# Patient Record
Sex: MALE | Race: WHITE | NOT HISPANIC OR LATINO | Employment: FULL TIME | URBAN - METROPOLITAN AREA
[De-identification: names, ages, dates, MRNs, and addresses within clinical notes are randomized per-mention and may not be internally consistent; named-entity substitution may affect disease eponyms.]

---

## 2021-01-15 ENCOUNTER — OFFICE VISIT (OUTPATIENT)
Dept: FAMILY MEDICINE CLINIC | Facility: CLINIC | Age: 61
End: 2021-01-15
Payer: COMMERCIAL

## 2021-01-15 VITALS
HEIGHT: 73 IN | SYSTOLIC BLOOD PRESSURE: 144 MMHG | WEIGHT: 235 LBS | BODY MASS INDEX: 31.14 KG/M2 | OXYGEN SATURATION: 98 % | DIASTOLIC BLOOD PRESSURE: 96 MMHG | TEMPERATURE: 98.7 F | HEART RATE: 88 BPM

## 2021-01-15 DIAGNOSIS — Z12.5 PROSTATE CANCER SCREENING: ICD-10-CM

## 2021-01-15 DIAGNOSIS — R03.0 ELEVATED BLOOD PRESSURE READING IN OFFICE WITHOUT DIAGNOSIS OF HYPERTENSION: ICD-10-CM

## 2021-01-15 DIAGNOSIS — Z13.29 THYROID DISORDER SCREEN: ICD-10-CM

## 2021-01-15 DIAGNOSIS — K40.21 BILATERAL RECURRENT INGUINAL HERNIA WITHOUT OBSTRUCTION OR GANGRENE: ICD-10-CM

## 2021-01-15 DIAGNOSIS — Z13.1 DIABETES MELLITUS SCREENING: ICD-10-CM

## 2021-01-15 DIAGNOSIS — Z12.12 SCREENING FOR COLORECTAL CANCER: ICD-10-CM

## 2021-01-15 DIAGNOSIS — Z12.11 SCREENING FOR COLORECTAL CANCER: ICD-10-CM

## 2021-01-15 DIAGNOSIS — Z11.59 NEED FOR HEPATITIS C SCREENING TEST: ICD-10-CM

## 2021-01-15 DIAGNOSIS — Z13.220 LIPID SCREENING: ICD-10-CM

## 2021-01-15 DIAGNOSIS — Z13.31 NEGATIVE DEPRESSION SCREENING: ICD-10-CM

## 2021-01-15 DIAGNOSIS — Z76.89 ENCOUNTER TO ESTABLISH CARE: Primary | ICD-10-CM

## 2021-01-15 DIAGNOSIS — N52.9 ERECTILE DYSFUNCTION, UNSPECIFIED ERECTILE DYSFUNCTION TYPE: ICD-10-CM

## 2021-01-15 PROCEDURE — 3725F SCREEN DEPRESSION PERFORMED: CPT | Performed by: PHYSICIAN ASSISTANT

## 2021-01-15 PROCEDURE — 99204 OFFICE O/P NEW MOD 45 MIN: CPT | Performed by: PHYSICIAN ASSISTANT

## 2021-01-15 RX ORDER — TADALAFIL 5 MG/1
5 TABLET ORAL DAILY PRN
Qty: 25 TABLET | Refills: 1 | Status: SHIPPED | OUTPATIENT
Start: 2021-01-15 | End: 2022-07-05

## 2021-01-15 NOTE — PATIENT INSTRUCTIONS
Hypertension   AMBULATORY CARE:   Hypertension  is high blood pressure  Your blood pressure is the force of your blood moving against the walls of your arteries  Hypertension causes your blood pressure to get so high that your heart has to work much harder than normal  This can damage your heart  The cause of hypertension may not be known  This is called essential or primary hypertension  Hypertension caused by another medical condition, such as kidney disease, is called secondary hypertension  Common symptoms include the following:   · Headache     · Blurred vision     · Chest pain     · Dizziness or weakness     · Trouble breathing    · Nosebleeds    Call 911 for any of the following:   · You have chest pain  · You have any of the following signs of a heart attack:      ? Squeezing, pressure, or pain in your chest    ? You may  also have any of the following:     § Discomfort or pain in your back, neck, jaw, stomach, or arm    § Shortness of breath    § Nausea or vomiting    § Lightheadedness or a sudden cold sweat    · You become confused or have difficulty speaking  · You suddenly feel lightheaded or have trouble breathing  Seek care immediately if:   · You have a severe headache or vision loss  · You have weakness in an arm or leg  Contact your healthcare provider if:   · You feel faint, dizzy, confused, or drowsy  · You have been taking your blood pressure medicine but your pressure is higher than your provider says it should be  · You have questions or concerns about your condition or care  What you need to know about the stages of hypertension:       · Normal blood pressure is 119/79 or lower   Your healthcare provider may only check your blood pressure each year if it stays at a normal level  · Elevated blood pressure is 120/79 to 129/79   This is sometimes called prehypertension   Your healthcare provider may suggest lifestyle changes to help lower your blood pressure to a normal level  He or she may then check it again in 3 to 6 months  · Stage 1 hypertension is 130/80  to 139/89   Your provider may recommend lifestyle changes, medication, and checks every 3 to 6 months until your blood pressure is controlled  · Stage 2 hypertension is 140/90 or higher   Your provider will recommend lifestyle changes and have you take 2 kinds of hypertension medicines  You will also need to have your blood pressure checked monthly until it is controlled  Treatment for hypertension  may include medicine to lower your blood pressure and lower your cholesterol level  A low cholesterol level helps prevent heart disease and makes it easier to control your blood pressure  Take your medicine exactly as directed  You may also need to make lifestyle changes  Manage hypertension:   · Check your blood pressure at home  Avoid smoking, caffeine, and exercise at least 30 minutes before checking your blood pressure  Sit and rest for 5 minutes before you take your blood pressure  Extend your arm and support it on a flat surface  Your arm should be at the same level as your heart  Follow the directions that came with your blood pressure monitor  Check your blood pressure 2 times, 1 minute apart, before you take your medicine in the morning  Also check your blood pressure before your evening meal  Keep a record of your readings and bring it to your follow-up visits  Ask your healthcare provider what your blood pressure should be  · Manage any other health conditions you have  Health conditions such as diabetes can increase your risk for hypertension  Follow your healthcare provider's instructions and take all your medicines as directed  · Ask about all medicines  Certain medicines can increase your blood pressure  Examples include oral birth control pills, decongestants, herbal supplements, and NSAIDs, such as ibuprofen  Your healthcare provider can tell you which medicines are safe for you to take  This includes prescription and over-the-counter medicines  Lifestyle changes you can make to manage hypertension:   · Limit sodium (salt) as directed  Too much sodium can affect your fluid balance  Check labels to find low-sodium or no-salt-added foods  Some low-sodium foods use potassium salts for flavor  Too much potassium can also cause health problems  Your healthcare provider will tell you how much sodium and potassium are safe for you to have in a day  He or she may recommend that you limit sodium to 2,300 mg a day  · Follow the meal plan recommended by your healthcare provider  A dietitian or your provider can give you more information on low-sodium plans or the DASH (Dietary Approaches to Stop Hypertension) eating plan  The DASH plan is low in sodium, unhealthy fats, and total fat  It is high in potassium, calcium, and fiber  · Exercise to maintain a healthy weight  Exercise at least 30 minutes per day, on most days of the week  This will help decrease your blood pressure  Ask your healthcare provider about the best exercise plan for you  · Decrease stress  This may help lower your blood pressure  Learn ways to relax, such as deep breathing or listening to music  · Limit alcohol as directed  Alcohol can increase your blood pressure  A drink of alcohol is 12 ounces of beer, 5 ounces of wine, or 1½ ounces of liquor  · Do not smoke  Nicotine and other chemicals in cigarettes and cigars can increase your blood pressure and also cause lung damage  Ask your healthcare provider for information if you currently smoke and need help to quit  E-cigarettes or smokeless tobacco still contain nicotine  Talk to your healthcare provider before you use these products  Follow up with your healthcare provider as directed: You will need to return to have your blood pressure checked and to have other lab tests done   Write down your questions so you remember to ask them during your visits  © Copyright [address] [place] [address] Information is for End User's use only and may not be sold, redistributed or otherwise used for commercial purposes  All illustrations and images included in CareNotes® are the copyrighted property of [employer]  or Betito Browne  The above information is an  only  It is not intended as medical advice for individual conditions or treatments  Talk to your doctor, nurse or pharmacist before following any medical regimen to see if it is safe and effective for you

## 2021-01-15 NOTE — PROGRESS NOTES
Routine Follow-up    Richard Mahajan 61 y o  male   Date:  1/15/2021      Assessment and Plan:    Hugo Camacho was seen today for establish care and hernia  Diagnoses and all orders for this visit:    Encounter to establish care    Screening for colorectal cancer  -     Cologuard; Future    Lipid screening  -     Lipid panel; Future    Diabetes mellitus screening  -     Comprehensive metabolic panel; Future  -     Hemoglobin A1C; Future    Thyroid disorder screen  -     TSH, 3rd generation with Free T4 reflex; Future    Need for hepatitis C screening test  -     Hepatitis C antibody; Future    Prostate cancer screening  -     PSA, Total Screen; Future    Negative depression screening    Bilateral recurrent inguinal hernia without obstruction or gangrene  -     Ambulatory referral to General Surgery; Future    Elevated blood pressure reading in office without diagnosis of hypertension  - follow up in 1 month to continue to monitor  - smoking cessation, limit caffeine, low salt     Erectile dysfunction, unspecified erectile dysfunction type  -     tadalafil (CIALIS) 5 MG tablet; Take 1 tablet (5 mg total) by mouth daily as needed for erectile dysfunction            BMI Counseling: Body mass index is 31 43 kg/m²  The BMI is above normal  Nutrition recommendations include decreasing portion sizes and encouraging healthy choices of fruits and vegetables  Tobacco Cessation Counseling: Tobacco cessation counseling was provided  The patient is sincerely urged to quit consumption of tobacco  He is not ready to quit tobacco        HPI:  Chief Complaint   Patient presents with   [address] patient   Hernia     Possible hernia, patients states "he has 3 testicles" since May 2020  Declined flu shot  HPI   Patient is a 60 yo male who presents to establish care  No recent PCP  His main concern is possible inguinal hernias  He states he has "3 testicles"   There is a lump floating around in there L scrotal area  He does appreciate not appreciate pain but sometimes fullness - "it feels crowded"  He notices bulges of b/l groin x 2 years  No difficulty with urination or bowel habits  He smokes 2 packs per day, smokes x 47 years  He states he should wear glasses  He does hearing 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 work  He does a physical every year with work  He has not done any routine labs, crc screening  He declines lung cancer screening  He does have concern for ED  ROS: Review of Systems   Constitutional: Negative  HENT: Negative  Eyes: Positive for visual disturbance  Respiratory: Negative  Cardiovascular: Negative  Gastrointestinal: Negative  Genitourinary: Positive for scrotal swelling (L side)  Negative for difficulty urinating, dysuria, hematuria, penile pain and testicular pain  Skin: Negative  Neurological: Negative  Psychiatric/Behavioral: Negative  History reviewed  No pertinent past medical history  There is no problem list on file for this patient        Past Surgical History:   Procedure Laterality Date    TONSILLECTOMY         Social History     Socioeconomic History    Marital status: /Civil Union     Spouse name: None    Number of children: None    Years of education: None    Highest education level: None   Occupational History    None   Social Needs    Financial resource strain: None    Food insecurity     Worry: None     Inability: None    Transportation needs     Medical: No     Non-medical: No   Tobacco Use    Smoking status: Current Every Day Smoker    Smokeless tobacco: Never Used   Substance and Sexual Activity    Alcohol use: Not Currently    Drug use: Not Currently    Sexual activity: None   Lifestyle    Physical activity     Days per week: None     Minutes per session: None    Stress: None   Relationships    Social connections     Talks on phone: None     Gets together: None     Attends Che[employer] service: None     Active member of club or organization: None     Attends meetings of clubs or organizations: None     Relationship status: None    Intimate partner violence     Fear of current or ex partner: None     Emotionally abused: None     Physically abused: None     Forced sexual activity: None   Other Topics Concern    None   Social History Narrative    None       Family History   Problem Relation Age of Onset    Diabetes Maternal Grandfather        No Known Allergies      Current Outpatient Medications:     tadalafil (CIALIS) 5 MG tablet, Take 1 tablet (5 mg total) by mouth daily as needed for erectile dysfunction, Disp: 25 tablet, Rfl: 1      Physical Exam:  /96 (BP Location: Left arm, Patient Position: Sitting, Cuff Size: Large)   Pulse 88   Temp 98 7 °F (37 1 °C) (Tympanic)   Ht 6' 0 5" (1 842 m)   Wt 107 kg (235 lb)   SpO2 98%   BMI 31 43 kg/m²     Physical Exam  Exam conducted with a chaperone present  Constitutional:       General: He is not in acute distress  Appearance: Normal appearance  HENT:      Head: Normocephalic and atraumatic  Right Ear: Tympanic membrane, ear canal and external ear normal       Left Ear: Tympanic membrane, ear canal and external ear normal    Eyes:      Conjunctiva/sclera: Conjunctivae normal       Pupils: Pupils are equal, round, and reactive to light  Cardiovascular:      Rate and Rhythm: Normal rate and regular rhythm  Pulses: Normal pulses  Heart sounds: No murmur  Pulmonary:      Effort: Pulmonary effort is normal  No respiratory distress  Breath sounds: Normal breath sounds  Abdominal:      Hernia: A hernia is present  Hernia is present in the left inguinal area (descended into L scrotal sac) and right inguinal area  Genitourinary:     Penis: Normal        Scrotum/Testes: Normal    Musculoskeletal:         General: No deformity or signs of injury  Skin:     General: Skin is warm and dry  Findings: No rash     Neurological:      General: No focal deficit present  Mental Status: He is alert and oriented to person, place, and time  Cranial Nerves: No cranial nerve deficit  Psychiatric:         Mood and Affect: Mood normal          Behavior: Behavior normal          Thought Content:  Thought content normal            Labs:  No results found for: WBC, HGB, HCT, MCV, PLT  No results found for: NA, K, CL, CO2, ANIONGAP, BUN, CREATININE, GLUCOSE, GLUF, CALCIUM, CORRECTEDCA, AST, ALT, ALKPHOS, PROT, BILITOT, EGFR

## 2021-01-22 ENCOUNTER — CONSULT (OUTPATIENT)
Dept: SURGERY | Facility: CLINIC | Age: 61
End: 2021-01-22
Payer: COMMERCIAL

## 2021-01-22 VITALS
BODY MASS INDEX: 31.01 KG/M2 | RESPIRATION RATE: 18 BRPM | WEIGHT: 234 LBS | HEART RATE: 104 BPM | TEMPERATURE: 97.2 F | SYSTOLIC BLOOD PRESSURE: 140 MMHG | DIASTOLIC BLOOD PRESSURE: 88 MMHG | HEIGHT: 73 IN

## 2021-01-22 DIAGNOSIS — K40.21 BILATERAL RECURRENT INGUINAL HERNIA WITHOUT OBSTRUCTION OR GANGRENE: Primary | ICD-10-CM

## 2021-01-22 DIAGNOSIS — K42.9 UMBILICAL HERNIA WITHOUT OBSTRUCTION AND WITHOUT GANGRENE: ICD-10-CM

## 2021-01-22 PROBLEM — K40.20 BILATERAL INGUINAL HERNIA WITHOUT OBSTRUCTION OR GANGRENE: Status: ACTIVE | Noted: 2021-01-22

## 2021-01-22 PROCEDURE — 4004F PT TOBACCO SCREEN RCVD TLK: CPT | Performed by: SURGERY

## 2021-01-22 PROCEDURE — 99244 OFF/OP CNSLTJ NEW/EST MOD 40: CPT | Performed by: SURGERY

## 2021-01-22 PROCEDURE — 3008F BODY MASS INDEX DOCD: CPT | Performed by: SURGERY

## 2021-01-22 RX ORDER — SODIUM CHLORIDE, SODIUM LACTATE, POTASSIUM CHLORIDE, CALCIUM CHLORIDE 600; 310; 30; 20 MG/100ML; MG/100ML; MG/100ML; MG/100ML
125 INJECTION, SOLUTION INTRAVENOUS CONTINUOUS
Status: CANCELLED | OUTPATIENT
Start: 2021-01-22

## 2021-01-22 RX ORDER — CEFAZOLIN SODIUM 2 G/50ML
2000 SOLUTION INTRAVENOUS ONCE
Status: CANCELLED | OUTPATIENT
Start: 2021-02-09 | End: 2021-01-22

## 2021-01-22 NOTE — H&P (VIEW-ONLY)
Assessment/Plan:    Bilateral inguinal hernia without obstruction or gangrene  Patient is a pleasant 78-year-old male with a past medical history significant for heavy tobacco use presenting for the definitive treatment of bilateral inguinal hernias by open mesh repair  According to the patient he has been aware of the hernias for several years  His work obligations have prevented his seeking out definitive treatment previously  The patient reports minimal symptoms referable to the hernias except when at home while doing strenuous work around the house  Patient denies a prior history of inguinal hernias  His only past surgical history significant for tonsillectomy at age 11  Today on physical examination the patient is a pleasant well-nourished well-developed male  He is in no acute distress  Awake alert oriented  Competent reliable as a historian  His abdomen rotund  He has a focal fascial defect at his umbilicus  He has palpable bilateral reducible inguinal hernias with larger on the left than the right  The testes are descended bilaterally  The technical details of open bilateral inguinal hernia repair with mesh were reviewed with the patient as were the risks related to anesthesia, bleeding, infection, the use of mesh, 1-3% lifetime risk of recurrence and 1-5% risk of chronic postoperative pain  Alternatives to open repair including laparoscopic and robotic options were discussed with the patient and offered to them  He declined  All questions answered to the satisfaction of the patient and informed consent taken to proceed  Umbilical hernia  The patient has a small asymptomatic hernia centered at his umbilicus  No additional treatment is indicated at the present time  Subjective:      Patient ID: Misael Antonio is a 61 y o  male  Patient is here today for pre op evaluation for bilateral inguinal hernias that he noticed over a year ago     States that he can feel a lump on the left groin,but not on the right  Pain comes and goes with straining, espically if he picks something up  No fever or chills  Sera Pierre MA        Review of Systems   Constitutional: Negative for chills and fever  HENT: Negative for ear pain and sore throat  Eyes: Negative for pain and visual disturbance  Respiratory: Negative for cough and shortness of breath  Patient smokes 2 packs of cigarettes per day   Cardiovascular: Negative for chest pain and palpitations  Gastrointestinal: Negative for abdominal pain and vomiting  Genitourinary: Negative for dysuria and hematuria  Musculoskeletal: Negative for arthralgias and back pain  Skin: Negative for color change and rash  Neurological: Negative for seizures and syncope  All other systems reviewed and are negative  Objective:      /88 (BP Location: Left arm, Patient Position: Sitting, Cuff Size: Standard)   Pulse 104   Temp (!) 97 2 °F (36 2 °C) (Tympanic)   Resp 18   Ht 6' 0 5" (1 842 m)   Wt 106 kg (234 lb)   BMI 31 30 kg/m²          Physical Exam  Vitals signs and nursing note reviewed  Constitutional:       Appearance: He is well-developed  HENT:      Head: Normocephalic and atraumatic  Eyes:      Conjunctiva/sclera: Conjunctivae normal       Pupils: Pupils are equal, round, and reactive to light  Neck:      Musculoskeletal: Normal range of motion and neck supple  Cardiovascular:      Rate and Rhythm: Normal rate and regular rhythm  Pulmonary:      Effort: Pulmonary effort is normal       Breath sounds: Normal breath sounds  Abdominal:      General: Bowel sounds are normal       Palpations: Abdomen is soft  Comments: Bilateral inguinal hernias present left greater than right   Genitourinary:     Comments: Testes descended bilaterally  Musculoskeletal: Normal range of motion  Skin:     General: Skin is warm and dry     Neurological:      Mental Status: He is alert and oriented to person, place, and time  Psychiatric:         Behavior: Behavior normal          Thought Content:  Thought content normal          Judgment: Judgment normal

## 2021-01-22 NOTE — ASSESSMENT & PLAN NOTE
Patient is a pleasant 27-year-old male with a past medical history significant for heavy tobacco use presenting for the definitive treatment of bilateral inguinal hernias by open mesh repair  According to the patient he has been aware of the hernias for several years  His work obligations have prevented his seeking out definitive treatment previously  The patient reports minimal symptoms referable to the hernias except when at home while doing strenuous work around the house  Patient denies a prior history of inguinal hernias  His only past surgical history significant for tonsillectomy at age 11  Today on physical examination the patient is a pleasant well-nourished well-developed male  He is in no acute distress  Awake alert oriented  Competent reliable as a historian  His abdomen rotund  He has a focal fascial defect at his umbilicus  He has palpable bilateral reducible inguinal hernias with larger on the left than the right  The testes are descended bilaterally  The technical details of open bilateral inguinal hernia repair with mesh were reviewed with the patient as were the risks related to anesthesia, bleeding, infection, the use of mesh, 1-3% lifetime risk of recurrence and 1-5% risk of chronic postoperative pain  Alternatives to open repair including laparoscopic and robotic options were discussed with the patient and offered to them  He declined  All questions answered to the satisfaction of the patient and informed consent taken to proceed

## 2021-01-22 NOTE — H&P
Assessment/Plan:    Bilateral inguinal hernia without obstruction or gangrene  Patient is a pleasant 17-year-old male with a past medical history significant for heavy tobacco use presenting for the definitive treatment of bilateral inguinal hernias by open mesh repair  According to the patient he has been aware of the hernias for several years  His work obligations have prevented his seeking out definitive treatment previously  The patient reports minimal symptoms referable to the hernias except when at home while doing strenuous work around the house  Patient denies a prior history of inguinal hernias  His only past surgical history significant for tonsillectomy at age 11  Today on physical examination the patient is a pleasant well-nourished well-developed male  He is in no acute distress  Awake alert oriented  Competent reliable as a historian  His abdomen rotund  He has a focal fascial defect at his umbilicus  He has palpable bilateral reducible inguinal hernias with larger on the left than the right  The testes are descended bilaterally  The technical details of open bilateral inguinal hernia repair with mesh were reviewed with the patient as were the risks related to anesthesia, bleeding, infection, the use of mesh, 1-3% lifetime risk of recurrence and 1-5% risk of chronic postoperative pain  Alternatives to open repair including laparoscopic and robotic options were discussed with the patient and offered to them  He declined  All questions answered to the satisfaction of the patient and informed consent taken to proceed  Umbilical hernia  The patient has a small asymptomatic hernia centered at his umbilicus  No additional treatment is indicated at the present time  Subjective:      Patient ID: Tyra Sharma is a 61 y o  male  Patient is here today for pre op evaluation for bilateral inguinal hernias that he noticed over a year ago     States that he can feel a lump on the left groin,but not on the right  Pain comes and goes with straining, espically if he picks something up  No fever or chills  Sera Pierre MA        Review of Systems   Constitutional: Negative for chills and fever  HENT: Negative for ear pain and sore throat  Eyes: Negative for pain and visual disturbance  Respiratory: Negative for cough and shortness of breath  Patient smokes 2 packs of cigarettes per day   Cardiovascular: Negative for chest pain and palpitations  Gastrointestinal: Negative for abdominal pain and vomiting  Genitourinary: Negative for dysuria and hematuria  Musculoskeletal: Negative for arthralgias and back pain  Skin: Negative for color change and rash  Neurological: Negative for seizures and syncope  All other systems reviewed and are negative  Objective:      /88 (BP Location: Left arm, Patient Position: Sitting, Cuff Size: Standard)   Pulse 104   Temp (!) 97 2 °F (36 2 °C) (Tympanic)   Resp 18   Ht 6' 0 5" (1 842 m)   Wt 106 kg (234 lb)   BMI 31 30 kg/m²          Physical Exam  Vitals signs and nursing note reviewed  Constitutional:       Appearance: He is well-developed  HENT:      Head: Normocephalic and atraumatic  Eyes:      Conjunctiva/sclera: Conjunctivae normal       Pupils: Pupils are equal, round, and reactive to light  Neck:      Musculoskeletal: Normal range of motion and neck supple  Cardiovascular:      Rate and Rhythm: Normal rate and regular rhythm  Pulmonary:      Effort: Pulmonary effort is normal       Breath sounds: Normal breath sounds  Abdominal:      General: Bowel sounds are normal       Palpations: Abdomen is soft  Comments: Bilateral inguinal hernias present left greater than right   Genitourinary:     Comments: Testes descended bilaterally  Musculoskeletal: Normal range of motion  Skin:     General: Skin is warm and dry     Neurological:      Mental Status: He is alert and oriented to person, place, and time  Psychiatric:         Behavior: Behavior normal          Thought Content:  Thought content normal          Judgment: Judgment normal

## 2021-01-22 NOTE — PROGRESS NOTES
Assessment/Plan:    Bilateral inguinal hernia without obstruction or gangrene  Patient is a pleasant 20-year-old male with a past medical history significant for heavy tobacco use presenting for the definitive treatment of bilateral inguinal hernias by open mesh repair  According to the patient he has been aware of the hernias for several years  His work obligations have prevented his seeking out definitive treatment previously  The patient reports minimal symptoms referable to the hernias except when at home while doing strenuous work around the house  Patient denies a prior history of inguinal hernias  His only past surgical history significant for tonsillectomy at age 11  Today on physical examination the patient is a pleasant well-nourished well-developed male  He is in no acute distress  Awake alert oriented  Competent reliable as a historian  His abdomen rotund  He has a focal fascial defect at his umbilicus  He has palpable bilateral reducible inguinal hernias with larger on the left than the right  The testes are descended bilaterally  The technical details of open bilateral inguinal hernia repair with mesh were reviewed with the patient as were the risks related to anesthesia, bleeding, infection, the use of mesh, 1-3% lifetime risk of recurrence and 1-5% risk of chronic postoperative pain  Alternatives to open repair including laparoscopic and robotic options were discussed with the patient and offered to them  He declined  All questions answered to the satisfaction of the patient and informed consent taken to proceed  Umbilical hernia  The patient has a small asymptomatic hernia centered at his umbilicus  No additional treatment is indicated at the present time  Subjective:      Patient ID: Mamadou Otoole is a 61 y o  male  Patient is here today for pre op evaluation for bilateral inguinal hernias that he noticed over a year ago     States that he can feel a lump on the left groin,but not on the right  Pain comes and goes with straining, espically if he picks something up  No fever or chills  Sera Pierre MA        Review of Systems   Constitutional: Negative for chills and fever  HENT: Negative for ear pain and sore throat  Eyes: Negative for pain and visual disturbance  Respiratory: Negative for cough and shortness of breath  Patient smokes 2 packs of cigarettes per day   Cardiovascular: Negative for chest pain and palpitations  Gastrointestinal: Negative for abdominal pain and vomiting  Genitourinary: Negative for dysuria and hematuria  Musculoskeletal: Negative for arthralgias and back pain  Skin: Negative for color change and rash  Neurological: Negative for seizures and syncope  All other systems reviewed and are negative  Objective:      /88 (BP Location: Left arm, Patient Position: Sitting, Cuff Size: Standard)   Pulse 104   Temp (!) 97 2 °F (36 2 °C) (Tympanic)   Resp 18   Ht 6' 0 5" (1 842 m)   Wt 106 kg (234 lb)   BMI 31 30 kg/m²          Physical Exam  Vitals signs and nursing note reviewed  Constitutional:       Appearance: He is well-developed  HENT:      Head: Normocephalic and atraumatic  Eyes:      Conjunctiva/sclera: Conjunctivae normal       Pupils: Pupils are equal, round, and reactive to light  Neck:      Musculoskeletal: Normal range of motion and neck supple  Cardiovascular:      Rate and Rhythm: Normal rate and regular rhythm  Pulmonary:      Effort: Pulmonary effort is normal       Breath sounds: Normal breath sounds  Abdominal:      General: Bowel sounds are normal       Palpations: Abdomen is soft  Comments: Bilateral inguinal hernias present left greater than right   Genitourinary:     Comments: Testes descended bilaterally  Musculoskeletal: Normal range of motion  Skin:     General: Skin is warm and dry     Neurological:      Mental Status: He is alert and oriented to person, place, and time  Psychiatric:         Behavior: Behavior normal          Thought Content:  Thought content normal          Judgment: Judgment normal

## 2021-01-22 NOTE — LETTER
January 22, 2021     Telam Huitron PA-C  [address] [facility] [address]    Patient: Ciera Mancera   YOB: 1960   Date of Visit: 1/22/2021       Dear Dr Angi Portillo: Thank you for referring John Fitch to me for evaluation  Below are my notes for this consultation  If you have questions, please do not hesitate to call me  I look forward to following your patient along with you  Sincerely,        Susan Richards MD        CC: No Recipients  Susan Richards MD  1/22/2021 12:29 PM  Sign when Signing Visit  Assessment/Plan:    Bilateral inguinal hernia without obstruction or gangrene  Patient is a pleasant 63-year-old male with a past medical history significant for heavy tobacco use presenting for the definitive treatment of bilateral inguinal hernias by open mesh repair  According to the patient he has been aware of the hernias for several years  His work obligations have prevented his seeking out definitive treatment previously  The patient reports minimal symptoms referable to the hernias except when at home while doing strenuous work around the house  Patient denies a prior history of inguinal hernias  His only past surgical history significant for tonsillectomy at age 11  Today on physical examination the patient is a pleasant well-nourished well-developed male  He is in no acute distress  Awake alert oriented  Competent reliable as a historian  His abdomen rotund  He has a focal fascial defect at his umbilicus  He has palpable bilateral reducible inguinal hernias with larger on the left than the right  The testes are descended bilaterally  The technical details of open bilateral inguinal hernia repair with mesh were reviewed with the patient as were the risks related to anesthesia, bleeding, infection, the use of mesh, 1-3% lifetime risk of recurrence and 1-5% risk of chronic postoperative pain      Alternatives to open repair including laparoscopic and robotic options were discussed with the patient and offered to them  He declined  All questions answered to the satisfaction of the patient and informed consent taken to proceed  Umbilical hernia  The patient has a small asymptomatic hernia centered at his umbilicus  No additional treatment is indicated at the present time  Subjective:      Patient ID: Misael Antonio is a 61 y o  male  Patient is here today for pre op evaluation for bilateral inguinal hernias that he noticed over a year ago  States that he can feel a lump on the left groin,but not on the right  Pain comes and goes with straining, espically if he picks something up  No fever or chills  Sera Pierre MA        Review of Systems   Constitutional: Negative for chills and fever  HENT: Negative for ear pain and sore throat  Eyes: Negative for pain and visual disturbance  Respiratory: Negative for cough and shortness of breath  Patient smokes 2 packs of cigarettes per day   Cardiovascular: Negative for chest pain and palpitations  Gastrointestinal: Negative for abdominal pain and vomiting  Genitourinary: Negative for dysuria and hematuria  Musculoskeletal: Negative for arthralgias and back pain  Skin: Negative for color change and rash  Neurological: Negative for seizures and syncope  All other systems reviewed and are negative  Objective:      /88 (BP Location: Left arm, Patient Position: Sitting, Cuff Size: Standard)   Pulse 104   Temp (!) 97 2 °F (36 2 °C) (Tympanic)   Resp 18   Ht 6' 0 5" (1 842 m)   Wt 106 kg (234 lb)   BMI 31 30 kg/m²          Physical Exam  Vitals signs and nursing note reviewed  Constitutional:       Appearance: He is well-developed  HENT:      Head: Normocephalic and atraumatic  Eyes:      Conjunctiva/sclera: Conjunctivae normal       Pupils: Pupils are equal, round, and reactive to light     Neck:      Musculoskeletal: Normal range of motion and neck supple  Cardiovascular:      Rate and Rhythm: Normal rate and regular rhythm  Pulmonary:      Effort: Pulmonary effort is normal       Breath sounds: Normal breath sounds  Abdominal:      General: Bowel sounds are normal       Palpations: Abdomen is soft  Comments: Bilateral inguinal hernias present left greater than right   Genitourinary:     Comments: Testes descended bilaterally  Musculoskeletal: Normal range of motion  Skin:     General: Skin is warm and dry  Neurological:      Mental Status: He is alert and oriented to person, place, and time  Psychiatric:         Behavior: Behavior normal          Thought Content:  Thought content normal          Judgment: Judgment normal

## 2021-01-22 NOTE — ASSESSMENT & PLAN NOTE
The patient has a small asymptomatic hernia centered at his umbilicus  No additional treatment is indicated at the present time

## 2021-01-29 ENCOUNTER — APPOINTMENT (OUTPATIENT)
Dept: RADIOLOGY | Facility: MEDICAL CENTER | Age: 61
End: 2021-01-29
Payer: COMMERCIAL

## 2021-01-29 ENCOUNTER — CLINICAL SUPPORT (OUTPATIENT)
Dept: URGENT CARE | Facility: MEDICAL CENTER | Age: 61
End: 2021-01-29
Payer: COMMERCIAL

## 2021-01-29 DIAGNOSIS — K40.21 BILATERAL RECURRENT INGUINAL HERNIA WITHOUT OBSTRUCTION OR GANGRENE: ICD-10-CM

## 2021-01-29 LAB
ATRIAL RATE: 85 BPM
P AXIS: 41 DEGREES
PR INTERVAL: 172 MS
QRS AXIS: 5 DEGREES
QRSD INTERVAL: 100 MS
QT INTERVAL: 368 MS
QTC INTERVAL: 437 MS
T WAVE AXIS: 62 DEGREES
VENTRICULAR RATE: 85 BPM

## 2021-01-29 PROCEDURE — 93005 ELECTROCARDIOGRAM TRACING: CPT

## 2021-01-29 PROCEDURE — 93010 ELECTROCARDIOGRAM REPORT: CPT

## 2021-01-29 PROCEDURE — 93010 ELECTROCARDIOGRAM REPORT: CPT | Performed by: INTERNAL MEDICINE

## 2021-01-29 PROCEDURE — 71046 X-RAY EXAM CHEST 2 VIEWS: CPT

## 2021-02-02 ENCOUNTER — HOSPITAL ENCOUNTER (EMERGENCY)
Facility: HOSPITAL | Age: 61
Discharge: HOME | End: 2021-02-02
Attending: INTERNAL MEDICINE | Admitting: INTERNAL MEDICINE
Payer: COMMERCIAL

## 2021-02-02 ENCOUNTER — APPOINTMENT (EMERGENCY)
Dept: CT IMAGING | Facility: HOSPITAL | Age: 61
End: 2021-02-02
Payer: COMMERCIAL

## 2021-02-02 VITALS
RESPIRATION RATE: 20 BRPM | SYSTOLIC BLOOD PRESSURE: 147 MMHG | TEMPERATURE: 97 F | WEIGHT: 235 LBS | BODY MASS INDEX: 31.43 KG/M2 | OXYGEN SATURATION: 96 % | HEART RATE: 77 BPM | DIASTOLIC BLOOD PRESSURE: 92 MMHG

## 2021-02-02 DIAGNOSIS — R42 LIGHTHEADEDNESS: ICD-10-CM

## 2021-02-02 DIAGNOSIS — R42 DIZZINESS: Primary | ICD-10-CM

## 2021-02-02 LAB
ALBUMIN SERPL BCP-MCNC: 4.3 G/DL (ref 3.5–5.7)
ALP SERPL-CCNC: 69 U/L (ref 55–165)
ALT SERPL W P-5'-P-CCNC: 22 U/L (ref 7–52)
ANION GAP SERPL CALCULATED.3IONS-SCNC: 9 MMOL/L (ref 4–13)
APTT PPP: 26 SECONDS (ref 23–37)
AST SERPL W P-5'-P-CCNC: 16 U/L (ref 13–39)
ATRIAL RATE: 83 BPM
BASOPHILS # BLD AUTO: 0.1 THOUSANDS/ΜL (ref 0–0.1)
BASOPHILS NFR BLD AUTO: 1 % (ref 0–2)
BILIRUB SERPL-MCNC: 0.4 MG/DL (ref 0.2–1)
BUN SERPL-MCNC: 23 MG/DL (ref 7–25)
CALCIUM SERPL-MCNC: 9.2 MG/DL (ref 8.6–10.5)
CHLORIDE SERPL-SCNC: 102 MMOL/L (ref 98–107)
CO2 SERPL-SCNC: 25 MMOL/L (ref 21–31)
CREAT SERPL-MCNC: 1.03 MG/DL (ref 0.7–1.3)
EOSINOPHIL # BLD AUTO: 0.1 THOUSAND/ΜL (ref 0–0.61)
EOSINOPHIL NFR BLD AUTO: 1 % (ref 0–5)
ERYTHROCYTE [DISTWIDTH] IN BLOOD BY AUTOMATED COUNT: 12.8 % (ref 11.5–14.5)
GFR SERPL CREATININE-BSD FRML MDRD: 79 ML/MIN/1.73SQ M
GLUCOSE SERPL-MCNC: 146 MG/DL (ref 65–99)
HCT VFR BLD AUTO: 43.1 % (ref 42–47)
HGB BLD-MCNC: 14.2 G/DL (ref 14–18)
INR PPP: 0.99 (ref 0.84–1.19)
LYMPHOCYTES # BLD AUTO: 1.3 THOUSANDS/ΜL (ref 0.6–4.47)
LYMPHOCYTES NFR BLD AUTO: 11 % (ref 21–51)
MAGNESIUM SERPL-MCNC: 1.9 MG/DL (ref 1.9–2.7)
MCH RBC QN AUTO: 29.7 PG (ref 26–34)
MCHC RBC AUTO-ENTMCNC: 33 G/DL (ref 31–37)
MCV RBC AUTO: 90 FL (ref 81–99)
MONOCYTES # BLD AUTO: 0.4 THOUSAND/ΜL (ref 0.17–1.22)
MONOCYTES NFR BLD AUTO: 4 % (ref 2–12)
NEUTROPHILS # BLD AUTO: 9.4 THOUSANDS/ΜL (ref 1.4–6.5)
NEUTS SEG NFR BLD AUTO: 84 % (ref 42–75)
P AXIS: 54 DEGREES
PLATELET # BLD AUTO: 213 THOUSANDS/UL (ref 149–390)
PMV BLD AUTO: 6.9 FL (ref 8.6–11.7)
POTASSIUM SERPL-SCNC: 4 MMOL/L (ref 3.5–5.5)
PR INTERVAL: 172 MS
PROT SERPL-MCNC: 6.8 G/DL (ref 6.4–8.9)
PROTHROMBIN TIME: 13 SECONDS (ref 11.6–14.5)
QRS AXIS: -41 DEGREES
QRSD INTERVAL: 102 MS
QT INTERVAL: 402 MS
QTC INTERVAL: 472 MS
RBC # BLD AUTO: 4.8 MILLION/UL (ref 4.3–5.9)
SODIUM SERPL-SCNC: 136 MMOL/L (ref 134–143)
T WAVE AXIS: 62 DEGREES
TROPONIN I SERPL-MCNC: <0.03 NG/ML
VENTRICULAR RATE: 83 BPM
WBC # BLD AUTO: 11.3 THOUSAND/UL (ref 4.8–10.8)

## 2021-02-02 PROCEDURE — 70498 CT ANGIOGRAPHY NECK: CPT

## 2021-02-02 PROCEDURE — 85025 COMPLETE CBC W/AUTO DIFF WBC: CPT | Performed by: INTERNAL MEDICINE

## 2021-02-02 PROCEDURE — 83735 ASSAY OF MAGNESIUM: CPT | Performed by: INTERNAL MEDICINE

## 2021-02-02 PROCEDURE — 93010 ELECTROCARDIOGRAM REPORT: CPT | Performed by: INTERNAL MEDICINE

## 2021-02-02 PROCEDURE — 99285 EMERGENCY DEPT VISIT HI MDM: CPT

## 2021-02-02 PROCEDURE — 84484 ASSAY OF TROPONIN QUANT: CPT | Performed by: INTERNAL MEDICINE

## 2021-02-02 PROCEDURE — 87635 SARS-COV-2 COVID-19 AMP PRB: CPT | Performed by: INTERNAL MEDICINE

## 2021-02-02 PROCEDURE — 85730 THROMBOPLASTIN TIME PARTIAL: CPT | Performed by: INTERNAL MEDICINE

## 2021-02-02 PROCEDURE — 85610 PROTHROMBIN TIME: CPT | Performed by: INTERNAL MEDICINE

## 2021-02-02 PROCEDURE — G1004 CDSM NDSC: HCPCS

## 2021-02-02 PROCEDURE — 36415 COLL VENOUS BLD VENIPUNCTURE: CPT | Performed by: INTERNAL MEDICINE

## 2021-02-02 PROCEDURE — 99285 EMERGENCY DEPT VISIT HI MDM: CPT | Performed by: INTERNAL MEDICINE

## 2021-02-02 PROCEDURE — 93005 ELECTROCARDIOGRAM TRACING: CPT

## 2021-02-02 PROCEDURE — 80053 COMPREHEN METABOLIC PANEL: CPT | Performed by: INTERNAL MEDICINE

## 2021-02-02 PROCEDURE — 70496 CT ANGIOGRAPHY HEAD: CPT

## 2021-02-02 RX ORDER — SODIUM CHLORIDE 9 MG/ML
125 INJECTION, SOLUTION INTRAVENOUS CONTINUOUS
Status: DISCONTINUED | OUTPATIENT
Start: 2021-02-02 | End: 2021-02-02 | Stop reason: HOSPADM

## 2021-02-02 RX ORDER — MECLIZINE HYDROCHLORIDE 25 MG/1
25 TABLET ORAL 3 TIMES DAILY PRN
Qty: 30 TABLET | Refills: 0 | Status: SHIPPED | OUTPATIENT
Start: 2021-02-02 | End: 2021-03-03

## 2021-02-02 RX ORDER — MECLIZINE HCL 12.5 MG/1
25 TABLET ORAL ONCE
Status: COMPLETED | OUTPATIENT
Start: 2021-02-02 | End: 2021-02-02

## 2021-02-02 RX ADMIN — IOHEXOL 85 ML: 350 INJECTION, SOLUTION INTRAVENOUS at 09:20

## 2021-02-02 RX ADMIN — MECLIZINE 25 MG: 12.5 TABLET ORAL at 10:42

## 2021-02-02 NOTE — DISCHARGE INSTRUCTIONS
Meclizine as needed for dizziness  Follow-up with family doctor and/or Cardiology if symptoms persist

## 2021-02-02 NOTE — ED PROVIDER NOTES
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 when he had the sensation  His extreme dizziness, he felt somewhat diaphoretic and nauseous  Lasted a short period of time  Today woke around 530, and the symptoms return  Associated with significant vertigo, he had to sit down, put his head between his legs to try to stop things  He became nauseous, very diaphoretic with this  He has no real past medical history other than a recent diagnosis of a hernia  He has had no pain associated with this or prior to his diaphoretic episode  He denies chest pain or palpitations  Denies headache  Currently his symptoms are completely resolved, he actually had a walk down the road to get in the ambulance to come here due to the weather conditions  He truly does describe a vertigo where everything is spinning  He smokes 2 packs a day  The than the recent hernia diagnosis, has not been to a doctor since the 1980s  Currently symptoms have resolved completely  He only thing that made his symptoms better or putting his head between his legs  There were not initiated nor worsened by rapid changes in position or rise from a chair  Prior to Admission Medications   Prescriptions Last Dose Informant Patient Reported? Taking? tadalafil (CIALIS) 5 MG tablet  Self No No   Sig: Take 1 tablet (5 mg total) by mouth daily as needed for erectile dysfunction      Facility-Administered Medications: None       History reviewed  No pertinent past medical history  Past Surgical History:   Procedure Laterality Date    TONSILLECTOMY         Family History   Problem Relation Age of Onset    Diabetes Maternal Grandfather      I have reviewed and agree with the history as documented      E-Cigarette/Vaping    E-Cigarette Use Never User      E-Cigarette/Vaping Substances    Nicotine No     THC No     CBD No     Flavoring No     Other No     Unknown No      Social History     Tobacco Use    Smoking status: Current Every Day Smoker     Packs/day: 2 00     Types: Cigarettes    Smokeless tobacco: Never Used   Substance Use Topics    Alcohol use: Not Currently    Drug use: Not Currently       Review of Systems   Constitutional: Negative for chills and fever  HENT: Negative for rhinorrhea and sore throat  Eyes: Negative for visual disturbance  Respiratory: Negative for cough and shortness of breath  Cardiovascular: Negative for chest pain and leg swelling  Gastrointestinal: Positive for nausea  Negative for abdominal pain, diarrhea and vomiting  Genitourinary: Negative for dysuria  Musculoskeletal: Negative for back pain and myalgias  Skin: Negative for rash  Diaphoretic   Neurological: Positive for dizziness  Negative for headaches  Psychiatric/Behavioral: Negative for confusion  All other systems reviewed and are negative  Physical Exam  Physical Exam  Vitals signs and nursing note reviewed  Constitutional:       Appearance: He is well-developed  HENT:      Nose: Nose normal       Mouth/Throat:      Pharynx: No oropharyngeal exudate  Eyes:      General: No scleral icterus  Conjunctiva/sclera: Conjunctivae normal       Pupils: Pupils are equal, round, and reactive to light  Neck:      Musculoskeletal: Normal range of motion and neck supple  Vascular: No JVD  Trachea: No tracheal deviation  Cardiovascular:      Rate and Rhythm: Normal rate and regular rhythm  Heart sounds: Normal heart sounds  No murmur  Pulmonary:      Effort: Pulmonary effort is normal  No respiratory distress  Breath sounds: Wheezing present  No rales  Comments: Few mild wheezes  Abdominal:      General: Bowel sounds are normal       Palpations: Abdomen is soft  Tenderness: There is no abdominal tenderness  There is no guarding        Comments: Right inguinal hernia   Musculoskeletal: Normal range of motion  General: No tenderness  Skin:     General: Skin is warm and dry  Neurological:      Mental Status: He is alert and oriented to person, place, and time  Cranial Nerves: No cranial nerve deficit  Sensory: No sensory deficit  Motor: No abnormal muscle tone  Comments: 5/5 motor, nl sens   Psychiatric:         Behavior: Behavior normal          Vital Signs  ED Triage Vitals [02/02/21 0742]   Temperature Pulse Respirations Blood Pressure SpO2   (!) 97 °F (36 1 °C) 78 18 147/92 97 %      Temp Source Heart Rate Source Patient Position - Orthostatic VS BP Location FiO2 (%)   Temporal Monitor Sitting Left arm --      Pain Score       --           Vitals:    02/02/21 0742 02/02/21 0745 02/02/21 0815 02/02/21 0845   BP: 147/92 147/92     Pulse: 78 79 79 77   Patient Position - Orthostatic VS: Sitting            Visual Acuity      ED Medications  Medications   iohexol (OMNIPAQUE) 350 MG/ML injection (SINGLE-DOSE) 85 mL (85 mL Intravenous Given 2/2/21 0920)   meclizine (ANTIVERT) tablet 25 mg (25 mg Oral Given 2/2/21 1042)       Diagnostic Studies  Results Reviewed     Procedure Component Value Units Date/Time    Novel Coronavirus [facility] [[id number]] Collected: 02/02/21 1046    Lab Status:  In process Specimen: Nares from Nasopharyngeal Swab Updated: 02/02/21 1051    Comprehensive metabolic panel [[id number]]  (Abnormal) Collected: 02/02/21 0802    Lab Status: Final result Specimen: Blood from Arm, Left Updated: 02/02/21 0830     Sodium 136 mmol/L      Potassium 4 0 mmol/L      Chloride 102 mmol/L      CO2 25 mmol/L      ANION GAP 9 mmol/L      BUN 23 mg/dL      Creatinine 1 03 mg/dL      Glucose 146 mg/dL      Calcium 9 2 mg/dL      AST 16 U/L      ALT 22 U/L      Alkaline Phosphatase 69 U/L      Total Protein 6 8 g/dL      Albumin 4 3 g/dL      Total Bilirubin 0 40 mg/dL      eGFR 79 ml/min/1 73sq m     Narrative: National Kidney Disease Foundation guidelines for Chronic Kidney Disease (CKD):     Stage 1 with normal or high GFR (GFR > 90 mL/min/1 73 square meters)    Stage 2 Mild CKD (GFR = 60-89 mL/min/1 73 square meters)    Stage 3A Moderate CKD (GFR = 45-59 mL/min/1 73 square meters)    Stage 3B Moderate CKD (GFR = 30-44 mL/min/1 73 square meters)    Stage 4 Severe CKD (GFR = 15-29 mL/min/1 73 square meters)    Stage 5 End Stage CKD (GFR <15 mL/min/1 73 square meters)  Note: GFR calculation is accurate only with a steady state creatinine    Troponin I [[id number]]  (Normal) Collected: 02/02/21 0802    Lab Status: Final result Specimen: Blood from Arm, Left Updated: 02/02/21 0826     Troponin I <0 03 ng/mL     Magnesium [[id number]]  (Normal) Collected: 02/02/21 0802    Lab Status: Final result Specimen: Blood from Arm, Left Updated: 02/02/21 0826     Magnesium 1 9 mg/dL     Protime-INR [[id number]]  (Normal) Collected: 02/02/21 0802    Lab Status: Final result Specimen: Blood from Arm, Left Updated: 02/02/21 0819     Protime 13 0 seconds      INR 0 99    APTT [[id number]]  (Normal) Collected: 02/02/21 0802    Lab Status: Final result Specimen: Blood from Arm, Left Updated: 02/02/21 0819     PTT 26 seconds     CBC and differential [[id number]]  (Abnormal) Collected: 02/02/21 0802    Lab Status: Final result Specimen: Blood from Arm, Left Updated: 02/02/21 0812     WBC 11 30 Thousand/uL      RBC 4 80 Million/uL      Hemoglobin 14 2 g/dL      Hematocrit 43 1 %      MCV 90 fL      MCH 29 7 pg      MCHC 33 0 g/dL      RDW 12 8 %      MPV 6 9 fL      Platelets 624 Thousands/uL      Neutrophils Relative 84 %      Lymphocytes Relative 11 %      Monocytes Relative 4 %      Eosinophils Relative 1 %      Basophils Relative 1 %      Neutrophils Absolute 9 40 Thousands/µL      Lymphocytes Absolute 1 30 Thousands/µL      Monocytes Absolute 0 40 Thousand/µL      Eosinophils Absolute 0 10 Thousand/µL      Basophils Absolute 0 10 Thousands/µL                  CTA head and neck with and without contrast   ED Interpretation by Mik Baird DO (02/02 1011)   No vascular abnormalities, no intra cranial process  Final Result by Jabier Lefort, MD (02/02 3010)      No mass effect, acute intracranial hemorrhage or evidence of recent infarction  No evidence for high-grade stenosis, major branch vessel occlusion or vascular aneurysm of the intracranial circulation  Mild atherosclerotic plaque at the carotid bulbs  No evidence for high-grade stenosis or focal occlusion of the cervical vasculature  Workstation performed: [id number]                    Procedures  ECG 12 Lead Documentation Only    Date/Time: 2/2/2021 8:03 AM  Performed by: Mik Baird DO  Authorized by: Mik Baird DO     Indications / Diagnosis:  Dizziness  ECG reviewed by me, the ED Provider: yes    Patient location:  ED  Previous ECG:     Previous ECG:  Compared to current    Similarity:  No change    Comparison to cardiac monitor: Yes    Interpretation:     Interpretation: non-specific    Rate:     ECG rate:  83    ECG rate assessment: normal    Rhythm:     Rhythm: sinus rhythm    Ectopy:     Ectopy: none    QRS:     QRS axis:  Left    QRS intervals:  Normal  Conduction:     Conduction: normal    ST segments:     ST segments:  Normal  T waves:     T waves: normal               ED Course  ED Course as of Feb 02 1552   Tue Feb 02, 2021   0944 Patient back on CT scan  He feels excellent  No further episodes of dizziness  Again had no chest pain Lukas episode of diaphoresis  1011 Patient feels well and is anxious for discharge  Vital signs have been stable                      Stroke Assessment     Row Name 02/02/21 0759             NIH Stroke Scale    Interval  Baseline      Level of Consciousness (1a )  0      LOC Questions (1b )  0      LOC Commands (1c )  0      Best Gaze (2 )  0      Visual (3 )  0      Facial Palsy (4 )  0 Motor Arm, Left (5a )  0      Motor Arm, Right (5b )  0      Motor Leg, Left (6a )  0      Motor Leg, Right (6b )  0      Limb Ataxia (7 )  0      Sensory (8 )  0      Best Language (9 )  0      Dysarthria (10 )  0      Extinction and Inattention (11 ) (Formerly Neglect)  0      Total  0          First Filed Value   TPA Decision  Patient not a TPA candidate  SBIRT 22yo+      Most Recent Value   SBIRT (24 yo +)   In order to provide better care to our patients, we are screening all of our patients for alcohol and drug use  Would it be okay to ask you these screening questions? Yes Filed at: 02/02/2021 1048   Initial Alcohol Screen: US AUDIT-C    1  How often do you have a drink containing alcohol?  0 Filed at: 02/02/2021 1048   2  How many drinks containing alcohol do you have on a typical day you are drinking? 0 Filed at: 02/02/2021 1048   3a  Male UNDER 65: How often do you have five or more drinks on one occasion? 0 Filed at: 02/02/2021 1048   3b  FEMALE Any Age, or MALE 65+: How often do you have 4 or more drinks on one occassion? 0 Filed at: 02/02/2021 1048   Audit-C Score  0 Filed at: 02/02/2021 1048   BAILEE: How many times in the past year have you    Used an illegal drug or used a prescription medication for non-medical reasons?   Never Filed at: 02/02/2021 1048                    MDM    Disposition  Final diagnoses:   Dizziness   Lightheadedness     Time reflects when diagnosis was documented in both MDM as applicable and the Disposition within this note     Time User Action Codes Description Comment    2/2/2021 10:11 AM Pepe Peterson Add [R42] Dizziness     2/2/2021 10:11 AM Pepe Peterson Add [R42] Lightheadedness     2/2/2021 10:35 AM Pepe Peterson Add [R42] Vertigo     2/2/2021 10:35 AM Pepe Peterson Remove [R42] Vertigo       ED Disposition     ED Disposition Condition Date/Time Comment    Discharge Stable Tue Feb 2, 2021 10:11 AM Misael Antonio discharge to home/self care             Follow-up Information     Follow up With Specialties Details Why [address] Darielakenneth Sabillon PA-C Family Medicine, Physician Assistant Call  Follow-up with her regarding these episodes Margy Bassett [place]  [phone]            Discharge Medication List as of 2/2/2021 10:36 AM      START taking these medications    Details   meclizine (ANTIVERT) 25 mg tablet Take 1 tablet (25 mg total) by mouth 3 (three) times a day as needed for dizziness, Starting Tue 2/2/2021, Normal         CONTINUE these medications which have NOT CHANGED    Details   tadalafil (CIALIS) 5 MG tablet Take 1 tablet (5 mg total) by mouth daily as needed for erectile dysfunction, Starting Fri 1/15/2021, Normal           No discharge procedures on file      PDMP Review     None          ED Provider  Electronically Signed by           Chelsey Robertson DO  02/02/21 2813

## 2021-02-03 ENCOUNTER — OFFICE VISIT (OUTPATIENT)
Dept: FAMILY MEDICINE CLINIC | Facility: CLINIC | Age: 61
End: 2021-02-03
Payer: COMMERCIAL

## 2021-02-03 VITALS
DIASTOLIC BLOOD PRESSURE: 92 MMHG | BODY MASS INDEX: 31.28 KG/M2 | RESPIRATION RATE: 20 BRPM | TEMPERATURE: 98.3 F | HEIGHT: 73 IN | SYSTOLIC BLOOD PRESSURE: 148 MMHG | HEART RATE: 92 BPM | OXYGEN SATURATION: 97 % | WEIGHT: 236 LBS

## 2021-02-03 DIAGNOSIS — R73.9 HYPERGLYCEMIA: ICD-10-CM

## 2021-02-03 DIAGNOSIS — R59.0 LAD (LYMPHADENOPATHY) OF RIGHT CERVICAL REGION: ICD-10-CM

## 2021-02-03 DIAGNOSIS — R42 VERTIGO: Primary | ICD-10-CM

## 2021-02-03 DIAGNOSIS — I10 ESSENTIAL HYPERTENSION: ICD-10-CM

## 2021-02-03 LAB — SARS-COV-2 RNA RESP QL NAA+PROBE: NEGATIVE

## 2021-02-03 PROCEDURE — 99215 OFFICE O/P EST HI 40 MIN: CPT | Performed by: PHYSICIAN ASSISTANT

## 2021-02-03 RX ORDER — LISINOPRIL 5 MG/1
5 TABLET ORAL DAILY
Qty: 30 TABLET | Refills: 1 | Status: SHIPPED | OUTPATIENT
Start: 2021-02-03 | End: 2021-03-03 | Stop reason: SDUPTHER

## 2021-02-03 NOTE — PATIENT INSTRUCTIONS
Vertigo   WHAT YOU NEED TO KNOW:   Vertigo is a condition that causes you to feel dizzy  You may feel that you or everything around you is moving or spinning  You may also feel like you are being pulled down or toward your side  DISCHARGE INSTRUCTIONS:   Return to the emergency department if:   · You have a headache and a stiff neck  · You have shaking chills and a fever  · You vomit over and over with no relief  · You have blood, pus, or fluid coming out of your ears  · You are confused  Contact your healthcare provider if:   · Your symptoms do not get better with treatment  · You have questions about your condition or care  Medicines:   · Medicine  may be given to help relieve your symptoms  · Take your medicine as directed  Contact your healthcare provider if you think your medicine is not helping or if you have side effects  Tell him or her if you are allergic to any medicine  Keep a list of the medicines, vitamins, and herbs you take  Include the amounts, and when and why you take them  Bring the list or the pill bottles to follow-up visits  Carry your medicine list with you in case of an emergency  Manage your symptoms:   · Do not drive , walk without help, or operate heavy machinery when you are dizzy  · Move slowly  when you move from one position to another position  Get up slowly from sitting or lying down  Sit or lie down right away if you feel dizzy  · Drink plenty of liquids  Liquids help prevent dehydration  Ask how much liquid to drink each day and which liquids are best for you  · Vestibular and balance rehabilitation therapy (VBRT)  is used to teach you exercises to improve your balance and strength  These exercises may help decrease your vertigo and improve your balance  Ask for more information about this therapy  Follow up with your healthcare provider as directed:  Write down your questions so you remember to ask them during your visits     © Copyright [address] Information is for [employer] use only and may not be sold, redistributed or otherwise used for commercial purposes  All illustrations and images included in CareNotes® are the copyrighted property of A D A M , Inc  or Betito Browne  The above information is an  only  It is not intended as medical advice for individual conditions or treatments  Talk to your doctor, nurse or pharmacist before following any medical regimen to see if it is safe and effective for you  Hypertension   AMBULATORY CARE:   Hypertension  is high blood pressure  Your blood pressure is the force of your blood moving against the walls of your arteries  Hypertension causes your blood pressure to get so high that your heart has to work much harder than normal  This can damage your heart  The cause of hypertension may not be known  This is called essential or primary hypertension  Hypertension caused by another medical condition, such as kidney disease, is called secondary hypertension  Common symptoms include the following:   · Headache     · Blurred vision     · Chest pain     · Dizziness or weakness     · Trouble breathing    · Nosebleeds    Call 911 for any of the following:   · You have chest pain  · You have any of the following signs of a heart attack:      ? Squeezing, pressure, or pain in your chest    ? You may  also have any of the following:     § Discomfort or pain in your back, neck, jaw, stomach, or arm    § Shortness of breath    § Nausea or vomiting    § Lightheadedness or a sudden cold sweat    · You become confused or have difficulty speaking  · You suddenly feel lightheaded or have trouble breathing  Seek care immediately if:   · You have a severe headache or vision loss  · You have weakness in an arm or leg  Contact your healthcare provider if:   · You feel faint, dizzy, confused, or drowsy      · You have been taking your blood pressure medicine but your pressure is higher than your provider says it should be  · You have questions or concerns about your condition or care  What you need to know about the stages of hypertension:       · Normal blood pressure is 119/79 or lower   Your healthcare provider may only check your blood pressure each year if it stays at a normal level  · Elevated blood pressure is 120/79 to 129/79   This is sometimes called prehypertension  Your healthcare provider may suggest lifestyle changes to help lower your blood pressure to a normal level  He or she may then check it again in 3 to 6 months  · Stage 1 hypertension is 130/80  to 139/89   Your provider may recommend lifestyle changes, medication, and checks every 3 to 6 months until your blood pressure is controlled  · Stage 2 hypertension is 140/90 or higher   Your provider will recommend lifestyle changes and have you take 2 kinds of hypertension medicines  You will also need to have your blood pressure checked monthly until it is controlled  Treatment for hypertension  may include medicine to lower your blood pressure and lower your cholesterol level  A low cholesterol level helps prevent heart disease and makes it easier to control your blood pressure  Take your medicine exactly as directed  You may also need to make lifestyle changes  Manage hypertension:   · Check your blood pressure at home  Avoid smoking, caffeine, and exercise at least 30 minutes before checking your blood pressure  Sit and rest for 5 minutes before you take your blood pressure  Extend your arm and support it on a flat surface  Your arm should be at the same level as your heart  Follow the directions that came with your blood pressure monitor  Check your blood pressure 2 times, 1 minute apart, before you take your medicine in the morning  Also check your blood pressure before your evening meal  Keep a record of your readings and bring it to your follow-up visits   Ask your healthcare provider what your blood pressure should be  · Manage any other health conditions you have  Health conditions such as diabetes can increase your risk for hypertension  Follow your healthcare provider's instructions and take all your medicines as directed  · Ask about all medicines  Certain medicines can increase your blood pressure  Examples include oral birth control pills, decongestants, herbal supplements, and NSAIDs, such as ibuprofen  Your healthcare provider can tell you which medicines are safe for you to take  This includes prescription and over-the-counter medicines  Lifestyle changes you can make to manage hypertension:   · Limit sodium (salt) as directed  Too much sodium can affect your fluid balance  Check labels to find low-sodium or no-salt-added foods  Some low-sodium foods use potassium salts for flavor  Too much potassium can also cause health problems  Your healthcare provider will tell you how much sodium and potassium are safe for you to have in a day  He or she may recommend that you limit sodium to 2,300 mg a day  · Follow the meal plan recommended by your healthcare provider  A dietitian or your provider can give you more information on low-sodium plans or the DASH (Dietary Approaches to Stop Hypertension) eating plan  The DASH plan is low in sodium, unhealthy fats, and total fat  It is high in potassium, calcium, and fiber  · Exercise to maintain a healthy weight  Exercise at least 30 minutes per day, on most days of the week  This will help decrease your blood pressure  Ask your healthcare provider about the best exercise plan for you  · Decrease stress  This may help lower your blood pressure  Learn ways to relax, such as deep breathing or listening to music  · Limit alcohol as directed  Alcohol can increase your blood pressure  A drink of alcohol is 12 ounces of beer, 5 ounces of wine, or 1½ ounces of liquor  · Do not smoke    Nicotine and other chemicals in cigarettes and cigars can increase your blood pressure and also cause lung damage  Ask your healthcare provider for information if you currently smoke and need help to quit  E-cigarettes or smokeless tobacco still contain nicotine  Talk to your healthcare provider before you use these products  Follow up with your healthcare provider as directed: You will need to return to have your blood pressure checked and to have other lab tests done  Write down your questions so you remember to ask them during your visits  © Copyright [address] Information is for End User's use only and may not be sold, redistributed or otherwise used for commercial purposes  All illustrations and images included in CareNotes® are the copyrighted property of A D A M , Inc  or [address] [place] [address]  The above information is an  only  It is not intended as medical advice for individual conditions or treatments  Talk to your doctor, nurse or pharmacist before following any medical regimen to see if it is safe and effective for you

## 2021-02-03 NOTE — PROGRESS NOTES
ER Follow-up    Tyra Sharma 61 y o  male   Date:  2/3/2021      Assessment and Plan:    Deanna Ramirez was seen today for follow-up  Diagnoses and all orders for this visit:    Vertigo  -     Ambulatory referral to Physical Therapy; Future  - no further episodes  - benign ER work up   - induced with repetitive positional changes  - continue prn meclizine  - if persistent, pursue vestibular therapy   - no ear infection, questionable congestion of inner ear - recommend daily otc antihistamine    LAD (lymphadenopathy) of right cervical region  - x 3 days, no URI symptoms  - patient reports on off since prior dental procedure 3 years ago  - follow up with clinical exam at 1 month visit  - pursue ultrasound if persistent     Hyperglycemia  - obtain a1c   - labs pending in chart    Essential hypertension  -     lisinopril (ZESTRIL) 5 mg tablet; Take 1 tablet (5 mg total) by mouth daily  - low salt diet, stop smoking  - follow  Up in 1 month   - hold lisinopril morning of upcoming hernia surgery          HPI:  Chief Complaint   Patient presents with    Follow-up     Seen at ER on 2/2/21 for dizziness  States the ER provider told him he has an inner ear infection  No antibiotics given  Was given Meclizine for dizziness  HPI   Patient is a 60 yo male who presents for ER follow up from day prior due to dizziness  His history was consistent with vertigo  It first started while out shoveling and turning head back and forth  He had unremarkable work up with EKG, labs, CTA head and neck  He was discharged with meclizine  He has had no further episodes  He did take a meclizine prior to coming to appt today to prevent any episodes  He has had no recent URI symptoms  He does appreciate a swollen lymph node on R side that is a little tender x 3 days  He has appreciated this on and off since having teeth pulled 3 years ago  He also now has had 3 visit of elevated BP  He has never been on BP meds prior  He does smoke     While in ER, BG was in 100s but was not fasting  He has pending labs in chart for a1c  No prior history of diabetes but also no recent medical care  ROS: Review of Systems   Constitutional: Negative for appetite change, chills, diaphoresis, fever and unexpected weight change  HENT: Negative for congestion, ear discharge, ear pain, rhinorrhea, sore throat and tinnitus  Eyes:        Overdue for eye exam, looking for new eye doctor    Respiratory: Negative  Cardiovascular: Negative  Gastrointestinal: Negative  Musculoskeletal: Positive for arthralgias  Skin: Negative  Neurological: Positive for dizziness (no further episodes since ER )  Negative for seizures, syncope, facial asymmetry, weakness, light-headedness, numbness and headaches  Hematological: Positive for adenopathy  History reviewed  No pertinent past medical history    Patient Active Problem List   Diagnosis    Bilateral inguinal hernia without obstruction or gangrene    Umbilical hernia       Past Surgical History:   Procedure Laterality Date    TONSILLECTOMY         Social History     Socioeconomic History    Marital status: /Civil Union     Spouse name: None    Number of children: None    Years of education: None    Highest education level: None   Occupational History    None   Social Needs    Financial resource strain: None    Food insecurity     Worry: None     Inability: None    Transportation needs     Medical: No     Non-medical: No   Tobacco Use    Smoking status: Current Every Day Smoker     Packs/day: 2 00     Types: Cigarettes    Smokeless tobacco: Never Used   Substance and Sexual Activity    Alcohol use: Not Currently    Drug use: Not Currently    Sexual activity: None   Lifestyle    Physical activity     Days per week: None     Minutes per session: None    Stress: None   Relationships    Social connections     Talks on phone: None     Gets together: None     Attends [employer] service: None Active member of club or organization: None     Attends meetings of clubs or organizations: None     Relationship status: None    Intimate partner violence     Fear of current or ex partner: None     Emotionally abused: None     Physically abused: None     Forced sexual activity: None   Other Topics Concern    None   Social History Narrative    None       Family History   Problem Relation Age of Onset    Diabetes Maternal Grandfather        No Known Allergies      Current Outpatient Medications:     meclizine (ANTIVERT) 25 mg tablet, Take 1 tablet (25 mg total) by mouth 3 (three) times a day as needed for dizziness, Disp: 30 tablet, Rfl: 0    tadalafil (CIALIS) 5 MG tablet, Take 1 tablet (5 mg total) by mouth daily as needed for erectile dysfunction, Disp: 25 tablet, Rfl: 1    lisinopril (ZESTRIL) 5 mg tablet, Take 1 tablet (5 mg total) by mouth daily, Disp: 30 tablet, Rfl: 1  No current facility-administered medications for this visit  Physical Exam:  /92 (BP Location: Left arm, Patient Position: Sitting)   Pulse 92   Temp 98 3 °F (36 8 °C)   Resp 20   Ht 6' 0 5" (1 842 m)   Wt 107 kg (236 lb)   SpO2 97%   BMI 31 57 kg/m²     Physical Exam  Constitutional:       General: He is not in acute distress  Appearance: Normal appearance  He is not diaphoretic  HENT:      Head: Normocephalic and atraumatic  Right Ear: Tympanic membrane, ear canal and external ear normal  Tympanic membrane is not bulging  Left Ear: Tympanic membrane, ear canal and external ear normal  Tympanic membrane is not bulging  Nose: Nose normal       Mouth/Throat:      Mouth: Mucous membranes are moist       Dentition: Has dentures  Pharynx: Oropharynx is clear  Eyes:      General:         Right eye: No discharge  Left eye: No discharge  Extraocular Movements: Extraocular movements intact        Conjunctiva/sclera: Conjunctivae normal       Pupils: Pupils are equal, round, and reactive to light  Neck:      Musculoskeletal: Normal range of motion and neck supple  Cardiovascular:      Rate and Rhythm: Normal rate and regular rhythm  Heart sounds: No murmur  Pulmonary:      Effort: Pulmonary effort is normal  No respiratory distress  Breath sounds: Normal breath sounds  Musculoskeletal:         General: No deformity  Lymphadenopathy:      Cervical: Cervical adenopathy (small R anterior cervical, slightly tender) present  Skin:     General: Skin is warm and dry  Coloration: Skin is not pale  Findings: No rash  Neurological:      General: No focal deficit present  Mental Status: He is alert and oriented to person, place, and time  Cranial Nerves: No cranial nerve deficit  Gait: Gait normal    Psychiatric:         Mood and Affect: Mood normal          Behavior: Behavior normal          Thought Content:  Thought content normal            Labs:  Lab Results   Component Value Date    WBC 11 30 (H) 02/02/2021    HGB 14 2 02/02/2021    HCT 43 1 02/02/2021    MCV 90 02/02/2021     02/02/2021     Lab Results   Component Value Date    K 4 0 02/02/2021     02/02/2021    CO2 25 02/02/2021    BUN 23 02/02/2021    CREATININE 1 03 02/02/2021    CALCIUM 9 2 02/02/2021    AST 16 02/02/2021    ALT 22 02/02/2021    ALKPHOS 69 02/02/2021    EGFR 79 02/02/2021

## 2021-02-04 ENCOUNTER — TELEPHONE (OUTPATIENT)
Dept: FAMILY MEDICINE CLINIC | Facility: CLINIC | Age: 61
End: 2021-02-04

## 2021-02-04 DIAGNOSIS — R42 VERTIGO: Primary | ICD-10-CM

## 2021-02-04 DIAGNOSIS — H92.01 RIGHT EAR PAIN: ICD-10-CM

## 2021-02-04 RX ORDER — PREDNISONE 20 MG/1
20 TABLET ORAL 2 TIMES DAILY WITH MEALS
Qty: 6 TABLET | Refills: 0 | Status: SHIPPED | OUTPATIENT
Start: 2021-02-04 | End: 2021-02-07

## 2021-02-04 RX ORDER — ACETAMINOPHEN 500 MG
500 TABLET ORAL EVERY 6 HOURS PRN
COMMUNITY
End: 2021-10-01

## 2021-02-04 NOTE — PRE-PROCEDURE INSTRUCTIONS
Pre-Surgery Instructions:   Medication Instructions    acetaminophen (TYLENOL) 500 mg tablet Instructed patient per Anesthesia Guidelines   lisinopril (ZESTRIL) 5 mg tablet Instructed patient per Anesthesia Guidelines   tadalafil (CIALIS) 5 MG tablet Instructed patient per Anesthesia Guidelines  Education Index    Med Instructions Troubleshoot   ACE/ARB Med Class    Continue this medication up to the evening before surgery/procedure, but do not take the morning of the day of surgery  Acetaminophen Med Class    Continue to take this medication on your normal schedule  If this is an oral medication and you take it in the morning, then you may take this medicine with a sip of water

## 2021-02-04 NOTE — TELEPHONE ENCOUNTER
Logan Calabrese called and stated he was seen by you yesterday for ear infection, as was told by ER, He stated the pain has now spread from the ear to the neck and jawbone  He would like your opinion on this as he states you did not emphasize on this at his appointment  States he is going for surgery on 2/9/2021 with Dr Tim Fletcher for hernia repair  He stated he would like to talk to you      Please advise  Thank you

## 2021-02-04 NOTE — TELEPHONE ENCOUNTER
Patient did not have an ear infection  He has vertigo which is an inner problem  As stated at visit, it did not look infected but had minimal congestion behind ear and was recommend an antihistamine such as claritin, allegra, or zyrtec  Please have him start daily claritin and we will do a short course of steroid x 3 days to take with food  Will send to pharmacy  He cannot take NSAIDs (aleve, ibuprofen, motrin) leading up to surgery, so will use steroid

## 2021-02-08 ENCOUNTER — ANESTHESIA EVENT (OUTPATIENT)
Dept: PERIOP | Facility: HOSPITAL | Age: 61
End: 2021-02-08
Payer: COMMERCIAL

## 2021-02-08 NOTE — ANESTHESIA PREPROCEDURE EVALUATION
Procedure:  INGUINAL HERNIA REPAIR (Bilateral Groin)    Relevant Problems   No relevant active problems   Current Every Day Smoker   HTN  Physical Exam    Airway    Mallampati score: II  TM Distance: >3 FB  Neck ROM: full     Dental       Cardiovascular      Pulmonary      Other Findings        Anesthesia Plan  ASA Score- 2     Anesthesia Type- general with ASA Monitors  Additional Monitors:   Airway Plan: LMA  Plan Factors-    Chart reviewed  Induction- intravenous  Postoperative Plan-     Informed Consent- Anesthetic plan and risks discussed with patient  I personally reviewed this patient with the CRNA  Discussed and agreed on the Anesthesia Plan with the CRNA  Abraham Jones

## 2021-02-09 ENCOUNTER — ANESTHESIA (OUTPATIENT)
Dept: PERIOP | Facility: HOSPITAL | Age: 61
End: 2021-02-09
Payer: COMMERCIAL

## 2021-02-09 ENCOUNTER — HOSPITAL ENCOUNTER (OUTPATIENT)
Facility: HOSPITAL | Age: 61
Setting detail: OUTPATIENT SURGERY
Discharge: HOME | End: 2021-02-09
Attending: SURGERY | Admitting: SURGERY
Payer: COMMERCIAL

## 2021-02-09 VITALS
RESPIRATION RATE: 18 BRPM | HEART RATE: 91 BPM | SYSTOLIC BLOOD PRESSURE: 142 MMHG | HEIGHT: 73 IN | TEMPERATURE: 97.8 F | WEIGHT: 236 LBS | DIASTOLIC BLOOD PRESSURE: 84 MMHG | OXYGEN SATURATION: 95 % | BODY MASS INDEX: 31.28 KG/M2

## 2021-02-09 VITALS — HEART RATE: 93 BPM

## 2021-02-09 DIAGNOSIS — K40.20 NON-RECURRENT BILATERAL INGUINAL HERNIA WITHOUT OBSTRUCTION OR GANGRENE: Primary | ICD-10-CM

## 2021-02-09 PROCEDURE — 49520 REREPAIR ING HERNIA REDUCE: CPT | Performed by: SURGERY

## 2021-02-09 PROCEDURE — C1781 MESH (IMPLANTABLE): HCPCS | Performed by: SURGERY

## 2021-02-09 PROCEDURE — 49520 REREPAIR ING HERNIA REDUCE: CPT | Performed by: PHYSICIAN ASSISTANT

## 2021-02-09 DEVICE — BARD MESH PERFIX PLUG, EXTRA LARGE
Type: IMPLANTABLE DEVICE | Site: GROIN | Status: FUNCTIONAL
Brand: BARD MESH PERFIX PLUG

## 2021-02-09 RX ORDER — LIDOCAINE HYDROCHLORIDE 10 MG/ML
INJECTION, SOLUTION EPIDURAL; INFILTRATION; INTRACAUDAL; PERINEURAL AS NEEDED
Status: DISCONTINUED | OUTPATIENT
Start: 2021-02-09 | End: 2021-02-09

## 2021-02-09 RX ORDER — BUPIVACAINE HYDROCHLORIDE 5 MG/ML
INJECTION, SOLUTION PERINEURAL AS NEEDED
Status: DISCONTINUED | OUTPATIENT
Start: 2021-02-09 | End: 2021-02-09 | Stop reason: HOSPADM

## 2021-02-09 RX ORDER — SODIUM CHLORIDE, SODIUM LACTATE, POTASSIUM CHLORIDE, CALCIUM CHLORIDE 600; 310; 30; 20 MG/100ML; MG/100ML; MG/100ML; MG/100ML
50 INJECTION, SOLUTION INTRAVENOUS CONTINUOUS
Status: DISCONTINUED | OUTPATIENT
Start: 2021-02-09 | End: 2021-02-09

## 2021-02-09 RX ORDER — ACETAMINOPHEN 325 MG/1
650 TABLET ORAL EVERY 6 HOURS PRN
Status: DISCONTINUED | OUTPATIENT
Start: 2021-02-09 | End: 2021-02-09 | Stop reason: HOSPADM

## 2021-02-09 RX ORDER — CEFAZOLIN SODIUM 2 G/50ML
2000 SOLUTION INTRAVENOUS ONCE
Status: COMPLETED | OUTPATIENT
Start: 2021-02-09 | End: 2021-02-09

## 2021-02-09 RX ORDER — ONDANSETRON 2 MG/ML
4 INJECTION INTRAMUSCULAR; INTRAVENOUS EVERY 6 HOURS PRN
Status: DISCONTINUED | OUTPATIENT
Start: 2021-02-09 | End: 2021-02-09 | Stop reason: HOSPADM

## 2021-02-09 RX ORDER — SODIUM CHLORIDE, SODIUM LACTATE, POTASSIUM CHLORIDE, CALCIUM CHLORIDE 600; 310; 30; 20 MG/100ML; MG/100ML; MG/100ML; MG/100ML
75 INJECTION, SOLUTION INTRAVENOUS CONTINUOUS
Status: DISCONTINUED | OUTPATIENT
Start: 2021-02-09 | End: 2021-02-09 | Stop reason: HOSPADM

## 2021-02-09 RX ORDER — OXYCODONE HYDROCHLORIDE AND ACETAMINOPHEN 5; 325 MG/1; MG/1
1 TABLET ORAL EVERY 6 HOURS PRN
Status: DISCONTINUED | OUTPATIENT
Start: 2021-02-09 | End: 2021-02-09 | Stop reason: HOSPADM

## 2021-02-09 RX ORDER — FENTANYL CITRATE 50 UG/ML
INJECTION, SOLUTION INTRAMUSCULAR; INTRAVENOUS AS NEEDED
Status: DISCONTINUED | OUTPATIENT
Start: 2021-02-09 | End: 2021-02-09

## 2021-02-09 RX ORDER — SODIUM CHLORIDE, SODIUM LACTATE, POTASSIUM CHLORIDE, CALCIUM CHLORIDE 600; 310; 30; 20 MG/100ML; MG/100ML; MG/100ML; MG/100ML
125 INJECTION, SOLUTION INTRAVENOUS CONTINUOUS
Status: DISCONTINUED | OUTPATIENT
Start: 2021-02-09 | End: 2021-02-09 | Stop reason: HOSPADM

## 2021-02-09 RX ORDER — LIDOCAINE HYDROCHLORIDE AND EPINEPHRINE 10; 10 MG/ML; UG/ML
INJECTION, SOLUTION INFILTRATION; PERINEURAL AS NEEDED
Status: DISCONTINUED | OUTPATIENT
Start: 2021-02-09 | End: 2021-02-09 | Stop reason: HOSPADM

## 2021-02-09 RX ORDER — FENTANYL CITRATE/PF 50 MCG/ML
50 SYRINGE (ML) INJECTION
Status: DISCONTINUED | OUTPATIENT
Start: 2021-02-09 | End: 2021-02-09 | Stop reason: HOSPADM

## 2021-02-09 RX ORDER — DEXAMETHASONE SODIUM PHOSPHATE 4 MG/ML
INJECTION, SOLUTION INTRA-ARTICULAR; INTRALESIONAL; INTRAMUSCULAR; INTRAVENOUS; SOFT TISSUE AS NEEDED
Status: DISCONTINUED | OUTPATIENT
Start: 2021-02-09 | End: 2021-02-09

## 2021-02-09 RX ORDER — MAGNESIUM HYDROXIDE 1200 MG/15ML
LIQUID ORAL AS NEEDED
Status: DISCONTINUED | OUTPATIENT
Start: 2021-02-09 | End: 2021-02-09 | Stop reason: HOSPADM

## 2021-02-09 RX ORDER — ONDANSETRON 2 MG/ML
INJECTION INTRAMUSCULAR; INTRAVENOUS AS NEEDED
Status: DISCONTINUED | OUTPATIENT
Start: 2021-02-09 | End: 2021-02-09

## 2021-02-09 RX ORDER — PROPOFOL 10 MG/ML
INJECTION, EMULSION INTRAVENOUS AS NEEDED
Status: DISCONTINUED | OUTPATIENT
Start: 2021-02-09 | End: 2021-02-09

## 2021-02-09 RX ORDER — HYDROMORPHONE HCL/PF 1 MG/ML
0.5 SYRINGE (ML) INJECTION
Status: DISCONTINUED | OUTPATIENT
Start: 2021-02-09 | End: 2021-02-09 | Stop reason: HOSPADM

## 2021-02-09 RX ORDER — SODIUM CHLORIDE, SODIUM LACTATE, POTASSIUM CHLORIDE, CALCIUM CHLORIDE 600; 310; 30; 20 MG/100ML; MG/100ML; MG/100ML; MG/100ML
125 INJECTION, SOLUTION INTRAVENOUS CONTINUOUS
Status: ACTIVE | OUTPATIENT
Start: 2021-02-09 | End: 2021-02-09

## 2021-02-09 RX ORDER — ONDANSETRON 2 MG/ML
4 INJECTION INTRAMUSCULAR; INTRAVENOUS ONCE AS NEEDED
Status: DISCONTINUED | OUTPATIENT
Start: 2021-02-09 | End: 2021-02-09 | Stop reason: HOSPADM

## 2021-02-09 RX ORDER — METOCLOPRAMIDE HYDROCHLORIDE 5 MG/ML
10 INJECTION INTRAMUSCULAR; INTRAVENOUS ONCE AS NEEDED
Status: DISCONTINUED | OUTPATIENT
Start: 2021-02-09 | End: 2021-02-09 | Stop reason: HOSPADM

## 2021-02-09 RX ORDER — KETOROLAC TROMETHAMINE 30 MG/ML
INJECTION, SOLUTION INTRAMUSCULAR; INTRAVENOUS AS NEEDED
Status: DISCONTINUED | OUTPATIENT
Start: 2021-02-09 | End: 2021-02-09

## 2021-02-09 RX ORDER — MIDAZOLAM HYDROCHLORIDE 2 MG/2ML
INJECTION, SOLUTION INTRAMUSCULAR; INTRAVENOUS AS NEEDED
Status: DISCONTINUED | OUTPATIENT
Start: 2021-02-09 | End: 2021-02-09

## 2021-02-09 RX ORDER — HYDROCODONE BITARTRATE AND ACETAMINOPHEN 5; 325 MG/1; MG/1
1 TABLET ORAL EVERY 6 HOURS PRN
Qty: 15 TABLET | Refills: 0 | Status: SHIPPED | OUTPATIENT
Start: 2021-02-09 | End: 2021-02-19

## 2021-02-09 RX ORDER — OXYCODONE HYDROCHLORIDE AND ACETAMINOPHEN 5; 325 MG/1; MG/1
2 TABLET ORAL EVERY 6 HOURS PRN
Status: DISCONTINUED | OUTPATIENT
Start: 2021-02-09 | End: 2021-02-09 | Stop reason: HOSPADM

## 2021-02-09 RX ADMIN — DEXAMETHASONE SODIUM PHOSPHATE 8 MG: 4 INJECTION, SOLUTION INTRA-ARTICULAR; INTRALESIONAL; INTRAMUSCULAR; INTRAVENOUS; SOFT TISSUE at 09:42

## 2021-02-09 RX ADMIN — PROPOFOL 230 MG: 10 INJECTION, EMULSION INTRAVENOUS at 09:34

## 2021-02-09 RX ADMIN — KETOROLAC TROMETHAMINE 30 MG: 30 INJECTION, SOLUTION INTRAMUSCULAR; INTRAVENOUS at 10:51

## 2021-02-09 RX ADMIN — PHENYLEPHRINE HYDROCHLORIDE 100 MCG: 10 INJECTION INTRAVENOUS at 10:08

## 2021-02-09 RX ADMIN — SODIUM CHLORIDE, SODIUM LACTATE, POTASSIUM CHLORIDE, AND CALCIUM CHLORIDE 125 ML/HR: .6; .31; .03; .02 INJECTION, SOLUTION INTRAVENOUS at 08:56

## 2021-02-09 RX ADMIN — SODIUM CHLORIDE, SODIUM LACTATE, POTASSIUM CHLORIDE, AND CALCIUM CHLORIDE: .6; .31; .03; .02 INJECTION, SOLUTION INTRAVENOUS at 10:57

## 2021-02-09 RX ADMIN — CEFAZOLIN SODIUM 2000 MG: 2 SOLUTION INTRAVENOUS at 09:26

## 2021-02-09 RX ADMIN — FENTANYL CITRATE 50 MCG: 50 INJECTION INTRAMUSCULAR; INTRAVENOUS at 09:34

## 2021-02-09 RX ADMIN — PHENYLEPHRINE HYDROCHLORIDE 100 MCG: 10 INJECTION INTRAVENOUS at 10:05

## 2021-02-09 RX ADMIN — FENTANYL CITRATE 50 MCG: 50 INJECTION INTRAMUSCULAR; INTRAVENOUS at 10:34

## 2021-02-09 RX ADMIN — FENTANYL CITRATE 50 MCG: 50 INJECTION INTRAMUSCULAR; INTRAVENOUS at 10:50

## 2021-02-09 RX ADMIN — SODIUM CHLORIDE, SODIUM LACTATE, POTASSIUM CHLORIDE, AND CALCIUM CHLORIDE: .6; .31; .03; .02 INJECTION, SOLUTION INTRAVENOUS at 09:00

## 2021-02-09 RX ADMIN — FENTANYL CITRATE 25 MCG: 50 INJECTION INTRAMUSCULAR; INTRAVENOUS at 10:21

## 2021-02-09 RX ADMIN — FENTANYL CITRATE 50 MCG: 50 INJECTION INTRAMUSCULAR; INTRAVENOUS at 11:49

## 2021-02-09 RX ADMIN — MIDAZOLAM HYDROCHLORIDE 2 MG: 1 INJECTION, SOLUTION INTRAMUSCULAR; INTRAVENOUS at 09:29

## 2021-02-09 RX ADMIN — LIDOCAINE HYDROCHLORIDE 100 MG: 10 INJECTION, SOLUTION EPIDURAL; INFILTRATION; INTRACAUDAL; PERINEURAL at 09:34

## 2021-02-09 RX ADMIN — ONDANSETRON 4 MG: 2 INJECTION INTRAMUSCULAR; INTRAVENOUS at 10:51

## 2021-02-09 RX ADMIN — FENTANYL CITRATE 25 MCG: 50 INJECTION INTRAMUSCULAR; INTRAVENOUS at 10:18

## 2021-02-09 NOTE — DISCHARGE INSTR - AVS FIRST PAGE
[facility]    Discharge Instructions  Light activity for 2 weeks  No heavy lifting for 2 weeks  Max 10 lbs for 2 weeks  No driving for 3-7 days or until pain is well controlled  Remove dressing in 3-5 days  Surgical glue will fall off with time  Take discharge medications as prescribed  Notify our office for nausea, vomiting, fever, diarrhea, chest pain, trouble breathing  Follow up in our office in 2 weeks or sooner if needed  Call with additional questions or concerns [phone]

## 2021-02-09 NOTE — OP NOTE
OPERATIVE REPORT  PATIENT NAME: Megan Almanzar    :  1960  MRN: [id number]  Pt Location: [address] [place] [address] OR ROOM 03    SURGERY DATE: 2021    Surgeon(s) and Role:     Agnes Prieto MD - Primary     * Bobby Guardado PA-C - Assisting  The PA was necessary to provide expert assistance; i e  in the form of providing optimal exposure with retraction, suturing, and assistance with dissection in order to perform the most efficient operation and in order to optimize patient safety in the abscence of a qualified surgical resident  Preop Diagnosis:  Bilateral recurrent inguinal hernia without obstruction or gangrene [K40 21]    Post-Op Diagnosis Codes:     * Bilateral recurrent inguinal hernia without obstruction or gangrene [K40 21]    Procedure(s) (LRB):  INGUINAL HERNIA REPAIR (Bilateral)    Specimen(s):  * No specimens in log *    Estimated Blood Loss:   10 mL    Drains:  * No LDAs found *    Anesthesia Type:   General/LMA    Operative Indications:  Bilateral recurrent inguinal hernia without obstruction or gangrene [K40 21]  PATIENT PRESENTS WITH BILATERAL SYMPTOMATIC INGUINAL HERNIAS FOR WHICH DEFINITIVE TREATMENT BY OPEN MESH REPAIR IS NOW INDICATED  Operative Findings:  Patient is found to have bilateral direct inguinal hernias  They were repaired with standard plug and patch technique  Extra-large mesh is used for each side  Complications:   None    Procedure and Technique:  The patient was taken to the operating room where they are properly identified, monitored and anesthetized  The received antibiotics perioperatively  Venodynes for DVT prophylaxis  Time-out performed  Skin infiltrated with 1% lidocaine local anesthesia with epinephrine  Skin incised  Dissection carried down through Annabella's to the aponeurosis of the external oblique which was opened to the external ring  Leaves developed superiorly and inferiorly  Self-retaining retractor placed    The spermatic cord dissected out at the pubic tubercle and controlled with a Penrose drain  The direct sac readily identified  The transversalis fascia incised and the hernia reduced  A mesh plug was placed through the floor of Hesselbach triangle and secured circumferentially with interrupted 2 0 Vicryl suture  The onlay mesh placed over the floor the canal and around the internal ring  The aponeurosis of the external oblique closed over top of the repair with a running 3 0 Vicryl suture  The wound irrigated and closed in layers with interrupted 3 0 Vicryl on Annabella's and running subcuticular 4 Monocryl suture on skin  The wounds infiltrated with 0 5% Marcaine  Dressed  Patient extubated and taken to recovery in stable condition  The procedure was performed in an identical fashion on each side     I was present for the entire procedure    Patient Disposition:  PACU     SIGNATURE: Eliseo Zhao MD  DATE: February 9, 2021  TIME: 11:16 AM

## 2021-02-09 NOTE — ANESTHESIA POSTPROCEDURE EVALUATION
Post-Op Assessment Note    CV Status:  Stable  Pain Score: 0       Mental Status:  Arousable   Hydration Status:  Stable   PONV Controlled:  None   Airway Patency:  Patent      Post Op Vitals Reviewed: Yes      Staff: CRNA         No complications documented      BP   147/90   Temp  97 8   Pulse  86   Resp   18   SpO2   99%

## 2021-02-09 NOTE — DISCHARGE INSTRUCTIONS
After An Inguinal Hernia Repair   AMBULATORY CARE:   Call 911 for any of the following:   · You feel lightheaded, short of breath, and have chest pain  · You cough up blood  · You have trouble breathing  Seek care immediately if:   · Your arm or leg feels warm, tender, and painful  It may look swollen and red  · Blood soaks through your bandage  · Your abdomen or groin feels hard and looks bigger than usual      · Your bruise suddenly gets bigger  · Your bowel movements are black, bloody, or tarry-looking  Contact your healthcare provider if:   · You have a fever above 101°F      · You develop a skin rash, hives, or itching  · Your incision is swollen, red, or draining pus or fluid  · You have nausea, or you are vomiting  · You cannot have a bowel movement  · You have trouble urinating  · Your pain does not get better after you take pain medicine  · You have questions or concerns about your condition or care  Medicines: You may need any of the following:  · Prescription pain medicine  may be given  Ask your healthcare provider how to take this medicine safely  Some prescription pain medicines contain acetaminophen  Do not take other medicines that contain acetaminophen without talking to your healthcare provider  Too much acetaminophen may cause liver damage  Prescription pain medicine may cause constipation  Ask your healthcare provider how to prevent or treat constipation  · NSAIDs , such as ibuprofen, help decrease swelling, pain, and fever  This medicine is available with or without a doctor's order  NSAIDs can cause stomach bleeding or kidney problems in certain people  If you take blood thinner medicine, always ask your healthcare provider if NSAIDs are safe for you  Always read the medicine label and follow directions  · Acetaminophen  decreases pain and fever  It is available without a doctor's order  Ask how much to take and how often to take it  Follow directions  Read the labels of all other medicines you are using to see if they also contain acetaminophen, or ask your doctor or pharmacist  Acetaminophen can cause liver damage if not taken correctly  Do not use more than 4 grams (4,000 milligrams) total of acetaminophen in one day  · Take your medicine as directed  Contact your healthcare provider if you think your medicine is not helping or if you have side effects  Tell him or her if you are allergic to any medicine  Keep a list of the medicines, vitamins, and herbs you take  Include the amounts, and when and why you take them  Bring the list or the pill bottles to follow-up visits  Carry your medicine list with you in case of an emergency  Bathing: You can shower in 48 hours  Remove your bandage before you shower  It is normal to see a small amount of blood under the bandage  Carefully wash around your wound  It is okay to let soap and water run over your wound  Do not  scrub your wound  Gently pay your wound dry  Care for your wound as directed: If you have strips of medical tape over your incision, allow them to fall off on their own  It may take 7 to 10 days for them to fall off  Do not put powders, lotions, or creams on your wound  They may cause your wound to get infected  Do not get in a bathtub, swimming pool, or hot tub until your healthcare provider says it is okay  Check your wound every day for signs of infection, such as redness, swelling, or pus  Bruising is normal and expected  Men may have bruising and swelling in the scrotum  Take deep breaths and cough:  Do this 10 times each hour  This will decrease your risk for a lung infection  Take a deep breath and hold it for as long as you can  Let the air out and then cough strongly  Deep breaths help open your airway  You may be given an incentive spirometer to help you take deep breaths  Put the plastic piece in your mouth and take a slow, deep breath   Then let the air out and cough  Repeat these steps 10 times every hour  Use a pillow as a splint:  Press a pillow lightly against your incision when you cough, move, or get out of bed  This may decrease pain or discomfort  You may need another person to help you get in and out of bed, a chair, or off the toilet  Activity:  Get out of bed and walk the day after your surgery  This will help prevent blood clots, move your bowels after surgery, and increase healing  Start with short walks around the house  Gradually walk further each day  Do not play sports for 2 to 3 weeks  Do not lift anything heavier than 5 pounds until your healthcare provider says it is okay  This may put too much pressure on your incision and cause it to come apart  It may also increase your risk for another hernia  Drink liquids as directed:  Liquids may prevent constipation and straining during a bowel movement  This will help prevent pressure on your incision, and prevent another hernia  Ask how much liquid to drink each day and which liquids are best for you  Decrease swelling:   · Apply ice  on your incision for 15 to 20 minutes every hour or as directed  Use an ice pack, or put crushed ice in a plastic bag  Cover it with a towel  Ice helps prevent tissue damage and decreases swelling and pain  · Elevate your scrotum  on a towel  Lie in bed  Roll a small towel and place it under your scrotum  This will help decrease swelling and bruising  Driving:  Do not drive for at least 1 week after surgery  Do not drive if you are taking prescription pain medication  Do not drive until it is comfortable to wear a seatbelt across your abdomen  Ask your healthcare provider when it is safe for you to drive  Return to work or school: You may be able to return to work or school in 50 to 67 hours  You may need to stay out of work if longer you have to lift heavy items at work     Do not smoke:  Nicotine and other chemicals in cigarettes and cigars can prevent your wound from healing  It can also increase your risk for another inguinal hernia  Ask your healthcare provider for information if you currently smoke and need help to quit  E-cigarettes or smokeless tobacco still contain nicotine  Talk to your healthcare provider before you use these products  Follow up with your healthcare provider as directed:  Write down your questions so you remember to ask them during your visits  © Copyright [address] Information is for End User's use only and may not be sold, redistributed or otherwise used for commercial purposes  All illustrations and images included in CareNotes® are the copyrighted property of [employer]  or [address] [place], [place] [address]william   The above information is an  only  It is not intended as medical advice for individual conditions or treatments  Talk to your doctor, nurse or pharmacist before following any medical regimen to see if it is safe and effective for you

## 2021-02-09 NOTE — INTERVAL H&P NOTE
H&P reviewed  After examining the patient I find no changes in the patients condition since the H&P had been written      Vitals:    02/09/21 0843   BP: 159/94   Pulse: 84   Resp: 18   Temp: (!) 96 8 °F (36 °C)   SpO2: 98%

## 2021-02-10 ENCOUNTER — TELEPHONE (OUTPATIENT)
Dept: SURGERY | Facility: CLINIC | Age: 61
End: 2021-02-10

## 2021-02-10 NOTE — TELEPHONE ENCOUNTER
Patient contacted by phone  The technical details of the surgery were reviewed with the patient and all questions answered to their satisfaction

## 2021-02-10 NOTE — TELEPHONE ENCOUNTER
Called and left a voicemail with contact information to review the findings at the time of surgery and to discuss any questions or concerns that they might have in the early postoperative period

## 2021-02-26 ENCOUNTER — OFFICE VISIT (OUTPATIENT)
Dept: SURGERY | Facility: CLINIC | Age: 61
End: 2021-02-26

## 2021-02-26 VITALS — TEMPERATURE: 98 F

## 2021-02-26 DIAGNOSIS — K40.20 NON-RECURRENT BILATERAL INGUINAL HERNIA WITHOUT OBSTRUCTION OR GANGRENE: Primary | ICD-10-CM

## 2021-02-26 PROCEDURE — 99024 POSTOP FOLLOW-UP VISIT: CPT | Performed by: PHYSICIAN ASSISTANT

## 2021-02-26 NOTE — PROGRESS NOTES
Post-Op Note - General Surgery   Pawel Paris 61 y o  male MRN: [id number]  Encounter: [id number]    Assessment/Plan    Bilateral inguinal hernia without obstruction or gangrene  Patient well after open bilateral inguinal hernia repair with mesh  Reporting minimal pain at this point  On exam incisions are well healed without evidence of complication  Copy of op note provided and reviewed  Advised slow return to normal activity over next 2-4 weeks  All questions answered, may follow-up in our office as needed  Diagnoses and all orders for this visit:    Non-recurrent bilateral inguinal hernia without obstruction or gangrene        Subjective      Chief Complaint   Patient presents with    Post-op     s/p Bi-lat ing hernia rpr 02/09/2021     Patient is here today in post op f/u s/p bilateral inguinal hernia repair 02-9-2021  States that he is doing well and denied any new concerns  Dio [address]    Pleasant 60 yo M here for follow-up 2 weeks s/p bilateral inguinal hernia repair with mesh  Reports feeling well  Pain improving and now mild and well controlled  Slowly increasing his activity level  No fever, redness or drainage from incision  Review of Systems   Constitutional: Negative for chills and fever  Skin: Negative for rash  The following portions of the patient's history were reviewed and updated as appropriate: allergies, current medications, past family history, past medical history, past social history, past surgical history and problem list     Objective      Temperature 98 °F (36 7 °C), temperature source Tympanic  Physical Exam  Vitals signs and nursing note reviewed  Constitutional:       General: He is not in acute distress  Appearance: He is well-developed  He is not diaphoretic  HENT:      Head: Normocephalic and atraumatic  Eyes:      Conjunctiva/sclera: Conjunctivae normal       Pupils: Pupils are equal, round, and reactive to light     Neck:      Musculoskeletal: Normal range of motion  Pulmonary:      Effort: No respiratory distress  Genitourinary:      Musculoskeletal: Normal range of motion  Skin:     General: Skin is warm and dry  Capillary Refill: Capillary refill takes less than 2 seconds  Neurological:      Mental Status: He is alert and oriented to person, place, and time     Psychiatric:         Behavior: Behavior normal          Signature:  Bobby Guardado PA-C  Date: 3/3/2021 Time: 12:40 PM

## 2021-02-26 NOTE — ASSESSMENT & PLAN NOTE
Patient well after open bilateral inguinal hernia repair with mesh  Reporting minimal pain at this point  On exam incisions are well healed without evidence of complication  Copy of op note provided and reviewed  Advised slow return to normal activity over next 2-4 weeks  All questions answered, may follow-up in our office as needed

## 2021-03-03 ENCOUNTER — OFFICE VISIT (OUTPATIENT)
Dept: FAMILY MEDICINE CLINIC | Facility: CLINIC | Age: 61
End: 2021-03-03
Payer: COMMERCIAL

## 2021-03-03 VITALS
DIASTOLIC BLOOD PRESSURE: 88 MMHG | HEART RATE: 68 BPM | WEIGHT: 236 LBS | HEIGHT: 72 IN | TEMPERATURE: 97.7 F | OXYGEN SATURATION: 94 % | BODY MASS INDEX: 31.97 KG/M2 | SYSTOLIC BLOOD PRESSURE: 144 MMHG

## 2021-03-03 DIAGNOSIS — R73.9 HYPERGLYCEMIA: ICD-10-CM

## 2021-03-03 DIAGNOSIS — I10 ESSENTIAL HYPERTENSION: Primary | ICD-10-CM

## 2021-03-03 DIAGNOSIS — Z53.20 COLON CANCER SCREENING DECLINED: ICD-10-CM

## 2021-03-03 PROCEDURE — 3077F SYST BP >= 140 MM HG: CPT | Performed by: PHYSICIAN ASSISTANT

## 2021-03-03 PROCEDURE — 3008F BODY MASS INDEX DOCD: CPT | Performed by: PHYSICIAN ASSISTANT

## 2021-03-03 PROCEDURE — 4004F PT TOBACCO SCREEN RCVD TLK: CPT | Performed by: PHYSICIAN ASSISTANT

## 2021-03-03 PROCEDURE — 99213 OFFICE O/P EST LOW 20 MIN: CPT | Performed by: PHYSICIAN ASSISTANT

## 2021-03-03 PROCEDURE — 3079F DIAST BP 80-89 MM HG: CPT | Performed by: PHYSICIAN ASSISTANT

## 2021-03-03 RX ORDER — LISINOPRIL 10 MG/1
10 TABLET ORAL DAILY
Qty: 90 TABLET | Refills: 1 | Status: SHIPPED | OUTPATIENT
Start: 2021-03-03 | End: 2021-08-30

## 2021-03-03 NOTE — PROGRESS NOTES
Routine Follow-up    Jennifer Lala 61 y o  male   Date:  3/3/2021      Assessment and Plan:    Orman Blizzard was seen today for follow-up  Diagnoses and all orders for this visit:    Essential hypertension  -     lisinopril (ZESTRIL) 10 mg tablet; Take 1 tablet (10 mg total) by mouth daily  - increase to 10 mg and call with updated home BP readings in 2 weeks; continue to increase if needed; tolerating lisinopril well    Hyperglycemia  - declines further routine labs, a1c not completed  - encourage low carb diet     Colon cancer screening declined             HPI:  Chief Complaint   Patient presents with    Follow-up     1 month check  No concerns today  No refills needed  HPI   Patient is a 62 yo male who presents for 1 month follow up  His diastolic BP improved but systolic still in 606V on lisinopril 5 mg  He is tolerating this without concerns  He does check BP at home and readings are consistent with our manual office visit checks  He is no longer having any dizziness, vertigo, no ear congestion  No longer taking meclizine  The recurrent LAD following dental procedures had resolved and not returned  He did complete some blood work while in ER and BG was in 140s although was not fasting  He still has pending routine labs but is not willing to proceed at this time due to cost  He does eat fast food often  He continues to smoke  ROS: Review of Systems   Constitutional: Negative for appetite change, diaphoresis and fever  Respiratory: Negative  Cardiovascular: Negative  Genitourinary: Negative  Healing very well from inguinal hernia repair, no concerns     Neurological: Negative  Psychiatric/Behavioral: Negative          Past Medical History:   Diagnosis Date    Bilateral inguinal hernia     Dizziness 02/02/2021    isolated incident of dizziness, sweating & disorientation    Hypertension      Patient Active Problem List   Diagnosis    Bilateral inguinal hernia without obstruction or gangrene    Umbilical hernia       Past Surgical History:   Procedure Laterality Date    [place] REPAIR ING HERNIA,5+Y/O,REDUCIBL Bilateral 2/9/2021    Procedure: INGUINAL HERNIA REPAIR with mesh;  Surgeon: Alpha Gaucher, MD;  Location: [address] [place] [address] MAIN OR;  Service: General    TONSILLECTOMY         Social History     Socioeconomic History    Marital status: /Civil Union     Spouse name: None    Number of children: None    Years of education: None    Highest education level: None   Occupational History    None   Social Needs    Financial resource strain: None    Food insecurity     Worry: None     Inability: None    Transportation needs     Medical: No     Non-medical: No   Tobacco Use    Smoking status: Current Every Day Smoker     Packs/day: 2 00     Types: Cigarettes    Smokeless tobacco: Never Used   Substance and Sexual Activity    Alcohol use: Not Currently    Drug use: Not Currently    Sexual activity: None   Lifestyle    Physical activity     Days per week: None     Minutes per session: None    Stress: None   Relationships    Social connections     Talks on phone: None     Gets together: None     Attends Buddhist service: None     Active member of club or organization: None     Attends meetings of clubs or organizations: None     Relationship status: None    Intimate partner violence     Fear of current or ex partner: None     Emotionally abused: None     Physically abused: None     Forced sexual activity: None   Other Topics Concern    None   Social History Narrative    None       Family History   Problem Relation Age of Onset    Diabetes Maternal Grandfather        No Known Allergies      Current Outpatient Medications:     lisinopril (ZESTRIL) 10 mg tablet, Take 1 tablet (10 mg total) by mouth daily, Disp: 90 tablet, Rfl: 1    tadalafil (CIALIS) 5 MG tablet, Take 1 tablet (5 mg total) by mouth daily as needed for erectile dysfunction, Disp: 25 tablet, Rfl: 1    acetaminophen (TYLENOL) 500 mg tablet, Take 500 mg by mouth every 6 (six) hours as needed for mild pain, Disp: , Rfl:       Physical Exam:  /88 (BP Location: Left arm, Patient Position: Sitting, Cuff Size: Large)   Pulse 68   Temp 97 7 °F (36 5 °C) (Tympanic)   Ht 6' (1 829 m)   Wt 107 kg (236 lb)   SpO2 94%   BMI 32 01 kg/m²     Physical Exam  Constitutional:       General: He is not in acute distress  Appearance: Normal appearance  He is obese  HENT:      Head: Normocephalic and atraumatic  Right Ear: External ear normal       Left Ear: External ear normal    Eyes:      Conjunctiva/sclera: Conjunctivae normal       Pupils: Pupils are equal, round, and reactive to light  Cardiovascular:      Rate and Rhythm: Normal rate and regular rhythm  Heart sounds: No murmur  Pulmonary:      Effort: Pulmonary effort is normal  No respiratory distress  Breath sounds: Normal breath sounds  No wheezing  Musculoskeletal:         General: No deformity  Skin:     General: Skin is warm and dry  Neurological:      General: No focal deficit present  Mental Status: He is alert and oriented to person, place, and time  Cranial Nerves: No cranial nerve deficit     Psychiatric:         Mood and Affect: Mood normal          Behavior: Behavior normal            Labs:  Lab Results   Component Value Date    WBC 11 30 (H) 02/02/2021    HGB 14 2 02/02/2021    HCT 43 1 02/02/2021    MCV 90 02/02/2021     02/02/2021     Lab Results   Component Value Date    K 4 0 02/02/2021     02/02/2021    CO2 25 02/02/2021    BUN 23 02/02/2021    CREATININE 1 03 02/02/2021    CALCIUM 9 2 02/02/2021    AST 16 02/02/2021    ALT 22 02/02/2021    ALKPHOS 69 02/02/2021    EGFR 79 02/02/2021

## 2021-06-01 ENCOUNTER — TELEPHONE (OUTPATIENT)
Dept: FAMILY MEDICINE CLINIC | Facility: CLINIC | Age: 61
End: 2021-06-01

## 2021-06-01 NOTE — TELEPHONE ENCOUNTER
Patients wife called and her  needs refill on his blood pressure med  Pharm is [facility] in [place]  Please advise thank you

## 2021-06-01 NOTE — TELEPHONE ENCOUNTER
Last refill date: 3/3/21 #90 with one refill  Refill not needed at this time  Attempted to contact patients spouse, received busy signal  Refill should be at pharmacy  Patient needs to contact pharmacy for refill  Will try again

## 2021-06-01 NOTE — TELEPHONE ENCOUNTER
Attempted to contact patients spouse again  Received busy signal   Called pharmacy to have them refill the medication

## 2021-08-11 ENCOUNTER — APPOINTMENT (EMERGENCY)
Dept: CT IMAGING | Facility: HOSPITAL | Age: 61
End: 2021-08-11
Payer: COMMERCIAL

## 2021-08-11 ENCOUNTER — HOSPITAL ENCOUNTER (EMERGENCY)
Facility: HOSPITAL | Age: 61
Discharge: HOME | End: 2021-08-11
Attending: FAMILY MEDICINE | Admitting: FAMILY MEDICINE
Payer: COMMERCIAL

## 2021-08-11 VITALS
RESPIRATION RATE: 20 BRPM | OXYGEN SATURATION: 96 % | BODY MASS INDEX: 32.01 KG/M2 | SYSTOLIC BLOOD PRESSURE: 168 MMHG | HEART RATE: 82 BPM | TEMPERATURE: 97.1 F | DIASTOLIC BLOOD PRESSURE: 84 MMHG | WEIGHT: 236 LBS

## 2021-08-11 DIAGNOSIS — N23 RENAL COLIC: ICD-10-CM

## 2021-08-11 DIAGNOSIS — N20.0 KIDNEY STONES, CALCIUM OXALATE: ICD-10-CM

## 2021-08-11 DIAGNOSIS — R10.9 ABDOMINAL PAIN: Primary | ICD-10-CM

## 2021-08-11 LAB
ALBUMIN SERPL BCP-MCNC: 4.7 G/DL (ref 3.5–5.7)
ALP SERPL-CCNC: 81 U/L (ref 55–165)
ALT SERPL W P-5'-P-CCNC: 25 U/L (ref 7–52)
ANION GAP SERPL CALCULATED.3IONS-SCNC: 9 MMOL/L (ref 4–13)
AST SERPL W P-5'-P-CCNC: 15 U/L (ref 13–39)
BASOPHILS # BLD AUTO: 0.1 THOUSANDS/ΜL (ref 0–0.1)
BASOPHILS NFR BLD AUTO: 1 % (ref 0–2)
BILIRUB SERPL-MCNC: 0.4 MG/DL (ref 0.2–1)
BILIRUB UR QL STRIP: NEGATIVE
BUN SERPL-MCNC: 21 MG/DL (ref 7–25)
CALCIUM SERPL-MCNC: 9.9 MG/DL (ref 8.6–10.5)
CHLORIDE SERPL-SCNC: 99 MMOL/L (ref 98–107)
CLARITY UR: CLEAR
CO2 SERPL-SCNC: 24 MMOL/L (ref 21–31)
COLOR UR: YELLOW
CREAT SERPL-MCNC: 1.32 MG/DL (ref 0.7–1.3)
EOSINOPHIL # BLD AUTO: 0.1 THOUSAND/ΜL (ref 0–0.61)
EOSINOPHIL NFR BLD AUTO: 1 % (ref 0–5)
ERYTHROCYTE [DISTWIDTH] IN BLOOD BY AUTOMATED COUNT: 12.8 % (ref 11.5–14.5)
GFR SERPL CREATININE-BSD FRML MDRD: 58 ML/MIN/1.73SQ M
GLUCOSE SERPL-MCNC: 123 MG/DL (ref 65–99)
GLUCOSE UR STRIP-MCNC: NEGATIVE MG/DL
HCT VFR BLD AUTO: 42.8 % (ref 42–47)
HGB BLD-MCNC: 14.3 G/DL (ref 14–18)
HGB UR QL STRIP.AUTO: NEGATIVE
KETONES UR STRIP-MCNC: NEGATIVE MG/DL
LEUKOCYTE ESTERASE UR QL STRIP: NEGATIVE
LIPASE SERPL-CCNC: 11 U/L (ref 11–82)
LYMPHOCYTES # BLD AUTO: 1.5 THOUSANDS/ΜL (ref 0.6–4.47)
LYMPHOCYTES NFR BLD AUTO: 11 % (ref 21–51)
MCH RBC QN AUTO: 30.1 PG (ref 26–34)
MCHC RBC AUTO-ENTMCNC: 33.4 G/DL (ref 31–37)
MCV RBC AUTO: 90 FL (ref 81–99)
MONOCYTES # BLD AUTO: 0.7 THOUSAND/ΜL (ref 0.17–1.22)
MONOCYTES NFR BLD AUTO: 5 % (ref 2–12)
NEUTROPHILS # BLD AUTO: 11.4 THOUSANDS/ΜL (ref 1.4–6.5)
NEUTS SEG NFR BLD AUTO: 82 % (ref 42–75)
NITRITE UR QL STRIP: NEGATIVE
PH UR STRIP.AUTO: 6 [PH]
PLATELET # BLD AUTO: 247 THOUSANDS/UL (ref 149–390)
PMV BLD AUTO: 6.8 FL (ref 8.6–11.7)
POTASSIUM SERPL-SCNC: 4.4 MMOL/L (ref 3.5–5.5)
PROT SERPL-MCNC: 7.2 G/DL (ref 6.4–8.9)
PROT UR STRIP-MCNC: NEGATIVE MG/DL
RBC # BLD AUTO: 4.75 MILLION/UL (ref 4.3–5.9)
SODIUM SERPL-SCNC: 132 MMOL/L (ref 134–143)
SP GR UR STRIP.AUTO: >=1.03 (ref 1–1.03)
UROBILINOGEN UR QL STRIP.AUTO: 0.2 E.U./DL
WBC # BLD AUTO: 13.8 THOUSAND/UL (ref 4.8–10.8)

## 2021-08-11 PROCEDURE — 81003 URINALYSIS AUTO W/O SCOPE: CPT | Performed by: FAMILY MEDICINE

## 2021-08-11 PROCEDURE — 83690 ASSAY OF LIPASE: CPT | Performed by: FAMILY MEDICINE

## 2021-08-11 PROCEDURE — 99283 EMERGENCY DEPT VISIT LOW MDM: CPT | Performed by: FAMILY MEDICINE

## 2021-08-11 PROCEDURE — 99284 EMERGENCY DEPT VISIT MOD MDM: CPT

## 2021-08-11 PROCEDURE — 74176 CT ABD & PELVIS W/O CONTRAST: CPT

## 2021-08-11 PROCEDURE — 36415 COLL VENOUS BLD VENIPUNCTURE: CPT | Performed by: FAMILY MEDICINE

## 2021-08-11 PROCEDURE — G1004 CDSM NDSC: HCPCS

## 2021-08-11 PROCEDURE — 85025 COMPLETE CBC W/AUTO DIFF WBC: CPT | Performed by: FAMILY MEDICINE

## 2021-08-11 PROCEDURE — 96374 THER/PROPH/DIAG INJ IV PUSH: CPT

## 2021-08-11 PROCEDURE — 80053 COMPREHEN METABOLIC PANEL: CPT | Performed by: FAMILY MEDICINE

## 2021-08-11 RX ORDER — TAMSULOSIN HYDROCHLORIDE 0.4 MG/1
0.4 CAPSULE ORAL 2 TIMES DAILY
Qty: 14 CAPSULE | Refills: 0 | Status: SHIPPED | OUTPATIENT
Start: 2021-08-11 | End: 2021-10-01 | Stop reason: ALTCHOICE

## 2021-08-11 RX ORDER — KETOROLAC TROMETHAMINE 30 MG/ML
30 INJECTION, SOLUTION INTRAMUSCULAR; INTRAVENOUS ONCE
Status: DISCONTINUED | OUTPATIENT
Start: 2021-08-11 | End: 2021-08-11

## 2021-08-11 RX ORDER — HYDROCODONE BITARTRATE AND ACETAMINOPHEN 5; 325 MG/1; MG/1
1 TABLET ORAL EVERY 4 HOURS PRN
Qty: 10 TABLET | Refills: 0 | Status: SHIPPED | OUTPATIENT
Start: 2021-08-11 | End: 2021-10-01

## 2021-08-11 RX ORDER — FENTANYL CITRATE 50 UG/ML
50 INJECTION, SOLUTION INTRAMUSCULAR; INTRAVENOUS ONCE
Status: COMPLETED | OUTPATIENT
Start: 2021-08-11 | End: 2021-08-11

## 2021-08-11 RX ADMIN — FENTANYL CITRATE 50 MCG: 50 INJECTION, SOLUTION INTRAMUSCULAR; INTRAVENOUS at 16:38

## 2021-08-11 NOTE — ED PROVIDER NOTES
History  Chief Complaint   Patient presents with    Abdominal Pain     pt presents with LLQ pain that radiates to his back since 1200hrs today  pt reports he has the feeling of needing to pass stool but is unable to do so  HPI  This is a 40-year-old morbidly obese male presented to ED with complain of left-sided flank pain going down to his left lower quadrant and groin area  Patient states that he has pain started around the 12:00 p m  Today  He states he feels like at that he has to go the bathroom but unable to have a bowel movement  He states he did had a bowel movement this morning  Rating his pain 8/10  States unable to find a comfortable position  Patient states nothing makes the pain better or worse and he did take some ibuprofen prior to arrival   Patient is awake alert oriented x3 GCS 15  Prior to Admission Medications   Prescriptions Last Dose Informant Patient Reported? Taking?   acetaminophen (TYLENOL) 500 mg tablet  Self Yes No   Sig: Take 500 mg by mouth every 6 (six) hours as needed for mild pain   lisinopril (ZESTRIL) 10 mg tablet   No No   Sig: Take 1 tablet (10 mg total) by mouth daily   tadalafil (CIALIS) 5 MG tablet  Self No No   Sig: Take 1 tablet (5 mg total) by mouth daily as needed for erectile dysfunction      Facility-Administered Medications: None       Past Medical History:   Diagnosis Date    Bilateral inguinal hernia     Dizziness 02/02/2021    isolated incident of dizziness, sweating & disorientation    Hypertension        Past Surgical History:   Procedure Laterality Date    [place] REPAIR ING HERNIA,5+Y/O,REDUCIBL Bilateral 2/9/2021    Procedure: INGUINAL HERNIA REPAIR with mesh;  Surgeon: Sarahi Veronica MD;  [facility]: [facility] MAIN OR;  Service: General    TONSILLECTOMY         Family History   Problem Relation Age of Onset    Diabetes Maternal Grandfather      I have reviewed and agree with the history as documented      E-Cigarette/Vaping    E-Cigarette Use Never User E-Cigarette/Vaping Substances    Nicotine No     THC No     CBD No     Flavoring No     Other No     Unknown No      Social History     Tobacco Use    Smoking status: Current Every Day Smoker     Packs/day: 2 00     Types: Cigarettes    Smokeless tobacco: Never Used   Vaping Use    Vaping Use: Never used   Substance Use Topics    Alcohol use: Not Currently    Drug use: Not Currently       Review of Systems   Constitutional: Negative  HENT: Negative  Eyes: Negative  Respiratory: Negative  Cardiovascular: Negative  Gastrointestinal: Positive for abdominal pain (LLQ pain)  Endocrine: Negative  Genitourinary: Positive for flank pain  Skin: Negative  Allergic/Immunologic: Negative  Neurological: Negative  Psychiatric/Behavioral: Negative  Physical Exam  Physical Exam  Vitals and nursing note reviewed  Constitutional:       Appearance: He is well-developed  He is obese  HENT:      Head: Normocephalic and atraumatic  Cardiovascular:      Rate and Rhythm: Normal rate and regular rhythm  Pulmonary:      Effort: Pulmonary effort is normal       Breath sounds: Normal breath sounds  Abdominal:      General: Bowel sounds are normal       Palpations: Abdomen is soft  Skin:     General: Skin is warm  Neurological:      General: No focal deficit present  Mental Status: He is alert and oriented to person, place, and time     Psychiatric:         Mood and Affect: Mood normal          Behavior: Behavior normal          Vital Signs  ED Triage Vitals [08/11/21 1532]   Temperature Pulse Respirations Blood Pressure SpO2   (!) 97 1 °F (36 2 °C) 84 18 170/96 96 %      Temp Source Heart Rate Source Patient Position - Orthostatic VS BP Location FiO2 (%)   Tympanic Monitor -- Left arm --      Pain Score       6           Vitals:    08/11/21 1532   BP: 170/96   Pulse: 84         Visual Acuity      ED Medications  Medications   fentanyl citrate (PF) 100 MCG/2ML 50 mcg (50 mcg Intravenous Given 8/11/21 1638)       Diagnostic Studies  Results Reviewed     Procedure Component Value Units Date/Time    Comprehensive metabolic panel [[id number]]  (Abnormal) Collected: 08/11/21 1623    Lab Status: Final result Specimen: Blood from Arm, Left Updated: 08/11/21 1652     Sodium 132 mmol/L      Potassium 4 4 mmol/L      Chloride 99 mmol/L      CO2 24 mmol/L      ANION GAP 9 mmol/L      BUN 21 mg/dL      Creatinine 1 32 mg/dL      Glucose 123 mg/dL      Calcium 9 9 mg/dL      AST 15 U/L      ALT 25 U/L      Alkaline Phosphatase 81 U/L      Total Protein 7 2 g/dL      Albumin 4 7 g/dL      Total Bilirubin 0 40 mg/dL      eGFR 58 ml/min/1 73sq m     Narrative:      [place] guidelines for Chronic Kidney Disease (CKD):     Stage 1 with normal or high GFR (GFR > 90 mL/min/1 73 square meters)    Stage 2 Mild CKD (GFR = 60-89 mL/min/1 73 square meters)    Stage 3A Moderate CKD (GFR = 45-59 mL/min/1 73 square meters)    Stage 3B Moderate CKD (GFR = 30-44 mL/min/1 73 square meters)    Stage 4 Severe CKD (GFR = 15-29 mL/min/1 73 square meters)    Stage 5 End Stage CKD (GFR <15 mL/min/1 73 square meters)  Note: GFR calculation is accurate only with a steady state creatinine    Lipase [[id number]]  (Normal) Collected: 08/11/21 1623    Lab Status: Final result Specimen: Blood from Arm, Left Updated: 08/11/21 1652     Lipase 11 u/L     UA w Reflex to Microscopic w Reflex to Culture [[id number]]  (Abnormal) Collected: 08/11/21 1632    Lab Status: Final result Specimen: Urine, Clean Catch Updated: 08/11/21 1643     Color, UA Yellow     Clarity, UA Clear     Specific Gravity, UA >=1 030     pH, UA 6 0     Leukocytes, UA Negative     Nitrite, UA Negative     Protein, UA Negative mg/dl      Glucose, UA Negative mg/dl      Ketones, UA Negative mg/dl      Urobilinogen, UA 0 2 E U /dl      Bilirubin, UA Negative     Blood, UA Negative    CBC and differential [[id number]]  (Abnormal) Collected: 08/11/21 1623    Lab Status: Final result Specimen: Blood from Arm, Left Updated: 08/11/21 1634     WBC 13 80 Thousand/uL      RBC 4 75 Million/uL      Hemoglobin 14 3 g/dL      Hematocrit 42 8 %      MCV 90 fL      MCH 30 1 pg      MCHC 33 4 g/dL      RDW 12 8 %      MPV 6 8 fL      Platelets 751 Thousands/uL      Neutrophils Relative 82 %      Lymphocytes Relative 11 %      Monocytes Relative 5 %      Eosinophils Relative 1 %      Basophils Relative 1 %      Neutrophils Absolute 11 40 Thousands/µL      Lymphocytes Absolute 1 50 Thousands/µL      Monocytes Absolute 0 70 Thousand/µL      Eosinophils Absolute 0 10 Thousand/µL      Basophils Absolute 0 10 Thousands/µL                  CT abdomen pelvis with contrast    (Results Pending)   CT renal stone study abdomen pelvis wo contrast    (Results Pending)              Procedures  Procedures         ED Course  ED Course as of Aug 11 1657   Wed Aug 11, 2021   1642 Pt care transfer to Dr Diane Santana   Pending CT and labs                                               MDM    Disposition  Final diagnoses:   Abdominal pain     Time reflects when diagnosis was documented in both MDM as applicable and the Disposition within this note     Time User Action Codes Description Comment    8/11/2021  4:42 PM Norma Gabriel [R10 9] Abdominal pain       ED Disposition     None      Follow-up Information    None         Patient's Medications   Discharge Prescriptions    No medications on file     No discharge procedures on file      PDMP Review       Value Time User    PDMP Reviewed  Yes 2/9/2021 11:26 AM Prabhakar Guardado PA-C          ED Provider  Electronically Signed by           Josh Jacobs MD  08/11/21 0754

## 2021-08-11 NOTE — DISCHARGE INSTRUCTIONS
Follow up with primary care or Urology if needed    Take Flomax 2 times a day for 5 days  Hydrocodone only as needed for severe pain  Motrin 600 mg 3 times a day as needed for moderate pain with food  Return if he developed fever or pain worsens

## 2021-10-01 ENCOUNTER — OFFICE VISIT (OUTPATIENT)
Dept: FAMILY MEDICINE CLINIC | Facility: CLINIC | Age: 61
End: 2021-10-01
Payer: COMMERCIAL

## 2021-10-01 VITALS
OXYGEN SATURATION: 96 % | SYSTOLIC BLOOD PRESSURE: 152 MMHG | HEIGHT: 74 IN | HEART RATE: 78 BPM | WEIGHT: 230 LBS | DIASTOLIC BLOOD PRESSURE: 90 MMHG | BODY MASS INDEX: 29.52 KG/M2 | TEMPERATURE: 98.4 F

## 2021-10-01 DIAGNOSIS — Z13.29 THYROID DISORDER SCREEN: ICD-10-CM

## 2021-10-01 DIAGNOSIS — Z53.20 LUNG CANCER SCREENING DECLINED BY PATIENT: ICD-10-CM

## 2021-10-01 DIAGNOSIS — Z12.11 COLON CANCER SCREENING: ICD-10-CM

## 2021-10-01 DIAGNOSIS — I10 ESSENTIAL HYPERTENSION: Primary | ICD-10-CM

## 2021-10-01 DIAGNOSIS — Z13.220 SCREENING CHOLESTEROL LEVEL: ICD-10-CM

## 2021-10-01 DIAGNOSIS — Z12.5 PROSTATE CANCER SCREENING: ICD-10-CM

## 2021-10-01 PROBLEM — F17.210 CIGARETTE NICOTINE DEPENDENCE WITHOUT COMPLICATION: Status: ACTIVE | Noted: 2021-10-01

## 2021-10-01 PROCEDURE — 99213 OFFICE O/P EST LOW 20 MIN: CPT | Performed by: PHYSICIAN ASSISTANT

## 2021-10-01 PROCEDURE — 3008F BODY MASS INDEX DOCD: CPT | Performed by: PHYSICIAN ASSISTANT

## 2021-10-01 PROCEDURE — 4004F PT TOBACCO SCREEN RCVD TLK: CPT | Performed by: PHYSICIAN ASSISTANT

## 2021-10-01 RX ORDER — IBUPROFEN 200 MG
200 TABLET ORAL EVERY 6 HOURS PRN
COMMUNITY
End: 2022-07-05

## 2021-10-01 RX ORDER — LISINOPRIL 20 MG/1
20 TABLET ORAL DAILY
Qty: 90 TABLET | Refills: 1 | Status: SHIPPED | OUTPATIENT
Start: 2021-10-01 | End: 2022-05-02

## 2022-02-22 ENCOUNTER — OFFICE VISIT (OUTPATIENT)
Dept: GASTROENTEROLOGY | Facility: CLINIC | Age: 62
End: 2022-02-22
Payer: COMMERCIAL

## 2022-02-22 VITALS
HEIGHT: 74 IN | SYSTOLIC BLOOD PRESSURE: 120 MMHG | TEMPERATURE: 98.6 F | RESPIRATION RATE: 18 BRPM | HEART RATE: 68 BPM | BODY MASS INDEX: 30.18 KG/M2 | OXYGEN SATURATION: 99 % | DIASTOLIC BLOOD PRESSURE: 88 MMHG | WEIGHT: 235.2 LBS

## 2022-02-22 DIAGNOSIS — Z72.0 TOBACCO ABUSE: Primary | ICD-10-CM

## 2022-02-22 DIAGNOSIS — Z12.11 COLON CANCER SCREENING: ICD-10-CM

## 2022-02-22 PROCEDURE — 3008F BODY MASS INDEX DOCD: CPT | Performed by: INTERNAL MEDICINE

## 2022-02-22 PROCEDURE — 4004F PT TOBACCO SCREEN RCVD TLK: CPT | Performed by: INTERNAL MEDICINE

## 2022-02-22 PROCEDURE — 99204 OFFICE O/P NEW MOD 45 MIN: CPT | Performed by: INTERNAL MEDICINE

## 2022-02-22 NOTE — PROGRESS NOTES
[facility] - Outpatient Consultation  Cynthia Callahan 64 y o  male MRN: [id number]  Encounter: [id number]          ASSESSMENT AND PLAN:      1  Colon cancer screening  Patient is overdue for age-appropriate colorectal cancer screening  This will be index colonoscopy  Is requested Learta Sussex tab for bowel prep, for which samples were provided today  He was encouraged to make sure that he is drinking the appropriate amount of water as the instructions provided to ensure adequate prep  Benefits and risks of colonoscopy were discussed in detail including bleeding, infection, perforation   - Ambulatory referral to Gastroenterology  - Colonoscopy; Future    2  Tobacco abuse  Patient smokes 2-3 packs per day and has for roughly 40 years  He understands the increased risk for health problems including malignancy as we discussed today     ______________________________________________________________________    HPI:  Mr Sharda Cuellar is a pleasant 28-year-old male with past medical history of extensive tobacco abuse presenting for discussion of colorectal cancer screening  He was referred by his PCP Sabine Reid  He denies any GI symptoms including rectal bleeding, change in bowel habits, weight loss  On rare occasions he gets heartburn but does not require medications for this  There is no family history of colorectal cancer  He has never had a colonoscopy or other form of colorectal cancer screening for      REVIEW OF SYSTEMS:    CONSTITUTIONAL: Denies any fever, chills, rigors, and weight loss  HEENT: Denies odynophagia, tinnitus  CARDIOVASCULAR: No chest pain or palpitations  RESPIRATORY: Denies any cough, hemoptysis, shortness of breath or dyspnea on exertion  GASTROINTESTINAL: As noted in the History of Present Illness  GENITOURINARY: No problems with urination  Denies any hematuria or dysuria  NEUROLOGIC: No dizziness or vertigo, denies headaches     MUSCULOSKELETAL: Denies any muscle or joint pain  SKIN: Denies skin rashes or itching  ENDOCRINE:  Denies intolerance to heat or cold  PSYCHOSOCIAL: Denies depression or anxiety  Denies any recent memory loss  Historical Information   Past Medical History:   Diagnosis Date    Bilateral inguinal hernia     Dizziness 02/02/2021    isolated incident of dizziness, sweating & disorientation    Hypertension      Past Surgical History:   Procedure Laterality Date    AR REPAIR ING HERNIA,5+Y/O,REDUCIBL Bilateral 2/9/2021    Procedure: INGUINAL HERNIA REPAIR with mesh;  Surgeon: Yves Fisher MD;  Location: [facility] MAIN OR;  Service: General    TONSILLECTOMY       Social History   Social History     Substance and Sexual Activity   Alcohol Use Not Currently     Social History     Substance and Sexual Activity   Drug Use Not Currently     Social History     Tobacco Use   Smoking Status Current Every Day Smoker    Packs/day: 2 00    Types: Cigarettes   Smokeless Tobacco Never Used     Family History   Problem Relation Age of Onset    Diabetes Maternal Grandfather        Meds/Allergies       Current Outpatient Medications:     ibuprofen (MOTRIN) 200 mg tablet    lisinopril (ZESTRIL) 20 mg tablet    tadalafil (CIALIS) 5 MG tablet    No Known Allergies        Objective     Blood pressure 120/88, pulse 68, temperature 98 6 °F (37 °C), resp  rate 18, height 6' 2" (1 88 m), weight 107 kg (235 lb 3 2 oz), SpO2 99 %  Body mass index is 30 2 kg/m²  PHYSICAL EXAM:      General Appearance:   Alert, cooperative, no distress   HEENT:   Normocephalic, atraumatic, anicteric  Neck:  Supple, symmetrical, trachea midline   Lungs:   Faint expiratory wheezes, normal respiratory effort  No rales  Heart[de-identified]   Regular rate and rhythm; no murmur, rub, or gallop     Abdomen:   Soft, non-tender, non-distended; normal bowel sounds; no masses, no organomegaly    Genitalia:   Deferred    Rectal:   Deferred    Extremities:  No cyanosis, clubbing or edema Pulses:  2+ and symmetric    Skin:  No jaundice, rashes, or lesions          Lab Results:   No visits with results within 1 Day(s) from this visit  Latest known visit with results is:   Admission on 08/11/2021, Discharged on 08/11/2021   Component Date Value    WBC 08/11/2021 13 80*    RBC 08/11/2021 4 75     Hemoglobin 08/11/2021 14 3     Hematocrit 08/11/2021 42 8     MCV 08/11/2021 90     MCH 08/11/2021 30 1     MCHC 08/11/2021 33 4     RDW 08/11/2021 12 8     MPV 08/11/2021 6 8*    Platelets 67/79/8373 247     Neutrophils Relative 08/11/2021 82*    Lymphocytes Relative 08/11/2021 11*    Monocytes Relative 08/11/2021 5     Eosinophils Relative 08/11/2021 1     Basophils Relative 08/11/2021 1     Neutrophils Absolute 08/11/2021 11 40*    Lymphocytes Absolute 08/11/2021 1 50     Monocytes Absolute 08/11/2021 0 70     Eosinophils Absolute 08/11/2021 0 10     Basophils Absolute 08/11/2021 0 10     Sodium 08/11/2021 132*    Potassium 08/11/2021 4 4     Chloride 08/11/2021 99     CO2 08/11/2021 24     ANION GAP 08/11/2021 9     BUN 08/11/2021 21     Creatinine 08/11/2021 1 32*    Glucose 08/11/2021 123*    Calcium 08/11/2021 9 9     AST 08/11/2021 15     ALT 08/11/2021 25     Alkaline Phosphatase 08/11/2021 81     Total Protein 08/11/2021 7 2     Albumin 08/11/2021 4 7     Total Bilirubin 08/11/2021 0 40     eGFR 08/11/2021 58     Lipase 08/11/2021 11     Color, UA 08/11/2021 Yellow     Clarity, UA 08/11/2021 Clear     Specific Gravity, UA 08/11/2021 >=1 030*    pH, UA 08/11/2021 6 0     Leukocytes, UA 08/11/2021 Negative     Nitrite, UA 08/11/2021 Negative     Protein, UA 08/11/2021 Negative     Glucose, UA 08/11/2021 Negative     Ketones, UA 08/11/2021 Negative     Urobilinogen, UA 08/11/2021 0 2     Bilirubin, UA 08/11/2021 Negative     Blood, UA 08/11/2021 Negative          Radiology Results:   No results found

## 2022-02-22 NOTE — PATIENT INSTRUCTIONS
Scheduled date of colonoscopy (as of today):3/9/22  Physician performing colonoscopy:Vijay  Location of colonoscopy:[place]  Bowel prep reviewed with patient:Sutab  Instructions reviewed with patient by:Tabby  Clearances: none

## 2022-02-22 NOTE — H&P (VIEW-ONLY)
[facility] - Outpatient Consultation  Vince Tiwari 64 y o  male MRN: [id number]  Encounter: [id number]          ASSESSMENT AND PLAN:      1  Colon cancer screening  Patient is overdue for age-appropriate colorectal cancer screening  This will be index colonoscopy  Is requested Theoplis Carrier tab for bowel prep, for which samples were provided today  He was encouraged to make sure that he is drinking the appropriate amount of water as the instructions provided to ensure adequate prep  Benefits and risks of colonoscopy were discussed in detail including bleeding, infection, perforation   - Ambulatory referral to Gastroenterology  - Colonoscopy; Future    2  Tobacco abuse  Patient smokes 2-3 packs per day and has for roughly 40 years  He understands the increased risk for health problems including malignancy as we discussed today     ______________________________________________________________________    HPI:  Mr rAambula is a pleasant 57-year-old male with past medical history of extensive tobacco abuse presenting for discussion of colorectal cancer screening  He was referred by his PCP Guilherme Amos  He denies any GI symptoms including rectal bleeding, change in bowel habits, weight loss  On rare occasions he gets heartburn but does not require medications for this  There is no family history of colorectal cancer  He has never had a colonoscopy or other form of colorectal cancer screening for      REVIEW OF SYSTEMS:    CONSTITUTIONAL: Denies any fever, chills, rigors, and weight loss  HEENT: Denies odynophagia, tinnitus  CARDIOVASCULAR: No chest pain or palpitations  RESPIRATORY: Denies any cough, hemoptysis, shortness of breath or dyspnea on exertion  GASTROINTESTINAL: As noted in the History of Present Illness  GENITOURINARY: No problems with urination  Denies any hematuria or dysuria  NEUROLOGIC: No dizziness or vertigo, denies headaches     MUSCULOSKELETAL: Denies any muscle or joint pain  SKIN: Denies skin rashes or itching  ENDOCRINE:  Denies intolerance to heat or cold  PSYCHOSOCIAL: Denies depression or anxiety  Denies any recent memory loss  Historical Information   Past Medical History:   Diagnosis Date    Bilateral inguinal hernia     Dizziness 02/02/2021    isolated incident of dizziness, sweating & disorientation    Hypertension      Past Surgical History:   Procedure Laterality Date    NM REPAIR ING HERNIA,5+Y/O,REDUCIBL Bilateral 2/9/2021    Procedure: INGUINAL HERNIA REPAIR with mesh;  Surgeon: Mike Vizcaino MD;  Location: [facility] MAIN OR;  Service: General    TONSILLECTOMY       Social History   Social History     Substance and Sexual Activity   Alcohol Use Not Currently     Social History     Substance and Sexual Activity   Drug Use Not Currently     Social History     Tobacco Use   Smoking Status Current Every Day Smoker    Packs/day: 2 00    Types: Cigarettes   Smokeless Tobacco Never Used     Family History   Problem Relation Age of Onset    Diabetes Maternal Grandfather        Meds/Allergies       Current Outpatient Medications:     ibuprofen (MOTRIN) 200 mg tablet    lisinopril (ZESTRIL) 20 mg tablet    tadalafil (CIALIS) 5 MG tablet    No Known Allergies        Objective     Blood pressure 120/88, pulse 68, temperature 98 6 °F (37 °C), resp  rate 18, height 6' 2" (1 88 m), weight 107 kg (235 lb 3 2 oz), SpO2 99 %  Body mass index is 30 2 kg/m²  PHYSICAL EXAM:      General Appearance:   Alert, cooperative, no distress   HEENT:   Normocephalic, atraumatic, anicteric  Neck:  Supple, symmetrical, trachea midline   Lungs:   Faint expiratory wheezes, normal respiratory effort  No rales  Heart[de-identified]   Regular rate and rhythm; no murmur, rub, or gallop     Abdomen:   Soft, non-tender, non-distended; normal bowel sounds; no masses, no organomegaly    Genitalia:   Deferred    Rectal:   Deferred    Extremities:  No cyanosis, clubbing or edema Pulses:  2+ and symmetric    Skin:  No jaundice, rashes, or lesions          Lab Results:   No visits with results within 1 Day(s) from this visit  Latest known visit with results is:   Admission on 08/11/2021, Discharged on 08/11/2021   Component Date Value    WBC 08/11/2021 13 80*    RBC 08/11/2021 4 75     Hemoglobin 08/11/2021 14 3     Hematocrit 08/11/2021 42 8     MCV 08/11/2021 90     MCH 08/11/2021 30 1     MCHC 08/11/2021 33 4     RDW 08/11/2021 12 8     MPV 08/11/2021 6 8*    Platelets 99/44/8198 247     Neutrophils Relative 08/11/2021 82*    Lymphocytes Relative 08/11/2021 11*    Monocytes Relative 08/11/2021 5     Eosinophils Relative 08/11/2021 1     Basophils Relative 08/11/2021 1     Neutrophils Absolute 08/11/2021 11 40*    Lymphocytes Absolute 08/11/2021 1 50     Monocytes Absolute 08/11/2021 0 70     Eosinophils Absolute 08/11/2021 0 10     Basophils Absolute 08/11/2021 0 10     Sodium 08/11/2021 132*    Potassium 08/11/2021 4 4     Chloride 08/11/2021 99     CO2 08/11/2021 24     ANION GAP 08/11/2021 9     BUN 08/11/2021 21     Creatinine 08/11/2021 1 32*    Glucose 08/11/2021 123*    Calcium 08/11/2021 9 9     AST 08/11/2021 15     ALT 08/11/2021 25     Alkaline Phosphatase 08/11/2021 81     Total Protein 08/11/2021 7 2     Albumin 08/11/2021 4 7     Total Bilirubin 08/11/2021 0 40     eGFR 08/11/2021 58     Lipase 08/11/2021 11     Color, UA 08/11/2021 Yellow     Clarity, UA 08/11/2021 Clear     Specific Gravity, UA 08/11/2021 >=1 030*    pH, UA 08/11/2021 6 0     Leukocytes, UA 08/11/2021 Negative     Nitrite, UA 08/11/2021 Negative     Protein, UA 08/11/2021 Negative     Glucose, UA 08/11/2021 Negative     Ketones, UA 08/11/2021 Negative     Urobilinogen, UA 08/11/2021 0 2     Bilirubin, UA 08/11/2021 Negative     Blood, UA 08/11/2021 Negative          Radiology Results:   No results found

## 2022-03-09 ENCOUNTER — HOSPITAL ENCOUNTER (OUTPATIENT)
Dept: GASTROENTEROLOGY | Facility: HOSPITAL | Age: 62
Setting detail: OUTPATIENT SURGERY
Discharge: HOME | End: 2022-03-09
Payer: COMMERCIAL

## 2022-03-09 ENCOUNTER — ANESTHESIA EVENT (OUTPATIENT)
Dept: GASTROENTEROLOGY | Facility: HOSPITAL | Age: 62
End: 2022-03-09

## 2022-03-09 ENCOUNTER — ANESTHESIA (OUTPATIENT)
Dept: GASTROENTEROLOGY | Facility: HOSPITAL | Age: 62
End: 2022-03-09

## 2022-03-09 VITALS
OXYGEN SATURATION: 99 % | TEMPERATURE: 97.3 F | SYSTOLIC BLOOD PRESSURE: 112 MMHG | HEART RATE: 74 BPM | DIASTOLIC BLOOD PRESSURE: 67 MMHG | WEIGHT: 235 LBS | BODY MASS INDEX: 30.16 KG/M2 | RESPIRATION RATE: 18 BRPM | HEIGHT: 74 IN

## 2022-03-09 DIAGNOSIS — Z12.11 COLON CANCER SCREENING: ICD-10-CM

## 2022-03-09 PROCEDURE — 88305 TISSUE EXAM BY PATHOLOGIST: CPT | Performed by: PATHOLOGY

## 2022-03-09 PROCEDURE — 45385 COLONOSCOPY W/LESION REMOVAL: CPT | Performed by: INTERNAL MEDICINE

## 2022-03-09 RX ORDER — SODIUM CHLORIDE, SODIUM LACTATE, POTASSIUM CHLORIDE, CALCIUM CHLORIDE 600; 310; 30; 20 MG/100ML; MG/100ML; MG/100ML; MG/100ML
125 INJECTION, SOLUTION INTRAVENOUS CONTINUOUS
Status: DISCONTINUED | OUTPATIENT
Start: 2022-03-09 | End: 2022-03-13 | Stop reason: HOSPADM

## 2022-03-09 RX ORDER — PROPOFOL 10 MG/ML
INJECTION, EMULSION INTRAVENOUS CONTINUOUS PRN
Status: DISCONTINUED | OUTPATIENT
Start: 2022-03-09 | End: 2022-03-09

## 2022-03-09 RX ORDER — PROPOFOL 10 MG/ML
INJECTION, EMULSION INTRAVENOUS AS NEEDED
Status: DISCONTINUED | OUTPATIENT
Start: 2022-03-09 | End: 2022-03-09

## 2022-03-09 RX ORDER — LIDOCAINE HYDROCHLORIDE 20 MG/ML
INJECTION, SOLUTION EPIDURAL; INFILTRATION; INTRACAUDAL; PERINEURAL AS NEEDED
Status: DISCONTINUED | OUTPATIENT
Start: 2022-03-09 | End: 2022-03-09

## 2022-03-09 RX ADMIN — PROPOFOL 100 MG: 10 INJECTION, EMULSION INTRAVENOUS at 08:56

## 2022-03-09 RX ADMIN — LIDOCAINE HYDROCHLORIDE 50 MG: 20 INJECTION, SOLUTION EPIDURAL; INFILTRATION; INTRACAUDAL; PERINEURAL at 08:56

## 2022-03-09 RX ADMIN — PROPOFOL 130 MCG/KG/MIN: 10 INJECTION, EMULSION INTRAVENOUS at 08:56

## 2022-03-09 RX ADMIN — SODIUM CHLORIDE, SODIUM LACTATE, POTASSIUM CHLORIDE, AND CALCIUM CHLORIDE 125 ML/HR: .6; .31; .03; .02 INJECTION, SOLUTION INTRAVENOUS at 08:18

## 2022-03-09 NOTE — DISCHARGE INSTRUCTIONS
Colonoscopy   WHAT YOU NEED TO KNOW:   A colonoscopy is a procedure to examine the inside of your colon (intestine) with a scope  Polyps or tissue growths may have been removed during your colonoscopy  It is normal to feel bloated and to have some abdominal discomfort  You should be passing gas  If you have hemorrhoids or you had polyps removed, you may have a small amount of bleeding  DISCHARGE INSTRUCTIONS:   Seek care immediately if:    You have sudden, severe abdominal pain   You have problems swallowing   You have a large amount of black, sticky bowel movements or blood in your bowel movements   You have sudden trouble breathing   You feel weak, lightheaded, or faint or your heart beats faster than normal for you  Contact your healthcare provider if:    You have a fever and chills   You have nausea or are vomiting   Your abdomen is bloated or feels full and hard   You have abdominal pain   You have black, sticky bowel movements or blood in your bowel movements   You have not had a bowel movement for 3 days after your procedure   You have rash or hives   You have questions or concerns about your procedure  Activity:    Do not lift, strain, or run for 24 hours after your procedure   Rest after your procedure  You have been given medicine to relax you  Do not drive or make important decisions until the day after your procedure  Return to your normal activity as directed   Relieve gas and discomfort from bloating by lying on your right side with a heating pad on your abdomen  You may need to take short walks to help the gas move out  Eat small meals until bloating is relieved  Follow up with your healthcare provider as directed: Write down your questions so you remember to ask them during your visits  If you take a blood thinner, please review the specific instructions from your endoscopist about when you should resume it   These can be found in the Recommendation and Your Medication list sections of this After Visit Summary

## 2022-03-09 NOTE — ANESTHESIA PREPROCEDURE EVALUATION
Procedure:  COLONOSCOPY    Relevant Problems   No relevant active problems        Physical Exam    Airway    Mallampati score: II  TM Distance: >3 FB  Neck ROM: full     Dental   upper dentures and lower dentures,     Cardiovascular      Pulmonary      Other Findings        Anesthesia Plan  ASA Score- 2     Anesthesia Type- IV sedation with anesthesia with ASA Monitors  Additional Monitors:   Airway Plan:           Plan Factors-Exercise tolerance (METS): >4 METS  Chart reviewed  Patient summary reviewed  Patient is a current smoker  Patient instructed to abstain from smoking on day of procedure  Patient smoked on day of surgery  Induction- intravenous  Postoperative Plan-     Informed Consent- Anesthetic plan and risks discussed with patient  I personally reviewed this patient with the CRNA  Discussed and agreed on the Anesthesia Plan with the CRNA  Isabelle Denise

## 2022-03-09 NOTE — ANESTHESIA POSTPROCEDURE EVALUATION
Post-Op Assessment Note    CV Status:  Stable  Pain Score: 0    Pain management: adequate     Mental Status:  Arousable   Hydration Status:  Stable   PONV Controlled:  None   Airway Patency:  Patent      Post Op Vitals Reviewed: Yes      Staff: CRNA         No complications documented      BP  114/75   Temp     Pulse  72   Resp   18   SpO2   99%

## 2022-03-09 NOTE — INTERVAL H&P NOTE
H&P reviewed  After examining the patient I find no changes in the patients condition since the H&P had been written      Vitals:    03/09/22 0808   BP: (!) 178/88   Pulse: 92   Resp: 18   Temp: (!) 96 5 °F (35 8 °C)   SpO2: 99%

## 2022-05-01 DIAGNOSIS — I10 ESSENTIAL HYPERTENSION: ICD-10-CM

## 2022-05-02 RX ORDER — LISINOPRIL 20 MG/1
TABLET ORAL
Qty: 90 TABLET | Refills: 1 | Status: SHIPPED | OUTPATIENT
Start: 2022-05-02 | End: 2022-07-05

## 2022-06-29 ENCOUNTER — APPOINTMENT (INPATIENT)
Dept: NON INVASIVE DIAGNOSTICS | Facility: HOSPITAL | Age: 62
DRG: 226 | End: 2022-06-29
Payer: COMMERCIAL

## 2022-06-29 ENCOUNTER — APPOINTMENT (EMERGENCY)
Dept: RADIOLOGY | Facility: HOSPITAL | Age: 62
DRG: 270 | End: 2022-06-29
Payer: COMMERCIAL

## 2022-06-29 ENCOUNTER — HOSPITAL ENCOUNTER (INPATIENT)
Facility: HOSPITAL | Age: 62
LOS: 6 days | Discharge: HOME | DRG: 226 | End: 2022-07-05
Attending: STUDENT IN AN ORGANIZED HEALTH CARE EDUCATION/TRAINING PROGRAM | Admitting: STUDENT IN AN ORGANIZED HEALTH CARE EDUCATION/TRAINING PROGRAM
Payer: COMMERCIAL

## 2022-06-29 ENCOUNTER — APPOINTMENT (INPATIENT)
Dept: RADIOLOGY | Facility: HOSPITAL | Age: 62
DRG: 226 | End: 2022-06-29
Payer: COMMERCIAL

## 2022-06-29 ENCOUNTER — HOSPITAL ENCOUNTER (INPATIENT)
Facility: HOSPITAL | Age: 62
LOS: 1 days | DRG: 270 | End: 2022-06-29
Attending: EMERGENCY MEDICINE | Admitting: INTERNAL MEDICINE
Payer: COMMERCIAL

## 2022-06-29 VITALS — RESPIRATION RATE: 14 BRPM | OXYGEN SATURATION: 99 % | HEART RATE: 88 BPM

## 2022-06-29 DIAGNOSIS — I46.9 CARDIAC ARREST WITH VENTRICULAR FIBRILLATION (HCC): ICD-10-CM

## 2022-06-29 DIAGNOSIS — I46.9 CARDIAC ARREST (HCC): Primary | ICD-10-CM

## 2022-06-29 DIAGNOSIS — I21.3 STEMI (ST ELEVATION MYOCARDIAL INFARCTION) (HCC): ICD-10-CM

## 2022-06-29 DIAGNOSIS — I50.22 HEART FAILURE WITH MID-RANGE EJECTION FRACTION (HCC): ICD-10-CM

## 2022-06-29 DIAGNOSIS — I21.02 ST ELEVATION MYOCARDIAL INFARCTION INVOLVING LEFT ANTERIOR DESCENDING (LAD) CORONARY ARTERY (HCC): ICD-10-CM

## 2022-06-29 DIAGNOSIS — I49.01 CARDIAC ARREST WITH VENTRICULAR FIBRILLATION (HCC): Primary | ICD-10-CM

## 2022-06-29 DIAGNOSIS — J96.90 RESPIRATORY FAILURE (HCC): ICD-10-CM

## 2022-06-29 DIAGNOSIS — I49.01 CARDIAC ARREST WITH VENTRICULAR FIBRILLATION (HCC): ICD-10-CM

## 2022-06-29 DIAGNOSIS — I50.20 HFREF (HEART FAILURE WITH REDUCED EJECTION FRACTION) (HCC): ICD-10-CM

## 2022-06-29 DIAGNOSIS — I46.9 CARDIAC ARREST WITH VENTRICULAR FIBRILLATION (HCC): Primary | ICD-10-CM

## 2022-06-29 PROBLEM — R57.9 SHOCK (HCC): Status: ACTIVE | Noted: 2022-06-29

## 2022-06-29 PROBLEM — R73.9 HYPERGLYCEMIA: Status: ACTIVE | Noted: 2022-06-29

## 2022-06-29 PROBLEM — J96.02 ACUTE RESPIRATORY FAILURE WITH HYPERCAPNIA (HCC): Status: ACTIVE | Noted: 2022-06-29

## 2022-06-29 PROBLEM — F17.210 CIGARETTE NICOTINE DEPENDENCE WITHOUT COMPLICATION: Chronic | Status: ACTIVE | Noted: 2021-10-01

## 2022-06-29 PROBLEM — R79.89 ELEVATED LFTS: Status: ACTIVE | Noted: 2022-06-29

## 2022-06-29 PROBLEM — I10 HYPERTENSION: Chronic | Status: ACTIVE | Noted: 2022-06-29

## 2022-06-29 PROBLEM — N17.9 AKI (ACUTE KIDNEY INJURY) (HCC): Status: ACTIVE | Noted: 2022-06-29

## 2022-06-29 LAB
ALBUMIN SERPL BCP-MCNC: 3.4 G/DL (ref 3.5–5)
ALBUMIN SERPL BCP-MCNC: 3.7 G/DL (ref 3.5–5)
ALP SERPL-CCNC: 109 U/L (ref 46–116)
ALP SERPL-CCNC: 80 U/L (ref 34–104)
ALT SERPL W P-5'-P-CCNC: 174 U/L (ref 7–52)
ALT SERPL W P-5'-P-CCNC: 281 U/L (ref 12–78)
ANCILLARY VALUES: ABNORMAL
ANION GAP SERPL CALCULATED.3IONS-SCNC: 11 MMOL/L (ref 4–13)
ANION GAP SERPL CALCULATED.3IONS-SCNC: 17 MMOL/L (ref 4–13)
ARTERIAL PATENCY WRIST A: ABNORMAL
AST SERPL W P-5'-P-CCNC: 121 U/L (ref 13–39)
AST SERPL W P-5'-P-CCNC: 215 U/L (ref 5–45)
ATRIAL RATE: 82 BPM
BASE EXCESS BLDA CALC-SCNC: -11 MMOL/L
BASE EXCESS BLDA CALC-SCNC: -6 MMOL/L (ref -2–3)
BASE EXCESS BLDA CALC-SCNC: -6 MMOL/L (ref -2–3)
BASOPHILS # BLD AUTO: 0.07 THOUSANDS/ΜL (ref 0–0.1)
BASOPHILS NFR BLD AUTO: 1 % (ref 0–1)
BILIRUB SERPL-MCNC: 0.38 MG/DL (ref 0.2–1)
BILIRUB SERPL-MCNC: 0.46 MG/DL (ref 0.2–1)
BUN SERPL-MCNC: 23 MG/DL (ref 5–25)
BUN SERPL-MCNC: 25 MG/DL (ref 5–25)
CA-I BLD-SCNC: 1.16 MMOL/L (ref 1.12–1.32)
CA-I BLD-SCNC: 1.23 MMOL/L (ref 1.12–1.32)
CA-I BLD-SCNC: 1.26 MMOL/L (ref 1.12–1.32)
CALCIUM ALBUM COR SERPL-MCNC: 9.3 MG/DL (ref 8.3–10.1)
CALCIUM SERPL-MCNC: 8.8 MG/DL (ref 8.4–10.2)
CALCIUM SERPL-MCNC: 9.1 MG/DL (ref 8.3–10.1)
CARDIAC TROPONIN I PNL SERPL HS: 4586 NG/L (ref 8–18)
CARDIAC TROPONIN I PNL SERPL HS: 64 NG/L
CATH EF QUANTITATIVE: 50 %
CHLORIDE SERPL-SCNC: 102 MMOL/L (ref 100–108)
CHLORIDE SERPL-SCNC: 96 MMOL/L (ref 96–108)
CO2 SERPL-SCNC: 19 MMOL/L (ref 21–32)
CO2 SERPL-SCNC: 22 MMOL/L (ref 21–32)
CREAT SERPL-MCNC: 1.56 MG/DL (ref 0.6–1.3)
CREAT SERPL-MCNC: 1.79 MG/DL (ref 0.6–1.3)
DS:DELIVERY SYSTEM: AC
EOSINOPHIL # BLD AUTO: 0.2 THOUSAND/ΜL (ref 0–0.61)
EOSINOPHIL NFR BLD AUTO: 2 % (ref 0–6)
ERYTHROCYTE [DISTWIDTH] IN BLOOD BY AUTOMATED COUNT: 12.3 % (ref 11.6–15.1)
ETHANOL SERPL-MCNC: <10 MG/DL
FIO2 GAS DIL.REBREATH: 100 L
FIO2 GAS DIL.REBREATH: 50 L
GFR SERPL CREATININE-BSD FRML MDRD: 39 ML/MIN/1.73SQ M
GFR SERPL CREATININE-BSD FRML MDRD: 46 ML/MIN/1.73SQ M
GLUCOSE SERPL-MCNC: 269 MG/DL (ref 65–140)
GLUCOSE SERPL-MCNC: 275 MG/DL (ref 65–140)
GLUCOSE SERPL-MCNC: 295 MG/DL (ref 65–140)
GLUCOSE SERPL-MCNC: 329 MG/DL (ref 65–140)
GLUCOSE SERPL-MCNC: 330 MG/DL (ref 65–140)
GLUCOSE SERPL-MCNC: 342 MG/DL (ref 65–140)
HCO3 BLDA-SCNC: 15.8 MMOL/L (ref 22–28)
HCO3 BLDA-SCNC: 21.2 MMOL/L (ref 22–28)
HCO3 BLDA-SCNC: 23.8 MMOL/L (ref 22–28)
HCT VFR BLD AUTO: 45.1 % (ref 36.5–49.3)
HCT VFR BLD CALC: 41 % (ref 36.5–49.3)
HCT VFR BLD CALC: 42 % (ref 36.5–49.3)
HGB BLD-MCNC: 14.9 G/DL (ref 12–17)
HGB BLDA-MCNC: 13.9 G/DL (ref 12–17)
HGB BLDA-MCNC: 14.3 G/DL (ref 12–17)
IMM GRANULOCYTES # BLD AUTO: 0.04 THOUSAND/UL (ref 0–0.2)
IMM GRANULOCYTES NFR BLD AUTO: 0 % (ref 0–2)
LACTATE SERPL-SCNC: 5.7 MMOL/L (ref 0.5–2)
LACTATE SERPL-SCNC: 8.6 MMOL/L (ref 0.5–2)
LYMPHOCYTES # BLD AUTO: 3.87 THOUSANDS/ΜL (ref 0.6–4.47)
LYMPHOCYTES NFR BLD AUTO: 34 % (ref 14–44)
MAGNESIUM SERPL-MCNC: 2.4 MG/DL (ref 1.6–2.6)
MCH RBC QN AUTO: 30.4 PG (ref 26.8–34.3)
MCHC RBC AUTO-ENTMCNC: 33 G/DL (ref 31.4–37.4)
MCV RBC AUTO: 92 FL (ref 82–98)
MONOCYTES # BLD AUTO: 0.82 THOUSAND/ΜL (ref 0.17–1.22)
MONOCYTES NFR BLD AUTO: 7 % (ref 4–12)
NEUTROPHILS # BLD AUTO: 6.42 THOUSANDS/ΜL (ref 1.85–7.62)
NEUTS SEG NFR BLD AUTO: 56 % (ref 43–75)
NRBC BLD AUTO-RTO: 0 /100 WBCS
O2 CT BLDA-SCNC: 19.9 ML/DL (ref 16–23)
OXYHGB MFR BLDA: 92.7 % (ref 94–97)
P AXIS: 73 DEGREES
PCO2 BLD: 22 MMOL/L (ref 21–32)
PCO2 BLD: 26 MMOL/L (ref 21–32)
PCO2 BLD: 44.6 MM HG (ref 36–44)
PCO2 BLD: 65.6 MM HG (ref 36–44)
PCO2 BLDA: 38.7 MM HG (ref 36–44)
PH BLD: 7.17 [PH] (ref 7.35–7.45)
PH BLD: 7.28 [PH] (ref 7.35–7.45)
PH BLDA: 7.23 [PH] (ref 7.35–7.45)
PHOSPHATE SERPL-MCNC: 5.7 MG/DL (ref 2.3–4.1)
PLATELET # BLD AUTO: 253 THOUSANDS/UL (ref 149–390)
PLATELET # BLD AUTO: 333 THOUSANDS/UL (ref 149–390)
PMV BLD AUTO: 8.7 FL (ref 8.9–12.7)
PMV BLD AUTO: 8.8 FL (ref 8.9–12.7)
PO2 BLD: 100 MM HG (ref 75–129)
PO2 BLD: 247 MM HG (ref 75–129)
PO2 BLDA: 86.6 MM HG (ref 75–129)
POTASSIUM BLD-SCNC: 3.5 MMOL/L (ref 3.5–5.3)
POTASSIUM BLD-SCNC: 4.3 MMOL/L (ref 3.5–5.3)
POTASSIUM SERPL-SCNC: 4.2 MMOL/L (ref 3.5–5.3)
POTASSIUM SERPL-SCNC: 4.3 MMOL/L (ref 3.5–5.3)
PR INTERVAL: 174 MS
PRESSURE SETTING: 6
PROT SERPL-MCNC: 5.7 G/DL (ref 6.4–8.4)
PROT SERPL-MCNC: 7.3 G/DL (ref 6.4–8.2)
QRS AXIS: -3 DEGREES
QRSD INTERVAL: 94 MS
QT INTERVAL: 382 MS
QTC INTERVAL: 446 MS
RBC # BLD AUTO: 4.9 MILLION/UL (ref 3.88–5.62)
RESPIRATORY RATE: 20
SAMPLE SITE: ABNORMAL
SAO2 % BLD FROM PO2: 100 % (ref 60–85)
SAO2 % BLD FROM PO2: 97 % (ref 60–85)
SODIUM BLD-SCNC: 132 MMOL/L (ref 136–145)
SODIUM BLD-SCNC: 137 MMOL/L (ref 136–145)
SODIUM SERPL-SCNC: 132 MMOL/L (ref 135–147)
SODIUM SERPL-SCNC: 135 MMOL/L (ref 136–145)
SPECIMEN SOURCE: ABNORMAL
T WAVE AXIS: 87 DEGREES
VENT TYPE: ABNORMAL
VENTILATION VALUE: 460
VENTRICULAR RATE: 82 BPM
WBC # BLD AUTO: 11.42 THOUSAND/UL (ref 4.31–10.16)

## 2022-06-29 PROCEDURE — 82803 BLOOD GASES ANY COMBINATION: CPT

## 2022-06-29 PROCEDURE — 93306 TTE W/DOPPLER COMPLETE: CPT | Performed by: INTERNAL MEDICINE

## 2022-06-29 PROCEDURE — 84295 ASSAY OF SERUM SODIUM: CPT

## 2022-06-29 PROCEDURE — 99292 CRITICAL CARE ADDL 30 MIN: CPT | Performed by: ANESTHESIOLOGY

## 2022-06-29 PROCEDURE — C1894 INTRO/SHEATH, NON-LASER: HCPCS | Performed by: INTERNAL MEDICINE

## 2022-06-29 PROCEDURE — 92950 HEART/LUNG RESUSCITATION CPR: CPT

## 2022-06-29 PROCEDURE — 82947 ASSAY GLUCOSE BLOOD QUANT: CPT

## 2022-06-29 PROCEDURE — 94760 N-INVAS EAR/PLS OXIMETRY 1: CPT

## 2022-06-29 PROCEDURE — 31500 INSERT EMERGENCY AIRWAY: CPT

## 2022-06-29 PROCEDURE — 99153 MOD SED SAME PHYS/QHP EA: CPT | Performed by: INTERNAL MEDICINE

## 2022-06-29 PROCEDURE — C1769 GUIDE WIRE: HCPCS | Performed by: INTERNAL MEDICINE

## 2022-06-29 PROCEDURE — 36600 WITHDRAWAL OF ARTERIAL BLOOD: CPT

## 2022-06-29 PROCEDURE — 93306 TTE W/DOPPLER COMPLETE: CPT

## 2022-06-29 PROCEDURE — 80053 COMPREHEN METABOLIC PANEL: CPT | Performed by: PHYSICIAN ASSISTANT

## 2022-06-29 PROCEDURE — 93458 L HRT ARTERY/VENTRICLE ANGIO: CPT | Performed by: INTERNAL MEDICINE

## 2022-06-29 PROCEDURE — 84132 ASSAY OF SERUM POTASSIUM: CPT

## 2022-06-29 PROCEDURE — 5A2204Z RESTORATION OF CARDIAC RHYTHM, SINGLE: ICD-10-PCS | Performed by: EMERGENCY MEDICINE

## 2022-06-29 PROCEDURE — 85014 HEMATOCRIT: CPT

## 2022-06-29 PROCEDURE — 96375 TX/PRO/DX INJ NEW DRUG ADDON: CPT

## 2022-06-29 PROCEDURE — 93005 ELECTROCARDIOGRAM TRACING: CPT

## 2022-06-29 PROCEDURE — 82330 ASSAY OF CALCIUM: CPT

## 2022-06-29 PROCEDURE — 80053 COMPREHEN METABOLIC PANEL: CPT | Performed by: NURSE PRACTITIONER

## 2022-06-29 PROCEDURE — C1887 CATHETER, GUIDING: HCPCS | Performed by: INTERNAL MEDICINE

## 2022-06-29 PROCEDURE — 84100 ASSAY OF PHOSPHORUS: CPT | Performed by: PHYSICIAN ASSISTANT

## 2022-06-29 PROCEDURE — 99285 EMERGENCY DEPT VISIT HI MDM: CPT

## 2022-06-29 PROCEDURE — 83605 ASSAY OF LACTIC ACID: CPT | Performed by: PHYSICIAN ASSISTANT

## 2022-06-29 PROCEDURE — 99291 CRITICAL CARE FIRST HOUR: CPT | Performed by: ANESTHESIOLOGY

## 2022-06-29 PROCEDURE — 82330 ASSAY OF CALCIUM: CPT | Performed by: PHYSICIAN ASSISTANT

## 2022-06-29 PROCEDURE — 05HY33Z INSERTION OF INFUSION DEVICE INTO UPPER VEIN, PERCUTANEOUS APPROACH: ICD-10-PCS | Performed by: EMERGENCY MEDICINE

## 2022-06-29 PROCEDURE — 84484 ASSAY OF TROPONIN QUANT: CPT | Performed by: PHYSICIAN ASSISTANT

## 2022-06-29 PROCEDURE — NC001 PR NO CHARGE: Performed by: NURSE PRACTITIONER

## 2022-06-29 PROCEDURE — 85049 AUTOMATED PLATELET COUNT: CPT | Performed by: PHYSICIAN ASSISTANT

## 2022-06-29 PROCEDURE — 5A1935Z RESPIRATORY VENTILATION, LESS THAN 24 CONSECUTIVE HOURS: ICD-10-PCS | Performed by: EMERGENCY MEDICINE

## 2022-06-29 PROCEDURE — 33967 INSERT I-AORT PERCUT DEVICE: CPT | Performed by: INTERNAL MEDICINE

## 2022-06-29 PROCEDURE — 96374 THER/PROPH/DIAG INJ IV PUSH: CPT

## 2022-06-29 PROCEDURE — 82948 REAGENT STRIP/BLOOD GLUCOSE: CPT

## 2022-06-29 PROCEDURE — 99291 CRITICAL CARE FIRST HOUR: CPT | Performed by: EMERGENCY MEDICINE

## 2022-06-29 PROCEDURE — 99152 MOD SED SAME PHYS/QHP 5/>YRS: CPT | Performed by: INTERNAL MEDICINE

## 2022-06-29 PROCEDURE — 36415 COLL VENOUS BLD VENIPUNCTURE: CPT | Performed by: EMERGENCY MEDICINE

## 2022-06-29 PROCEDURE — 0BH17EZ INSERTION OF ENDOTRACHEAL AIRWAY INTO TRACHEA, VIA NATURAL OR ARTIFICIAL OPENING: ICD-10-PCS | Performed by: EMERGENCY MEDICINE

## 2022-06-29 PROCEDURE — 83735 ASSAY OF MAGNESIUM: CPT | Performed by: PHYSICIAN ASSISTANT

## 2022-06-29 PROCEDURE — 84484 ASSAY OF TROPONIN QUANT: CPT | Performed by: EMERGENCY MEDICINE

## 2022-06-29 PROCEDURE — B2151ZZ FLUOROSCOPY OF LEFT HEART USING LOW OSMOLAR CONTRAST: ICD-10-PCS | Performed by: INTERNAL MEDICINE

## 2022-06-29 PROCEDURE — 4A023N7 MEASUREMENT OF CARDIAC SAMPLING AND PRESSURE, LEFT HEART, PERCUTANEOUS APPROACH: ICD-10-PCS | Performed by: INTERNAL MEDICINE

## 2022-06-29 PROCEDURE — B2111ZZ FLUOROSCOPY OF MULTIPLE CORONARY ARTERIES USING LOW OSMOLAR CONTRAST: ICD-10-PCS | Performed by: INTERNAL MEDICINE

## 2022-06-29 PROCEDURE — 80307 DRUG TEST PRSMV CHEM ANLYZR: CPT | Performed by: NURSE PRACTITIONER

## 2022-06-29 PROCEDURE — 82805 BLOOD GASES W/O2 SATURATION: CPT | Performed by: PHYSICIAN ASSISTANT

## 2022-06-29 PROCEDURE — 71045 X-RAY EXAM CHEST 1 VIEW: CPT

## 2022-06-29 PROCEDURE — 85025 COMPLETE CBC W/AUTO DIFF WBC: CPT | Performed by: EMERGENCY MEDICINE

## 2022-06-29 PROCEDURE — 5A1945Z RESPIRATORY VENTILATION, 24-96 CONSECUTIVE HOURS: ICD-10-PCS | Performed by: ANESTHESIOLOGY

## 2022-06-29 PROCEDURE — 5A12012 PERFORMANCE OF CARDIAC OUTPUT, SINGLE, MANUAL: ICD-10-PCS | Performed by: EMERGENCY MEDICINE

## 2022-06-29 PROCEDURE — 94002 VENT MGMT INPAT INIT DAY: CPT

## 2022-06-29 PROCEDURE — 5A02210 ASSISTANCE WITH CARDIAC OUTPUT USING BALLOON PUMP, CONTINUOUS: ICD-10-PCS | Performed by: INTERNAL MEDICINE

## 2022-06-29 PROCEDURE — 93010 ELECTROCARDIOGRAM REPORT: CPT | Performed by: INTERNAL MEDICINE

## 2022-06-29 PROCEDURE — C1725 CATH, TRANSLUMIN NON-LASER: HCPCS | Performed by: INTERNAL MEDICINE

## 2022-06-29 PROCEDURE — 82077 ASSAY SPEC XCP UR&BREATH IA: CPT | Performed by: EMERGENCY MEDICINE

## 2022-06-29 PROCEDURE — 96365 THER/PROPH/DIAG IV INF INIT: CPT

## 2022-06-29 RX ORDER — SODIUM CHLORIDE 9 MG/ML
100 INJECTION, SOLUTION INTRAVENOUS CONTINUOUS
Status: CANCELLED | OUTPATIENT
Start: 2022-06-29 | End: 2022-06-30

## 2022-06-29 RX ORDER — ETOMIDATE 2 MG/ML
INJECTION INTRAVENOUS CODE/TRAUMA/SEDATION MEDICATION
Status: COMPLETED | OUTPATIENT
Start: 2022-06-29 | End: 2022-06-29

## 2022-06-29 RX ORDER — ATROPINE SULFATE 0.1 MG/ML
INJECTION, SOLUTION ENDOTRACHEAL; INTRAMUSCULAR; INTRAVENOUS; SUBCUTANEOUS CODE/TRAUMA/SEDATION MEDICATION
Status: COMPLETED | OUTPATIENT
Start: 2022-06-29 | End: 2022-06-29

## 2022-06-29 RX ORDER — HEPARIN SODIUM,PORCINE 10 UNIT/ML
VIAL (ML) INTRAVENOUS
Status: DISCONTINUED
Start: 2022-06-29 | End: 2022-06-29 | Stop reason: HOSPADM

## 2022-06-29 RX ORDER — PROPOFOL 10 MG/ML
5-50 INJECTION, EMULSION INTRAVENOUS
Status: DISCONTINUED | OUTPATIENT
Start: 2022-06-29 | End: 2022-07-01

## 2022-06-29 RX ORDER — CHLORHEXIDINE GLUCONATE 0.12 MG/ML
15 RINSE ORAL EVERY 12 HOURS SCHEDULED
Status: DISCONTINUED | OUTPATIENT
Start: 2022-06-29 | End: 2022-07-01

## 2022-06-29 RX ORDER — MIDAZOLAM HYDROCHLORIDE 2 MG/2ML
5 INJECTION, SOLUTION INTRAMUSCULAR; INTRAVENOUS ONCE
Status: DISCONTINUED | OUTPATIENT
Start: 2022-06-29 | End: 2022-06-29 | Stop reason: HOSPADM

## 2022-06-29 RX ORDER — HEPARIN SODIUM 1000 [USP'U]/ML
INJECTION, SOLUTION INTRAVENOUS; SUBCUTANEOUS AS NEEDED
Status: DISCONTINUED | OUTPATIENT
Start: 2022-06-29 | End: 2022-06-29 | Stop reason: HOSPADM

## 2022-06-29 RX ORDER — AMIODARONE HYDROCHLORIDE 50 MG/ML
INJECTION, SOLUTION INTRAVENOUS CODE/TRAUMA/SEDATION MEDICATION
Status: COMPLETED | OUTPATIENT
Start: 2022-06-29 | End: 2022-06-29

## 2022-06-29 RX ORDER — HEPARIN SODIUM 1000 [USP'U]/ML
INJECTION, SOLUTION INTRAVENOUS; SUBCUTANEOUS CODE/TRAUMA/SEDATION MEDICATION
Status: COMPLETED | OUTPATIENT
Start: 2022-06-29 | End: 2022-06-29

## 2022-06-29 RX ORDER — EPINEPHRINE 0.1 MG/ML
SYRINGE (ML) INJECTION CODE/TRAUMA/SEDATION MEDICATION
Status: COMPLETED | OUTPATIENT
Start: 2022-06-29 | End: 2022-06-29

## 2022-06-29 RX ORDER — SUCCINYLCHOLINE/SOD CL,ISO/PF 100 MG/5ML
SYRINGE (ML) INTRAVENOUS CODE/TRAUMA/SEDATION MEDICATION
Status: COMPLETED | OUTPATIENT
Start: 2022-06-29 | End: 2022-06-29

## 2022-06-29 RX ORDER — FENTANYL CITRATE 50 UG/ML
INJECTION, SOLUTION INTRAMUSCULAR; INTRAVENOUS
Status: COMPLETED
Start: 2022-06-29 | End: 2022-06-29

## 2022-06-29 RX ORDER — LIDOCAINE HYDROCHLORIDE 10 MG/ML
INJECTION, SOLUTION EPIDURAL; INFILTRATION; INTRACAUDAL; PERINEURAL AS NEEDED
Status: DISCONTINUED | OUTPATIENT
Start: 2022-06-29 | End: 2022-06-29 | Stop reason: HOSPADM

## 2022-06-29 RX ORDER — SODIUM CHLORIDE, SODIUM GLUCONATE, SODIUM ACETATE, POTASSIUM CHLORIDE, MAGNESIUM CHLORIDE, SODIUM PHOSPHATE, DIBASIC, AND POTASSIUM PHOSPHATE .53; .5; .37; .037; .03; .012; .00082 G/100ML; G/100ML; G/100ML; G/100ML; G/100ML; G/100ML; G/100ML
500 INJECTION, SOLUTION INTRAVENOUS ONCE
Status: COMPLETED | OUTPATIENT
Start: 2022-06-29 | End: 2022-06-29

## 2022-06-29 RX ORDER — ACETAMINOPHEN 325 MG/1
650 TABLET ORAL EVERY 6 HOURS PRN
Status: DISCONTINUED | OUTPATIENT
Start: 2022-06-29 | End: 2022-06-30

## 2022-06-29 RX ORDER — ASPIRIN 81 MG/1
81 TABLET, CHEWABLE ORAL DAILY
Status: DISCONTINUED | OUTPATIENT
Start: 2022-06-30 | End: 2022-06-29 | Stop reason: HOSPADM

## 2022-06-29 RX ORDER — FENTANYL CITRATE 50 UG/ML
50 INJECTION, SOLUTION INTRAMUSCULAR; INTRAVENOUS
Status: DISCONTINUED | OUTPATIENT
Start: 2022-06-29 | End: 2022-06-30

## 2022-06-29 RX ORDER — FENTANYL CITRATE 50 UG/ML
50 INJECTION, SOLUTION INTRAMUSCULAR; INTRAVENOUS EVERY 2 HOUR PRN
Status: CANCELLED | OUTPATIENT
Start: 2022-06-29

## 2022-06-29 RX ORDER — ONDANSETRON 2 MG/ML
4 INJECTION INTRAMUSCULAR; INTRAVENOUS EVERY 6 HOURS PRN
Status: DISCONTINUED | OUTPATIENT
Start: 2022-06-29 | End: 2022-07-05 | Stop reason: HOSPADM

## 2022-06-29 RX ORDER — SUCCINYLCHOLINE/SOD CL,ISO/PF 100 MG/5ML
SYRINGE (ML) INTRAVENOUS
Status: DISCONTINUED
Start: 2022-06-29 | End: 2022-06-29 | Stop reason: HOSPADM

## 2022-06-29 RX ORDER — METOPROLOL TARTRATE 5 MG/5ML
5 INJECTION INTRAVENOUS ONCE
Status: DISCONTINUED | OUTPATIENT
Start: 2022-06-29 | End: 2022-06-29 | Stop reason: HOSPADM

## 2022-06-29 RX ORDER — ASPIRIN 81 MG/1
81 TABLET, CHEWABLE ORAL DAILY
Status: CANCELLED | OUTPATIENT
Start: 2022-06-30

## 2022-06-29 RX ORDER — HEPARIN SODIUM 5000 [USP'U]/ML
5000 INJECTION, SOLUTION INTRAVENOUS; SUBCUTANEOUS EVERY 8 HOURS SCHEDULED
Status: DISCONTINUED | OUTPATIENT
Start: 2022-06-29 | End: 2022-06-29 | Stop reason: HOSPADM

## 2022-06-29 RX ORDER — MAGNESIUM SULFATE HEPTAHYDRATE 40 MG/ML
2 INJECTION, SOLUTION INTRAVENOUS EVERY 6 HOURS PRN
Status: DISCONTINUED | OUTPATIENT
Start: 2022-06-29 | End: 2022-06-29

## 2022-06-29 RX ORDER — HEPARIN SODIUM 5000 [USP'U]/ML
INJECTION, SOLUTION INTRAVENOUS; SUBCUTANEOUS
Status: DISCONTINUED
Start: 2022-06-29 | End: 2022-06-29 | Stop reason: HOSPADM

## 2022-06-29 RX ORDER — MIDAZOLAM HYDROCHLORIDE 2 MG/2ML
INJECTION, SOLUTION INTRAMUSCULAR; INTRAVENOUS AS NEEDED
Status: DISCONTINUED | OUTPATIENT
Start: 2022-06-29 | End: 2022-06-29 | Stop reason: HOSPADM

## 2022-06-29 RX ORDER — MIDAZOLAM HYDROCHLORIDE 2 MG/2ML
INJECTION, SOLUTION INTRAMUSCULAR; INTRAVENOUS
Status: DISCONTINUED
Start: 2022-06-29 | End: 2022-06-29 | Stop reason: HOSPADM

## 2022-06-29 RX ORDER — VECURONIUM BROMIDE 1 MG/ML
INJECTION, POWDER, LYOPHILIZED, FOR SOLUTION INTRAVENOUS CODE/TRAUMA/SEDATION MEDICATION
Status: COMPLETED | OUTPATIENT
Start: 2022-06-29 | End: 2022-06-29

## 2022-06-29 RX ORDER — MIDAZOLAM HYDROCHLORIDE 2 MG/2ML
INJECTION, SOLUTION INTRAMUSCULAR; INTRAVENOUS CODE/TRAUMA/SEDATION MEDICATION
Status: COMPLETED | OUTPATIENT
Start: 2022-06-29 | End: 2022-06-29

## 2022-06-29 RX ORDER — ACETAMINOPHEN 325 MG/1
975 TABLET ORAL EVERY 6 HOURS
Status: CANCELLED | OUTPATIENT
Start: 2022-06-29 | End: 2022-07-02

## 2022-06-29 RX ORDER — FENTANYL CITRATE 50 UG/ML
100 INJECTION, SOLUTION INTRAMUSCULAR; INTRAVENOUS ONCE
Status: COMPLETED | OUTPATIENT
Start: 2022-06-29 | End: 2022-06-29

## 2022-06-29 RX ORDER — HEPARIN SODIUM 5000 [USP'U]/ML
5000 INJECTION, SOLUTION INTRAVENOUS; SUBCUTANEOUS EVERY 8 HOURS SCHEDULED
Status: DISCONTINUED | OUTPATIENT
Start: 2022-06-29 | End: 2022-06-30

## 2022-06-29 RX ORDER — ASPIRIN 81 MG/1
81 TABLET, CHEWABLE ORAL DAILY
Status: DISCONTINUED | OUTPATIENT
Start: 2022-06-30 | End: 2022-07-02

## 2022-06-29 RX ORDER — SODIUM BICARBONATE 84 MG/ML
INJECTION, SOLUTION INTRAVENOUS CODE/TRAUMA/SEDATION MEDICATION
Status: COMPLETED | OUTPATIENT
Start: 2022-06-29 | End: 2022-06-29

## 2022-06-29 RX ORDER — SODIUM CHLORIDE 9 MG/ML
INJECTION, SOLUTION INTRAVENOUS
Status: DISCONTINUED | OUTPATIENT
Start: 2022-06-29 | End: 2022-06-29

## 2022-06-29 RX ORDER — FENTANYL CITRATE-0.9 % NACL/PF 10 MCG/ML
100 PLASTIC BAG, INJECTION (ML) INTRAVENOUS CONTINUOUS
Status: DISCONTINUED | OUTPATIENT
Start: 2022-06-29 | End: 2022-07-01

## 2022-06-29 RX ADMIN — Medication 150 MG: at 14:40

## 2022-06-29 RX ADMIN — FENTANYL CITRATE 50 MCG/HR: 50 INJECTION INTRAMUSCULAR; INTRAVENOUS at 19:12

## 2022-06-29 RX ADMIN — AMIODARONE HYDROCHLORIDE 150 MG: 50 INJECTION, SOLUTION INTRAVENOUS at 14:42

## 2022-06-29 RX ADMIN — NOREPINEPHRINE BITARTRATE 5 MCG/MIN: 1 INJECTION INTRAVENOUS at 15:22

## 2022-06-29 RX ADMIN — HEPARIN SODIUM 4000 UNITS: 1000 INJECTION INTRAVENOUS; SUBCUTANEOUS at 14:59

## 2022-06-29 RX ADMIN — AMIODARONE HYDROCHLORIDE 1 MG/MIN: 50 INJECTION, SOLUTION INTRAVENOUS at 16:29

## 2022-06-29 RX ADMIN — TICAGRELOR 180 MG: 60 TABLET ORAL at 15:14

## 2022-06-29 RX ADMIN — FENTANYL CITRATE 100 MCG: 50 INJECTION INTRAMUSCULAR; INTRAVENOUS at 18:40

## 2022-06-29 RX ADMIN — SODIUM CHLORIDE, SODIUM GLUCONATE, SODIUM ACETATE, POTASSIUM CHLORIDE, MAGNESIUM CHLORIDE, SODIUM PHOSPHATE, DIBASIC, AND POTASSIUM PHOSPHATE 500 ML: .53; .5; .37; .037; .03; .012; .00082 INJECTION, SOLUTION INTRAVENOUS at 23:00

## 2022-06-29 RX ADMIN — SODIUM CHLORIDE 5 UNITS/HR: 9 INJECTION, SOLUTION INTRAVENOUS at 20:10

## 2022-06-29 RX ADMIN — AMIODARONE HYDROCHLORIDE 300 MG: 50 INJECTION, SOLUTION INTRAVENOUS at 14:36

## 2022-06-29 RX ADMIN — CHLORHEXIDINE GLUCONATE 15 ML: 1.2 SOLUTION ORAL at 21:58

## 2022-06-29 RX ADMIN — SODIUM BICARBONATE 50 MEQ: 84 INJECTION, SOLUTION INTRAVENOUS at 15:04

## 2022-06-29 RX ADMIN — HEPARIN SODIUM 5000 UNITS: 5000 INJECTION INTRAVENOUS; SUBCUTANEOUS at 21:58

## 2022-06-29 RX ADMIN — EPINEPHRINE 10 MCG/MIN: 1 INJECTION, SOLUTION, CONCENTRATE INTRAVENOUS at 19:33

## 2022-06-29 RX ADMIN — ATROPINE SULFATE 1 MG: 0.1 INJECTION, SOLUTION ENDOTRACHEAL; INTRAMUSCULAR; INTRAVENOUS; SUBCUTANEOUS at 14:45

## 2022-06-29 RX ADMIN — NOREPINEPHRINE BITARTRATE 6 MCG/MIN: 1 INJECTION, SOLUTION, CONCENTRATE INTRAVENOUS at 19:32

## 2022-06-29 RX ADMIN — MIDAZOLAM HYDROCHLORIDE 10 MG: 1 INJECTION, SOLUTION INTRAMUSCULAR; INTRAVENOUS at 15:10

## 2022-06-29 RX ADMIN — EPINEPHRINE 0 MG: 0.1 INJECTION, SOLUTION ENDOTRACHEAL; INTRACARDIAC; INTRAVENOUS at 15:01

## 2022-06-29 RX ADMIN — FENTANYL CITRATE 100 MCG: 50 INJECTION, SOLUTION INTRAMUSCULAR; INTRAVENOUS at 18:40

## 2022-06-29 RX ADMIN — EPINEPHRINE 1 MG: 0.1 INJECTION, SOLUTION ENDOTRACHEAL; INTRACARDIAC; INTRAVENOUS at 14:35

## 2022-06-29 RX ADMIN — EPINEPHRINE 2 MCG/MIN: 1 INJECTION, SOLUTION INTRAMUSCULAR; SUBCUTANEOUS at 14:56

## 2022-06-29 RX ADMIN — EPINEPHRINE 10 MCG/MIN: 1 INJECTION, SOLUTION, CONCENTRATE INTRAVENOUS at 16:05

## 2022-06-29 RX ADMIN — FENTANYL CITRATE 50 MCG: 50 INJECTION INTRAMUSCULAR; INTRAVENOUS at 22:49

## 2022-06-29 RX ADMIN — SODIUM BICARBONATE 50 MEQ: 84 INJECTION INTRAVENOUS at 19:22

## 2022-06-29 RX ADMIN — PROPOFOL 40 MCG/KG/MIN: 10 INJECTION, EMULSION INTRAVENOUS at 21:42

## 2022-06-29 RX ADMIN — EPINEPHRINE 1 MG: 0.1 INJECTION, SOLUTION ENDOTRACHEAL; INTRACARDIAC; INTRAVENOUS at 14:48

## 2022-06-29 RX ADMIN — EPINEPHRINE 1 MG: 0.1 INJECTION, SOLUTION ENDOTRACHEAL; INTRACARDIAC; INTRAVENOUS at 14:43

## 2022-06-29 RX ADMIN — SODIUM BICARBONATE 50 MEQ: 84 INJECTION INTRAVENOUS at 19:23

## 2022-06-29 RX ADMIN — EPINEPHRINE 1 MG: 0.1 INJECTION, SOLUTION ENDOTRACHEAL; INTRACARDIAC; INTRAVENOUS at 14:38

## 2022-06-29 RX ADMIN — ETOMIDATE 20 MG: 2 INJECTION, SOLUTION INTRAVENOUS at 14:39

## 2022-06-29 RX ADMIN — TICAGRELOR 90 MG: 90 TABLET ORAL at 21:58

## 2022-06-29 RX ADMIN — PROPOFOL 5 MCG/KG/MIN: 10 INJECTION, EMULSION INTRAVENOUS at 18:32

## 2022-06-29 RX ADMIN — NOREPINEPHRINE BITARTRATE 2.5 MCG/MIN: 1 INJECTION, SOLUTION, CONCENTRATE INTRAVENOUS at 16:05

## 2022-06-29 RX ADMIN — EPINEPHRINE 1 MG: 0.1 INJECTION, SOLUTION ENDOTRACHEAL; INTRACARDIAC; INTRAVENOUS at 14:32

## 2022-06-29 RX ADMIN — VECURONIUM BROMIDE 10 MG: 1 INJECTION, POWDER, LYOPHILIZED, FOR SOLUTION INTRAVENOUS at 15:13

## 2022-06-29 NOTE — ASSESSMENT & PLAN NOTE
Likely cardiogenic shock in the setting of cardiac arrest requiring multiple shocks    Plan:  · Formal ECHO  · Wean Epi and levophed as able for MAP >65  · IABP 1:1  · Obtain repeat labs  · If not weaning on pressor requirements consider alternative explanation for shock state

## 2022-06-29 NOTE — ASSESSMENT & PLAN NOTE
Secondary to cardiac arrest    Plan:  · Increased respiratory rate at [facility], repeat ABG and adjust ventilator accordingly  · Wean FiO2 as able for SPO2  · Not appropriate for SBT today given arrhythmia  · VAP bundle ordered   · Start propofol and fentanyl for sedation

## 2022-06-29 NOTE — ED TRIAGE NOTES
Patient arrives via EMS on stretcher, coding  Per EMS, patient was talking during transport and when they got outside of the hospital patient said "ut oh" and then went unresponsive without a pulse  Pt previously c/o chest pain on Monday that resolved  Today he was at work and wasn't feeling right and co-workers called EMS  Upon EMS arrival, patient was reluctant to come to the hospital but then was agreeable  Pt did not have any c/o of chest pain for EMS   EMS administered 324 mg ASA prior to arrival to hospital

## 2022-06-29 NOTE — Clinical Note
The Skylar Kirkpatrick Is1 Df4 device was inserted  The leads were placed into the connector and visually verified to be in correct position  Injury current obtained

## 2022-06-29 NOTE — ASSESSMENT & PLAN NOTE
6/29 s/p cardiac arrest  Received heparin bolus and loaded with ASA/Brilinta in ED  Underwent emergent cardiac cath at [facility]  Moderate chronic nonobstrucive disease noted, but distal LAD and OM disease were small without the ability to perform intervention    LV mildly reduced EF with apical dyskinesis, appearance of takotsubo cardiomyopathy on LV-gram   IABP placed secondary to shock state post arrest     Plan:  · Cardiology consulted appreciate recommendations  · Patient with no intervenable lesions, continue therapy per cardiology recommendations   · BBlocker when medically appropriate   · Formal ECHO ordered

## 2022-06-29 NOTE — H&P
History and Physical   General Cardiology  Tonio Vega 58 y o  male MRN: [id number]  Unit/Bed#: AN CATH LAB ROOM Encounter: [id number]    PCP: Lesley Hermosillo PA-C      Principal Problem: V fib cardiac arrest      INPATIENT     History of Present Illness   HPI: Tonio Vega is a 58y o  year old male who has history of hypertension, obesity and tobacco abuse  He does not follow with a cardiologist              He presents to [facility] ER via EMS with complaint of chest pain  Per EMS patient was reporting chest pain intermittently for the past 3 days and using TUMs without relief  On arrival to [facility] he reportedly stated to EMS "oh no" and lost conscious  Monitor showed Vfib  CPR and ACLS protocol were initiated  He received multiple doses of epinephrine and shocked multiple times  He had ROSC, then Vfib with CPR and ACLS again  He was started on epinephrine gtt, norepinephrine gtt and given amiodarone bolus and later started on amiodarone gtt  He is currently intubated  Patient did open eyes and move extremities with ROSC  He was intubated therefore was given versed and vecuronium by ER  STEMI alert was called and he was sent to cardiac cath after being stabilized  Cardiac cath showed moderate obstructive disease  Balloon pump was placed  He will be transferred to [facility] for further management of Balloon pump and EP evaluation for ICD  Assessment/ Plan     1  Vfib cardiac arrest   STEMI alert called     Received heparin bolus and loaded with Brilinta  He received Aspirin 324 mg by EMS  S/p multiple shocks, epi and amiodarone   ROSC   On epinephrine and norepinephrine gtt   Echocardiogram ordered   Will start amiodarone gtt   S/p Cardiac cath showed moderate obstructive CAD   Balloon pump initiated   Will transfer to hospitals for further management      2   History of tobacco abuse   Recommend nicotine patch        Review of Systems   Unable to perform ROS: intubated     Historical Information   Past Medical History:   Diagnosis Date    Bilateral inguinal hernia     Dizziness 02/02/2021    isolated incident of dizziness, sweating & disorientation    Hypertension      Past Surgical History:   Procedure Laterality Date    KS REPAIR ING HERNIA,5+Y/O,REDUCIBL Bilateral 2/9/2021    Procedure: INGUINAL HERNIA REPAIR with mesh;  Surgeon: Mamie Gowers, MD;  Location: [address] [place] [address] OR;  Service: General    TONSILLECTOMY       Social History     Substance and Sexual Activity   Alcohol Use Not Currently     Social History     Substance and Sexual Activity   Drug Use Not Currently     Social History     Tobacco Use   Smoking Status Current Every Day Smoker    Packs/day: 2 00    Types: Cigarettes   Smokeless Tobacco Never Used     Social History     Socioeconomic History    Marital status: /Civil Union     Spouse name: Not on file    Number of children: Not on file    Years of education: Not on file    Highest education level: Not on file   Occupational History    Not on file   Tobacco Use    Smoking status: Current Every Day Smoker     Packs/day: 2 00     Types: Cigarettes    Smokeless tobacco: Never Used   Vaping Use    Vaping Use: Never used   Substance and Sexual Activity    Alcohol use: Not Currently    Drug use: Not Currently    Sexual activity: Not on file   Other Topics Concern    Not on file   Social History Narrative    Not on file     Social Determinants of Health     Financial Resource Strain: Not on file   Food Insecurity: Not on file   Transportation Needs: Not on file   Physical Activity: Not on file   Stress: Not on file   Social Connections: Not on file   Intimate Partner Violence: Not on file   Housing Stability: Not on file         Family History:  Family History   Problem Relation Age of Onset    Diabetes Maternal Grandfather        Meds/Allergies      Current Facility-Administered Medications   Medication Dose Route Frequency    amiodarone (CORDARONE) 900 mg in dextrose 5 % 500 mL infusion  1 mg/min Intravenous Continuous    Followed by   Mariah Jarvis amiodarone (CORDARONE) 900 mg in dextrose 5 % 500 mL infusion  0 5 mg/min Intravenous Continuous    amiodarone 150 mg/3 mL injection    Code/Trauma/Sedation Med    [START ON 6/30/2022] aspirin chewable tablet 81 mg  81 mg Oral Daily    EPINEPHrine (ADRENALIN) injection   Intravenous Code/Trauma/Sedation Med    EPINEPHrine (ADRENALIN) injection   Intravenous Code/Trauma/Sedation Med    EPINEPHrine (ADRENALIN) injection   Intravenous Code/Trauma/Sedation Med    EPINEPHrine 3000 mcg (STANDARD CONCENTRATION) IV in sodium chloride 0 9% 250 mL  1-10 mcg/min Intravenous Titrated    heparin (porcine) 5,000 units/mL subcutaneous injection **ADS Override Pull**        heparin (porcine) injection    PRN    heparin lock flush 10 units/mL injection **ADS Override Pull**        lidocaine (PF) (XYLOCAINE-MPF) 1 % injection    PRN    metoprolol (LOPRESSOR) injection 5 mg  5 mg Intravenous Once    midazolam (VERSED) 2 mg/2 mL injection **ADS Override Pull**        midazolam (VERSED) injection 5 mg  5 mg Intravenous Once    norepinephrine (LEVOPHED) 4 mg (STANDARD CONCENTRATION) IV in sodium chloride 0 9% 250 mL  1-30 mcg/min Intravenous Titrated    Succinylcholine Chloride 100 mg/5 mL syringe **ADS Override Pull**        Succinylcholine Chloride 100 mg/5 mL syringe **ADS Override Pull**        ticagrelor (BRILINTA) tablet 180 mg  180 mg Oral Once        Prior to Admission Medications   Prescriptions Last Dose Informant Patient Reported?  Taking?   ibuprofen (MOTRIN) 200 mg tablet  Self Yes No   Sig: Take 200 mg by mouth every 6 (six) hours as needed for mild pain   lisinopril (ZESTRIL) 20 mg tablet   No No   Sig: take 1 tablet by mouth once daily   tadalafil (CIALIS) 5 MG tablet  Self No No   Sig: Take 1 tablet (5 mg total) by mouth daily as needed for erectile dysfunction      Facility-Administered Medications: None       amiodarone, 1 mg/min   Followed by  amiodarone, 0 5 mg/min  epinephrine, 1-10 mcg/min  norepinephrine, 1-30 mcg/min      No Known Allergies        Objective   Vitals: SpO2 99 %  No data recorded  No data recorded  No intake or output data in the 24 hours ending 06/29/22 1550    Invasive Devices  Report    Central Venous Catheter Line  Duration           CVC Central Lines 06/29/22 Triple Left Femoral <1 day          Peripheral Intravenous Line  Duration           Peripheral IV 08/11/21 Left Antecubital 321 days    Peripheral IV 06/29/22 Left Hand <1 day    Peripheral IV 06/29/22 Right Antecubital <1 day          Line  Duration           Arterial Sheath 6 Fr  Right Femoral <1 day              Weight (last 2 days)     None          Physical Exam  Constitutional:       Appearance: He is ill-appearing  Comments: Intubated and sedated    HENT:      Head: Normocephalic  Mouth/Throat:      Mouth: Mucous membranes are moist    Cardiovascular:      Rate and Rhythm: Normal rate and regular rhythm  Comments: Pulses weak   Pulmonary:      Comments: Intubated  AC mode   Course   Abdominal:      General: Bowel sounds are normal       Palpations: Abdomen is soft  Comments: OG tube    Musculoskeletal:         General: No swelling  Normal range of motion  Cervical back: Neck supple  Skin:     Comments: Mottled    Neurological:      Comments: sedated         LABORATORY RESULTS:        CBC with diff:   Results from last 7 days   Lab Units 06/29/22  1446   WBC Thousand/uL 11 42*   HEMOGLOBIN g/dL 14 9   HEMATOCRIT % 45 1   MCV fL 92   PLATELETS Thousands/uL 253   MCH pg 30 4   MCHC g/dL 33 0   RDW % 12 3   MPV fL 8 8*   NRBC AUTO /100 WBCs 0     Lipid Profile:   No results found for: CHOL  No results found for: HDL  No results found for: LDLCALC  No results found for: TRIG      Cardiac Testing:   No results found for this or any previous visit  No valid procedures specified  No results found for this or any previous visit      Daisy Cordova bedside procedure    Result Date: 6/29/2022  Narrative: 1 2 840 [id number]  2 446 161 [id number] 174 1      VTE Prophylaxis: Heparin  Expected length of stay:   greater than 2 midnights     Code Status: Prior    Counseling / Coordination of Care  Total floor / unit time spent today 45 minutes  Greater than 50% of total time was spent with the patient and / or family counseling and / or coordination of care  ASHLEY Yeung      ** Please Note: Dragon 360 Dictation voice to text software may have been used in the creation of this document   **

## 2022-06-29 NOTE — ED PROVIDER NOTES
History  Chief Complaint   Patient presents with    Cardiac Arrest     History is per EMS only  Patient is a 59 y/o male that presents to the ED by EMS  Patient reportedly had 3 days of chest pain for which he was taking tums  Called EMS today for same  prehospital EKG was performed  Patient received  mg by EMS  Upon arrival to the hospital, patient became unresponsive  I met EMS at the door upon their request for assistance  Patient was unresponsive with agonal respirations  Patient was ventricular fibrillation on the monitor  Defibrillation pads were placed by me and CPR was initiated on the stretcher  Patient shocked at 360J on the stretcher while staff was called for assistance  CPR continued, BVM ventilation was initiated with patient teeth clenched  Airway maintained by Dr Kinjal Love and Dr Héctor Orozco  Central line placed by PA-C [place], ACLS followed with epi given q 3 minutes  CPR continued  Prehospital EKG showed hyperacute T waves concerning for STEMI  PACs calledPatient shocked multiple times  Regained pulses temporarily and again to Vfib with continued CPR and multiple defibrillations at [facility]  Patient did regain pulses again with a rate in the 40s for which atropine was given  Again lost pulses and cpr/defibrillation occurred  ROSC obtained BPs in 70s  Push dose epinephrine was given 2mcg IV while epi drip was ordered  Epinephrine drip was started at 2 mcg/min, increase to 4mcg/min then ultimately to 10 mcg/min  Patient received Amiodarone 300 mg IV, magnesium 2 grams IV, Sodium Bicarb 1 amp given at 20 minutes  ETT was established by Peter Rangel with ETT 26cm at the lips  RSI medications ETomidate 20mg and Succinylcholine 150 mg IV given  Upon ROSC patient was able to lift his head and appeared to be looking around  Versed 10mg IV and vecuronium 10mg IV ordered by Dr Kinjal Love    Patient  During initial resuscitation, PAC was called for discussion with interventional cardiologist  I spoke with Dr Akiko Salinas  The patient at that time was unstable and CPR was in progress  Dr Akiko Salinas was aware and goal was to establish ROSC at that time  Brenda Rider ROSC was achieved and Dr Edwards was re-contacted and initiated STEMI alert  Patient was given heparin 4000 IV, brilinta loading dose crushed and placed down NGT and had ASA prehospital  BP remained in 70s on epinephrine drip  Levophed initiated at 5mcg/min IV with improvement of BP  CXR was performed and ETT was at top of clavicles  Advanced by 2 cm with 28cm at teeth Patient was transferred to cath lab once staff present  ETT position was confirmed under fluoro in the cath lab and visualized by me  Please see code summary for timed summary of efforts  Prior to Admission Medications   Prescriptions Last Dose Informant Patient Reported? Taking?   ibuprofen (MOTRIN) 200 mg tablet  Self Yes No   Sig: Take 200 mg by mouth every 6 (six) hours as needed for mild pain   lisinopril (ZESTRIL) 20 mg tablet   No No   Sig: take 1 tablet by mouth once daily   tadalafil (CIALIS) 5 MG tablet  Self No No   Sig: Take 1 tablet (5 mg total) by mouth daily as needed for erectile dysfunction      Facility-Administered Medications: None       Past Medical History:   Diagnosis Date    Bilateral inguinal hernia     Dizziness 02/02/2021    isolated incident of dizziness, sweating & disorientation    Hypertension        Past Surgical History:   Procedure Laterality Date    NM REPAIR ING HERNIA,5+Y/O,REDUCIBL Bilateral 2/9/2021    Procedure: INGUINAL HERNIA REPAIR with mesh;  Surgeon: Barb Olson MD;  Location: [address] [place] [address] OR;  Service: General    TONSILLECTOMY         Family History   Problem Relation Age of Onset    Diabetes Maternal Grandfather      I have reviewed and agree with the history as documented      E-Cigarette/Vaping    E-Cigarette Use Never User      E-Cigarette/Vaping Substances    Nicotine No     THC No     CBD No     Flavoring No     Other No  Unknown No      Social History     Tobacco Use    Smoking status: Current Every Day Smoker     Packs/day: 2 00     Types: Cigarettes    Smokeless tobacco: Never Used   Vaping Use    Vaping Use: Never used   Substance Use Topics    Alcohol use: Not Currently    Drug use: Not Currently       Review of Systems   Unable to perform ROS: Acuity of condition       Physical Exam  Physical Exam  Vitals and nursing note reviewed  Constitutional:       Comments: Unconscious/unresponsive  Agonal respirations  HENT:      Head: Normocephalic and atraumatic  Cardiovascular:      Comments: Pulseless, apneic upon arrival   Pulmonary:      Comments: Apneic  Skin:     Comments: Mottled, cyanotic   Neurological:      Comments: Unconscious, unresponsive           Vital Signs  ED Triage Vitals   Temp Pulse Respirations BP SpO2   -- 06/29/22 1438 06/29/22 1444 -- 06/29/22 1444    (!) 0 16  97 %      Temp src Heart Rate Source Patient Position - Orthostatic VS BP Location FiO2 (%)   -- 06/29/22 1454 -- -- --    Monitor         Pain Score       --                  Vitals:    06/29/22 1501 06/29/22 1505 06/29/22 1515 06/29/22 1518   Pulse: 77 80 84 88         Visual Acuity      ED Medications  Medications   metoprolol (LOPRESSOR) injection 5 mg (has no administration in time range)   Succinylcholine Chloride 100 mg/5 mL syringe **ADS Override Pull** (has no administration in time range)   Succinylcholine Chloride 100 mg/5 mL syringe **ADS Override Pull** (has no administration in time range)   EPINEPHrine 3000 mcg (STANDARD CONCENTRATION) IV in sodium chloride 0 9% 250 mL (10 mcg/min Intravenous New Bag 6/29/22 1605)   heparin (porcine) 5,000 units/mL subcutaneous injection **ADS Override Pull** (has no administration in time range)   heparin lock flush 10 units/mL injection **ADS Override Pull** (has no administration in time range)   midazolam (VERSED) injection 5 mg (has no administration in time range)   midazolam (VERSED) 2 mg/2 mL injection **ADS Override Pull** (has no administration in time range)   ticagrelor (BRILINTA) tablet 180 mg (has no administration in time range)   norepinephrine (LEVOPHED) 4 mg (STANDARD CONCENTRATION) IV in sodium chloride 0 9% 250 mL (2 5 mcg/min Intravenous New Bag 6/29/22 1605)   amiodarone (CORDARONE) 900 mg in dextrose 5 % 500 mL infusion (1 mg/min Intravenous New Bag 6/29/22 1629)     Followed by   amiodarone (CORDARONE) 900 mg in dextrose 5 % 500 mL infusion (has no administration in time range)   aspirin chewable tablet 81 mg (has no administration in time range)   heparin (porcine) subcutaneous injection 5,000 Units (has no administration in time range)   EPINEPHrine (ADRENALIN) injection (1 mg Intravenous Given 6/29/22 1432)   EPINEPHrine (ADRENALIN) injection (1 mg Intravenous Given 6/29/22 1432)   EPINEPHrine (ADRENALIN) injection (1 mg Intravenous Given 6/29/22 1435)   EPINEPHrine (ADRENALIN) injection (1 mg Intravenous Given 6/29/22 1438)   amiodarone 150 mg/3 mL injection (300 mg Intravenous Given 6/29/22 1436)   etomidate (AMIDATE) 2 mg/mL injection (20 mg Intravenous Given 6/29/22 1439)   Succinylcholine Chloride 100 mg/5 mL syringe (150 mg Intravenous Given 6/29/22 1440)   amiodarone 150 mg/3 mL injection (150 mg Intravenous Given 6/29/22 1442)   EPINEPHrine (ADRENALIN) injection (1 mg Intravenous Given 6/29/22 1443)   atropine 1 mg/10 mL injection (1 mg Intravenous Given 6/29/22 1445)   EPINEPHrine (ADRENALIN) injection (1 mg Intravenous Given 6/29/22 1448)   heparin (porcine) injection (4,000 Units Intravenous Given 6/29/22 1459)   EPINEPHrine 3000 mcg (STANDARD CONCENTRATION) IV in sodium chloride 0 9% 250 mL (10 mcg/min Intravenous Rate/Dose Change 6/29/22 1504)   sodium bicarbonate 50 mEq/50 mL injection (50 mEq Intravenous Given 6/29/22 1504)   midazolam (VERSED) injection (10 mg Intravenous Given 6/29/22 1510)   vecuronium (NORCURON) injection (10 mg Intravenous Given 6/29/22 1513)   ticagrelor tablet (180 mg Per NG Tube Given 6/29/22 1514)   EPINEPHrine 3000 mcg (STANDARD CONCENTRATION) IV in sodium chloride 0 9% 250 mL (2 mcg/min Intravenous New Bag 6/29/22 1456)   EPINEPHrine (ADRENALIN) injection (0 0005 mg Intravenous Given 6/29/22 1501)   norepinephrine (LEVOPHED) 4 mg in sodium chloride 0 9 % 250 mL infusion (5 mcg/min Intravenous New Bag 6/29/22 1522)       Diagnostic Studies  Results Reviewed     Procedure Component Value Units Date/Time    HS Troponin I 4hr [[id number]]     Lab Status: No result Specimen: Blood     HS Troponin I 2hr [[id number]]     Lab Status: No result Specimen: Blood     HS Troponin 0hr (reflex protocol) [[id number]]  (Abnormal) Collected: 06/29/22 1446    Lab Status: Final result Specimen: Blood from Arm, Left Updated: 06/29/22 1519     hs TnI 0hr 64 ng/L     Ethanol [[id number]]  (Normal) Collected: 06/29/22 1446    Lab Status: Final result Specimen: Blood from Arm, Left Updated: 06/29/22 1510     Ethanol Lvl <10 mg/dL     CBC and differential [[id number]]  (Abnormal) Collected: 06/29/22 1446    Lab Status: Final result Specimen: Blood from Arm, Left Updated: 06/29/22 1500     WBC 11 42 Thousand/uL      RBC 4 90 Million/uL      Hemoglobin 14 9 g/dL      Hematocrit 45 1 %      MCV 92 fL      MCH 30 4 pg      MCHC 33 0 g/dL      RDW 12 3 %      MPV 8 8 fL      Platelets 100 Thousands/uL      nRBC 0 /100 WBCs      Neutrophils Relative 56 %      Immat GRANS % 0 %      Lymphocytes Relative 34 %      Monocytes Relative 7 %      Eosinophils Relative 2 %      Basophils Relative 1 %      Neutrophils Absolute 6 42 Thousands/µL      Immature Grans Absolute 0 04 Thousand/uL      Lymphocytes Absolute 3 87 Thousands/µL      Monocytes Absolute 0 82 Thousand/µL      Eosinophils Absolute 0 20 Thousand/µL      Basophils Absolute 0 07 Thousands/µL     Blood gas, arterial [[id number]] Updated: 06/29/22 1500    Lab Status: No result Specimen: Blood, Arterial     Salicylate level [[id number]]     Lab Status: No result Specimen: Blood     Acetaminophen level-If concentration is detectable, please discuss with medical  on call  [[id number]]     Lab Status: No result Specimen: Blood                  XR chest 1 view portable   Final Result by Antonieta Mccall MD (06/29 9085)   Recommend repositioning of enteric tube  Needs to be advanced several centimeters and proximal portion appears coiled in the proximal esophagus  Low lung volumes  Otherwise no acute cardiopulmonary findings                     Workstation performed: [id number]                    Procedures  CriticalCare Time  Performed by: Misty Stephen MD  Authorized by: Misty Stephen MD     Critical care provider statement:     Critical care time (minutes):  40    Critical care time was exclusive of:  Separately billable procedures and treating other patients and teaching time    Critical care was necessary to treat or prevent imminent or life-threatening deterioration of the following conditions:  Circulatory failure    Critical care was time spent personally by me on the following activities:  Obtaining history from patient or surrogate, development of treatment plan with patient or surrogate, discussions with consultants, examination of patient, evaluation of patient's response to treatment, interpretation of cardiac output measurements, ordering and performing treatments and interventions, ordering and review of laboratory studies, ordering and review of radiographic studies and re-evaluation of patient's condition             ED Course     Prehospital EKG showed hyperacute T waves in V3-V5 concerning for STEMI                                              MDM      Disposition  Final diagnoses:   STEMI (ST elevation myocardial infarction) [facility])   Cardiac arrest with ventricular fibrillation ([facility] )   Respiratory failure ([facility] )     Time reflects when diagnosis was documented in both MDM as applicable and the Disposition within this note     Time User Action Codes Description Comment    6/29/2022  3:08 PM Godfrey Christiansenk Add [I21 3] STEMI (ST elevation myocardial infarction) ([facility] )     6/29/2022  4:18 PM Carlos Patrick Modify [I21 3] [facility]     6/29/2022  4:19 PM Carlos Patrick Add [I46 9,  I49 01] Cardiac arrest with ventricular fibrillation ([place] [address] )     6/29/2022  4:19 PM Carlos Patrick Add [J96 90] Respiratory failure ([place] [address] )     6/29/2022  4:22 PM Veronica Mcguire Modify [I21 3] STEMI (ST elevation myocardial infarction) [facility])       ED Disposition     ED Disposition   Send to Cath Lab    Condition   --    Date/Time   Wed Jun 29, 2022  4:19 PM    Comment   --         MD Documentation    Flowsheet Row Most Recent Value   Patient Condition An emergency transfer is being made prior to stabilization due to the need for definitive care and the benefit of transfer outweighs the risk   Reason for Transfer Level of Care needed not available at this facility   Benefits of Transfer Specialized equipment and/or services available at the receiving facility (Include comment)________________________  [balloon pump, EP Eval]   Risks of Transfer Potential for delay in receiving treatment, Potential deterioration of medical condition   [facility] Name, [address] [facility]    (Name & Tel number) [facility]   Sending MD MD Jazmin Dorsey critical care      RN Documentation    [address] Name, [address] [facility]    (Name & Tel number) [facility]   Transport Mode Ambulance   Level of Care CCT-Nurse   Patient Belongings Disposition Sent with patient  [Patient sent with 1 wallet, 1 cell phone, dentures, boots, torn pants/shirt]   Transfer Date 06/29/22      Follow-up Information    None         Current Discharge Medication List CONTINUE these medications which have NOT CHANGED    Details   ibuprofen (MOTRIN) 200 mg tablet Take 200 mg by mouth every 6 (six) hours as needed for mild pain      lisinopril (ZESTRIL) 20 mg tablet take 1 tablet by mouth once daily  Qty: 90 tablet, Refills: 1    Associated Diagnoses: Essential hypertension      tadalafil (CIALIS) 5 MG tablet Take 1 tablet (5 mg total) by mouth daily as needed for erectile dysfunction  Qty: 25 tablet, Refills: 1    Associated Diagnoses: Erectile dysfunction, unspecified erectile dysfunction type             No discharge procedures on file      PDMP Review       Value Time User    PDMP Reviewed  Yes 2/9/2021 11:26 AM Edyta Guardado PA-C          ED Provider  Electronically Signed by           John Everett MD  06/29/22 4630

## 2022-06-29 NOTE — LETTER
Farrukh help set up cardiology post-discharge follow up in a week  He loves close to [facility]FFILIATED WITH [facility] office       Thank you,  Preethi Leon

## 2022-06-29 NOTE — H&P
[address] [facility]  H&P- Lizette Morris 1960, 58 y o  male MRN: [id number]  Unit/Bed#: [facility] 513-01 Encounter: [id number]  Primary Care Provider: Tesfaye Muñiz PA-C   Date and time admitted to hospital: 6/29/2022  6:03 PM    Shock [facility])  Assessment & Plan  Likely cardiogenic shock in the setting of cardiac arrest requiring multiple shocks    Plan:  · Formal ECHO  · Wean Epi and levophed as able for MAP >65  · IABP 1:1  · Obtain repeat labs  · If not weaning on pressor requirements consider alternative explanation for shock state    Cardiac arrest with ventricular fibrillation ([facility] )  Assessment & Plan  Chest pain x 3 days  VFib arrest in front of EMS  In total approximately 15-20 minutes of CPR/ACLS with multiple shocks delivered and multiple ROSC  Finally in ED s/p amio 300 and 150 boluses  Plan:  · Following commands on arrival, no need for TTM  · Trend neuro exam   · Continue Amiodarone infusion   · Cardiology consulted  · Consider heparin gtt  · Will need ICD     STEMI (ST elevation myocardial infarction) [facility])  Assessment & Plan  6/29 s/p cardiac arrest  Received heparin bolus and loaded with ASA/Brilinta in ED  Underwent emergent cardiac cath at [facility]  Moderate chronic nonobstrucive disease noted, but distal LAD and OM disease were small without the ability to perform intervention    LV mildly reduced EF with apical dyskinesis, appearance of takotsubo cardiomyopathy on LV-gram   IABP placed secondary to shock state post arrest     Plan:  · Cardiology consulted appreciate recommendations  · Patient with no intervenable lesions, continue therapy per cardiology recommendations   · BBlocker when medically appropriate   · Formal ECHO ordered     Acute respiratory failure with hypercapnia ([facility] )  Assessment & Plan  Secondary to cardiac arrest    Plan:  · Increased respiratory rate at [place], repeat ABG and adjust ventilator accordingly  · Wean FiO2 as able for SPO2  · Not appropriate for SBT today given arrhythmia  · VAP bundle ordered   · Start propofol and fentanyl for sedation    Hyperglycemia  Assessment & Plan  · Start insulin gtt  · Check A1C in AM  · No reported hx of diabetes    Elevated LFTs  Assessment & Plan  · Likely secondary to hypoperfusion during cardiac arrest    · Avoid hypotension and hepatotoxic medications    PERCY (acute kidney injury) ([facility] )  Assessment & Plan  · Likely secondary to hypoperfusion during cardiac arrest    · Trend UOP and Cr   · Avoid hypotension and nephrotoxic medications    -------------------------------------------------------------------------------------------------------------  Chief Complaint: unable to provide    History of Present Illness   HX and PE limited by: ETT  Shannan Iraheta is a 58 y o  male with PMHx of HTN, tobacco abuse, and obesity who presented to [address] [place] [address] ER with complaints of chest pain  Per chart review, patient had been experiencing intermittent chest pain for three days without improvement  EMS was called and upon arrival to the ER parking lot, patient lost consciousness and had a VFib arrest  He received multiple doses of epinephrine and was shocked several times  ROSC was achieved after approximately 20 minutes  He was started on epi, levo, and amio and STEMI alert was activated  Cardiac cath showed moderate obstructive disease and no interventions were performed  IABP was placed and patient was transferred to [facility] ER AND [facility] for further management  Patient arrives on epi at 10 and levo at 7 5  He is following commands in all extremities  History obtained from chart review  -------------------------------------------------------------------------------------------------------------  Dispo:  Admit to Critical Care     Code Status: Level 1 - Full Code  --------------------------------------------------------------------------------------------------------------  Review of Systems    Review of systems was unable to be performed secondary to ETT    Physical Exam  Vitals and nursing note reviewed  Constitutional:       Appearance: He is overweight  He is ill-appearing  Interventions: He is sedated, intubated and restrained  Comments: Mildly agitated   HENT:      Head: Normocephalic and atraumatic  Eyes:      Pupils: Pupils are equal, round, and reactive to light  Cardiovascular:      Rate and Rhythm: Normal rate and regular rhythm  Heart sounds: Normal heart sounds  Heart sounds not distant  No murmur heard  No friction rub  No gallop  Pulmonary:      Effort: No tachypnea  He is intubated  Breath sounds: No decreased breath sounds, wheezing, rhonchi or rales  Abdominal:      General: There is no distension  Palpations: Abdomen is soft  Musculoskeletal:      Comments: R femoral IABP in place, site C/D/I  L femoral CVC in place   Skin:     General: Skin is warm and dry  Neurological:      Comments: Follows commands in all four extremities   Psychiatric:         Behavior: Behavior is cooperative        --------------------------------------------------------------------------------------------------------------  Vitals:   Vitals:    06/29/22 1816 06/29/22 1818 06/29/22 1830 06/29/22 1900   BP:  110/66 112/81 100/68   Pulse:  83 85 77   Resp:  (!) 24 22 (!) 25   Temp:  (!) 97 °F (36 1 °C) (!) 97 2 °F (36 2 °C) 97 52 °F (36 4 °C)   SpO2:  93% 93% 95%   Weight: 104 kg (229 lb 4 5 oz)        Temp  Min: 96 6 °F (35 9 °C)  Max: 97 52 °F (36 4 °C)        Body mass index is 29 44 kg/m²      Laboratory and Diagnostics:  Results from last 7 days   Lab Units 06/29/22  1836 06/29/22  1630 06/29/22  1446   WBC Thousand/uL  --   --  11 42*   HEMOGLOBIN g/dL  --   --  14 9   I STAT HEMOGLOBIN g/dl  --  13 9  --    HEMATOCRIT %  --   --  45 1   HEMATOCRIT, ISTAT %  --  41  --    PLATELETS Thousands/uL 333  --  253   NEUTROS PCT %  --   --  56   MONOS PCT %  --   --  7     Results from last 7 days Lab Units 06/29/22  1836 06/29/22  1630 06/29/22  1543   SODIUM mmol/L 135*  --  132*   POTASSIUM mmol/L 4 3  --  4 2   CHLORIDE mmol/L 102  --  96   CO2 mmol/L 22  --  19*   CO2, I-STAT mmol/L  --  26  --    ANION GAP mmol/L 11  --  17*   BUN mg/dL 25  --  23   CREATININE mg/dL 1 79*  --  1 56*   CALCIUM mg/dL 9 1  --  8 8   GLUCOSE RANDOM mg/dL 342*  --  330*   ALT U/L 281*  --  174*   AST U/L 215*  --  121*   ALK PHOS U/L 109  --  80   ALBUMIN g/dL 3 7  --  3 4*   TOTAL BILIRUBIN mg/dL 0 46  --  0 38     Results from last 7 days   Lab Units 06/29/22  1836   MAGNESIUM mg/dL 2 4   PHOSPHORUS mg/dL 5 7*                   ABG:  Results from last 7 days   Lab Units 06/29/22  1844   PH ART  7 230*   PCO2 ART mm Hg 38 7   PO2 ART mm Hg 86 6   HCO3 ART mmol/L 15 8*   BASE EXC ART mmol/L -11 0   ABG SOURCE  Artery     VBG:  Results from last 7 days   Lab Units 06/29/22  1844   ABG SOURCE  Artery       EKG: NSR rate 82  Imaging: Lines and tubes intact, no ptx I have personally reviewed pertinent reports          Historical Information   Past Medical History:   Diagnosis Date    Bilateral inguinal hernia     Dizziness 02/02/2021    isolated incident of dizziness, sweating & disorientation    Hypertension      Past Surgical History:   Procedure Laterality Date    CA REPAIR ING HERNIA,5+Y/O,REDUCIBL Bilateral 2/9/2021    Procedure: INGUINAL HERNIA REPAIR with mesh;  Surgeon: Courtney Reed MD;  Location: [address] [place] [address] OR;  Service: General    TONSILLECTOMY       Social History   Social History     Substance and Sexual Activity   Alcohol Use Not Currently     Social History     Substance and Sexual Activity   Drug Use Not Currently     Social History     Tobacco Use   Smoking Status Current Every Day Smoker    Packs/day: 2 00    Types: Cigarettes   Smokeless Tobacco Never Used       Family History:   Family History   Problem Relation Age of Onset    Diabetes Maternal Grandfather      I have reviewed this patient's family history and commented on sigificant items within the HPI      Medications:  Current Facility-Administered Medications   Medication Dose Route Frequency    acetaminophen (TYLENOL) tablet 650 mg  650 mg Oral Q6H PRN    amiodarone (CORDARONE) 900 mg in dextrose 5 % 500 mL infusion  1 mg/min Intravenous Continuous    [START ON 6/30/2022] aspirin chewable tablet 81 mg  81 mg Oral Daily    chlorhexidine (PERIDEX) 0 12 % oral rinse 15 mL  15 mL Mouth/Throat Q12H [address]    EPINEPHrine 3000 mcg (STANDARD CONCENTRATION) IV in sodium chloride 0 9% 250 mL  1-20 mcg/min Intravenous Titrated    fentaNYL 1000 mcg in sodium chloride 0 9% 100mL infusion  50 mcg/hr Intravenous Continuous    heparin (porcine) subcutaneous injection 5,000 Units  5,000 Units Subcutaneous Q8H [address]    insulin regular (HumuLIN R,NovoLIN R) 1 Units/mL in sodium chloride 0 9 % 100 mL infusion  0 3-21 Units/hr Intravenous Titrated    norepinephrine (LEVOPHED) 4 mg (STANDARD CONCENTRATION) IV in sodium chloride 0 9% 250 mL  1-30 mcg/min Intravenous Titrated    ondansetron (ZOFRAN) injection 4 mg  4 mg Intravenous Q6H PRN    propofol (DIPRIVAN) 1000 mg in 100 mL infusion (premix)  5-50 mcg/kg/min Intravenous Titrated    sodium bicarbonate 8 4 % injection 50 mEq  50 mEq Intravenous Once    sodium bicarbonate 8 4 % injection 50 mEq  50 mEq Intravenous Once     Home medications:  Prior to Admission Medications   Prescriptions Last Dose Informant Patient Reported?  Taking?   ibuprofen (MOTRIN) 200 mg tablet  Self Yes No   Sig: Take 200 mg by mouth every 6 (six) hours as needed for mild pain   lisinopril (ZESTRIL) 20 mg tablet   No No   Sig: take 1 tablet by mouth once daily   tadalafil (CIALIS) 5 MG tablet  Self No No   Sig: Take 1 tablet (5 mg total) by mouth daily as needed for erectile dysfunction      Facility-Administered Medications: None     Allergies:  No Known Allergies    ------------------------------------------------------------------------------------------------------------  Advance Directive and Living Will:      Power of :    POLST:    ------------------------------------------------------------------------------------------------------------  Anticipated Length of Stay is > 2 midnights    Care Time Delivered:   Upon my evaluation, this patient had a high probability of imminent or life-threatening deterioration due to cardiogenic shock, which required my direct attention, intervention, and personal management  I have personally provided 42 minutes (1810 to 1920) of critical care time, exclusive of procedures, teaching, family meetings, and any prior time recorded by providers other than myself         Kirti Rutledge PA-C

## 2022-06-29 NOTE — RESPIRATORY THERAPY NOTE
RT Ventilator Management Note  Luis Kang 58 y o  male MRN: [id number]  Unit/Bed#: [facility] 958-52 Encounter: [id number]      Daily Screen         6/29/2022  1830             Patient safety screen outcome[de-identified] Failed (P)     Not Ready for Weaning due to[de-identified] Underline problem not resolved; Intra-Aortic balloon pump (P)               Physical Exam:          Resp Comments: (P) pt arrived from [address] [facility] [address] put on vent decreased FiO2 to 50%  will conitnue to monitor

## 2022-06-29 NOTE — ASSESSMENT & PLAN NOTE
· Likely secondary to hypoperfusion during cardiac arrest    · Avoid hypotension and hepatotoxic medications

## 2022-06-29 NOTE — ASSESSMENT & PLAN NOTE
· Likely secondary to hypoperfusion during cardiac arrest    · Trend UOP and Cr   · Avoid hypotension and nephrotoxic medications

## 2022-06-29 NOTE — Clinical Note
Prepped: left chest  Prepped with: ChloraPrep  The site was clipped  The patient was draped   Bilateral wrist restraints applied for patient safety

## 2022-06-29 NOTE — ASSESSMENT & PLAN NOTE
Chest pain x 3 days  VFib arrest in front of EMS  In total approximately 15-20 minutes of CPR/ACLS with multiple shocks delivered and multiple ROSC  Finally in ED s/p amio 300 and 150 boluses        Plan:  · Following commands on arrival, no need for TTM  · Trend neuro exam   · Continue Amiodarone infusion   · Cardiology consulted  · Consider heparin gtt  · Will need ICD

## 2022-06-30 ENCOUNTER — APPOINTMENT (INPATIENT)
Dept: RADIOLOGY | Facility: HOSPITAL | Age: 62
DRG: 226 | End: 2022-06-30
Payer: COMMERCIAL

## 2022-06-30 ENCOUNTER — APPOINTMENT (INPATIENT)
Dept: NON INVASIVE DIAGNOSTICS | Facility: HOSPITAL | Age: 62
DRG: 226 | End: 2022-06-30
Payer: COMMERCIAL

## 2022-06-30 PROBLEM — E87.20 LACTIC ACIDOSIS: Status: ACTIVE | Noted: 2022-06-30

## 2022-06-30 PROBLEM — E87.2 LACTIC ACIDOSIS: Status: ACTIVE | Noted: 2022-06-30

## 2022-06-30 LAB
ANION GAP SERPL CALCULATED.3IONS-SCNC: 9 MMOL/L (ref 4–13)
ANION GAP SERPL CALCULATED.3IONS-SCNC: 9 MMOL/L (ref 4–13)
AORTIC ROOT: 3.3 CM
AORTIC VALVE MEAN VELOCITY: 9.1 M/S
APICAL FOUR CHAMBER EJECTION FRACTION: 57 %
APTT PPP: 28 SECONDS (ref 23–37)
APTT PPP: 28 SECONDS (ref 23–37)
APTT PPP: 57 SECONDS (ref 23–37)
ATRIAL RATE: 138 BPM
ATRIAL RATE: 78 BPM
AV LVOT MEAN GRADIENT: 3 MMHG
AV LVOT PEAK GRADIENT: 5 MMHG
AV MEAN GRADIENT: 4 MMHG
AV PEAK GRADIENT: 7 MMHG
AV VALVE AREA: 2.48 CM2
AV VELOCITY RATIO: 0.85
BASE EX.OXY STD BLDV CALC-SCNC: 57.7 % (ref 60–80)
BASE EXCESS BLDV CALC-SCNC: -2.8 MMOL/L
BASOPHILS # BLD AUTO: 0.02 THOUSANDS/ΜL (ref 0–0.1)
BASOPHILS NFR BLD AUTO: 0 % (ref 0–1)
BODY TEMPERATURE: 99.5 DEGREES FEHRENHEIT
BUN SERPL-MCNC: 28 MG/DL (ref 5–25)
BUN SERPL-MCNC: 33 MG/DL (ref 5–25)
CA-I BLD-SCNC: 1.07 MMOL/L (ref 1.12–1.32)
CALCIUM SERPL-MCNC: 8.5 MG/DL (ref 8.3–10.1)
CALCIUM SERPL-MCNC: 8.6 MG/DL (ref 8.3–10.1)
CHLORIDE SERPL-SCNC: 104 MMOL/L (ref 100–108)
CHLORIDE SERPL-SCNC: 104 MMOL/L (ref 100–108)
CHOLEST SERPL-MCNC: 171 MG/DL
CO2 SERPL-SCNC: 25 MMOL/L (ref 21–32)
CO2 SERPL-SCNC: 26 MMOL/L (ref 21–32)
CREAT SERPL-MCNC: 1.81 MG/DL (ref 0.6–1.3)
CREAT SERPL-MCNC: 2.19 MG/DL (ref 0.6–1.3)
DOP CALC AO PEAK VEL: 1.28 M/S
DOP CALC AO VTI: 24.5 CM
DOP CALC LVOT AREA: 3.14 CM2
DOP CALC LVOT DIAMETER: 2 CM
DOP CALC LVOT PEAK VEL VTI: 19.36 CM
DOP CALC LVOT PEAK VEL: 1.09 M/S
DOP CALC LVOT STROKE VOLUME: 60.79 CM3
E WAVE DECELERATION TIME: 177 MS
EOSINOPHIL # BLD AUTO: 0 THOUSAND/ΜL (ref 0–0.61)
EOSINOPHIL NFR BLD AUTO: 0 % (ref 0–6)
ERYTHROCYTE [DISTWIDTH] IN BLOOD BY AUTOMATED COUNT: 12.5 % (ref 11.6–15.1)
EST. AVERAGE GLUCOSE BLD GHB EST-MCNC: 123 MG/DL
GFR SERPL CREATININE-BSD FRML MDRD: 31 ML/MIN/1.73SQ M
GFR SERPL CREATININE-BSD FRML MDRD: 39 ML/MIN/1.73SQ M
GLOBAL LONGITUIDAL STRAIN: -13 %
GLUCOSE SERPL-MCNC: 109 MG/DL (ref 65–140)
GLUCOSE SERPL-MCNC: 111 MG/DL (ref 65–140)
GLUCOSE SERPL-MCNC: 117 MG/DL (ref 65–140)
GLUCOSE SERPL-MCNC: 123 MG/DL (ref 65–140)
GLUCOSE SERPL-MCNC: 123 MG/DL (ref 65–140)
GLUCOSE SERPL-MCNC: 124 MG/DL (ref 65–140)
GLUCOSE SERPL-MCNC: 130 MG/DL (ref 65–140)
GLUCOSE SERPL-MCNC: 170 MG/DL (ref 65–140)
GLUCOSE SERPL-MCNC: 230 MG/DL (ref 65–140)
GLUCOSE SERPL-MCNC: 242 MG/DL (ref 65–140)
GLUCOSE SERPL-MCNC: 79 MG/DL (ref 65–140)
GLUCOSE SERPL-MCNC: 94 MG/DL (ref 65–140)
HBA1C MFR BLD: 5.9 %
HCO3 BLDV-SCNC: 24.2 MMOL/L (ref 24–30)
HCT VFR BLD AUTO: 39.5 % (ref 36.5–49.3)
HDLC SERPL-MCNC: 22 MG/DL
HGB BLD-MCNC: 12.9 G/DL (ref 12–17)
IMM GRANULOCYTES # BLD AUTO: 0.12 THOUSAND/UL (ref 0–0.2)
IMM GRANULOCYTES NFR BLD AUTO: 1 % (ref 0–2)
INR PPP: 1.06 (ref 0.84–1.19)
INR PPP: 1.08 (ref 0.84–1.19)
LAAS-AP4: 10.9 CM2
LACTATE SERPL-SCNC: 2.3 MMOL/L (ref 0.5–2)
LACTATE SERPL-SCNC: 3.9 MMOL/L (ref 0.5–2)
LACTATE SERPL-SCNC: 7.2 MMOL/L (ref 0.5–2)
LDLC SERPL DIRECT ASSAY-MCNC: 94 MG/DL (ref 0–100)
LEFT VENTRICLE DIASTOLIC VOLUME (MOD BIPLANE): 126 ML
LEFT VENTRICLE SYSTOLIC VOLUME (MOD BIPLANE): 54 ML
LV EF: 57 %
LYMPHOCYTES # BLD AUTO: 1.21 THOUSANDS/ΜL (ref 0.6–4.47)
LYMPHOCYTES NFR BLD AUTO: 6 % (ref 14–44)
MAGNESIUM SERPL-MCNC: 2.4 MG/DL (ref 1.6–2.6)
MCH RBC QN AUTO: 30.5 PG (ref 26.8–34.3)
MCHC RBC AUTO-ENTMCNC: 32.7 G/DL (ref 31.4–37.4)
MCV RBC AUTO: 93 FL (ref 82–98)
MONOCYTES # BLD AUTO: 1.69 THOUSAND/ΜL (ref 0.17–1.22)
MONOCYTES NFR BLD AUTO: 9 % (ref 4–12)
MV E'TISSUE VEL-LAT: 7 CM/S
MV E'TISSUE VEL-SEP: 6 CM/S
MV PEAK A VEL: 0.74 M/S
MV PEAK E VEL: 50 CM/S
MV STENOSIS PRESSURE HALF TIME: 51 MS
MV VALVE AREA P 1/2 METHOD: 4.31 CM2
NEUTROPHILS # BLD AUTO: 16.14 THOUSANDS/ΜL (ref 1.85–7.62)
NEUTS SEG NFR BLD AUTO: 84 % (ref 43–75)
NRBC BLD AUTO-RTO: 0 /100 WBCS
O2 CT BLDV-SCNC: 10 ML/DL
P AXIS: 71 DEGREES
PCO2 BLD: 52.5 MM HG (ref 42–50)
PCO2 BLDV: 51.4 MM HG (ref 42–50)
PH BLD: 7.28 [PH] (ref 7.3–7.4)
PH BLDV: 7.29 [PH] (ref 7.3–7.4)
PHOSPHATE SERPL-MCNC: 3.9 MG/DL (ref 2.3–4.1)
PLATELET # BLD AUTO: 222 THOUSANDS/UL (ref 149–390)
PMV BLD AUTO: 8.9 FL (ref 8.9–12.7)
PO2 BLDV: 32.6 MM HG (ref 35–45)
PO2 VENOUS TEMP CORRECTED: 33.8 MM HG (ref 35–45)
POTASSIUM SERPL-SCNC: 4 MMOL/L (ref 3.5–5.3)
POTASSIUM SERPL-SCNC: 4.2 MMOL/L (ref 3.5–5.3)
PROTHROMBIN TIME: 13.4 SECONDS (ref 11.6–14.5)
PROTHROMBIN TIME: 13.5 SECONDS (ref 11.6–14.5)
PS CM H2O: 8
PS VENT FIO2: 50
PS VENT PEEP: 6
QRS AXIS: 105 DEGREES
QRS AXIS: 78 DEGREES
QRSD INTERVAL: 104 MS
QRSD INTERVAL: 108 MS
QT INTERVAL: 340 MS
QT INTERVAL: 356 MS
QTC INTERVAL: 389 MS
QTC INTERVAL: 418 MS
RA PRESSURE ESTIMATED: 3 MMHG
RBC # BLD AUTO: 4.23 MILLION/UL (ref 3.88–5.62)
RIGHT ATRIUM AREA SYSTOLE A4C: 11.9 CM2
RIGHT VENTRICLE ID DIMENSION: 3.6 CM
RV PSP: 26 MMHG
SL CV LV EF: 44
SODIUM SERPL-SCNC: 138 MMOL/L (ref 136–145)
SODIUM SERPL-SCNC: 139 MMOL/L (ref 136–145)
T WAVE AXIS: 41 DEGREES
T WAVE AXIS: 84 DEGREES
TR MAX PG: 23 MMHG
TR PEAK VELOCITY: 2.4 M/S
TRICUSPID VALVE PEAK REGURGITATION VELOCITY: 2.39 M/S
TRIGL SERPL-MCNC: 528 MG/DL
VENT - PS: ABNORMAL
VENTRICULAR RATE: 79 BPM
VENTRICULAR RATE: 83 BPM
WBC # BLD AUTO: 19.18 THOUSAND/UL (ref 4.31–10.16)

## 2022-06-30 PROCEDURE — 82948 REAGENT STRIP/BLOOD GLUCOSE: CPT

## 2022-06-30 PROCEDURE — 83036 HEMOGLOBIN GLYCOSYLATED A1C: CPT | Performed by: PHYSICIAN ASSISTANT

## 2022-06-30 PROCEDURE — 93010 ELECTROCARDIOGRAM REPORT: CPT | Performed by: INTERNAL MEDICINE

## 2022-06-30 PROCEDURE — 84100 ASSAY OF PHOSPHORUS: CPT | Performed by: PHYSICIAN ASSISTANT

## 2022-06-30 PROCEDURE — 85730 THROMBOPLASTIN TIME PARTIAL: CPT | Performed by: PHYSICIAN ASSISTANT

## 2022-06-30 PROCEDURE — 80061 LIPID PANEL: CPT | Performed by: PHYSICIAN ASSISTANT

## 2022-06-30 PROCEDURE — 99223 1ST HOSP IP/OBS HIGH 75: CPT | Performed by: INTERNAL MEDICINE

## 2022-06-30 PROCEDURE — 85025 COMPLETE CBC W/AUTO DIFF WBC: CPT | Performed by: PHYSICIAN ASSISTANT

## 2022-06-30 PROCEDURE — 83721 ASSAY OF BLOOD LIPOPROTEIN: CPT | Performed by: PHYSICIAN ASSISTANT

## 2022-06-30 PROCEDURE — 99292 CRITICAL CARE ADDL 30 MIN: CPT | Performed by: STUDENT IN AN ORGANIZED HEALTH CARE EDUCATION/TRAINING PROGRAM

## 2022-06-30 PROCEDURE — 83735 ASSAY OF MAGNESIUM: CPT | Performed by: PHYSICIAN ASSISTANT

## 2022-06-30 PROCEDURE — 82805 BLOOD GASES W/O2 SATURATION: CPT | Performed by: PHYSICIAN ASSISTANT

## 2022-06-30 PROCEDURE — 94760 N-INVAS EAR/PLS OXIMETRY 1: CPT

## 2022-06-30 PROCEDURE — 99291 CRITICAL CARE FIRST HOUR: CPT | Performed by: STUDENT IN AN ORGANIZED HEALTH CARE EDUCATION/TRAINING PROGRAM

## 2022-06-30 PROCEDURE — 85730 THROMBOPLASTIN TIME PARTIAL: CPT | Performed by: INTERNAL MEDICINE

## 2022-06-30 PROCEDURE — 71045 X-RAY EXAM CHEST 1 VIEW: CPT

## 2022-06-30 PROCEDURE — 0241U HB NFCT DS VIR RESP RNA 4 TRGT: CPT | Performed by: PHYSICIAN ASSISTANT

## 2022-06-30 PROCEDURE — 82330 ASSAY OF CALCIUM: CPT | Performed by: PHYSICIAN ASSISTANT

## 2022-06-30 PROCEDURE — 85730 THROMBOPLASTIN TIME PARTIAL: CPT | Performed by: STUDENT IN AN ORGANIZED HEALTH CARE EDUCATION/TRAINING PROGRAM

## 2022-06-30 PROCEDURE — 80048 BASIC METABOLIC PNL TOTAL CA: CPT | Performed by: PHYSICIAN ASSISTANT

## 2022-06-30 PROCEDURE — 83605 ASSAY OF LACTIC ACID: CPT | Performed by: PHYSICIAN ASSISTANT

## 2022-06-30 PROCEDURE — 85610 PROTHROMBIN TIME: CPT | Performed by: PHYSICIAN ASSISTANT

## 2022-06-30 PROCEDURE — 85610 PROTHROMBIN TIME: CPT | Performed by: INTERNAL MEDICINE

## 2022-06-30 PROCEDURE — 94003 VENT MGMT INPAT SUBQ DAY: CPT

## 2022-06-30 RX ORDER — LIDOCAINE WITH 8.4% SOD BICARB 0.9%(10ML)
10 SYRINGE (ML) INJECTION ONCE
Status: DISCONTINUED | OUTPATIENT
Start: 2022-06-30 | End: 2022-07-01

## 2022-06-30 RX ORDER — SODIUM CHLORIDE, SODIUM GLUCONATE, SODIUM ACETATE, POTASSIUM CHLORIDE, MAGNESIUM CHLORIDE, SODIUM PHOSPHATE, DIBASIC, AND POTASSIUM PHOSPHATE .53; .5; .37; .037; .03; .012; .00082 G/100ML; G/100ML; G/100ML; G/100ML; G/100ML; G/100ML; G/100ML
500 INJECTION, SOLUTION INTRAVENOUS ONCE
Status: COMPLETED | OUTPATIENT
Start: 2022-06-30 | End: 2022-06-30

## 2022-06-30 RX ORDER — INSULIN LISPRO 100 [IU]/ML
1-5 INJECTION, SOLUTION INTRAVENOUS; SUBCUTANEOUS EVERY 6 HOURS SCHEDULED
Status: DISCONTINUED | OUTPATIENT
Start: 2022-06-30 | End: 2022-07-02

## 2022-06-30 RX ORDER — SODIUM CHLORIDE, SODIUM GLUCONATE, SODIUM ACETATE, POTASSIUM CHLORIDE, MAGNESIUM CHLORIDE, SODIUM PHOSPHATE, DIBASIC, AND POTASSIUM PHOSPHATE .53; .5; .37; .037; .03; .012; .00082 G/100ML; G/100ML; G/100ML; G/100ML; G/100ML; G/100ML; G/100ML
1000 INJECTION, SOLUTION INTRAVENOUS ONCE
Status: COMPLETED | OUTPATIENT
Start: 2022-06-30 | End: 2022-06-30

## 2022-06-30 RX ORDER — ACETAMINOPHEN 160 MG/5ML
650 SUSPENSION, ORAL (FINAL DOSE FORM) ORAL EVERY 6 HOURS PRN
Status: DISCONTINUED | OUTPATIENT
Start: 2022-06-30 | End: 2022-07-01

## 2022-06-30 RX ORDER — HEPARIN SODIUM 10000 [USP'U]/100ML
3-20 INJECTION, SOLUTION INTRAVENOUS
Status: DISCONTINUED | OUTPATIENT
Start: 2022-06-30 | End: 2022-07-02

## 2022-06-30 RX ORDER — CALCIUM GLUCONATE 20 MG/ML
2 INJECTION, SOLUTION INTRAVENOUS ONCE
Status: COMPLETED | OUTPATIENT
Start: 2022-06-30 | End: 2022-06-30

## 2022-06-30 RX ORDER — HEPARIN SODIUM 1000 [USP'U]/ML
4000 INJECTION, SOLUTION INTRAVENOUS; SUBCUTANEOUS
Status: DISCONTINUED | OUTPATIENT
Start: 2022-06-30 | End: 2022-07-02

## 2022-06-30 RX ORDER — FENTANYL CITRATE 50 UG/ML
100 INJECTION, SOLUTION INTRAMUSCULAR; INTRAVENOUS
Status: DISCONTINUED | OUTPATIENT
Start: 2022-06-30 | End: 2022-07-01

## 2022-06-30 RX ORDER — DEXMEDETOMIDINE HYDROCHLORIDE 4 UG/ML
.1-1.5 INJECTION, SOLUTION INTRAVENOUS
Status: DISCONTINUED | OUTPATIENT
Start: 2022-06-30 | End: 2022-07-01

## 2022-06-30 RX ORDER — HEPARIN SODIUM 1000 [USP'U]/ML
2000 INJECTION, SOLUTION INTRAVENOUS; SUBCUTANEOUS
Status: DISCONTINUED | OUTPATIENT
Start: 2022-06-30 | End: 2022-07-02

## 2022-06-30 RX ORDER — HEPARIN SODIUM 1000 [USP'U]/ML
4000 INJECTION, SOLUTION INTRAVENOUS; SUBCUTANEOUS ONCE
Status: COMPLETED | OUTPATIENT
Start: 2022-06-30 | End: 2022-06-30

## 2022-06-30 RX ADMIN — NOREPINEPHRINE BITARTRATE 7 MCG/MIN: 1 INJECTION, SOLUTION, CONCENTRATE INTRAVENOUS at 09:56

## 2022-06-30 RX ADMIN — SODIUM CHLORIDE, SODIUM GLUCONATE, SODIUM ACETATE, POTASSIUM CHLORIDE, MAGNESIUM CHLORIDE, SODIUM PHOSPHATE, DIBASIC, AND POTASSIUM PHOSPHATE 1000 ML: .53; .5; .37; .037; .03; .012; .00082 INJECTION, SOLUTION INTRAVENOUS at 01:44

## 2022-06-30 RX ADMIN — ASPIRIN 81 MG CHEWABLE TABLET 81 MG: 81 TABLET CHEWABLE at 08:40

## 2022-06-30 RX ADMIN — PROPOFOL 50 MCG/KG/MIN: 10 INJECTION, EMULSION INTRAVENOUS at 01:22

## 2022-06-30 RX ADMIN — ACETAMINOPHEN 650 MG: 650 SUSPENSION ORAL at 23:19

## 2022-06-30 RX ADMIN — FENTANYL CITRATE 100 MCG: 50 INJECTION INTRAMUSCULAR; INTRAVENOUS at 17:14

## 2022-06-30 RX ADMIN — PROPOFOL 50 MCG/KG/MIN: 10 INJECTION, EMULSION INTRAVENOUS at 22:19

## 2022-06-30 RX ADMIN — PROPOFOL 50 MCG/KG/MIN: 10 INJECTION, EMULSION INTRAVENOUS at 05:02

## 2022-06-30 RX ADMIN — DEXMEDETOMIDINE HYDROCHLORIDE 0.4 MCG/KG/HR: 100 INJECTION, SOLUTION INTRAVENOUS at 08:18

## 2022-06-30 RX ADMIN — AMIODARONE HYDROCHLORIDE 1 MG/MIN: 50 INJECTION, SOLUTION INTRAVENOUS at 07:22

## 2022-06-30 RX ADMIN — HEPARIN SODIUM 4000 UNITS: 1000 INJECTION INTRAVENOUS; SUBCUTANEOUS at 12:29

## 2022-06-30 RX ADMIN — NOREPINEPHRINE BITARTRATE 2 MCG/MIN: 1 INJECTION, SOLUTION, CONCENTRATE INTRAVENOUS at 16:54

## 2022-06-30 RX ADMIN — FENTANYL CITRATE 100 MCG: 50 INJECTION INTRAMUSCULAR; INTRAVENOUS at 22:19

## 2022-06-30 RX ADMIN — SODIUM CHLORIDE, SODIUM GLUCONATE, SODIUM ACETATE, POTASSIUM CHLORIDE, MAGNESIUM CHLORIDE, SODIUM PHOSPHATE, DIBASIC, AND POTASSIUM PHOSPHATE 500 ML: .53; .5; .37; .037; .03; .012; .00082 INJECTION, SOLUTION INTRAVENOUS at 10:59

## 2022-06-30 RX ADMIN — CALCIUM GLUCONATE 2 G: 20 INJECTION, SOLUTION INTRAVENOUS at 07:20

## 2022-06-30 RX ADMIN — FENTANYL CITRATE 100 MCG: 50 INJECTION INTRAMUSCULAR; INTRAVENOUS at 12:38

## 2022-06-30 RX ADMIN — CHLORHEXIDINE GLUCONATE 15 ML: 1.2 SOLUTION ORAL at 20:03

## 2022-06-30 RX ADMIN — EPINEPHRINE 7 MCG/MIN: 1 INJECTION, SOLUTION, CONCENTRATE INTRAVENOUS at 01:02

## 2022-06-30 RX ADMIN — PROPOFOL 35 MCG/KG/MIN: 10 INJECTION, EMULSION INTRAVENOUS at 16:52

## 2022-06-30 RX ADMIN — PROPOFOL 35 MCG/KG/MIN: 10 INJECTION, EMULSION INTRAVENOUS at 13:55

## 2022-06-30 RX ADMIN — PROPOFOL 50 MCG/KG/MIN: 10 INJECTION, EMULSION INTRAVENOUS at 06:43

## 2022-06-30 RX ADMIN — AMIODARONE HYDROCHLORIDE 1 MG/MIN: 50 INJECTION, SOLUTION INTRAVENOUS at 16:54

## 2022-06-30 RX ADMIN — CALCIUM GLUCONATE 2 G: 20 INJECTION, SOLUTION INTRAVENOUS at 05:46

## 2022-06-30 RX ADMIN — FENTANYL CITRATE 100 MCG: 50 INJECTION INTRAMUSCULAR; INTRAVENOUS at 18:29

## 2022-06-30 RX ADMIN — FENTANYL CITRATE 100 MCG/HR: 50 INJECTION INTRAMUSCULAR; INTRAVENOUS at 07:48

## 2022-06-30 RX ADMIN — PROPOFOL 50 MCG/KG/MIN: 10 INJECTION, EMULSION INTRAVENOUS at 19:57

## 2022-06-30 RX ADMIN — FENTANYL CITRATE 50 MCG: 50 INJECTION INTRAMUSCULAR; INTRAVENOUS at 01:21

## 2022-06-30 RX ADMIN — FENTANYL CITRATE 100 MCG: 50 INJECTION INTRAMUSCULAR; INTRAVENOUS at 10:23

## 2022-06-30 RX ADMIN — HEPARIN SODIUM 2000 UNITS: 1000 INJECTION INTRAVENOUS; SUBCUTANEOUS at 20:17

## 2022-06-30 RX ADMIN — HEPARIN SODIUM 11.1 UNITS/KG/HR: 10000 INJECTION, SOLUTION INTRAVENOUS at 16:53

## 2022-06-30 RX ADMIN — TICAGRELOR 90 MG: 90 TABLET ORAL at 20:03

## 2022-06-30 RX ADMIN — CHLORHEXIDINE GLUCONATE 15 ML: 1.2 SOLUTION ORAL at 08:40

## 2022-06-30 RX ADMIN — HEPARIN SODIUM 11.1 UNITS/KG/HR: 10000 INJECTION, SOLUTION INTRAVENOUS at 12:31

## 2022-06-30 RX ADMIN — PROPOFOL 35 MCG/KG/MIN: 10 INJECTION, EMULSION INTRAVENOUS at 09:58

## 2022-06-30 RX ADMIN — FENTANYL CITRATE 50 MCG: 50 INJECTION INTRAMUSCULAR; INTRAVENOUS at 03:42

## 2022-06-30 RX ADMIN — FENTANYL CITRATE 100 MCG/HR: 50 INJECTION INTRAMUSCULAR; INTRAVENOUS at 16:53

## 2022-06-30 RX ADMIN — TICAGRELOR 90 MG: 90 TABLET ORAL at 08:40

## 2022-06-30 RX ADMIN — HEPARIN SODIUM 5000 UNITS: 5000 INJECTION INTRAVENOUS; SUBCUTANEOUS at 05:46

## 2022-06-30 RX ADMIN — FENTANYL CITRATE 100 MCG: 50 INJECTION INTRAMUSCULAR; INTRAVENOUS at 15:27

## 2022-06-30 NOTE — ASSESSMENT & PLAN NOTE
Likely cardiogenic shock in the setting of cardiac arrest requiring multiple shocks    Plan:  · Formal ECHO pending, bedside echo with EF 55%, some apical hypokinesis  · Wean Epi and levophed as able for MAP >65   Currently on levo at 3 and epi at 2  · Pressor requirements improved with crystalloid overnight  · IABP 1:1

## 2022-06-30 NOTE — PLAN OF CARE
Problem: Nutrition/Hydration-ADULT  Goal: Nutrient/Hydration intake appropriate for improving, restoring or maintaining nutritional needs  Description: Monitor and assess patient's nutrition/hydration status for malnutrition  Collaborate with interdisciplinary team and initiate plan and interventions as ordered  Monitor patient's weight and dietary intake as ordered or per policy  Utilize nutrition screening tool and intervene as necessary  Determine patient's food preferences and provide high-protein, high-caloric foods as appropriate       INTERVENTIONS:  - Monitor oral intake, urinary output, labs, and treatment plans  - Assess nutrition and hydration status and recommend course of action  - Evaluate amount of meals eaten  - Assist patient with eating if necessary   - Allow adequate time for meals  - Recommend/ encourage appropriate diets, oral nutritional supplements, and vitamin/mineral supplements  - Order, calculate, and assess calorie counts as needed  - Recommend, monitor, and adjust tube feedings and TPN/PPN based on assessed needs  - Assess need for intravenous fluids  - Provide specific nutrition/hydration education as appropriate  - Include patient/family/caregiver in decisions related to nutrition  Outcome: Not Progressing   Patient is NPO, vented

## 2022-06-30 NOTE — RESPIRATORY THERAPY NOTE
RT Protocol Note  Ceci Berman 58 y o  male MRN: [id number]  Unit/Bed#: [facility] 5-5 Encounter: [id number]    Assessment    Principal Problem:    Cardiac arrest with ventricular fibrillation ([facility] )  Active Problems:    STEMI (ST elevation myocardial infarction) ([facility] )    PERCY (acute kidney injury) ([facility] )    Elevated LFTs    Shock ([facility] )    Acute respiratory failure with hypercapnia (HCC)    Hyperglycemia      Home Pulmonary Medications:  none       Past Medical History:   Diagnosis Date    Bilateral inguinal hernia     Dizziness 02/02/2021    isolated incident of dizziness, sweating & disorientation    Hypertension      Social History     Socioeconomic History    Marital status: /Civil Union     Spouse name: Not on file    Number of children: Not on file    Years of education: Not on file    Highest education level: Not on file   Occupational History    Not on file   Tobacco Use    Smoking status: Current Every Day Smoker     Packs/day: 2 00     Types: Cigarettes    Smokeless tobacco: Never Used   Vaping Use    Vaping Use: Never used   Substance and Sexual Activity    Alcohol use: Not Currently    Drug use: Not Currently    Sexual activity: Not on file   Other Topics Concern    Not on file   Social History Narrative    Not on file     Social Determinants of Health     Financial Resource Strain: Not on file   Food Insecurity: Not on file   Transportation Needs: Not on file   Physical Activity: Not on file   Stress: Not on file   Social Connections: Not on file   Intimate Partner Violence: Not on file   Housing Stability: Not on file       Subjective         Objective    Physical Exam:   Assessment Type: Assess only  General Appearance: Sedated  Respiratory Pattern: Assisted  Chest Assessment: Chest expansion symmetrical  Bilateral Breath Sounds: Diminished    Vitals:  Blood pressure 95/58, pulse 80, temperature 97 7 °F (36 5 °C), resp  rate (!) 24, weight 104 kg (229 lb 4 5 oz), SpO2 98 %      Results from last 7 days   Lab Units 06/29/22  1844   PH ART  7 230*   PCO2 ART mm Hg 38 7   PO2 ART mm Hg 86 6   HCO3 ART mmol/L 15 8*   BASE EXC ART mmol/L -11 0   O2 CONTENT ART mL/dL 19 9   O2 HGB, ARTERIAL % 92 7*   ABG SOURCE  Artery       Imaging and other studies: I have personally reviewed pertinent reports              Plan    Respiratory Plan: Vent/NIV/HFNC        Resp Comments: pt is currently vented at this time  ther eis no pulmonary hx or respiratroy meds on chart will continue to follow per protocol

## 2022-06-30 NOTE — ASSESSMENT & PLAN NOTE
· Likely secondary to hypoperfusion during cardiac arrest    · Trend UOP and Cr   · Avoid hypotension and nephrotoxic medications  · Trend strict I/Os

## 2022-06-30 NOTE — PLAN OF CARE
Problem: SAFETY,RESTRAINT: NV/NON-SELF DESTRUCTIVE BEHAVIOR  Goal: Remains free of harm/injury (restraint for non violent/non self-detsructive behavior)  Description: INTERVENTIONS:  - Instruct patient/family regarding restraint use   - Assess and monitor physiologic and psychological status   - Provide interventions and comfort measures to meet assessed patient needs   - Identify and implement measures to help patient regain control  - Assess readiness for release of restraint   6/30/2022 0150 by Todd Virk RN  Outcome: Progressing  6/30/2022 0150 by Todd Virk RN  Outcome: Progressing  Goal: Returns to optimal restraint-free functioning  Description: INTERVENTIONS:  - Assess the patient's behavior and symptoms that indicate continued need for restraint  - Identify and implement measures to help patient regain control  - Assess readiness for release of restraint   6/30/2022 0150 by Todd Virk RN  Outcome: Progressing  6/30/2022 0150 by Todd Virk RN  Outcome: Progressing

## 2022-06-30 NOTE — ASSESSMENT & PLAN NOTE
Chest pain x 3 days  VFib arrest in front of EMS  In total approximately 15-20 minutes of CPR/ACLS with multiple shocks delivered and multiple ROSC  Finally in ED s/p amio 300 and 150 boluses        Plan:  · Following commands on arrival, no need for TTM  · Trend neuro exam   · Continue Amiodarone infusion at 1  · Cardiology consulted  · Will need ICD

## 2022-06-30 NOTE — CONSULTS
Consultation - HF  Gary Mir 58 y o  male MRN: [id number]  Unit/Bed#: [facility] 554-91 Encounter: [id number]      Inpatient consult to Cardiology  Consult performed by: Leonid Cuevas MD  Consult ordered by: Marcey Oppenheim, PA-C        PCP: Deepa Blackburn PA-C   Outpatient Cardiologist:     History of Present Illness   Physician Requesting Consult: Carolina Greenfield DO  Reason for Consult / Principal Problem: STEMI    HPI: Upon evaluation patient was intubated and sedated, as he arrived as a VT/VF arrest in the setting of what appeared to be a STEMI  Therefore history was obtained from the ER and other providers  55-year-old male with no prior cardiac history comes in with recurrent chest pain over the last 3 days or so  He called EMS today and upon arrival to the hospital he had an VFib arrest while with EMS  At that point CPR and ACLS protocol were initiated, and he received several shocks and epinephrine  ROSC was obtained and he was started on epi, levophed, and amiodarone gtts  The patient went to the cath lab that did not show any acute lesion or thrombosis but just moderate and smaller vessel disease  IABP was placed for mechanical support in the cath lab  The patient was [facility] to [facility] 6/29/22 with no significant events  Currently intubated sedated on minimal vent settings  The patient on IABP 1:1 currently and low dose levophed  Tele NSR in the 60s  Review of Systems  Review of system was conducted and was negative except for as stated in the HPI  Historical Information   Past Medical History:   Diagnosis Date    Bilateral inguinal hernia     Dizziness 02/02/2021    isolated incident of dizziness, sweating & disorientation    Hypertension      Past Surgical History:   Procedure Laterality Date    CARDIAC CATHETERIZATION N/A 6/29/2022    Procedure: Cardiac pci;  Surgeon: Khanh Allison MD;  [facility];   Service: Cardiology    CARDIAC CATHETERIZATION N/A 6/29/2022    Procedure: Cardiac iabp; Surgeon: Jose Raul Mac MD;  [facility];   Service: Cardiology    MT REPAIR ING HERNIA,5+Y/O,REDUCIBL Bilateral 2/9/2021    Procedure: INGUINAL HERNIA REPAIR with mesh;  Surgeon: Cinthya Martinez MD;  [facility] MAIN OR;  Service: General    TONSILLECTOMY       Social History     Substance and Sexual Activity   Alcohol Use Not Currently     Social History     Substance and Sexual Activity   Drug Use Not Currently     Social History     Tobacco Use   Smoking Status Current Every Day Smoker    Packs/day: 2 00    Types: Cigarettes   Smokeless Tobacco Never Used     Family History: non-contributory    Meds/Allergies   Hospital Medications:   Current Facility-Administered Medications   Medication Dose Route Frequency    acetaminophen (TYLENOL) tablet 650 mg  650 mg Oral Q6H PRN    amiodarone (CORDARONE) 900 mg in dextrose 5 % 500 mL infusion  1 mg/min Intravenous Continuous    aspirin chewable tablet 81 mg  81 mg Oral Daily    chlorhexidine (PERIDEX) 0 12 % oral rinse 15 mL  15 mL Mouth/Throat Q12H [address]    dexmedeTOMIDine (Precedex) 400 mcg in sodium chloride 0 9% 100 mL  0 1-1 5 mcg/kg/hr Intravenous Titrated    fentaNYL 1000 mcg in sodium chloride 0 9% 100mL infusion  100 mcg/hr Intravenous Continuous    fentanyl citrate (PF) 100 MCG/2ML 100 mcg  100 mcg Intravenous Q1H PRN    heparin (porcine) 25,000 units in 0 45% NaCl 250 mL infusion (premix)  3-20 Units/kg/hr (Order-Specific) Intravenous Titrated    heparin (porcine) injection 2,000 Units  2,000 Units Intravenous Q1H PRN    heparin (porcine) injection 4,000 Units  4,000 Units Intravenous Once    heparin (porcine) injection 4,000 Units  4,000 Units Intravenous Q1H PRN    heparin (porcine) subcutaneous injection 5,000 Units  5,000 Units Subcutaneous Q8H [address]    insulin regular (HumuLIN R,NovoLIN R) 1 Units/mL in sodium chloride 0 9 % 100 mL infusion  0 3-21 Units/hr Intravenous Titrated    lidocaine 1% buffered 10 mL  10 mL Infiltration Once    norepinephrine (LEVOPHED) 4 mg (STANDARD CONCENTRATION) IV in sodium chloride 0 9% 250 mL  1-30 mcg/min Intravenous Titrated    ondansetron (ZOFRAN) injection 4 mg  4 mg Intravenous Q6H PRN    propofol (DIPRIVAN) 1000 mg in 100 mL infusion (premix)  5-50 mcg/kg/min Intravenous Titrated    ticagrelor (BRILINTA) tablet 90 mg  90 mg Oral Q12H [address]     Home Medications:   Medications Prior to Admission   Medication    ibuprofen (MOTRIN) 200 mg tablet    lisinopril (ZESTRIL) 20 mg tablet    tadalafil (CIALIS) 5 MG tablet       No Known Allergies    Objective   Vitals: Blood pressure 102/72, pulse 67, temperature 99 86 °F (37 7 °C), temperature source Probe, resp  rate 12, height 6' 2" (1 88 m), weight 106 kg (233 lb 14 5 oz), SpO2 98 %  Orthostatic Blood Pressures    Flowsheet Row Most Recent Value   Blood Pressure 102/72 filed at 06/30/2022 1200            Invasive Devices  Report    Central Venous Catheter Line  Duration           CVC Central Lines 06/29/22 Triple Left Femoral <1 day          Peripheral Intravenous Line  Duration           Peripheral IV 06/29/22 Right Antecubital <1 day    Peripheral IV 06/30/22 Distal;Left;Ventral (anterior) Forearm <1 day          Line  Duration           Arterial Sheath 6 Fr  Right Femoral <1 day    IABP 8 0 Fr   50 mL <1 day          Drain  Duration           NG/OG/Enteral Tube Orogastric Right mouth <1 day    Urethral Catheter Temperature probe 16 Fr  <1 day          Airway  Duration           ETT  8 mm <1 day                Physical Exam    GEN: Alric Kid intubated and sedated   HEENT:  Normocephalic, atraumatic, anicteric, moist mucous membranes  NECK: No JVD or carotid bruits   HEART: regular rhythm, regular rate, normal S1 and S2, no murmurs, clicks, gallops or rubs   LUNGS: Clear to auscultation bilaterally; no wheezes, rales, or rhonchi; respiration nonlabored   ABDOMEN:  Normoactive bowel sounds, soft, no tenderness, no distention  EXTREMITIES: peripheral pulses dopplers; no edema, cool distal extremities   NEURO: no gross focal findings; cranial nerves grossly intact   SKIN:  Dry, intact, warm to touch    Lab Results: I have personally reviewed pertinent lab results  Results from last 7 days   Lab Units 06/29/22  2204 06/29/22  1446   HS TNI 0HR ng/L  --  64*   HS TNI RAND ng/L 4,586*  --          Results from last 7 days   Lab Units 06/30/22  0432 06/30/22  0002 06/29/22 2227 06/29/22  1836   POTASSIUM mmol/L 4 2 4 0  --  4 3   CO2 mmol/L 26 25  --  22   CO2, I-STAT mmol/L  --   --  22  --    CHLORIDE mmol/L 104 104  --  102   BUN mg/dL 33* 28*  --  25   CREATININE mg/dL 1 81* 2 19*  --  1 79*     Results from last 7 days   Lab Units 06/30/22  0432 06/29/22 2227 06/29/22  1836 06/29/22  1630 06/29/22  1446   HEMOGLOBIN g/dL 12 9  --   --   --  14 9   I STAT HEMOGLOBIN g/dl  --  14 3  --  13 9  --    HEMATOCRIT % 39 5  --   --   --  45 1   HEMATOCRIT, ISTAT %  --  42  --  41  --    PLATELETS Thousands/uL 222  --  333  --  253     Results from last 7 days   Lab Units 06/30/22  0432   TRIGLYCERIDES mg/dL 528*   HDL mg/dL 22*   LDL DIRECT mg/dl 94   HEMOGLOBIN A1C % 5 9*         Imaging: I have personally reviewed pertinent reports  Telemetry:   nsr 60s        Previous STRESS TEST:  No results found for this or any previous visit  No results found for this or any previous visit  No results found for this or any previous visit  Previous Cath/PCI:  No results found for this or any previous visit  No results found for this or any previous visit  No results found for this or any previous visit  ECHO:  No results found for this or any previous visit      Results for orders placed during the hospital encounter of 06/29/22    Echo complete w/ contrast if indicated    Interpretation Summary    Left Ventricle: Left ventricular cavity size is normal  Wall thickness is mildly increased  The left ventricular ejection fraction is 44%  Systolic function is moderately reduced  Global longitudinal strain is reduced at -13%  Diastolic function is mildly abnormal, consistent with grade I (abnormal) relaxation  Left atrial filling pressure is normal     The following segments are akinetic: apical anterior, apical septal, apical inferior, apical lateral and apex    All other segments are normal     Aortic Valve: The aortic valve velocity is normal  Cardiac output is approximately 5 L/min    Tricuspid Valve: There is mild regurgitation  The right ventricular systolic pressure is normal  The estimated right ventricular systolic pressure is 17 82 mmHg    IVC/SVC: The right atrial pressure is estimated at 3 0 mmHg  The inferior vena cava is normal in size  Respirophasic changes were normal       DILIP:  No results found for this or any previous visit  No results found for this or any previous visit  CMR:  No results found for this or any previous visit  No results found for this or any previous visit  No results found for this or any previous visit  HOLTER  No results found for this or any previous visit  No results found for this or any previous visit  VTE Prophylaxis: Sequential compression device (Venodyne)  and Heparin gtt      Assessment/Plan   Upon evaluation patient was intubated and sedated, as he arrived as a VT/VF arrest in the setting of what appeared to be a STEMI  Therefore history was obtained from the ER and other providers  72-year-old male with no prior cardiac history comes in with recurrent chest pain over the last 3 days or so  He called EMS today and upon arrival to the hospital he had an VFib arrest while with EMS  At that point CPR and ACLS protocol were initiated, and he received several shocks and epinephrine  ROSC was obtained and he was started on epi, levophed, and amiodarone gtts   The patient went to the cath lab that did not show any acute lesion or thrombosis but just moderate and smaller vessel disease  IABP was placed for mechanical support in the cath lab  Cardiogenic Shock s/p VF arrest with mild cardiomyopathy on TTE while on IABP: Patient does not have a typical stress induced cardiomyopathy pattern on TTE and regional akinetic walls at the apex concerning that patient had an ischemic event  Possible that the patient had an occlusive event with subsequent lysis and was the reason it was not visualized on LHC    -keep IABP on 1:1 for at least another 24-48 hours  -wean pressors   -continue IV heparin acs protocol for at least 48 hours   -continue ASA 81 mg and brilinta 90 mg BID for at least one year   -high intensity statin Lipitor 80 mg daily   -hold off on BB for now   -would consider fenofibrate and vascepa outpatient   -would give IV diuretics to keep the patient slightly negative -500   -keep intubated for now and attempt to extubate tomorrow  -will need secondary ICD before discharge        Case discussed and reviewed with Dr Fatimah Connelly who agrees with my assessment and plan  Thank you for involving us in the care of your patient  Brian Anguiano MD  Cardiology Fellow PGY-4    ==========================================================================================    Counseling / Coordination of Care  Total floor / unit time spent today 1 hour minutes  Greater than 50% of total time was spent with the patient and / or family counseling and / or coordination of care  A description of the counseling / coordination of care:          Epic/ Allscripts/Care Everywhere records reviewed:     ** Please Note: Fluency DirectDictation voice to text software may have been used in the creation of this document   **

## 2022-06-30 NOTE — ASSESSMENT & PLAN NOTE
6/29 s/p cardiac arrest  Received heparin bolus and loaded with ASA/Brilinta in ED  Underwent emergent cardiac cath at [facility]  Moderate chronic nonobstrucive disease noted, but distal LAD and OM disease were small without the ability to perform intervention    LV mildly reduced EF with apical dyskinesis, appearance of takotsubo cardiomyopathy on LV-gram   IABP placed secondary to shock state post arrest     Plan:  · Cardiology consulted appreciate recommendations  · Patient with no intervenable lesions, continue therapy per cardiology recommendations   · ASA/Brilinta   · BBlocker when medically appropriate   · Formal ECHO ordered

## 2022-06-30 NOTE — PROGRESS NOTES
[facility]  Progress Note - Ada Fair 1960, 58 y o  male MRN: [id number]  Unit/Bed#: [facility] 513-01 Encounter: [id number]  Primary Care Provider: Mikell Leventhal, PA-C   Date and time admitted to hospital: 6/29/2022  6:03 PM    * Cardiac arrest with ventricular fibrillation [facility])  Assessment & Plan  Chest pain x 3 days  VFib arrest in front of EMS  In total approximately 15-20 minutes of CPR/ACLS with multiple shocks delivered and multiple ROSC  Finally in ED s/p amio 300 and 150 boluses  Plan:  · Following commands on arrival, no need for TTM  · Trend neuro exam   · Continue Amiodarone infusion at 1  · Cardiology consulted  · Will need ICD     Shock [facility])  Assessment & Plan  Likely cardiogenic shock in the setting of cardiac arrest requiring multiple shocks    Plan:  · Formal ECHO pending, bedside echo with EF 55%, some apical hypokinesis  · Wean Epi and levophed as able for MAP >65  Currently on levo at 3 and epi at 2  · Pressor requirements improved with crystalloid overnight  · IABP 1:1    STEMI (ST elevation myocardial infarction) [facility])  Assessment & Plan  6/29 s/p cardiac arrest  Received heparin bolus and loaded with ASA/Brilinta in ED  Underwent emergent cardiac cath at [place]  Moderate chronic nonobstrucive disease noted, but distal LAD and OM disease were small without the ability to perform intervention    LV mildly reduced EF with apical dyskinesis, appearance of takotsubo cardiomyopathy on LV-gram   IABP placed secondary to shock state post arrest     Plan:  · Cardiology consulted appreciate recommendations  · Patient with no intervenable lesions, continue therapy per cardiology recommendations   · ASA/Brilinta   · BBlocker when medically appropriate   · Formal ECHO ordered     Acute respiratory failure with hypercapnia ([address] )  Assessment & Plan  Secondary to cardiac arrest    Plan:  · Wean FiO2 as able for SPO2 > 92%  · Daily SBT/SAT  · VAP bundle ordered   · Propofol/fentanyl for sedation    PERCY (acute kidney injury) ([address] )  Assessment & Plan  · Likely secondary to hypoperfusion during cardiac arrest    · Trend UOP and Cr   · Avoid hypotension and nephrotoxic medications  · Trend strict I/Os    Elevated LFTs  Assessment & Plan  · Likely secondary to hypoperfusion during cardiac arrest    · Avoid hypotension and hepatotoxic medications  · Trend daily    Hyperglycemia  Assessment & Plan  · Insulin gtt  · A1C pending  · No reported hx of diabetes    Lactic acidosis  Assessment & Plan  · 5 7 > 8 6 > 7 2 > 3 9  · Trend to clearance  · Received 1 5L crystalloid overnight    ----------------------------------------------------------------------------------------  HPI/24hr events: Epi weaned to 2, levophed to 3  Lactic acidosis overnight, improving with crystalloid  Received 1 5L  IABP remains 1:1  Patient appropriate for transfer out of the ICU today?: No  Disposition: Continue Critical Care   Code Status: Level 1 - Full Code  ---------------------------------------------------------------------------------------  SUBJECTIVE  Unable to provide    Review of Systems  Review of systems was unable to be performed secondary to ETT  ---------------------------------------------------------------------------------------  OBJECTIVE    Vitals   Vitals:    22 0345 22 0357 22 0400 22 0500   BP: 93/66  94/62 99/64   Pulse: 81  76 75   Resp: 17  15 16   Temp: 98 78 °F (37 1 °C) 98 78 °F (37 1 °C) 98 78 °F (37 1 °C) 99 14 °F (37 3 °C)   SpO2: 97%  97% 97%   Weight:         Temp (24hrs), Av 5 °F (36 9 °C), Min:96 6 °F (35 9 °C), Max:99 3 °F (37 4 °C)  Current: Temperature: 99 14 °F (37 3 °C)          Respiratory:  SpO2: SpO2: 97 %       Invasive/non-invasive ventilation settings   Respiratory  Report   Lab Data (Last 4 hours)    None         O2/Vent Data (Last 4 hours)    None                Physical Exam  Vitals and nursing note reviewed  Constitutional:       Interventions: He is sedated, intubated and restrained  HENT:      Head: Normocephalic and atraumatic  Eyes:      Pupils: Pupils are equal, round, and reactive to light  Cardiovascular:      Rate and Rhythm: Normal rate and regular rhythm  Heart sounds: Normal heart sounds  Heart sounds not distant  No murmur heard  No friction rub  No gallop  Comments: +IABP click auscultated  Pulmonary:      Effort: Pulmonary effort is normal  He is intubated  Breath sounds: No decreased breath sounds, wheezing, rhonchi or rales  Abdominal:      General: There is no distension  Palpations: Abdomen is soft  Musculoskeletal:      Comments: IABP in R fem, site C/D/I  CVC in L fem, site C/D/I   Skin:     General: Skin is warm and dry     Neurological:      Comments: Sedation not lightened for exam this morning 2/2 agitation             Laboratory and Diagnostics:  Results from last 7 days   Lab Units 06/29/22 2227 06/29/22  1836 06/29/22  1630 06/29/22  1446   WBC Thousand/uL  --   --   --  11 42*   HEMOGLOBIN g/dL  --   --   --  14 9   I STAT HEMOGLOBIN g/dl 14 3  --  13 9  --    HEMATOCRIT %  --   --   --  45 1   HEMATOCRIT, ISTAT % 42  --  41  --    PLATELETS Thousands/uL  --  333  --  253   NEUTROS PCT %  --   --   --  56   MONOS PCT %  --   --   --  7     Results from last 7 days   Lab Units 06/30/22  0002 06/29/22 2227 06/29/22  1836 06/29/22  1630 06/29/22  1543   SODIUM mmol/L 138  --  135*  --  132*   POTASSIUM mmol/L 4 0  --  4 3  --  4 2   CHLORIDE mmol/L 104  --  102  --  96   CO2 mmol/L 25  --  22  --  19*   CO2, I-STAT mmol/L  --  22  --  26  --    ANION GAP mmol/L 9  --  11  --  17*   BUN mg/dL 28*  --  25  --  23   CREATININE mg/dL 2 19*  --  1 79*  --  1 56*   CALCIUM mg/dL 8 6  --  9 1  --  8 8   GLUCOSE RANDOM mg/dL 230*  --  342*  --  330*   ALT U/L  --   --  281*  --  174*   AST U/L  --   --  215*  --  121*   ALK PHOS U/L  --   --  109  --  80   ALBUMIN g/dL  --   --  3 7  --  3 4*   TOTAL BILIRUBIN mg/dL  --   --  0 46  --  0 38     Results from last 7 days   Lab Units 06/29/22  1836   MAGNESIUM mg/dL 2 4   PHOSPHORUS mg/dL 5 7*               Results from last 7 days   Lab Units 06/30/22  0315 06/30/22  0002 06/29/22  2204 06/29/22  1836   LACTIC ACID mmol/L 3 9* 7 2* 8 6* 5 7*     ABG:  Results from last 7 days   Lab Units 06/29/22  1844   PH ART  7 230*   PCO2 ART mm Hg 38 7   PO2 ART mm Hg 86 6   HCO3 ART mmol/L 15 8*   BASE EXC ART mmol/L -11 0   ABG SOURCE  Artery     VBG:  Results from last 7 days   Lab Units 06/29/22  1844   ABG SOURCE  Artery       Imaging: I have personally reviewed pertinent reports  Intake and Output  I/O       06/28 0701  06/29 0700 06/29 0701  06/30 0700    I V  (mL/kg)  2753 1 (26 5)    NG/GT  50    Total Intake(mL/kg)  2803 1 (27)    Urine (mL/kg/hr)  825    Emesis/NG output  0    Total Output  825    Net  +1978 1                Height and Weights         Body mass index is 29 44 kg/m²  Weight (last 2 days)     Date/Time Weight    06/29/22 1816 104 (229 28)            Nutrition       Diet Orders   (From admission, onward)             Start     Ordered    06/29/22 1815  Diet NPO  Diet effective now        References:    Nutrtion Support Algorithm Enteral vs  Parenteral   Question Answer Comment   Diet Type NPO    RD to adjust diet per protocol?  Yes        06/29/22 1815                  Active Medications  Scheduled Meds:  Current Facility-Administered Medications   Medication Dose Route Frequency Provider Last Rate    acetaminophen  650 mg Oral Q6H PRN Bushra Aggarwal PA-C      amiodarone  1 mg/min Intravenous Continuous CLAUDETTE Castro-C 1 mg/min (06/29/22 1831)    aspirin  81 mg Oral Daily Bushra Aggarwal PA-ALAN      calcium gluconate  2 g Intravenous Once Bushra Aggarwal PA-ALAN      chlorhexidine  15 mL Mouth/Throat Q12H [facility] CLAUDETTE Castro-ALAN      epinephrine  1-20 mcg/min Intravenous Titrated Charlanne Gasmen, PA-C 2 mcg/min (06/30/22 0417)    fentaNYL  100 mcg/hr Intravenous Continuous Charlanne Gasmen, PA-C 100 mcg/hr (06/30/22 0419)    fentanyl citrate (PF)  50 mcg Intravenous Q1H PRN Charmargot Gaschinyere, PA-C      heparin (porcine)  5,000 Units Subcutaneous [facility] Inga Mason [place], [place]      insulin regular (HumuLIN R,NovoLIN R) infusion  0 3-21 Units/hr Intravenous Titrated Charlanne Gasmen, PA-C 1 5 Units/hr (06/30/22 0418)    norepinephrine  1-30 mcg/min Intravenous Titrated Charlanne Gasmen, PA-C 3 mcg/min (06/30/22 0503)    ondansetron  4 mg Intravenous Q6H PRN Fabian Gasmen, PA-C      propofol  5-50 mcg/kg/min Intravenous Titrated Pippalanne Gaschinyere, PA-C 50 mcg/kg/min (06/30/22 0502)    ticagrelor  90 mg Oral Q12H [facility] Fabian Pope PA-C       Continuous Infusions:  amiodarone, 1 mg/min, Last Rate: 1 mg/min (06/29/22 1831)  epinephrine, 1-20 mcg/min, Last Rate: 2 mcg/min (06/30/22 0417)  fentaNYL, 100 mcg/hr, Last Rate: 100 mcg/hr (06/30/22 0419)  insulin regular (HumuLIN R,NovoLIN R) infusion, 0 3-21 Units/hr, Last Rate: 1 5 Units/hr (06/30/22 0418)  norepinephrine, 1-30 mcg/min, Last Rate: 3 mcg/min (06/30/22 0503)  propofol, 5-50 mcg/kg/min, Last Rate: 50 mcg/kg/min (06/30/22 0502)      PRN Meds:   acetaminophen, 650 mg, Q6H PRN  fentanyl citrate (PF), 50 mcg, Q1H PRN  ondansetron, 4 mg, Q6H PRN        Invasive Devices Review  Invasive Devices  Report    Central Venous Catheter Line  Duration           CVC Central Lines 06/29/22 Triple Left Femoral <1 day          Peripheral Intravenous Line  Duration           Peripheral IV 08/11/21 Left Antecubital 322 days    Peripheral IV 06/29/22 Left Hand 1 day    Peripheral IV 06/29/22 Right Antecubital <1 day          Line  Duration           Arterial Sheath 6 Fr  Right Femoral <1 day    IABP 8 0 Fr   50 mL <1 day          Drain  Duration           NG/OG/Enteral Tube Orogastric Right mouth <1 day    Urethral Catheter Temperature probe 16 Fr  <1 day          Airway  Duration           ETT  8 mm <1 day              ---------------------------------------------------------------------------------------  Advance Directive and Living Will:      Power of :    POLST:    ---------------------------------------------------------------------------------------  Care Time Delivered:   Upon my evaluation, this patient had a high probability of imminent or life-threatening deterioration due to cardiogenic shock, which required my direct attention, intervention, and personal management  I have personally provided 35 minutes ([phone]) of critical care time, exclusive of procedures, teaching, family meetings, and any prior time recorded by providers other than myself         Donato Fernandez PA-C

## 2022-06-30 NOTE — UTILIZATION REVIEW
Initial Clinical Review    Admission: Date/Time/Statement:   Admission Orders (From admission, onward)     Ordered        06/29/22 1815  Inpatient Admission  Once                      Orders Placed This Encounter   Procedures    Inpatient Admission     Standing Status:   Standing     Number of Occurrences:   1     Order Specific Question:   Level of Care     Answer:   Critical Care [15]     Order Specific Question:   Estimated length of stay     Answer:   More than 2 Midnights     Order Specific Question:   Certification     Answer:   I certify that inpatient services are medically necessary for this patient for a duration of greater than two midnights  See H&P and MD Progress Notes for additional information about the patient's course of treatment  Initial Presentation: 58 y o  male with pmh of HTN, tobacco abuse and obesity transferred from [facility] to [facility] for a higher level of care  Pt initially presented to [facility] via EMS w/ chest pain x 3 days w/o improvement  When EMS arrived in the parking lot, pt lost consciousness and had a VFib arrest  He received multiple doses of epinephrine and was shocked several times  ROSC was achieved after approximately 20 minutes  He was started on epi, levo, and amio and STEMI alert was activated  He was intubated and given versed and vecuronium in the ED  He underwent cardiac cath w/c showed mod obstructive disease  IABP placed  He was transferred to [facility] for further mgt of balloon pump and EP eval for ICD  Pt arrived at [facility] intubated, sedated, following commands  On epi and levo gtt  Plan: Inpatient admission for evaluation and treatment of  STEMI, cardiogenic shock, cardiac arrest w/ Vfib, acute resp failure with hypercapnia: ECHO, IABP 1:1, wean EPI and Levo as able, MAP goal>65  Trend neuro exam  Cont amio gtt  Cardiology consult, Consider hep htt, ICD  Not SBP appropriate today, VAP bundle ordered  Start propofol and fentanyl  Start insulin gtt  Check A1C in a,m  Trend UOP and creatinine  Date: 06/30   Day 2:   Critical Care Notes: Pt's Epi gtt weaned down to 2, levo weaned to 3  Lactic acidosis noted overnight, given 1 5 L crystalloid, improving  IABP remains 1:1  Remains intubated, sedated  Cont amio gtt  Cont ASA, Brilinta  Pending formal echo  Daily SBT/SAT, keep SPO2>92%  Trend LFT's daily  Cont Insulin gtt, Trend lactic acid  Will need ICD  PE: Intubated, sedated, agitated  Restrained  +IABP click on auscultation       ED Triage Vitals   Temperature Pulse Respirations Blood Pressure SpO2   06/29/22 1815 06/29/22 1815 06/29/22 1815 06/29/22 1815 06/29/22 1815   (!) 96 6 °F (35 9 °C) 85 (!) 24 112/81 93 %      Temp Source Heart Rate Source Patient Position - Orthostatic VS BP Location FiO2 (%)   06/30/22 0700 06/30/22 0700 -- 06/30/22 0700 --   Probe Monitor  Left arm       Pain Score       06/30/22 1023       Med Not Given for Pain - for MAR use only          Wt Readings from Last 1 Encounters:   06/30/22 106 kg (233 lb 14 5 oz)     Additional Vital Signs:   Date/Time Temp Pulse Resp BP MAP (mmHg) SpO2 O2 Device   06/30/22 0900 99 5 °F (37 5 °C) 61 14 67/44 Abnormal  52 Abnormal  98 % --   06/30/22 0800 99 32 °F (37 4 °C) 68 12 87/51 Abnormal  65 96 % --   06/30/22 0700 99 14 °F (37 3 °C) 72 17 91/56 69 97 % Ventilator   06/30/22 0649 99 14 °F (37 3 °C) 72 17 92/52 68 96 % --   06/30/22 0639 99 14 °F (37 3 °C) 72 16 99/59 74 97 % --   06/30/22 0600 99 14 °F (37 3 °C) 73 18 89/60 Abnormal  71 97 % --   06/30/22 0538 99 14 °F (37 3 °C) 73 16 92/61 72 97 % --   06/30/22 0500 99 14 °F (37 3 °C) 76 16 99/64 77 97 % --   06/30/22 0400 98 78 °F (37 1 °C) 76 15 94/62 74 97 % --   06/30/22 0357 98 78 °F (37 1 °C) -- -- -- -- -- --   06/30/22 0345 98 78 °F (37 1 °C) 81 17 93/66 74 97 % --   06/30/22 0316 -- -- -- -- -- 98 % --   06/30/22 0300 98 78 °F (37 1 °C) 75 19 107/61 79 98 % --   06/30/22 0230 99 °F (37 2 °C) 78 18 111/64 82 97 % --   06/30/22 0200 99 14 °F (37 3 °C) 78 18 -- 74 97 % --   06/30/22 0147 99 14 °F (37 3 °C) 77 18 91/70 77 97 % --   06/30/22 0145 -- 116 Abnormal  -- -- -- -- --   06/30/22 0130 99 1 °F (37 3 °C) 77 18 88/63 Abnormal  69 97 % --   06/30/22 0115 99 3 °F (37 4 °C) 79 20 94/55 70 97 % --   06/30/22 0100 99 14 °F (37 3 °C) 77 20 90/53 70 97 % --   06/30/22 0045 99 1 °F (37 3 °C) 77 20 98/56 71 97 % --   06/30/22 0030 99 1 °F (37 3 °C) 76 21 96/55 72 97 % --   06/30/22 0015 99 1 °F (37 3 °C) 81 19 99/58 74 97 % --   06/30/22 0000 99 14 °F (37 3 °C) 81 19 94/63 73 97 % --   06/29/22 2350 99 14 °F (37 3 °C) -- -- -- -- -- --   06/29/22 2345 99 1 °F (37 3 °C) 81 17 83/56 Abnormal  63 Abnormal  97 % --   06/29/22 2330 99 1 °F (37 3 °C) 79 17 87/57 Abnormal  67 97 % --   06/29/22 2315 99 °F (37 2 °C) 79 17 87/58 Abnormal  67 97 % --   06/29/22 2308 98 96 °F (37 2 °C) 78 17 91/54 66 97 % --   06/29/22 2300 98 78 °F (37 1 °C) 79 17 108/66 80 97 % --   06/29/22 2245 98 8 °F (37 1 °C) 84 22 -- -- 97 % --   06/29/22 2230 98 6 °F (37 °C) 80 20 92/65 73 98 % --   06/29/22 2215 98 4 °F (36 9 °C) 81 21 105/69 81 98 % --   06/29/22 2200 98 42 °F (36 9 °C) 83 21 96/61 74 98 % --   06/29/22 2145 98 2 °F (36 8 °C) 82 21 94/69 76 97 % --   06/29/22 2130 98 2 °F (36 8 °C) 81 21 86/60 Abnormal  69 98 % --   06/29/22 2115 98 1 °F (36 7 °C) 77 22 99/65 77 98 % --   06/29/22 2100 98 06 °F (36 7 °C) 78 22 95/72 80 98 % --   06/29/22 2045 97 9 °F (36 6 °C) 76 24 Abnormal  93/70 77 98 % --   06/29/22 2030 97 9 °F (36 6 °C) 78 22 91/69 76 98 % --   06/29/22 2025 -- -- -- -- -- 98 % --   06/29/22 2015 97 7 °F (36 5 °C) 80 23 Abnormal  95/60 72 98 % --   06/29/22 2000 97 7 °F (36 5 °C) 72  24 Abnormal  99/64  72 98 % --   Pulse: Simultaneous filing  User may not have seen previous data  at 06/29/22 2000   BP: Simultaneous filing  User may not have seen previous data   at 06/29/22 2000 06/29/22 1945 97 7 °F (36 5 °C) 82 25 Abnormal  85/56 Abnormal  66 99 % -- 06/29/22 1930 97 7 °F (36 5 °C) 90 27 Abnormal  121/62 84 94 % --   06/29/22 1915 97 5 °F (36 4 °C) 81 27 Abnormal  107/75 87 97 % --   06/29/22 1900 97 52 °F (36 4 °C) 82 25 Abnormal  100/68 79 95 % --   06/29/22 1830 97 2 °F (36 2 °C) Abnormal  85 22 112/81 93 93 % --   06/29/22 1818 97 °F (36 1 °C) Abnormal  83 24 Abnormal  110/66 83 93 % --     Pertinent Labs/Diagnostic Test Results:   STAT CXR ICU   Final Result by Sole Sy MD (06/30 9045)      Persistent malpositioning of the enteric tube  Repositioning is advised  Endotracheal tube is in satisfactory position  The study was marked in [facility] for immediate notification  Workstation performed: [id number]           06/29   EKG result: Junctional rhythm  Rightward axis  ST elevation consider inferior injury or acute infarct  ACUTE MI      Cardiac Catheterization:   · Mid LAD lesion is 50% stenosed  · Dist LAD lesion is 70% stenosed  · 1st Diag lesion is 60% stenosed  · 2nd Diag lesion is 60% stenosed  · 1st Mrg lesion is 50% stenosed  · 3rd Mrg lesion is 99% stenosed  · Mid RCA lesion is 60% stenosed  · 3rd RPL lesion is 60% stenosed  · There is mild left ventricular systolic dysfunction  Moderate 3v CAD  ? vasospasm +/- Takotsubo      Results from last 7 days   Lab Units 06/30/22  0432 06/29/22 2227 06/29/22 1836 06/29/22  1630 06/29/22  1446   WBC Thousand/uL 19 18*  --   --   --  11 42*   HEMOGLOBIN g/dL 12 9  --   --   --  14 9   I STAT HEMOGLOBIN g/dl  --  14 3  --  13 9  --    HEMATOCRIT % 39 5  --   --   --  45 1   HEMATOCRIT, ISTAT %  --  42  --  41  --    PLATELETS Thousands/uL 222  --  333  --  253   NEUTROS ABS Thousands/µL 16 14*  --   --   --  6 42         Results from last 7 days   Lab Units 06/30/22  0432 06/30/22  0002 06/29/22 2227 06/29/22  1836 06/29/22  1630 06/29/22  1543   SODIUM mmol/L 139 138  --  135*  --  132*   POTASSIUM mmol/L 4 2 4 0  --  4 3  --  4 2   CHLORIDE mmol/L 104 104  --  102  --  96 CO2 mmol/L 26 25  --  22  --  19*   CO2, I-STAT mmol/L  --   --  22  --  26  --    ANION GAP mmol/L 9 9  --  11  --  17*   BUN mg/dL 33* 28*  --  25  --  23   CREATININE mg/dL 1 81* 2 19*  --  1 79*  --  1 56*   EGFR ml/min/1 73sq m 39 31  --  39  --  46   CALCIUM mg/dL 8 5 8 6  --  9 1  --  8 8   CALCIUM, IONIZED mmol/L 1 07*  --   --  1 16  --   --    CALCIUM, IONIZED, ISTAT mmol/L  --   --  1 26  --  1 23  --    MAGNESIUM mg/dL 2 4  --   --  2 4  --   --    PHOSPHORUS mg/dL 3 9  --   --  5 7*  --   --      Results from last 7 days   Lab Units 06/29/22  1836 06/29/22  1543   AST U/L 215* 121*   ALT U/L 281* 174*   ALK PHOS U/L 109 80   TOTAL PROTEIN g/dL 7 3 5 7*   ALBUMIN g/dL 3 7 3 4*   TOTAL BILIRUBIN mg/dL 0 46 0 38     Results from last 7 days   Lab Units 06/30/22  1027 06/30/22  0803 06/30/22  0617 06/30/22  0418 06/30/22  0205 06/29/22  2350 06/29/22  2211 06/29/22 2010   POC GLUCOSE mg/dl 94 79 111 130 170* 242* 269* 295*     Results from last 7 days   Lab Units 06/30/22  0432 06/30/22  0002 06/29/22  1836 06/29/22  1543   GLUCOSE RANDOM mg/dL 123 230* 342* 330*         Results from last 7 days   Lab Units 06/30/22  0432   HEMOGLOBIN A1C % 5 9*   EAG mg/dl 123     No results found for: BETA-HYDROXYBUTYRATE   Results from last 7 days   Lab Units 06/29/22  1844   PH ART  7 230*   PCO2 ART mm Hg 38 7   PO2 ART mm Hg 86 6   HCO3 ART mmol/L 15 8*   BASE EXC ART mmol/L -11 0   O2 CONTENT ART mL/dL 19 9   O2 HGB, ARTERIAL % 92 7*   ABG SOURCE  Artery         Results from last 7 days   Lab Units 06/29/22  2227 06/29/22  1630   I STAT BASE EXC mmol/L -6* -6*   I STAT O2 SAT % 97* 100*   ISTAT PH ART  7 284* 7 168*   I STAT ART PCO2 mm HG 44 6* 65 6*   I STAT ART PO2 mm  0 247 0*   I STAT ART HCO3 mmol/L 21 2* 23 8         Results from last 7 days   Lab Units 06/29/22  1446   HS TNI 0HR ng/L 64*         Results from last 7 days   Lab Units 06/30/22  0432   PROTIME seconds 13 4   INR  1 06   PTT seconds 28 Results from last 7 days   Lab Units 06/30/22  0502 06/30/22  0315 06/30/22  0002 06/29/22  2204 06/29/22  1836   LACTIC ACID mmol/L 2 3* 3 9* 7 2* 8 6* 5 7*       Results from last 7 days   Lab Units 06/29/22  1446   ETHANOL LVL mg/dL <10       Past Medical History:   Diagnosis Date    Bilateral inguinal hernia     Dizziness 02/02/2021    isolated incident of dizziness, sweating & disorientation    Hypertension      Present on Admission:   Cardiac arrest with ventricular fibrillation (HCC)   STEMI (ST elevation myocardial infarction) ([facility] )   PERCY (acute kidney injury) ([place] [address] )   Elevated LFTs   Shock ([place] [address] )   Acute respiratory failure with hypercapnia ([facility])      Admitting Diagnosis: STEMI (ST elevation myocardial infarction) ([place] [address] )  Age/Sex: 58 y o  male  Admission Orders:  SCD  Cardio-Pulmonary Monitoring, Neuro Checks, Nursing dysphagia assesment, I/O, Daily weights, Vital signs  NPO  Restraints non violent  Scheduled Medications:  aspirin, 81 mg, Oral, Daily  chlorhexidine, 15 mL, Mouth/Throat, Q12H [facility]  heparin (porcine), 5,000 Units, Subcutaneous, Q8H [facility]  lidocaine 1% buffered, 10 mL, Infiltration, Once  multi-electrolyte, 500 mL, Intravenous, Once  ticagrelor, 90 mg, Oral, Q12H [facility]      Continuous IV Infusions:  amiodarone, 1 mg/min, Intravenous, Continuous  dexmedetomidine, 0 1-1 5 mcg/kg/hr, Intravenous, Titrated  fentaNYL, 100 mcg/hr, Intravenous, Continuous  insulin regular (HumuLIN R,NovoLIN R) infusion, 0 3-21 Units/hr, Intravenous, Titrated  norepinephrine, 1-30 mcg/min, Intravenous, Titrated  propofol, 5-50 mcg/kg/min, Intravenous, Titrated  EPINEPHrine 3000 mcg (STANDARD CONCENTRATION) IV in sodium chloride 0 9% 250 mL  Rate: 5-100 mL/hr Dose: 1-20 mcg/min  Freq: Titrated Route: IV  Last Dose: Stopped (06/30/22 6277)  Start: 06/29/22 1830 End: 06/30/22 0756    PRN Meds:  acetaminophen, 650 mg, Oral, Q6H PRN  fentanyl citrate (PF), 100 mcg, Intravenous, Q1H PRN 06/30 x 1  ondansetron, 4 mg, Intravenous, Q6H PRN  fentanyl citrate (PF) 100 MCG/2ML 50 mcg q1h IV prn 06/29 x 1, 06/30 x 2      IP CONSULT TO CASE MANAGEMENT  IP CONSULT TO CARDIOLOGY    Network Utilization Review Department  ATTENTION: Please call with any questions or concerns to [phone] and carefully listen to the prompts so that you are directed to the right person  All voicemails are confidential   Harlene Pair all requests for admission clinical reviews, approved or denied determinations and any other requests to dedicated fax number below belonging to the campus where the patient is receiving treatment   List of dedicated fax numbers for the Facilities:  [address] DENIALS (Administrative/Medical Necessity) [phone]   [address] (Maternity/NICU/Pediatrics) [phone]   [address]  [address] [place] [address] [id number] [place] [address] [address] [place] [address] [phone]

## 2022-06-30 NOTE — ASSESSMENT & PLAN NOTE
Secondary to cardiac arrest    Plan:  · Wean FiO2 as able for SPO2 > 92%  · Daily SBT/SAT  · VAP bundle ordered   · Propofol/fentanyl for sedation

## 2022-06-30 NOTE — RESPIRATORY THERAPY NOTE
Pt on 8PS/6PEEP/50%FIO@ for most of day  No distress noted  Placed back on controled setting by AP for decreased RR  Will attempt to resume wean tommorow

## 2022-06-30 NOTE — ASSESSMENT & PLAN NOTE
· Likely secondary to hypoperfusion during cardiac arrest    · Avoid hypotension and hepatotoxic medications  · Trend daily

## 2022-07-01 ENCOUNTER — APPOINTMENT (INPATIENT)
Dept: RADIOLOGY | Facility: HOSPITAL | Age: 62
DRG: 226 | End: 2022-07-01
Payer: COMMERCIAL

## 2022-07-01 LAB
ANION GAP SERPL CALCULATED.3IONS-SCNC: 1 MMOL/L (ref 4–13)
APTT PPP: 51 SECONDS (ref 23–37)
APTT PPP: 60 SECONDS (ref 23–37)
BUN SERPL-MCNC: 32 MG/DL (ref 5–25)
CA-I BLD-SCNC: 1.13 MMOL/L (ref 1.12–1.32)
CALCIUM SERPL-MCNC: 8.3 MG/DL (ref 8.3–10.1)
CHLORIDE SERPL-SCNC: 106 MMOL/L (ref 100–108)
CO2 SERPL-SCNC: 30 MMOL/L (ref 21–32)
CREAT SERPL-MCNC: 1.43 MG/DL (ref 0.6–1.3)
ERYTHROCYTE [DISTWIDTH] IN BLOOD BY AUTOMATED COUNT: 12.9 % (ref 11.6–15.1)
FLUAV RNA RESP QL NAA+PROBE: NEGATIVE
FLUBV RNA RESP QL NAA+PROBE: NEGATIVE
GFR SERPL CREATININE-BSD FRML MDRD: 52 ML/MIN/1.73SQ M
GLUCOSE SERPL-MCNC: 107 MG/DL (ref 65–140)
GLUCOSE SERPL-MCNC: 120 MG/DL (ref 65–140)
GLUCOSE SERPL-MCNC: 120 MG/DL (ref 65–140)
GLUCOSE SERPL-MCNC: 127 MG/DL (ref 65–140)
GLUCOSE SERPL-MCNC: 160 MG/DL (ref 65–140)
HCT VFR BLD AUTO: 34 % (ref 36.5–49.3)
HGB BLD-MCNC: 11.1 G/DL (ref 12–17)
MAGNESIUM SERPL-MCNC: 2.2 MG/DL (ref 1.6–2.6)
MCH RBC QN AUTO: 31 PG (ref 26.8–34.3)
MCHC RBC AUTO-ENTMCNC: 32.6 G/DL (ref 31.4–37.4)
MCV RBC AUTO: 95 FL (ref 82–98)
PHOSPHATE SERPL-MCNC: 2.9 MG/DL (ref 2.3–4.1)
PLATELET # BLD AUTO: 154 THOUSANDS/UL (ref 149–390)
PMV BLD AUTO: 9.3 FL (ref 8.9–12.7)
POTASSIUM SERPL-SCNC: 4.1 MMOL/L (ref 3.5–5.3)
PROCALCITONIN SERPL-MCNC: 8.95 NG/ML
RBC # BLD AUTO: 3.58 MILLION/UL (ref 3.88–5.62)
RSV RNA RESP QL NAA+PROBE: NEGATIVE
SARS-COV-2 RNA RESP QL NAA+PROBE: NEGATIVE
SODIUM SERPL-SCNC: 137 MMOL/L (ref 136–145)
WBC # BLD AUTO: 16.41 THOUSAND/UL (ref 4.31–10.16)

## 2022-07-01 PROCEDURE — 99232 SBSQ HOSP IP/OBS MODERATE 35: CPT | Performed by: INTERNAL MEDICINE

## 2022-07-01 PROCEDURE — 80048 BASIC METABOLIC PNL TOTAL CA: CPT | Performed by: STUDENT IN AN ORGANIZED HEALTH CARE EDUCATION/TRAINING PROGRAM

## 2022-07-01 PROCEDURE — 94003 VENT MGMT INPAT SUBQ DAY: CPT

## 2022-07-01 PROCEDURE — 84100 ASSAY OF PHOSPHORUS: CPT | Performed by: STUDENT IN AN ORGANIZED HEALTH CARE EDUCATION/TRAINING PROGRAM

## 2022-07-01 PROCEDURE — 82330 ASSAY OF CALCIUM: CPT | Performed by: STUDENT IN AN ORGANIZED HEALTH CARE EDUCATION/TRAINING PROGRAM

## 2022-07-01 PROCEDURE — 87070 CULTURE OTHR SPECIMN AEROBIC: CPT | Performed by: PHYSICIAN ASSISTANT

## 2022-07-01 PROCEDURE — 71045 X-RAY EXAM CHEST 1 VIEW: CPT

## 2022-07-01 PROCEDURE — 99291 CRITICAL CARE FIRST HOUR: CPT | Performed by: STUDENT IN AN ORGANIZED HEALTH CARE EDUCATION/TRAINING PROGRAM

## 2022-07-01 PROCEDURE — 87040 BLOOD CULTURE FOR BACTERIA: CPT | Performed by: PHYSICIAN ASSISTANT

## 2022-07-01 PROCEDURE — 82948 REAGENT STRIP/BLOOD GLUCOSE: CPT

## 2022-07-01 PROCEDURE — 85730 THROMBOPLASTIN TIME PARTIAL: CPT | Performed by: STUDENT IN AN ORGANIZED HEALTH CARE EDUCATION/TRAINING PROGRAM

## 2022-07-01 PROCEDURE — 85027 COMPLETE CBC AUTOMATED: CPT | Performed by: STUDENT IN AN ORGANIZED HEALTH CARE EDUCATION/TRAINING PROGRAM

## 2022-07-01 PROCEDURE — 94760 N-INVAS EAR/PLS OXIMETRY 1: CPT

## 2022-07-01 PROCEDURE — 87205 SMEAR GRAM STAIN: CPT | Performed by: PHYSICIAN ASSISTANT

## 2022-07-01 PROCEDURE — NC001 PR NO CHARGE: Performed by: INTERNAL MEDICINE

## 2022-07-01 PROCEDURE — 83735 ASSAY OF MAGNESIUM: CPT | Performed by: STUDENT IN AN ORGANIZED HEALTH CARE EDUCATION/TRAINING PROGRAM

## 2022-07-01 PROCEDURE — 87186 SC STD MICRODIL/AGAR DIL: CPT | Performed by: PHYSICIAN ASSISTANT

## 2022-07-01 PROCEDURE — 84145 PROCALCITONIN (PCT): CPT | Performed by: PHYSICIAN ASSISTANT

## 2022-07-01 PROCEDURE — 87181 SC STD AGAR DILUTION PER AGT: CPT | Performed by: PHYSICIAN ASSISTANT

## 2022-07-01 RX ORDER — MIDAZOLAM HYDROCHLORIDE 2 MG/2ML
2 INJECTION, SOLUTION INTRAMUSCULAR; INTRAVENOUS ONCE
Status: COMPLETED | OUTPATIENT
Start: 2022-07-01 | End: 2022-07-01

## 2022-07-01 RX ORDER — OXYCODONE HYDROCHLORIDE 5 MG/1
2.5 TABLET ORAL EVERY 4 HOURS PRN
Status: DISCONTINUED | OUTPATIENT
Start: 2022-07-01 | End: 2022-07-05 | Stop reason: HOSPADM

## 2022-07-01 RX ORDER — MIDAZOLAM HYDROCHLORIDE 2 MG/2ML
INJECTION, SOLUTION INTRAMUSCULAR; INTRAVENOUS
Status: COMPLETED
Start: 2022-07-01 | End: 2022-07-01

## 2022-07-01 RX ORDER — LIDOCAINE HYDROCHLORIDE AND EPINEPHRINE 10; 10 MG/ML; UG/ML
20 INJECTION, SOLUTION INFILTRATION; PERINEURAL ONCE
Status: COMPLETED | OUTPATIENT
Start: 2022-07-01 | End: 2022-07-01

## 2022-07-01 RX ORDER — ACETAMINOPHEN 160 MG/5ML
650 SUSPENSION, ORAL (FINAL DOSE FORM) ORAL EVERY 6 HOURS PRN
Status: DISCONTINUED | OUTPATIENT
Start: 2022-07-01 | End: 2022-07-05 | Stop reason: HOSPADM

## 2022-07-01 RX ORDER — OXYCODONE HYDROCHLORIDE 5 MG/1
5 TABLET ORAL EVERY 4 HOURS PRN
Status: DISCONTINUED | OUTPATIENT
Start: 2022-07-01 | End: 2022-07-05 | Stop reason: HOSPADM

## 2022-07-01 RX ORDER — ALBUMIN, HUMAN INJ 5% 5 %
12.5 SOLUTION INTRAVENOUS ONCE
Status: COMPLETED | OUTPATIENT
Start: 2022-07-01 | End: 2022-07-01

## 2022-07-01 RX ORDER — LANOLIN ALCOHOL/MO/W.PET/CERES
6 CREAM (GRAM) TOPICAL
Status: DISCONTINUED | OUTPATIENT
Start: 2022-07-01 | End: 2022-07-05 | Stop reason: HOSPADM

## 2022-07-01 RX ADMIN — FENTANYL CITRATE 100 MCG: 50 INJECTION INTRAMUSCULAR; INTRAVENOUS at 05:21

## 2022-07-01 RX ADMIN — MIDAZOLAM HYDROCHLORIDE 2 MG: 2 INJECTION, SOLUTION INTRAMUSCULAR; INTRAVENOUS at 12:21

## 2022-07-01 RX ADMIN — FENTANYL CITRATE 100 MCG/HR: 50 INJECTION INTRAMUSCULAR; INTRAVENOUS at 10:43

## 2022-07-01 RX ADMIN — AMIODARONE HYDROCHLORIDE 1 MG/MIN: 50 INJECTION, SOLUTION INTRAVENOUS at 08:03

## 2022-07-01 RX ADMIN — DEXTROSE MONOHYDRATE 2000 MG: 5 INJECTION, SOLUTION INTRAVENOUS at 11:15

## 2022-07-01 RX ADMIN — TICAGRELOR 90 MG: 90 TABLET ORAL at 08:07

## 2022-07-01 RX ADMIN — FENTANYL CITRATE 100 MCG/HR: 50 INJECTION INTRAMUSCULAR; INTRAVENOUS at 02:07

## 2022-07-01 RX ADMIN — NICOTINE 7 MG: 7 PATCH, EXTENDED RELEASE TRANSDERMAL at 09:44

## 2022-07-01 RX ADMIN — MIDAZOLAM 3 MG/HR: 5 INJECTION INTRAMUSCULAR; INTRAVENOUS at 10:39

## 2022-07-01 RX ADMIN — ACETAMINOPHEN 650 MG: 650 SUSPENSION ORAL at 15:13

## 2022-07-01 RX ADMIN — TICAGRELOR 90 MG: 90 TABLET ORAL at 20:16

## 2022-07-01 RX ADMIN — INSULIN LISPRO 1 UNITS: 100 INJECTION, SOLUTION INTRAVENOUS; SUBCUTANEOUS at 17:12

## 2022-07-01 RX ADMIN — MIDAZOLAM 2 MG: 1 INJECTION INTRAMUSCULAR; INTRAVENOUS at 12:21

## 2022-07-01 RX ADMIN — ASPIRIN 81 MG CHEWABLE TABLET 81 MG: 81 TABLET CHEWABLE at 08:07

## 2022-07-01 RX ADMIN — FENTANYL CITRATE 100 MCG: 50 INJECTION INTRAMUSCULAR; INTRAVENOUS at 11:57

## 2022-07-01 RX ADMIN — HEPARIN SODIUM 2000 UNITS: 1000 INJECTION INTRAVENOUS; SUBCUTANEOUS at 09:13

## 2022-07-01 RX ADMIN — MIDAZOLAM 2 MG: 1 INJECTION INTRAMUSCULAR; INTRAVENOUS at 10:37

## 2022-07-01 RX ADMIN — PROPOFOL 40 MCG/KG/MIN: 10 INJECTION, EMULSION INTRAVENOUS at 05:21

## 2022-07-01 RX ADMIN — LIDOCAINE HYDROCHLORIDE,EPINEPHRINE BITARTRATE 20 ML: 10; .01 INJECTION, SOLUTION INFILTRATION; PERINEURAL at 14:21

## 2022-07-01 RX ADMIN — OXYCODONE HYDROCHLORIDE 2.5 MG: 5 TABLET ORAL at 23:07

## 2022-07-01 RX ADMIN — PROPOFOL 50 MCG/KG/MIN: 10 INJECTION, EMULSION INTRAVENOUS at 02:07

## 2022-07-01 RX ADMIN — Medication 6 MG: at 20:16

## 2022-07-01 RX ADMIN — CHLORHEXIDINE GLUCONATE 15 ML: 1.2 SOLUTION ORAL at 08:07

## 2022-07-01 RX ADMIN — PROPOFOL 50 MCG/KG/MIN: 10 INJECTION, EMULSION INTRAVENOUS at 07:33

## 2022-07-01 RX ADMIN — ALBUMIN (HUMAN) 12.5 G: 12.5 INJECTION, SOLUTION INTRAVENOUS at 05:29

## 2022-07-01 NOTE — PROGRESS NOTES
Called by nursing staff  Right femoral arterial access from intra  aortic balloon site bleeding  Nursing staff at held pressure  I held pressure for approximately 10 minutes and injected site with 1% lidocaine with 100,000 units of epinephrine  Safeguard applied with 40 mL of air  Hemostasis achieved  safeguard on for approximately 4 hours until all areas removed  Patient may get out of bed but he most safeguard should remain in place until a m  I will remove it by myself

## 2022-07-01 NOTE — UTILIZATION REVIEW
Initial Clinical Review    Admission: Date/Time/Statement:   Admission Orders (From admission, onward)     Ordered        06/29/22 1541  Inpatient Admission  Once                      Orders Placed This Encounter   Procedures    Inpatient Admission     Standing Status:   Standing     Number of Occurrences:   1     Order Specific Question:   Level of Care     Answer:   Critical Care [15]     Order Specific Question:   Estimated length of stay     Answer:   More than 2 Midnights     Order Specific Question:   Certification     Answer:   I certify that inpatient services are medically necessary for this patient for a duration of greater than two midnights  See H&P and MD Progress Notes for additional information about the patient's course of treatment  ED Arrival Information     Expected   -    Arrival   6/29/2022 14:32    Acuity   Immediate            Means of arrival   Ambulance    Escorted by   [facility]    Service   Critical Care/ICU    Admission type   Emergency            Arrival complaint   -           Chief Complaint   Patient presents with    Cardiac Arrest       Initial Presentation: 58 y o  male with PMH of HTN, tobacco abuse and obesity presents to the ED from home with chest pain  Per EMS, patient reported intermittent chest pain for the past three days, using Tums without relief  EMS EKG showed SR with lateral ST elevations  On arrival to ED parking lot, patient lost consciousness  Vfib on monitor  CPR and ACLS protocol initiated  Given multiple doses of epinephrine and shocked multiple times  Had ROSC, then Vfib with CPR and ACLS again  Started on epinephrine gtt and norepinephrine gtt and given amiodarone, currently intubated  STEMI alert was called and patient was taken to cardiac cath lab  Cath did not show any acute lesion or thrombosis, showed moderate chronic and small vessel disease   PE: Intubated and sedated, no significant signs of volume overload, although JVD present while lying flat  Lungs clear  S1 and S2 re regular  Pulses diminished but equal      6/29 Inpatient admission for Vfib cardiac arrest, hypotension/shock post Vfib arrest:   Start amiodarone gtt  On epinephrine and norepinephrine  ECHO  Continue antiplatelet therapy and statin  Initiate beta blocker when blood pressure more stable  Patient will be transferred to the [facility] ICU for further management  6/29 1802 Transferred to the [facility] for higher level of care  ED Triage Vitals   Temp Pulse Respirations BP SpO2   -- 06/29/22 1438 06/29/22 1444 -- 06/29/22 1444    (!) 0 16  97 %      Temp src Heart Rate Source Patient Position - Orthostatic VS BP Location FiO2 (%)   -- 06/29/22 1454 -- -- --    Monitor         Pain Score       --                 Wt Readings from Last 1 Encounters:   07/01/22 106 kg (234 lb 5 6 oz)     Additional Vital Signs:       Date/Time Pulse Resp BP SpO2 O2 Device   06/29/22 1607 -- -- -- -- Ventilator   06/29/22 15:30:14 -- -- -- 99 % Ventilator   06/29/22 1522 -- -- --  -- --   BP: unable to obtain at 06/29/22 1522   06/29/22 1518 88 -- -- 98 % Ventilator   06/29/22 1515 84 14 -- 99 %  --   SpO2: on ventilator at 06/29/22 1515   06/29/22 1505 80 16 --  95 % --   BP: manual blood pressure obtained by ED Resident- 90/ palp at 06/29/22 1505   06/29/22 1502 -- -- -- 95 % Ventilator   06/29/22 1501 77 16 -- 95 %  --   SpO2: on ventilator at 06/29/22 1501   06/29/22 1454 76 -- -- -- --   06/29/22 1451 -- -- --  -- --   BP: unable to obtain at 06/29/22 1451   06/29/22 1444 44 Abnormal  16 -- 97 % --   06/29/22 1438 0 Abnormal   -- -- -- --   Pulse: CPR in progress at 06/29/22 1438           Pertinent Labs/Diagnostic Test Results:       XR chest 1 view portable   Final Result by Julien Clement MD (06/29 8737)   Recommend repositioning of enteric tube  Needs to be advanced several centimeters and proximal portion appears coiled in the proximal esophagus        Low lung volumes  Otherwise no acute cardiopulmonary findings  6/29 Cardiac cath:    · Mid LAD lesion is 50% stenosed  · Dist LAD lesion is 70% stenosed  · 1st Diag lesion is 60% stenosed  · 2nd Diag lesion is 60% stenosed  · 1st Mrg lesion is 50% stenosed  · 3rd Mrg lesion is 99% stenosed  · Mid RCA lesion is 60% stenosed  · 3rd RPL lesion is 60% stenosed  · There is mild left ventricular systolic dysfunction  Moderate 3v CAD  ? vasospasm +/- Takotsubo    Intra-aortic balloon pump placement  The 50 cc balloon catheter was inserted via right common femoral artery and the tip was fluoroscopically positioned distal to the left subclavian artery origin  Indication: hemodynamic support   IABP settings were 1:1       6/29  EKG #2:    Sinus rhythm  Inferior infarct , age undetermined  Abnormal ECG  When compared with ECG of 29-JUN-2022 14:46, (unconfirmed)  Sinus rhythm has replaced Junctional rhythm      6/29 EKG:    Junctional rhythm  Rightward axis  ST elevation consider inferior injury or acute infarct   ACUTE MI    Abnormal ECG  When compared with ECG of 02-FEB-2021 07:41,  Junctional rhythm has replaced Sinus rhythm  ST elevation now present in Inferior leads        Lab Units 06/29/22  1630 06/29/22  1446   WBC Thousand/uL  --  11 42*   HEMOGLOBIN g/dL  --  14 9   I STAT HEMOGLOBIN g/dl 13 9  --    HEMATOCRIT %  --  45 1   HEMATOCRIT, ISTAT % 41  --    PLATELETS Thousands/uL  --  253   NEUTROS ABS Thousands/µL  --  6 42         Lab Units 06/29/22  1630 06/29/22  1543   SODIUM mmol/L  --  132*   POTASSIUM mmol/L  --  4 2   CHLORIDE mmol/L  --  96   CO2 mmol/L  --  19*   CO2, I-STAT mmol/L 26  --    ANION GAP mmol/L  --  17*   BUN mg/dL  --  23   CREATININE mg/dL  --  1 56*   EGFR ml/min/1 73sq m  --  46   CALCIUM mg/dL  --  8 8   CALCIUM, IONIZED mmol/L  --   --    CALCIUM, IONIZED, ISTAT mmol/L 1 23  --    MAGNESIUM mg/dL  --   --    PHOSPHORUS mg/dL  --   --      Results from last 7 days   Lab Units 06/29/22  1543   AST U/L 121*   ALT U/L 174*   ALK PHOS U/L 80   TOTAL PROTEIN g/dL 5 7*   ALBUMIN g/dL 3 4*   TOTAL BILIRUBIN mg/dL 0 38       Lab Units 06/29/22  1543   GLUCOSE RANDOM mg/dL 330*       Results from last 7 days   Lab Units 06/29/22  1630   I STAT BASE EXC mmol/L -6*   I STAT O2 SAT % 100*   ISTAT PH ART  7 168*   I STAT ART PCO2 mm HG 65 6*   I STAT ART PO2 mm  0*   I STAT ART HCO3 mmol/L 23 8         Results from last 7 days   Lab Units 06/29/22  1446   HS TNI 0HR ng/L 64*             Results from last 7 days   Lab Units 06/29/22  1446   ETHANOL LVL mg/dL <10       ED Treatment:   Medication Administration from 06/29/2022 1431 to 06/29/2022 1523       Date/Time Order Dose Route Action     06/29/2022 1432 EPINEPHrine (ADRENALIN) injection 1 mg Intravenous Given     06/29/2022 1435 EPINEPHrine (ADRENALIN) injection 1 mg Intravenous Given     06/29/2022 1438 EPINEPHrine (ADRENALIN) injection 1 mg Intravenous Given     06/29/2022 1436 amiodarone 150 mg/3 mL injection 300 mg Intravenous Given     06/29/2022 1439 etomidate (AMIDATE) 2 mg/mL injection 20 mg Intravenous Given     06/29/2022 1440 Succinylcholine Chloride 100 mg/5 mL syringe 150 mg Intravenous Given     06/29/2022 1442 amiodarone 150 mg/3 mL injection 150 mg Intravenous Given     06/29/2022 1443 EPINEPHrine (ADRENALIN) injection 1 mg Intravenous Given     06/29/2022 1445 atropine 1 mg/10 mL injection 1 mg Intravenous Given     06/29/2022 1448 EPINEPHrine (ADRENALIN) injection 1 mg Intravenous Given     06/29/2022 1459 heparin (porcine) injection 4,000 Units Intravenous Given     06/29/2022 1504 EPINEPHrine 3000 mcg (STANDARD CONCENTRATION) IV in sodium chloride 0 9% 250 mL 10 mcg/min Intravenous Rate/Dose Change     06/29/2022 1504 sodium bicarbonate 50 mEq/50 mL injection 50 mEq Intravenous Given     06/29/2022 1510 midazolam (VERSED) injection 10 mg Intravenous Given     06/29/2022 1513 vecuronium (NORCURON) injection 10 mg Intravenous Given     06/29/2022 1514 ticagrelor tablet 180 mg Per NG Tube Given     06/29/2022 1456 EPINEPHrine 3000 mcg (STANDARD CONCENTRATION) IV in sodium chloride 0 9% 250 mL 2 mcg/min Intravenous New Bag     06/29/2022 1501 EPINEPHrine (ADRENALIN) injection 0 0005 mg Intravenous Given     06/29/2022 1522 norepinephrine (LEVOPHED) 4 mg in sodium chloride 0 9 % 250 mL infusion 5 mcg/min Intravenous New Bag        Past Medical History:   Diagnosis Date    Bilateral inguinal hernia     Dizziness 02/02/2021    isolated incident of dizziness, sweating & disorientation    Hypertension      Present on Admission:   Cigarette nicotine dependence without complication   Cardiac arrest with ventricular fibrillation ([facility] )      Admitting Diagnosis: Cardiac arrest ([facility] ) [I46 9]  STEMI (ST elevation myocardial infarction) ([facility] ) [I21 3]  Age/Sex: 58 y o  male       Admission Orders: ECHO, CB,  CMP, lipid panel, A1c, TSH, toxicology screen  Scheduled Medications:     norepinephrine (LEVOPHED) 4 mg (STANDARD CONCENTRATION) IV in sodium chloride 0 9% 250 mL  Rate: 3 8-112 5 mL/hr Dose: 1-30 mcg/min  Freq: Titrated Route: IV  Last Dose: 2 5 mcg/min (06/29/22 1605)  Start: 06/29/22 1530 End: 06/29/22 1803    amiodarone (CORDARONE) 900 mg in dextrose 5 % 500 mL infusion  Rate: 33 3 mL/hr Dose: 1 mg/min  Freq: Continuous Route: IV  Last Dose: 1 mg/min (06/29/22 1629)  Start: 06/29/22 1600 End: 06/29/22 1803    EPINEPHrine 3000 mcg (STANDARD CONCENTRATION) IV in sodium chloride 0 9% 250 mL  Rate: 5-50 mL/hr Dose: 1-10 mcg/min  Freq: Titrated Route: IV  Last Dose: 10 mcg/min (06/29/22 1605)  Start: 06/29/22 1500 End: 06/29/22 1803          Network Utilization Review Department  ATTENTION: Please call with any questions or concerns to [phone] and carefully listen to the prompts so that you are directed to the right person   All voicemails are confidential   Lynette Kussmaul all requests for admission clinical reviews, approved or denied determinations and any other requests to dedicated fax number below belonging to the campus where the patient is receiving treatment   List of dedicated fax numbers for the Facilities:  [address] [place] [address] DENIALS (Administrative/Medical Necessity) [phone]   [address] (Maternity/NICU/Pediatrics) [phone] [address] [place] [address]  [address] Medical [place] [address] [id number] [address] [facility] [place] [address] [phone]

## 2022-07-01 NOTE — ASSESSMENT & PLAN NOTE
Likely cardiogenic shock in the setting of cardiac arrest requiring multiple shocks    Plan:  · Echo: EF 44% with G1DD, akinesis of apical anterior, apical septal, apical inferior, apical lateral and apex  · Epi weaned off, remains on levophed at 3   Wean for goal MAP > 65  · IABP 1:1 - consider removal today

## 2022-07-01 NOTE — PROGRESS NOTES
[address] [facility]  Progress Note - Ada Fair 1960, 58 y o  male MRN: [id number]  Unit/Bed#: [facility] 513-01 Encounter: [id number]  Primary Care Provider: Mikell Leventhal, PA-C   Date and time admitted to hospital: 6/29/2022  6:03 PM    * Cardiac arrest with ventricular fibrillation [facility])  Assessment & Plan  Chest pain x 3 days  VFib arrest in front of EMS  In total approximately 15-20 minutes of CPR/ACLS with multiple shocks delivered and multiple ROSC  Finally in ED s/p amio 300 and 150 boluses  Plan:  · Following commands on arrival, no need for TTM  · Trend neuro exam   · Continue Amiodarone infusion at 1  · Cardiology consulted  · Will need ICD     Shock [facility])  Assessment & Plan  Likely cardiogenic shock in the setting of cardiac arrest requiring multiple shocks    Plan:  · Echo: EF 44% with G1DD, akinesis of apical anterior, apical septal, apical inferior, apical lateral and apex  · Epi weaned off, remains on levophed at 3  Wean for goal MAP > 65  · IABP 1:1 - consider removal today    STEMI (ST elevation myocardial infarction) [facility])  Assessment & Plan  6/29 s/p cardiac arrest  Received heparin bolus and loaded with ASA/Brilinta in ED  Underwent emergent cardiac cath [facility]  Moderate chronic nonobstrucive disease noted, but distal LAD and OM disease were small without the ability to perform intervention    LV mildly reduced EF with apical dyskinesis, appearance of takotsubo cardiomyopathy on LV-gram   IABP placed secondary to shock state post arrest     Plan:  · Cardiology consulted appreciate recommendations  · Patient with no intervenable lesions, continue therapy per cardiology recommendations   · ASA/Brilinta  · Heparin gtt  · BBlocker when medically appropriate       Acute respiratory failure with hypercapnia ([address] )  Assessment & Plan  Secondary to cardiac arrest    Plan:  · Wean FiO2 as able for SPO2 > 92%  · Daily SBT/SAT  · VAP bundle ordered · Propofol/fentanyl for sedation    PERCY (acute kidney injury) ([facility] )  Assessment & Plan  · Likely secondary to hypoperfusion during cardiac arrest    · Trend UOP and Cr   · Avoid hypotension and nephrotoxic medications  · Trend strict I/Os  · Improving    Elevated LFTs  Assessment & Plan  · Likely secondary to hypoperfusion during cardiac arrest    · Avoid hypotension and hepatotoxic medications  · Trend daily    Hyperglycemia  Assessment & Plan  · SSI  · Hgb A1C 5 9    Lactic acidosis  Assessment & Plan  · Improved    ----------------------------------------------------------------------------------------  HPI/24hr events: Remains on levophed at 3  No overnight events  Patient appropriate for transfer out of the ICU today?: No  Disposition: Continue Critical Care   Code Status: Level 1 - Full Code  ---------------------------------------------------------------------------------------  SUBJECTIVE  Unable to provide     Review of Systems   Unable to perform ROS: Intubated     Review of systems was unable to be performed secondary to ETT  ---------------------------------------------------------------------------------------  OBJECTIVE    Vitals   Vitals:    22 0200 22 0258 22 0300 22 0400   BP: 93/57  (!) 89/50 (!) 88/53   BP Location:    Left arm   Pulse: 67  66 64   Resp: 15  15 14   Temp: 100 04 °F (37 8 °C)  99 7 °F (37 6 °C) 99 5 °F (37 5 °C)   TempSrc:    Bladder   SpO2: 95% 96% 96% 96%   Weight:       Height:         Temp (24hrs), Av 9 °F (37 7 °C), Min:99 14 °F (37 3 °C), Max:100 58 °F (38 1 °C)  Current: Temperature: 99 5 °F (37 5 °C)          Respiratory:  SpO2: SpO2: 96 %       Invasive/non-invasive ventilation settings   Respiratory  Report   Lab Data (Last 4 hours)    None         O2/Vent Data (Last 4 hours)    None                Physical Exam  Vitals and nursing note reviewed  Constitutional:       General: He is not in acute distress       Appearance: He is overweight  Interventions: He is sedated, intubated and restrained  HENT:      Head: Normocephalic and atraumatic  Eyes:      Pupils: Pupils are equal, round, and reactive to light  Cardiovascular:      Rate and Rhythm: Normal rate and regular rhythm  Pulses:           Dorsalis pedis pulses are detected w/ Doppler on the right side and detected w/ Doppler on the left side  Heart sounds: Normal heart sounds  Heart sounds not distant  No murmur heard  No friction rub  No gallop  Pulmonary:      Effort: Pulmonary effort is normal  No tachypnea  He is intubated  Breath sounds: Examination of the right-lower field reveals decreased breath sounds  Examination of the left-lower field reveals decreased breath sounds  Decreased breath sounds present  No wheezing, rhonchi or rales  Abdominal:      General: There is no distension  Palpations: Abdomen is soft  Musculoskeletal:      Right lower leg: No edema  Left lower leg: No edema  Comments: R femoral IABP site C/D/I   Skin:     General: Skin is warm and dry  Neurological:      Mental Status: He is easily aroused  GCS: GCS eye subscore is 3  GCS verbal subscore is 1  GCS motor subscore is 6        Comments: FC in all extremities           Laboratory and Diagnostics:  Results from last 7 days   Lab Units 06/30/22  0432 06/29/22 2227 06/29/22 1836 06/29/22  1630 06/29/22  1446   WBC Thousand/uL 19 18*  --   --   --  11 42*   HEMOGLOBIN g/dL 12 9  --   --   --  14 9   I STAT HEMOGLOBIN g/dl  --  14 3  --  13 9  --    HEMATOCRIT % 39 5  --   --   --  45 1   HEMATOCRIT, ISTAT %  --  42  --  41  --    PLATELETS Thousands/uL 222  --  333  --  253   NEUTROS PCT % 84*  --   --   --  56   MONOS PCT % 9  --   --   --  7     Results from last 7 days   Lab Units 06/30/22  0432 06/30/22  0002 06/29/22 2227 06/29/22 1836 06/29/22  1630 06/29/22  1543   SODIUM mmol/L 139 138  --  135*  --  132*   POTASSIUM mmol/L 4 2 4 0  --  4 3 --  4 2   CHLORIDE mmol/L 104 104  --  102  --  96   CO2 mmol/L 26 25  --  22  --  19*   CO2, I-STAT mmol/L  --   --  22  --  26  --    ANION GAP mmol/L 9 9  --  11  --  17*   BUN mg/dL 33* 28*  --  25  --  23   CREATININE mg/dL 1 81* 2 19*  --  1 79*  --  1 56*   CALCIUM mg/dL 8 5 8 6  --  9 1  --  8 8   GLUCOSE RANDOM mg/dL 123 230*  --  342*  --  330*   ALT U/L  --   --   --  281*  --  174*   AST U/L  --   --   --  215*  --  121*   ALK PHOS U/L  --   --   --  109  --  80   ALBUMIN g/dL  --   --   --  3 7  --  3 4*   TOTAL BILIRUBIN mg/dL  --   --   --  0 46  --  0 38     Results from last 7 days   Lab Units 06/30/22  0432 06/29/22  1836   MAGNESIUM mg/dL 2 4 2 4   PHOSPHORUS mg/dL 3 9 5 7*      Results from last 7 days   Lab Units 07/01/22  0211 06/30/22  1814 06/30/22  1225 06/30/22  0432   INR   --   --  1 08 1 06   PTT seconds 60* 57* 28 28          Results from last 7 days   Lab Units 06/30/22  0502 06/30/22  0315 06/30/22  0002 06/29/22  2204 06/29/22  1836   LACTIC ACID mmol/L 2 3* 3 9* 7 2* 8 6* 5 7*     ABG:  Results from last 7 days   Lab Units 06/29/22  1844   PH ART  7 230*   PCO2 ART mm Hg 38 7   PO2 ART mm Hg 86 6   HCO3 ART mmol/L 15 8*   BASE EXC ART mmol/L -11 0   ABG SOURCE  Artery     VBG:  Results from last 7 days   Lab Units 06/30/22  1346 06/29/22  1844   PH TAHIRA  7 290*  --    PCO2 TAHIRA mm Hg 51 4*  --    PO2 TAHIRA mm Hg 32 6*  --    HCO3 TAHIRA mmol/L 24 2  --    BASE EXC TAHIRA mmol/L -2 8  --    ABG SOURCE   --  Artery       Imaging: I have personally reviewed pertinent reports  Intake and Output  I/O       06/29 0701  06/30 0700 06/30 0701  07/01 0700    P  O   0    I V  (mL/kg) 2954 9 (27 9) 2454 3 (23 2)    NG/GT 50 220    IV Piggyback  200    Total Intake(mL/kg) 3004 9 (28 3) 2874 3 (27 1)    Urine (mL/kg/hr) 965 1495 (0 6)    Emesis/NG output 0 60    Stool  0    Total Output 965 1555    Net +2039 9 +1319 3          Unmeasured Stool Occurrence  1 x          Height and Weights   Height: 6' 2" (188 cm)  IBW (Ideal Body Weight): 82 2 kg  Body mass index is 30 03 kg/m²  Weight (last 2 days)     Date/Time Weight    06/30/22 0600 106 (233 91)    06/29/22 2000 106 (233)    06/29/22 1816 104 (229 28)            Nutrition       Diet Orders   (From admission, onward)             Start     Ordered    06/29/22 1815  Diet NPO  Diet effective now        References:    Nutrtion Support Algorithm Enteral vs  Parenteral   Question Answer Comment   Diet Type NPO    RD to adjust diet per protocol?  Yes        06/29/22 1815                  Active Medications  Scheduled Meds:  Current Facility-Administered Medications   Medication Dose Route Frequency Provider Last Rate    acetaminophen  650 mg Oral Q6H PRN Zebedee Waterloo, PA-C      albumin human  12 5 g Intravenous Once Zebedee Waterloo, PA-C      amiodarone  1 mg/min Intravenous Continuous Zebedee Alonzo, PA-C 1 mg/min (06/30/22 1654)    aspirin  81 mg Oral Daily Zebedee Alonzo, PA-C      chlorhexidine  15 mL Mouth/Throat Q12H [address] Zebedee Waterloo, PA-C      dexmedetomidine  0 1-1 5 mcg/kg/hr Intravenous Titrated Jalen Quezada PA-C Stopped (06/30/22 0915)    fentaNYL  100 mcg/hr Intravenous Continuous Zebedee Alonzo, PA-C 100 mcg/hr (07/01/22 0207)    fentanyl citrate (PF)  100 mcg Intravenous Q1H PRN Jalen Mcdowell PA-C      heparin (porcine)  3-20 Units/kg/hr (Order-Specific) Intravenous Titrated Taylor Cali MD 13 1 Units/kg/hr (06/30/22 2017)    heparin (porcine)  2,000 Units Intravenous Q1H PRN Taylor Cali MD      heparin (porcine)  4,000 Units Intravenous Q1H PRN Taylor Cali MD      insulin lispro  1-5 Units Subcutaneous Q6H [address] [place]      lidocaine 1% buffered  10 mL Infiltration Once Holley Buitrago DO      norepinephrine  1-30 mcg/min Intravenous Titrated Zebedee Alonzo, PA-C 3 mcg/min (07/01/22 0400)    ondansetron  4 mg Intravenous Q6H PRN Kelly Nolasco Shabbir Scales PA-C      propofol  5-50 mcg/kg/min Intravenous Titrated Chandu Yang PA-C 40 mcg/kg/min (07/01/22 0521)    ticagrelor  90 mg Oral Q12H [facility] Chandu Yang PA-C       Continuous Infusions:  amiodarone, 1 mg/min, Last Rate: 1 mg/min (06/30/22 1654)  dexmedetomidine, 0 1-1 5 mcg/kg/hr, Last Rate: Stopped (06/30/22 0915)  fentaNYL, 100 mcg/hr, Last Rate: 100 mcg/hr (07/01/22 0207)  heparin (porcine), 3-20 Units/kg/hr (Order-Specific), Last Rate: 13 1 Units/kg/hr (06/30/22 2017)  norepinephrine, 1-30 mcg/min, Last Rate: 3 mcg/min (07/01/22 0400)  propofol, 5-50 mcg/kg/min, Last Rate: 40 mcg/kg/min (07/01/22 0521)      PRN Meds:   acetaminophen, 650 mg, Q6H PRN  fentanyl citrate (PF), 100 mcg, Q1H PRN  heparin (porcine), 2,000 Units, Q1H PRN  heparin (porcine), 4,000 Units, Q1H PRN  ondansetron, 4 mg, Q6H PRN        Invasive Devices Review  Invasive Devices  Report    Central Venous Catheter Line  Duration           CVC Central Lines 06/30/22 Triple Right Internal jugular <1 day          Peripheral Intravenous Line  Duration           Peripheral IV 06/29/22 Right Antecubital 1 day    Peripheral IV 06/30/22 Distal;Left;Ventral (anterior) Forearm <1 day          Line  Duration           Arterial Sheath 6 Fr  Right Femoral 1 day    IABP 8 0 Fr   50 mL 1 day          Drain  Duration           NG/OG/Enteral Tube Orogastric Right mouth 1 day    Urethral Catheter Temperature probe 16 Fr  1 day          Airway  Duration           ETT  8 mm 1 day                  ---------------------------------------------------------------------------------------  Advance Directive and Living Will:      Power of :    POLST:    ---------------------------------------------------------------------------------------  Care Time Delivered:   Upon my evaluation, this patient had a high probability of imminent or life-threatening deterioration due to cardiogenic shock, which required my direct attention, intervention, and personal management  I have personally provided 33 minutes [phone] of critical care time, exclusive of procedures, teaching, family meetings, and any prior time recorded by providers other than myself         [facility], PA-C

## 2022-07-01 NOTE — PLAN OF CARE
Problem: MOBILITY - ADULT  Goal: Maintain or return to baseline ADL function  Description: INTERVENTIONS:  -  Assess patient's ability to carry out ADLs; assess patient's baseline for ADL function and identify physical deficits which impact ability to perform ADLs (bathing, care of mouth/teeth, toileting, grooming, dressing, etc )  - Assess/evaluate cause of self-care deficits   - Assess range of motion  - Assess patient's mobility; develop plan if impaired  - Assess patient's need for assistive devices and provide as appropriate  - Encourage maximum independence but intervene and supervise when necessary  - Involve family in performance of ADLs  - Assess for home care needs following discharge   - Consider OT consult to assist with ADL evaluation and planning for discharge  - Provide patient education as appropriate  Outcome: Progressing     Problem: SAFETY,RESTRAINT: NV/NON-SELF DESTRUCTIVE BEHAVIOR  Goal: Remains free of harm/injury (restraint for non violent/non self-detsructive behavior)  Description: INTERVENTIONS:  - Instruct patient/family regarding restraint use   - Assess and monitor physiologic and psychological status   - Provide interventions and comfort measures to meet assessed patient needs   - Identify and implement measures to help patient regain control  - Assess readiness for release of restraint   Outcome: Progressing

## 2022-07-01 NOTE — ASSESSMENT & PLAN NOTE
6/29 s/p cardiac arrest  Received heparin bolus and loaded with ASA/Brilinta in ED  Underwent emergent cardiac cath at [facility]  Moderate chronic nonobstrucive disease noted, but distal LAD and OM disease were small without the ability to perform intervention    LV mildly reduced EF with apical dyskinesis, appearance of takotsubo cardiomyopathy on LV-gram   IABP placed secondary to shock state post arrest     Plan:  · Cardiology consulted appreciate recommendations  · Patient with no intervenable lesions, continue therapy per cardiology recommendations   · ASA/Brilinta  · Heparin gtt  · BBlocker when medically appropriate

## 2022-07-01 NOTE — PROGRESS NOTES
Progress Note - Cardiology   Tonio Vega 58 y o  male MRN: [id number]  Unit/Bed#: [facility] 525-73 Encounter: [id number]    Assessment/Plan:  Upon evaluation patient was intubated and sedated, as he arrived as a VT/VF arrest in the setting of what appeared to be a STEMI   Therefore history was obtained from the ER and other providers   58year-old male with no prior cardiac history comes in with recurrent chest pain over the last 3 days or so  Denis Moody called EMS today and upon arrival to the hospital he had an VFib arrest while with EMS   At that point CPR and ACLS protocol were initiated, and he received several shocks and epinephrine  ROSC was obtained and he was started on epi, levophed, and amiodarone gtts  The patient went to the cath lab that did not show any acute lesion or thrombosis but just moderate and smaller vessel disease  IABP was placed for mechanical support in the cath lab         Cardiogenic Shock s/p VF arrest with mild cardiomyopathy on TTE while on IABP: Patient does not have a typical stress induced cardiomyopathy pattern on TTE and regional akinetic walls at the apex concerning that patient had an ischemic event  Possible that the patient had an occlusive event with subsequent lysis and was the reason it was not visualized on [facility]    -keep IABP on 1:1 for at least another 24-48 hours>>> removed on 7/1  -wean pressors   -was on IV heparin acs protocol for at least 48 hours but would hold off on restarting at this point as risk >benefits   -Patient may get out of bed but he most safeguard should remain in place until a m  It will be removed from someone on our team on 7/2/22  -continue ASA 81 mg and brilinta 90 mg BID for at least one year   -high intensity statin Lipitor 80 mg daily   -hold off on BB for now   -would consider fenofibrate and vascepa outpatient   - attempt to extubate today   -will need secondary ICD before discharge         Subjective/Objective       Febrile this morning      On 1:1 IABP this morning  Held 45 minutes of pressure at bedside after removal of the balloon pump  Nursing called 1 hour later  that Right femoral arterial access from intra  aortic balloon site rebleeding  Nursing staff at held pressure  Kishore Wander held pressure for approximately 10 minutes and injected site with 1% lidocaine with 100,000 units of epinephrine  Safeguard applied with 40 mL of air  Hemostasis achieved  Vitals: /79   Pulse 76   Temp 99 5 °F (37 5 °C)   Resp 19   Ht 6' 2" (1 88 m)   Wt 106 kg (234 lb 5 6 oz)   SpO2 98%   BMI 30 09 kg/m²   Vitals:    06/30/22 0600 07/01/22 0535   Weight: 106 kg (233 lb 14 5 oz) 106 kg (234 lb 5 6 oz)     Orthostatic Blood Pressures    Flowsheet Row Most Recent Value   Blood Pressure 123/79 filed at 07/01/2022 1400   Patient Position - Orthostatic VS Lying filed at 07/01/2022 0915            Intake/Output Summary (Last 24 hours) at 7/1/2022 1433  Last data filed at 7/1/2022 1230  Gross per 24 hour   Intake 2432 82 ml   Output 1760 ml   Net 672 82 ml       Invasive Devices  Report    Central Venous Catheter Line  Duration           CVC Central Lines 06/30/22 Triple Right Internal jugular <1 day          Peripheral Intravenous Line  Duration           Peripheral IV 06/29/22 Right Antecubital 1 day    Peripheral IV 06/30/22 Distal;Left;Ventral (anterior) Forearm 1 day          Line  Duration           Arterial Sheath 6 Fr  Right Femoral 1 day    IABP 8 0 Fr   50 mL 1 day          Drain  Duration           Urethral Catheter Temperature probe 16 Fr  1 day          Airway  Duration           ETT  8 mm 1 day                Review of Systems: intubated and sedated       GEN: Ambar Frazier intubated and sedated   HEENT:  Normocephalic, atraumatic, anicteric, moist mucous membranes  NECK: No JVD or carotid bruits   HEART: regular rhythm, regular rate, normal S1 and S2, no murmurs, clicks, gallops or rubs   LUNGS: Clear to auscultation bilaterally; no wheezes, rales, or rhonchi; respiration nonlabored   ABDOMEN:  Normoactive bowel sounds, soft, no tenderness, no distention  EXTREMITIES: peripheral pulses dopplers; no edema, cool distal extremities   NEURO: no gross focal findings; cranial nerves grossly intact   SKIN:  Dry, intact, warm to touch       Lab Results: I have personally reviewed pertinent lab results  Imaging: I have personally reviewed pertinent reports  EKG:   VTE Pharmacologic Prophylaxis: Sequential compression device (Venodyne)   VTE Mechanical Prophylaxis: sequential compression device    Counseling / Coordination of Care  Total Critical Care time spent 30 minutes excluding procedures, teaching and family updates

## 2022-07-01 NOTE — CASE MANAGEMENT
Case Management Assessment    Patient name Dex Lomeli  Location [address]/PPHP 848-25 MRN [id number]  : 1960 Date 2022       Current Admission Date: 2022  Current Admission Diagnosis:Cardiac arrest with ventricular fibrillation [facility])   Patient Active Problem List    Diagnosis Date Noted    Lactic acidosis 2022    STEMI (ST elevation myocardial infarction) ([facility] [address] ) 2022    Hypertension 2022    Cardiac arrest with ventricular fibrillation ([facility] [address] ) 2022    PERCY (acute kidney injury) ([facility] [address] ) 2022    Elevated LFTs 2022    Shock ([facility] [address] ) 2022    Acute respiratory failure with hypercapnia ([facility] [address] ) 2022    Hyperglycemia 2022    Cigarette nicotine dependence without complication     Bilateral inguinal hernia without obstruction or gangrene     Umbilical hernia       LOS (days): 2  Geometric Mean LOS (GMLOS) (days): 6 60  Days to GMLOS:4 7     OBJECTIVE:  PATIENT READMITTED TO HOSPITAL  Risk of Unplanned Readmission Score: 12 93         Current admission status: Inpatient       Preferred Pharmacy:   [address] [facility] [address]  [place], [address] [address]  Phone: [phone] Fax: [phone]    Primary Care Provider: Cher Cardenas PA-C    Primary Insurance: BLUE CROSS  Secondary Insurance:     ASSESSMENT:  Summer 26 Proxies    There are no active Health Care Proxies on file         Advance Directives  Does patient have a Health Care POA?: No  Was patient offered paperwork?: Yes (Patient Intubated, gave paperwork to wife)  Does patient currently have a Health Care decision maker?: Yes, please see Health Care Proxy section  Does patient have Advance Directives?: No  Was patient offered paperwork?: Yes (Patient is intubated, gave paperwork to wife)  Primary Contact: Chloe Milian    Readmission Root Cause  30 Day Readmission: No    Patient Information  Admitted from[de-identified] Other (comment) (Transfer from [facility] ED)  Mental Status: Intubated, Sedated  During Assessment patient was accompanied by: Spouse  Assessment information provided by[de-identified] Spouse  Primary Caregiver: Self  Support Systems: Spouse/significant other, Family members  [place] of Residence: [address] do you live in?: [address] entry access options   Select all that apply : Stairs  Number of steps to enter home : 4  Type of Current Residence: [place]  In the last 12 months, was there a time when you were not able to pay the mortgage or rent on time?: No  In the last 12 months, how many places have you lived?: 1  In the last 12 months, was there a time when you did not have a steady place to sleep or slept in a shelter (including now)?: No  Homeless/housing insecurity resource given?: N/A  Living Arrangements: Lives w/ Spouse/significant other  Is patient a ?: Yes ([employer])  Is patient active with [facility] ([facility])?: No    Activities of Daily Living Prior to Admission  Functional Status: Independent  Completes ADLs independently?: Yes  Ambulates independently?: Yes  Does patient use assisted devices?: No  Does patient currently own DME?: No  Does patient have a history of Outpatient Therapy (PT/OT)?: No  Does the patient have a history of Short-Term Rehab?: No  Does patient have a history of HHC?: No  Does patient currently have [facility]?: No    Patient Information Continued  Income Source: Employed  Does patient have prescription coverage?: Yes  Within the past 12 months, you worried that your food would run out before you got the money to buy more : Never true  Within the past 12 months, the food you bought just didn't last and you didn't have money to get more : Never true  Food insecurity resource given?: N/A  Does patient receive dialysis treatments?: No  Does patient have a history of substance abuse?: No  Does patient have a history of Mental Health Diagnosis?: No    Means of Transportation  Means of Transport to [facility][de-identified] Drives Self  In the past 12 months, has lack of transportation kept you from medical appointments or from getting medications?: No  In the past 12 months, has lack of transportation kept you from meetings, work, or from getting things needed for daily living?: No  Was application for public transport provided?: N/A    Met with patient's wife to complete assessment and to answer questions about FMLA paperwork and POA  Patient has no advance directive and she was given explanation of process and paperwork  She is worried about paying bills and was advised to contact an  for financial POA when patient is able to designate this and can complete paperwork  Wife upset because patient has been estranged from his adult son for many years but who came to visit yesterday  Reviewed  with her and she would not want patient's son involved in medical decisions  Provided support to her and advised they work on Advance directive when patient is able to make these decisions

## 2022-07-01 NOTE — ASSESSMENT & PLAN NOTE
· Likely secondary to hypoperfusion during cardiac arrest    · Trend UOP and Cr   · Avoid hypotension and nephrotoxic medications  · Trend strict I/Os  · Improving

## 2022-07-02 LAB
ALBUMIN SERPL BCP-MCNC: 2.7 G/DL (ref 3.5–5)
ALP SERPL-CCNC: 66 U/L (ref 46–116)
ALT SERPL W P-5'-P-CCNC: 94 U/L (ref 12–78)
ANION GAP SERPL CALCULATED.3IONS-SCNC: 6 MMOL/L (ref 4–13)
AST SERPL W P-5'-P-CCNC: 129 U/L (ref 5–45)
BASOPHILS # BLD AUTO: 0.03 THOUSANDS/ΜL (ref 0–0.1)
BASOPHILS NFR BLD AUTO: 0 % (ref 0–1)
BILIRUB SERPL-MCNC: 0.63 MG/DL (ref 0.2–1)
BUN SERPL-MCNC: 24 MG/DL (ref 5–25)
CA-I BLD-SCNC: 1.13 MMOL/L (ref 1.12–1.32)
CALCIUM ALBUM COR SERPL-MCNC: 9.7 MG/DL (ref 8.3–10.1)
CALCIUM SERPL-MCNC: 8.7 MG/DL (ref 8.3–10.1)
CHLORIDE SERPL-SCNC: 105 MMOL/L (ref 100–108)
CO2 SERPL-SCNC: 27 MMOL/L (ref 21–32)
CREAT SERPL-MCNC: 0.97 MG/DL (ref 0.6–1.3)
EOSINOPHIL # BLD AUTO: 0.05 THOUSAND/ΜL (ref 0–0.61)
EOSINOPHIL NFR BLD AUTO: 0 % (ref 0–6)
ERYTHROCYTE [DISTWIDTH] IN BLOOD BY AUTOMATED COUNT: 12.5 % (ref 11.6–15.1)
GFR SERPL CREATININE-BSD FRML MDRD: 83 ML/MIN/1.73SQ M
GLUCOSE SERPL-MCNC: 111 MG/DL (ref 65–140)
GLUCOSE SERPL-MCNC: 115 MG/DL (ref 65–140)
GLUCOSE SERPL-MCNC: 133 MG/DL (ref 65–140)
HCT VFR BLD AUTO: 30 % (ref 36.5–49.3)
HGB BLD-MCNC: 10 G/DL (ref 12–17)
IMM GRANULOCYTES # BLD AUTO: 0.09 THOUSAND/UL (ref 0–0.2)
IMM GRANULOCYTES NFR BLD AUTO: 1 % (ref 0–2)
LYMPHOCYTES # BLD AUTO: 1.19 THOUSANDS/ΜL (ref 0.6–4.47)
LYMPHOCYTES NFR BLD AUTO: 9 % (ref 14–44)
MAGNESIUM SERPL-MCNC: 2.2 MG/DL (ref 1.6–2.6)
MCH RBC QN AUTO: 30.5 PG (ref 26.8–34.3)
MCHC RBC AUTO-ENTMCNC: 33.3 G/DL (ref 31.4–37.4)
MCV RBC AUTO: 92 FL (ref 82–98)
MONOCYTES # BLD AUTO: 1 THOUSAND/ΜL (ref 0.17–1.22)
MONOCYTES NFR BLD AUTO: 8 % (ref 4–12)
NEUTROPHILS # BLD AUTO: 10.95 THOUSANDS/ΜL (ref 1.85–7.62)
NEUTS SEG NFR BLD AUTO: 82 % (ref 43–75)
NRBC BLD AUTO-RTO: 0 /100 WBCS
PHOSPHATE SERPL-MCNC: 2.2 MG/DL (ref 2.3–4.1)
PLATELET # BLD AUTO: 126 THOUSANDS/UL (ref 149–390)
PMV BLD AUTO: 9.1 FL (ref 8.9–12.7)
POTASSIUM SERPL-SCNC: 3.7 MMOL/L (ref 3.5–5.3)
PROT SERPL-MCNC: 6.2 G/DL (ref 6.4–8.2)
RBC # BLD AUTO: 3.28 MILLION/UL (ref 3.88–5.62)
SODIUM SERPL-SCNC: 138 MMOL/L (ref 136–145)
TRIGL SERPL-MCNC: 359 MG/DL
WBC # BLD AUTO: 13.31 THOUSAND/UL (ref 4.31–10.16)

## 2022-07-02 PROCEDURE — 82948 REAGENT STRIP/BLOOD GLUCOSE: CPT

## 2022-07-02 PROCEDURE — 99232 SBSQ HOSP IP/OBS MODERATE 35: CPT | Performed by: INTERNAL MEDICINE

## 2022-07-02 PROCEDURE — 99223 1ST HOSP IP/OBS HIGH 75: CPT | Performed by: INTERNAL MEDICINE

## 2022-07-02 PROCEDURE — 84100 ASSAY OF PHOSPHORUS: CPT | Performed by: PHYSICIAN ASSISTANT

## 2022-07-02 PROCEDURE — 99233 SBSQ HOSP IP/OBS HIGH 50: CPT | Performed by: STUDENT IN AN ORGANIZED HEALTH CARE EDUCATION/TRAINING PROGRAM

## 2022-07-02 PROCEDURE — 84478 ASSAY OF TRIGLYCERIDES: CPT | Performed by: PHYSICIAN ASSISTANT

## 2022-07-02 PROCEDURE — 94760 N-INVAS EAR/PLS OXIMETRY 1: CPT

## 2022-07-02 PROCEDURE — 80053 COMPREHEN METABOLIC PANEL: CPT | Performed by: PHYSICIAN ASSISTANT

## 2022-07-02 PROCEDURE — 83735 ASSAY OF MAGNESIUM: CPT | Performed by: PHYSICIAN ASSISTANT

## 2022-07-02 PROCEDURE — 82330 ASSAY OF CALCIUM: CPT | Performed by: PHYSICIAN ASSISTANT

## 2022-07-02 PROCEDURE — 85025 COMPLETE CBC W/AUTO DIFF WBC: CPT | Performed by: PHYSICIAN ASSISTANT

## 2022-07-02 RX ORDER — NICOTINE 21 MG/24HR
14 PATCH, TRANSDERMAL 24 HOURS TRANSDERMAL DAILY
Status: DISCONTINUED | OUTPATIENT
Start: 2022-07-02 | End: 2022-07-05 | Stop reason: HOSPADM

## 2022-07-02 RX ORDER — POTASSIUM CHLORIDE 20 MEQ/1
40 TABLET, EXTENDED RELEASE ORAL ONCE
Status: COMPLETED | OUTPATIENT
Start: 2022-07-02 | End: 2022-07-02

## 2022-07-02 RX ORDER — INSULIN LISPRO 100 [IU]/ML
1-5 INJECTION, SOLUTION INTRAVENOUS; SUBCUTANEOUS
Status: DISCONTINUED | OUTPATIENT
Start: 2022-07-02 | End: 2022-07-03

## 2022-07-02 RX ORDER — HEPARIN SODIUM 5000 [USP'U]/ML
5000 INJECTION, SOLUTION INTRAVENOUS; SUBCUTANEOUS EVERY 8 HOURS SCHEDULED
Status: DISCONTINUED | OUTPATIENT
Start: 2022-07-02 | End: 2022-07-05 | Stop reason: HOSPADM

## 2022-07-02 RX ORDER — ASPIRIN 81 MG/1
81 TABLET, CHEWABLE ORAL DAILY
Status: DISCONTINUED | OUTPATIENT
Start: 2022-07-02 | End: 2022-07-03

## 2022-07-02 RX ORDER — INSULIN LISPRO 100 [IU]/ML
1-6 INJECTION, SOLUTION INTRAVENOUS; SUBCUTANEOUS
Status: DISCONTINUED | OUTPATIENT
Start: 2022-07-02 | End: 2022-07-03

## 2022-07-02 RX ORDER — ATORVASTATIN CALCIUM 40 MG/1
40 TABLET, FILM COATED ORAL
Status: DISCONTINUED | OUTPATIENT
Start: 2022-07-02 | End: 2022-07-05 | Stop reason: HOSPADM

## 2022-07-02 RX ADMIN — NICOTINE 14 MG: 14 PATCH, EXTENDED RELEASE TRANSDERMAL at 08:06

## 2022-07-02 RX ADMIN — ONDANSETRON 4 MG: 2 INJECTION INTRAMUSCULAR; INTRAVENOUS at 10:19

## 2022-07-02 RX ADMIN — OXYCODONE HYDROCHLORIDE 5 MG: 5 TABLET ORAL at 02:37

## 2022-07-02 RX ADMIN — Medication 12.5 MG: at 11:40

## 2022-07-02 RX ADMIN — HEPARIN SODIUM 5000 UNITS: 5000 INJECTION INTRAVENOUS; SUBCUTANEOUS at 13:30

## 2022-07-02 RX ADMIN — ACETAMINOPHEN 650 MG: 650 SUSPENSION ORAL at 15:46

## 2022-07-02 RX ADMIN — Medication 12.5 MG: at 21:38

## 2022-07-02 RX ADMIN — DEXTROSE MONOHYDRATE 2000 MG: 5 INJECTION, SOLUTION INTRAVENOUS at 09:30

## 2022-07-02 RX ADMIN — ASPIRIN 81 MG CHEWABLE TABLET 81 MG: 81 TABLET CHEWABLE at 08:07

## 2022-07-02 RX ADMIN — Medication 6 MG: at 21:38

## 2022-07-02 RX ADMIN — AMIODARONE HYDROCHLORIDE 1 MG/MIN: 50 INJECTION, SOLUTION INTRAVENOUS at 01:55

## 2022-07-02 RX ADMIN — OXYCODONE HYDROCHLORIDE 5 MG: 5 TABLET ORAL at 08:07

## 2022-07-02 RX ADMIN — ATORVASTATIN CALCIUM 40 MG: 40 TABLET, FILM COATED ORAL at 15:47

## 2022-07-02 RX ADMIN — TICAGRELOR 90 MG: 90 TABLET ORAL at 21:38

## 2022-07-02 RX ADMIN — POTASSIUM CHLORIDE 40 MEQ: 1500 TABLET, EXTENDED RELEASE ORAL at 08:07

## 2022-07-02 RX ADMIN — TICAGRELOR 90 MG: 90 TABLET ORAL at 08:07

## 2022-07-02 RX ADMIN — HEPARIN SODIUM 5000 UNITS: 5000 INJECTION INTRAVENOUS; SUBCUTANEOUS at 21:38

## 2022-07-02 RX ADMIN — OXYCODONE HYDROCHLORIDE 5 MG: 5 TABLET ORAL at 12:55

## 2022-07-02 NOTE — QUICK NOTE
Patient seen and evaluated by Critical Care today and deemed to be appropriate for transfer to Med Surg with Telemetry   Spoke to Dr Billy Rangel from [facility] to accept patient transfer  Critical care can be contacted via [facility] with any questions or concerns      Rom Comer PA-C

## 2022-07-02 NOTE — PLAN OF CARE
Problem: Prexisting or High Potential for Compromised Skin Integrity  Goal: Skin integrity is maintained or improved  Description: INTERVENTIONS:  - Identify patients at risk for skin breakdown  - Assess and monitor skin integrity  - Assess and monitor nutrition and hydration status  - Monitor labs   - Assess for incontinence   - Turn and reposition patient  - Assist with mobility/ambulation  - Relieve pressure over bony prominences  - Avoid friction and shearing  - Provide appropriate hygiene as needed including keeping skin clean and dry  - Evaluate need for skin moisturizer/barrier cream  - Collaborate with interdisciplinary team   - Patient/family teaching  - Consider wound care consult   Outcome: Progressing     Problem: MOBILITY - ADULT  Goal: Maintain or return to baseline ADL function  Description: INTERVENTIONS:  -  Assess patient's ability to carry out ADLs; assess patient's baseline for ADL function and identify physical deficits which impact ability to perform ADLs (bathing, care of mouth/teeth, toileting, grooming, dressing, etc )  - Assess/evaluate cause of self-care deficits   - Assess range of motion  - Assess patient's mobility; develop plan if impaired  - Assess patient's need for assistive devices and provide as appropriate  - Encourage maximum independence but intervene and supervise when necessary  - Involve family in performance of ADLs  - Assess for home care needs following discharge   - Consider OT consult to assist with ADL evaluation and planning for discharge  - Provide patient education as appropriate  Outcome: Not Progressing     Problem: SAFETY,RESTRAINT: NV/NON-SELF DESTRUCTIVE BEHAVIOR  Goal: Remains free of harm/injury (restraint for non violent/non self-detsructive behavior)  Description: INTERVENTIONS:  - Instruct patient/family regarding restraint use   - Assess and monitor physiologic and psychological status   - Provide interventions and comfort measures to meet assessed patient needs   - Identify and implement measures to help patient regain control  - Assess readiness for release of restraint   Outcome: Completed

## 2022-07-02 NOTE — ASSESSMENT & PLAN NOTE
6/29 s/p cardiac arrest  Received heparin bolus and loaded with ASA/Brilinta in ED  Underwent emergent cardiac cath at [facility]  Moderate chronic nonobstrucive disease noted, but distal LAD and OM disease were small without the ability to perform intervention    LV mildly reduced EF with apical dyskinesis, appearance of takotsubo cardiomyopathy on LV-gram   IABP placed secondary to shock state post arrest     Plan:  · Cardiology consulted appreciate recommendations  · Patient with no intervenable lesions, continue therapy per cardiology recommendations   · ASA/Brilinta  · Heparin gtt - on hold 2/2 bleeding at IABP site  · Safeguard in place, to be removed by cardiology today  · BBlocker when medically appropriate

## 2022-07-02 NOTE — PROGRESS NOTES
[address] [facility]  Progress Note - Luis Kang 1960, 58 y o  male MRN: [id number]  Unit/Bed#: [facility] 513-01 Encounter: [id number]  Primary Care Provider: Roseline Ireland PA-C   Date and time admitted to hospital: 6/29/2022  6:03 PM    * Cardiac arrest with ventricular fibrillation [facility])  Assessment & Plan  Chest pain x 3 days  VFib arrest in front of EMS  In total approximately 15-20 minutes of CPR/ACLS with multiple shocks delivered and multiple ROSC  Finally in ED s/p amio 300 and 150 boluses  Plan:  · Following commands on arrival, no need for TTM  · Trend neuro exam   · Continue Amiodarone infusion at 1  · Cardiology consulted  · Will need ICD     Shock [facility])  Assessment & Plan  Likely cardiogenic shock in the setting of cardiac arrest requiring multiple shocks    Plan:  · Echo: EF 44% with G1DD, akinesis of apical anterior, apical septal, apical inferior, apical lateral and apex  · Epi weaned off, levophed weaned off  Goal MAP > 65  · IABP d/c'ed 7/1    STEMI (ST elevation myocardial infarction) [facility])  Assessment & Plan  6/29 s/p cardiac arrest  Received heparin bolus and loaded with ASA/Brilinta in ED  Underwent emergent cardiac cath at [facility]  Moderate chronic nonobstrucive disease noted, but distal LAD and OM disease were small without the ability to perform intervention    LV mildly reduced EF with apical dyskinesis, appearance of takotsubo cardiomyopathy on LV-gram   IABP placed secondary to shock state post arrest     Plan:  · Cardiology consulted appreciate recommendations  · Patient with no intervenable lesions, continue therapy per cardiology recommendations   · ASA/Brilinta  · Heparin gtt - on hold 2/2 bleeding at IABP site  · Safeguard in place, to be removed by cardiology today  · BBlocker when medically appropriate       Acute respiratory failure with hypercapnia ([facility] [address] )  Assessment & Plan  Secondary to cardiac arrest    Plan:  · Wean FiO2 as able for SPO2 > 92%  · Extubated     PERCY (acute kidney injury) ([facility] [address] )  Assessment & Plan  · Likely secondary to hypoperfusion during cardiac arrest    · Trend UOP and Cr   · Avoid hypotension and nephrotoxic medications  · Trend strict I/Os  · Improving    Elevated LFTs  Assessment & Plan  · Likely secondary to hypoperfusion during cardiac arrest    · Avoid hypotension and hepatotoxic medications  · Trend daily    Hyperglycemia  Assessment & Plan  · SSI  · Hgb A1C 5 9    Lactic acidosis  Assessment & Plan  · Improved    ----------------------------------------------------------------------------------------  HPI/24hr events: IABP removed yesterday, some bleeding removal site after pressure held  Safeguard place per cardiology  Patient with intermittent b/l LE tingling overnight  Extubated yesterday, weaned off pressors  Patient appropriate for transfer out of the ICU today?: Patient does not meet criteria for referral to the ICU Follow-Up Clinic; referral has not been made     Disposition: Transfer to Stepdown Level 2  Code Status: Level 1 - Full Code  ---------------------------------------------------------------------------------------  SUBJECTIVE  "When can I leave"    Review of Systems  Review of systems was reviewed and negative unless stated above in HPI/24-hour events   ---------------------------------------------------------------------------------------  OBJECTIVE    Vitals   Vitals:    22 1600 22 1800 22 1930 22   BP: 132/73 123/65 125/68    BP Location:       Pulse: 77 78 77    Resp: (!) 29 16 19    Temp: 99 86 °F (37 7 °C) 100 04 °F (37 8 °C) 100 22 °F (37 9 °C)    TempSrc:       SpO2: 98% 98% 96% 95%   Weight:       Height:         Temp (24hrs), Av 6 °F (37 6 °C), Min:99 1 °F (37 3 °C), Max:100 22 °F (37 9 °C)  Current: Temperature: 100 22 °F (37 9 °C)          Respiratory:  SpO2: SpO2: 95 %       Invasive/non-invasive ventilation settings   Respiratory  Report   Lab Data (Last 4 hours)    None         O2/Vent Data (Last 4 hours)    None                Physical Exam  Vitals and nursing note reviewed  Constitutional:       Appearance: He is well-developed  Interventions: Nasal cannula in place  HENT:      Head: Normocephalic and atraumatic  Eyes:      Conjunctiva/sclera: Conjunctivae normal       Pupils: Pupils are equal, round, and reactive to light  Cardiovascular:      Rate and Rhythm: Normal rate and regular rhythm  Heart sounds: Normal heart sounds  No murmur heard  Pulmonary:      Effort: Pulmonary effort is normal  No respiratory distress  Breath sounds: Normal breath sounds  Abdominal:      General: There is no distension  Palpations: Abdomen is soft  Tenderness: There is no abdominal tenderness  Musculoskeletal:      Cervical back: Neck supple  Comments: R fem stop in place  No bleeding noted   Skin:     General: Skin is warm and dry  Neurological:      General: No focal deficit present  Mental Status: He is alert and oriented to person, place, and time  Psychiatric:         Behavior: Behavior is cooperative               Laboratory and Diagnostics:  Results from last 7 days   Lab Units 07/01/22 0415 06/30/22 0432 06/29/22 2227 06/29/22  1836 06/29/22  1630 06/29/22  1446   WBC Thousand/uL 16 41* 19 18*  --   --   --  11 42*   HEMOGLOBIN g/dL 11 1* 12 9  --   --   --  14 9   I STAT HEMOGLOBIN g/dl  --   --  14 3  --  13 9  --    HEMATOCRIT % 34 0* 39 5  --   --   --  45 1   HEMATOCRIT, ISTAT %  --   --  42  --  41  --    PLATELETS Thousands/uL 154 222  --  333  --  253   NEUTROS PCT %  --  84*  --   --   --  56   MONOS PCT %  --  9  --   --   --  7     Results from last 7 days   Lab Units 07/01/22 0415 06/30/22  0432 06/30/22  0002 06/29/22 2227 06/29/22  1836 06/29/22  1630 06/29/22  1543   SODIUM mmol/L 137 139 138  --  135*  --  132*   POTASSIUM mmol/L 4 1 4 2 4 0  --  4 3  --  4 2   CHLORIDE mmol/L 106 104 104 --  102  --  96   CO2 mmol/L 30 26 25  --  22  --  19*   CO2, I-STAT mmol/L  --   --   --  22  --  26  --    ANION GAP mmol/L 1* 9 9  --  11  --  17*   BUN mg/dL 32* 33* 28*  --  25  --  23   CREATININE mg/dL 1 43* 1 81* 2 19*  --  1 79*  --  1 56*   CALCIUM mg/dL 8 3 8 5 8 6  --  9 1  --  8 8   GLUCOSE RANDOM mg/dL 107 123 230*  --  342*  --  330*   ALT U/L  --   --   --   --  281*  --  174*   AST U/L  --   --   --   --  215*  --  121*   ALK PHOS U/L  --   --   --   --  109  --  80   ALBUMIN g/dL  --   --   --   --  3 7  --  3 4*   TOTAL BILIRUBIN mg/dL  --   --   --   --  0 46  --  0 38     Results from last 7 days   Lab Units 07/01/22  0415 06/30/22  0432 06/29/22  1836   MAGNESIUM mg/dL 2 2 2 4 2 4   PHOSPHORUS mg/dL 2 9 3 9 5 7*      Results from last 7 days   Lab Units 07/01/22  0801 07/01/22  0211 06/30/22  1814 06/30/22  1225 06/30/22  0432   INR   --   --   --  1 08 1 06   PTT seconds 51* 60* 57* 28 28          Results from last 7 days   Lab Units 06/30/22  0502 06/30/22  0315 06/30/22  0002 06/29/22  2204 06/29/22  1836   LACTIC ACID mmol/L 2 3* 3 9* 7 2* 8 6* 5 7*     ABG:  Results from last 7 days   Lab Units 06/29/22  1844   PH ART  7 230*   PCO2 ART mm Hg 38 7   PO2 ART mm Hg 86 6   HCO3 ART mmol/L 15 8*   BASE EXC ART mmol/L -11 0   ABG SOURCE  Artery     VBG:  Results from last 7 days   Lab Units 06/30/22  1346 06/29/22  1844   PH TAHIRA  7 290*  --    PCO2 TAHIRA mm Hg 51 4*  --    PO2 TAHIRA mm Hg 32 6*  --    HCO3 TAHIRA mmol/L 24 2  --    BASE EXC TAHIRA mmol/L -2 8  --    ABG SOURCE   --  Artery     Results from last 7 days   Lab Units 07/01/22  0415   PROCALCITONIN ng/ml 8 95*       Micro  Results from last 7 days   Lab Units 07/01/22  0939   BLOOD CULTURE  Received in Microbiology Lab  Culture in Progress  Received in Microbiology Lab  Culture in Progress     GRAM STAIN RESULT  Rare Epithelial cells per low power field*  3+ Polys*  3+ Gram positive cocci in pairs and chains*       Imaging: I have personally reviewed pertinent reports  Intake and Output  I/O       06/30 0701  07/01 0700 07/01 0701 07/02 0700    P  O  0 50    I V  (mL/kg) 2646 9 (25) 945 8 (8 9)    NG/ 50    IV Piggyback 450 50    Total Intake(mL/kg) 3316 9 (31 3) 1095 8 (10 3)    Urine (mL/kg/hr) 1645 (0 6) 1740 (0 7)    Emesis/NG output 60 100    Stool 0 0    Total Output 1705 1840    Net +1611 9 -744  2          Unmeasured Stool Occurrence 1 x 0 x          Height and Weights   Height: 6' 2" (188 cm)  IBW (Ideal Body Weight): 82 2 kg  Body mass index is 30 09 kg/m²  Weight (last 2 days)     Date/Time Weight    07/01/22 0535 106 (234 35)    06/30/22 0600 106 (233 91)            Nutrition       Diet Orders   (From admission, onward)             Start     Ordered    07/01/22 1429  Diet Cardiovascular; Cardiac; Fluid Restriction 1800 ML  Diet effective now        References:    Nutrtion Support Algorithm Enteral vs  Parenteral   Question Answer Comment   Diet Type Cardiovascular    Cardiac Cardiac    Other Restriction(s): Fluid Restriction 1800 ML    RD to adjust diet per protocol?  Yes        07/01/22 1429                  Active Medications  Scheduled Meds:  Current Facility-Administered Medications   Medication Dose Route Frequency Provider Last Rate    acetaminophen  650 mg Oral Q6H PRN Nidia Pritchard PA-C      amiodarone  1 mg/min Intravenous Continuous Wilver Goss PA-C 1 mg/min (07/01/22 0803)    aspirin  81 mg Oral Daily Wilver Goss PA-C      cefTRIAXone  2,000 mg Intravenous Q24H Bisi Buitrago DO Stopped (07/01/22 1200)    heparin (porcine)  3-20 Units/kg/hr (Order-Specific) Intravenous Titrated Harshad Phillips MD Stopped (07/01/22 1052)    heparin (porcine)  2,000 Units Intravenous Q1H PRN Harshad Phillips MD      heparin (porcine)  4,000 Units Intravenous Q1H PRN Harshad Phillips MD      insulin lispro  1-5 Units Subcutaneous Q6H [facility] [address]      melatonin  6 mg Oral HS [place] Jane Mery Nunez PA-C      nicotine  7 mg Transdermal Daily Celine Yuli [place], [place]      ondansetron  4 mg Intravenous Q6H PRN E  I  [place], [place]      oxyCODONE  2 5 mg Oral Q4H PRN Swathi Collazo PA-C      Or    oxyCODONE  5 mg Oral Q4H PRN Swathi ROSS Collazo      ticagrelor  90 mg Oral Q12H [address]  I  ollie Baird PA-C       Continuous Infusions:  amiodarone, 1 mg/min, Last Rate: 1 mg/min (07/01/22 0803)  heparin (porcine), 3-20 Units/kg/hr (Order-Specific), Last Rate: Stopped (07/01/22 1052)      PRN Meds:   acetaminophen, 650 mg, Q6H PRN  heparin (porcine), 2,000 Units, Q1H PRN  heparin (porcine), 4,000 Units, Q1H PRN  ondansetron, 4 mg, Q6H PRN  oxyCODONE, 2 5 mg, Q4H PRN   Or  oxyCODONE, 5 mg, Q4H PRN        Invasive Devices Review  Invasive Devices  Report    Central Venous Catheter Line  Duration           CVC Central Lines 06/30/22 Triple Right Internal jugular 1 day          Peripheral Intravenous Line  Duration           Peripheral IV 06/29/22 Right Antecubital 2 days    Peripheral IV 06/30/22 Distal;Left;Ventral (anterior) Forearm 1 day          Line  Duration           IABP 8 0 Fr   50 mL 2 days          Drain  Duration           Urethral Catheter Temperature probe 16 Fr  2 days          Airway  Duration           ETT  8 mm 2 days                ---------------------------------------------------------------------------------------  Advance Directive and Living Will:      Power of :    POLST:    ---------------------------------------------------------------------------------------  Care Time Delivered:   No Critical Care time spent       WANG Roman PA-C

## 2022-07-02 NOTE — PROGRESS NOTES
PROGRESS NOTE - Cardiology  Darryle Canton 58 y o  male MRN: [id number]  Unit/Bed#: [facility] 394-83 Encounter: [id number]  Assessment/Plan   VT-VFib arrest  -currently on amiodarone drip  EP is consulted for evaluation for AICD as secondary prevention  -intra-aortic balloon pump removed yesterday, patient is of pressors from yesterday with stable pressures  Will add low-dose metoprolol    New Cardiomyopathy  - initially on intra-aortic balloon pump and pressors, balloon pump removed yesterday, pressors  From yesterday  Will start on low-dose metoprolol today and add rest of GDMT as pressure permits     CAD  -left heart catheterization with moderate disease  Continue with aspirin, Brilinta  Heparin drip was stopped due to bleeding at IABP site  No need tor resume as patient was on it for 48 hours already  -LFTs improving, can add statin therapy  -I had an extensive discussion with patient about smoking cessation    Aspiration pneumonia  -started on ceftriaxone    Interval History: In short 51-year-old gentleman presented with VFib arrest in setting of what  thought to be STEMI, underwent left heart catheterization that did not reveal acute lesion but demonstrated three-vessel moderate disease with no intervention  Intra-aortic balloon pump was placed during catheterization on 06/20/2022 and removed yesterday on 07/01/2022  Due to bleeding at access side safeguard was applied  Removed today, site is without hematoma, mild bruising, lower extremity pedalis dorsalis pulses intact +2  He has a cardiomyopathy with LVEF of 44% and apical wall motion abnormalities, initially required pressor support but off since yesterday  Patient was extubated yesterday  He is off pressors  Has no complaints today  TELE:  Normal sinus rhythm with heart rate in 70 beats per minute, no events    Review of Systems  ROS as noted above, otherwise 12 point review of systems was performed and is negative       Historical Information Past Medical History:   Diagnosis Date    Bilateral inguinal hernia     Dizziness 02/02/2021    isolated incident of dizziness, sweating & disorientation    Hypertension      Past Surgical History:   Procedure Laterality Date    CARDIAC CATHETERIZATION N/A 6/29/2022    Procedure: Cardiac pci;  Surgeon: Tr Metz MD;  [facility]; Service: Cardiology    CARDIAC CATHETERIZATION N/A 6/29/2022    Procedure: Cardiac iabp; Surgeon: Tr Metz MD;  [facility];   Service: Cardiology    NE REPAIR ING HERNIA,5+Y/O,REDUCIBL Bilateral 2/9/2021    Procedure: INGUINAL HERNIA REPAIR with mesh;  Surgeon: Santo Spain MD;  Location: [facility] MAIN OR;  Service: General    TONSILLECTOMY       Social History     Substance and Sexual Activity   Alcohol Use Not Currently     Social History     Substance and Sexual Activity   Drug Use Not Currently     Social History     Tobacco Use   Smoking Status Current Every Day Smoker    Packs/day: 2 00    Types: Cigarettes   Smokeless Tobacco Never Used     Meds/Allergies   Hospital Medications:   Current Facility-Administered Medications   Medication Dose Route Frequency    acetaminophen (TYLENOL) oral suspension 650 mg  650 mg Oral Q6H PRN    amiodarone (CORDARONE) 900 mg in dextrose 5 % 500 mL infusion  1 mg/min Intravenous Continuous    aspirin chewable tablet 81 mg  81 mg Oral Daily    cefTRIAXone (ROCEPHIN) 2,000 mg in dextrose 5 % 50 mL IVPB  2,000 mg Intravenous Q24H    heparin (porcine) 25,000 units in 0 45% NaCl 250 mL infusion (premix)  3-20 Units/kg/hr (Order-Specific) Intravenous Titrated    heparin (porcine) injection 2,000 Units  2,000 Units Intravenous Q1H PRN    heparin (porcine) injection 4,000 Units  4,000 Units Intravenous Q1H PRN    insulin lispro (HumaLOG) 100 units/mL subcutaneous injection 1-5 Units  1-5 Units Subcutaneous HS    insulin lispro (HumaLOG) 100 units/mL subcutaneous injection 1-6 Units  1-6 Units Subcutaneous TID AC    melatonin tablet 6 mg  6 mg Oral HS    nicotine (NICODERM CQ) 14 mg/24hr TD 24 hr patch 14 mg  14 mg Transdermal Daily    ondansetron (ZOFRAN) injection 4 mg  4 mg Intravenous Q6H PRN    oxyCODONE (ROXICODONE) IR tablet 2 5 mg  2 5 mg Oral Q4H PRN    Or    oxyCODONE (ROXICODONE) IR tablet 5 mg  5 mg Oral Q4H PRN    ticagrelor (BRILINTA) tablet 90 mg  90 mg Oral Q12H [address]     Home Medications:   Medications Prior to Admission   Medication    ibuprofen (MOTRIN) 200 mg tablet    lisinopril (ZESTRIL) 20 mg tablet    tadalafil (CIALIS) 5 MG tablet     No Known Allergies    Objective   Vitals: Blood pressure 126/73, pulse 71, temperature 99 14 °F (37 3 °C), resp  rate (!) 24, height 6' 2" (1 88 m), weight 104 kg (229 lb 4 5 oz), SpO2 93 %  Orthostatic Blood Pressures    Flowsheet Row Most Recent Value   Blood Pressure 126/73 filed at 07/02/2022 1100   Patient Position - Orthostatic VS Lying filed at 07/01/2022 0915          Intake/Output Summary (Last 24 hours) at 7/2/2022 1106  Last data filed at 7/2/2022 1000  Gross per 24 hour   Intake 1296 26 ml   Output 2380 ml   Net -1083 74 ml     Invasive Devices  Report    Peripheral Intravenous Line  Duration           Peripheral IV 06/29/22 Right Antecubital 2 days    Peripheral IV 06/30/22 Distal;Left;Ventral (anterior) Forearm 2 days          Line  Duration           IABP 8 0 Fr   50 mL 2 days          Airway  Duration           ETT  8 mm 2 days              Physical Exam   GEN: NAD, alert and oriented, well appearing  SKIN: dry without significant lesions or rashes  HEENT: NCAT, PERRL, EOMs intact  NECK: No JVD or carotid bruits appreciated  CARDIOVASCULAR: RRR, normal S1, S2 without murmurs, rubs, or gallops appreciated  LUNGS:  Mild expiratory wheezes bilaterally  ABDOMEN: Soft, nontender, nondistended  EXTREMITIES/VASCULAR: perfused without clubbing, cyanosis, or edema b/l, dorsalis pedis pulses +2 bilaterally  PSYCH: Normal mood and affect  NEURO: CN ll-Xll grossly intact    Lab Results: I have personally reviewed pertinent lab results  Results from last 7 days   Lab Units 07/02/22  0509 07/01/22  0415 06/30/22  0432   WBC Thousand/uL 13 31* 16 41* 19 18*   HEMOGLOBIN g/dL 10 0* 11 1* 12 9   HEMATOCRIT % 30 0* 34 0* 39 5   PLATELETS Thousands/uL 126* 154 222     Results from last 7 days   Lab Units 07/02/22  0509 07/01/22  0415 06/30/22  0432 06/30/22  0002 06/29/22  2227   POTASSIUM mmol/L 3 7 4 1 4 2   < >  --    CHLORIDE mmol/L 105 106 104   < >  --    CO2 mmol/L 27 30 26   < >  --    CO2, I-STAT mmol/L  --   --   --   --  22   BUN mg/dL 24 32* 33*   < >  --    CREATININE mg/dL 0 97 1 43* 1 81*   < >  --    GLUCOSE, ISTAT mg/dl  --   --   --   --  275*   CALCIUM mg/dL 8 7 8 3 8 5   < >  --     < > = values in this interval not displayed  Results from last 7 days   Lab Units 07/01/22  0801 07/01/22  0211 06/30/22  1814 06/30/22  1225 06/30/22  0432   INR   --   --   --  1 08 1 06   PTT seconds 51* 60* 57* 28 28     Results from last 7 days   Lab Units 07/02/22  0509 07/01/22  0415 06/30/22  0432   MAGNESIUM mg/dL 2 2 2 2 2 4     Imaging: I have personally reviewed pertinent reports

## 2022-07-02 NOTE — ASSESSMENT & PLAN NOTE
Likely cardiogenic shock in the setting of cardiac arrest requiring multiple shocks    Plan:  · Echo: EF 44% with G1DD, akinesis of apical anterior, apical septal, apical inferior, apical lateral and apex  · Epi weaned off, levophed weaned off   Goal MAP > 65  · IABP d/c'ed 7/1

## 2022-07-02 NOTE — CONSULTS
Electrophysiology-Cardiology (EP)   Diamante Benavides 58 y o  male MRN: [id number]  Unit/Bed#: [facility] 513-01 Encounter: [id number]        IMPRESSION:    1  VT/VF arrest, EF 44% with apical wall motion, had infer ST elevations transiently at time of arrest, which have resolved  likely secondary to ischemia (Has multivessel CAD but diffuse, with  Small vessels  moderate obstructive (50-60% lesions throughout) , smoker  Was active/stressed at work when it happened  No plan for CABG or targets for PCI  Post IABP for cardiogenic shock which has been removed  Seems normal congitively, extubated yesterday  On amiodarone gtt    2  Hypoxia today mild laying flat SPO2 80s  3  Leukocytosis  PLAN:  1  Recommend dual chamber ICD planned for Tuesday, prefer to wait for him to improve a bit more post arrest  Secondary prevention , not revascularized so no indication to wait 40 days/3 months post MI or meds  Very high risk for recurrent cardiac arrest    2  Can d/c amiodarone, would uptitrate betablockers, nitrates etc as much as possible to prevent ischemia in future  3  Discussed w him work restrictions, risks and benefits of ICD and informed him he can no longer [place]  Informed Consent: Risks, benefits, and alternatives to Implantable cardiac device surgery was  discussed with patient and any family present  The patient and/or the patients designated decision maker understands risks, which include but are not limited to life threatening  bleeding, infection, air around lungs, blood around the heart, stroke, open heart surgery, death and reoperation dislodged or malfunctioning device  Reoperation due to device dislodgment is a fairly common complication where more serious complications are rare  Referring Physian: Virgilio Adler MD    Chief Complain/Reason for Referal: Cardiac arrest  HPI:Patel Phan is a 58 y o     Patient Active Problem List    Diagnosis Date Noted    Lactic acidosis 06/30/2022    [facility] 06/29/2022    Hypertension 06/29/2022    Cardiac arrest with ventricular fibrillation ([facility] ) 06/29/2022    PERCY (acute kidney injury) ([facility] ) 06/29/2022    Elevated LFTs 06/29/2022    Shock [facility] ) 06/29/2022    Acute respiratory failure with hypercapnia ([facility] ) 06/29/2022    Hyperglycemia 06/29/2022    Cigarette nicotine dependence without complication 22/00/1879    Bilateral inguinal hernia without obstruction or gangrene 13/01/0792    Umbilical hernia 42/06/8290        57 yo male with HTN, tobacco use  Was working "Very hard" and stressed at work    He had CP for past 3 days and came to ER via ambulance, and arrested in ER received CPR and went to Cath lab for inf PRISCA, not  Stents placed, had diffuse 3 Vessel disease  He was intubated  IABP was placed and subsequently it has been removed and extubated, neurologically intact  No plans for CABG  He is stable on amiodarone gtt                Past Medical History:   Diagnosis Date    Bilateral inguinal hernia     Dizziness 02/02/2021    isolated incident of dizziness, sweating & disorientation    Hypertension        Medications Prior to Admission   Medication    ibuprofen (MOTRIN) 200 mg tablet    lisinopril (ZESTRIL) 20 mg tablet    tadalafil (CIALIS) 5 MG tablet       Scheduled Meds:  Current Facility-Administered Medications   Medication Dose Route Frequency Provider Last Rate    acetaminophen  650 mg Oral Q6H PRN Tono Atkinson PA-C      amiodarone  1 mg/min Intravenous Continuous Trish Petty PA-C 1 mg/min (07/02/22 0155)    aspirin  81 mg Oral Daily Rey Petty PA-C      atorvastatin  40 mg Oral Daily With Dianna Blood PA-C      cefTRIAXone  2,000 mg Intravenous Q24H Trish Petty PA-C Stopped (07/02/22 1000)    heparin (porcine)  5,000 Units Subcutaneous [facility] Rey OCONNELL [place], [place]      insulin lispro  1-5 Units Subcutaneous HS Rey Petty PA-C      insulin lispro  1-6 Units Subcutaneous [facility] Rey Aaron PA-C      melatonin  6 mg Oral HS [place], [place]      metoprolol tartrate  12 5 mg Oral Q12H [address] [place], [place]      nicotine  14 mg Transdermal Daily [place], [place]      ondansetron  4 mg Intravenous Q6H [facility]N [facility] [place]      oxyCODONE  2 5 mg Oral Q4H PRN Lindy Huffman PA-C      [facility] oxyCODONE  5 mg Oral Q4H PRN Lindy Huffman PA-C      ticagrelor  90 mg Oral Q12H [address] Rey Petty PA-C       Continuous Infusions:amiodarone, 1 mg/min, Last Rate: 1 mg/min (07/02/22 0155)      PRN Meds:   acetaminophen    ondansetron    oxyCODONE **OR** oxyCODONE  No Known Allergies  I reviewed the Home Medication list in the chart  Family History   Problem Relation Age of Onset    Diabetes Maternal Grandfather        Social History     Socioeconomic History    Marital status: /Civil Union     Spouse name: Not on file    Number of children: Not on file    Years of education: Not on file    Highest education level: Not on file   Occupational History    Not on file   Tobacco Use    Smoking status: Current Every Day Smoker     Packs/day: 2 00     Types: Cigarettes    Smokeless tobacco: Never Used   Vaping Use    Vaping Use: Never used   Substance and Sexual Activity    Alcohol use: Not Currently    Drug use: Not Currently    Sexual activity: Not on file   Other Topics Concern    Not on file   Social History Narrative    Not on file     Social Determinants of Health     Financial Resource Strain: Not on file   Food Insecurity: No Food Insecurity    Worried About Running Out of Food in the Last Year: Never true    Zackery of Food in the Last Year: Never true   Transportation Needs: No Transportation Needs    Lack of Transportation (Medical): No    Lack of Transportation (Non-Medical):  No   Physical Activity: Not on file   Stress: Not on file   Social Connections: Not on file   Intimate Partner Violence: Not on file   Housing Stability: Low Risk  Unable to Pay for Housing in the Last Year: No    Number of Places Lived in the Last Year: 1    Unstable Housing in the Last Year: No       Review of Systems -12 Point ROS reviewed and are negative or noted in chart except for Pertinent Positives Pertaining to Cardiovascular and Respiratory in HPI above  Vitals:    07/02/22 1200   BP: 153/78   Pulse: 80   Resp: (!) 33   Temp:    SpO2: 91%     Vitals:    07/01/22 0535 07/02/22 0600   Weight: 106 kg (234 lb 5 6 oz) 104 kg (229 lb 4 5 oz)       Intake/Output Summary (Last 24 hours) at 7/2/2022 1218  Last data filed at 7/2/2022 1200  Gross per 24 hour   Intake 1321 03 ml   Output 2540 ml   Net -1218 97 ml         GEN: Now acute distress, Alert and oriented, well appearing laying in bed  HEENT:Head, neck, ears, oral pharynx: Mucus membranes moist, oral pharynx clear, nares clear  External ears normal  EYES: Pupils equal, sclera anicteric, midline, normal conjuctiva  NECK: No JVD, supple, no obvious masses or thryomegaly or goiter  CARDIOVASCULAR: , No murmur, rub, gallops S1,S2  LUNGS: Clear To auscultation bilaterally, normal effort, no rales, rhonchi, crackles  ABDOMEN: Soft, nondistended, nontender, without obvious organomegaly or ascites  EXTREMITIES/VASCULAR: No edema  Radial pulses intact, pedal pulses difficult to palpate, warm an well perfused  PSYCH: Normal Affect, no overt suicidal ideation, linear speech pattern without evidence of psychosis  NEURO: Grossly intact, moving all extremiteis equal, face symmetric, alert and responsive, no obvious focal defecits  HEME: No bleeding, bruising, petechia, purpura  SKIN: No significant rashes, warm, no diaphoresis or pallor       Lab Results:     CBC with diff:   Results from last 7 days   Lab Units 07/02/22  0509 07/01/22  0415 06/30/22  0432 06/29/22  2227 06/29/22  1836 06/29/22  1630 06/29/22  1446   WBC Thousand/uL 13 31* 16 41* 19 18*  --   --   --  11 42*   HEMOGLOBIN g/dL 10 0* 11 1* 12 9  --   -- --  14 9   I STAT HEMOGLOBIN g/dl  --   --   --  14 3  --  13 9  --    HEMATOCRIT % 30 0* 34 0* 39 5  --   --   --  45 1   HEMATOCRIT, ISTAT %  --   --   --  42  --  41  --    MCV fL 92 95 93  --   --   --  92   PLATELETS Thousands/uL 126* 154 222  --  333  --  253   MCH pg 30 5 31 0 30 5  --   --   --  30 4   MCHC g/dL 33 3 32 6 32 7  --   --   --  33 0   RDW % 12 5 12 9 12 5  --   --   --  12 3   MPV fL 9 1 9 3 8 9  --  8 7*  --  8 8*   NRBC AUTO /100 WBCs 0  --  0  --   --   --  0         CMP:  Results from last 7 days   Lab Units 07/02/22  0509 07/01/22 0415 06/30/22  0432 06/30/22  0002 06/29/22  2227 06/29/22  1836 06/29/22  1630 06/29/22  1543   POTASSIUM mmol/L 3 7 4 1 4 2 4 0  --  4 3  --  4 2   CHLORIDE mmol/L 105 106 104 104  --  102  --  96   CO2 mmol/L 27 30 26 25  --  22  --  19*   CO2, I-STAT mmol/L  --   --   --   --  22  --  26  --    BUN mg/dL 24 32* 33* 28*  --  25  --  23   CREATININE mg/dL 0 97 1 43* 1 81* 2 19*  --  1 79*  --  1 56*   GLUCOSE, ISTAT mg/dl  --   --   --   --  275*  --  329*  --    CALCIUM mg/dL 8 7 8 3 8 5 8 6  --  9 1  --  8 8   AST U/L 129*  --   --   --   --  215*  --  121*   ALT U/L 94*  --   --   --   --  281*  --  174*   ALK PHOS U/L 66  --   --   --   --  109  --  80   EGFR ml/min/1 73sq m 83 52 39 31  --  39  --  46         BMP:  Results from last 7 days   Lab Units 07/02/22  0509 07/01/22 0415 06/30/22  0432 06/30/22  0002 06/29/22  2227 06/29/22  1836 06/29/22  1630 06/29/22  1630 06/29/22  1543   POTASSIUM mmol/L 3 7 4 1 4 2 4 0  --  4 3  --   --  4 2   CHLORIDE mmol/L 105 106 104 104  --  102  --   --  96   CO2 mmol/L 27 30 26 25  --  22  --   --  19*   CO2, I-STAT mmol/L  --   --   --   --  22  --   --  26  --    BUN mg/dL 24 32* 33* 28*  --  25  --   --  23   CREATININE mg/dL 0 97 1 43* 1 81* 2 19*  --  1 79*  --   --  1 56*   GLUCOSE, ISTAT mg/dl  --   --   --   --  275*  --    < > 329*  --    CALCIUM mg/dL 8 7 8 3 8 5 8 6  --  9 1  --   --  8 8    < > = values in this interval not displayed  BNP:   Results Reviewed     None        No results for input(s): BNP in the last 72 hours  Magnesium:   Results from last 7 days   Lab Units 07/02/22  0509 07/01/22  0415 06/30/22  0432 06/29/22  1836   MAGNESIUM mg/dL 2 2 2 2 2 4 2 4       Coags:   Results from last 7 days   Lab Units 07/01/22  0801 07/01/22  0211 06/30/22  1814 06/30/22  1225 06/30/22  0432   PTT seconds 51* 60* 57* 28 28   INR   --   --   --  1 08 1 06       TSH:       Lipid Profile:   Results from last 7 days   Lab Units 07/02/22  0509 06/30/22  0432   TRIGLYCERIDES mg/dL 359* 528*   HDL mg/dL  --  22*       Cardiac testing:   No results found for this or any previous visit  No results found for this or any previous visit  No results found for this or any previous visit  No results found for this or any previous visit  EKG/TELE: Intiially EKG shows PRISCA inf leads, subtle     EKG today is NSR w nonspecific st changes, normal QT interval

## 2022-07-03 PROBLEM — J18.9 PNEUMONIA: Status: ACTIVE | Noted: 2022-07-03

## 2022-07-03 LAB
ALBUMIN SERPL BCP-MCNC: 2.8 G/DL (ref 3.5–5)
ALP SERPL-CCNC: 77 U/L (ref 46–116)
ALT SERPL W P-5'-P-CCNC: 73 U/L (ref 12–78)
ANION GAP SERPL CALCULATED.3IONS-SCNC: 7 MMOL/L (ref 4–13)
AST SERPL W P-5'-P-CCNC: 86 U/L (ref 5–45)
BASOPHILS # BLD AUTO: 0.04 THOUSANDS/ΜL (ref 0–0.1)
BASOPHILS NFR BLD AUTO: 0 % (ref 0–1)
BILIRUB SERPL-MCNC: 1.09 MG/DL (ref 0.2–1)
BUN SERPL-MCNC: 24 MG/DL (ref 5–25)
CALCIUM ALBUM COR SERPL-MCNC: 10 MG/DL (ref 8.3–10.1)
CALCIUM SERPL-MCNC: 9 MG/DL (ref 8.3–10.1)
CHLORIDE SERPL-SCNC: 108 MMOL/L (ref 100–108)
CO2 SERPL-SCNC: 23 MMOL/L (ref 21–32)
CREAT SERPL-MCNC: 1.02 MG/DL (ref 0.6–1.3)
EOSINOPHIL # BLD AUTO: 0.15 THOUSAND/ΜL (ref 0–0.61)
EOSINOPHIL NFR BLD AUTO: 1 % (ref 0–6)
ERYTHROCYTE [DISTWIDTH] IN BLOOD BY AUTOMATED COUNT: 12.1 % (ref 11.6–15.1)
GFR SERPL CREATININE-BSD FRML MDRD: 78 ML/MIN/1.73SQ M
GLUCOSE SERPL-MCNC: 103 MG/DL (ref 65–140)
GLUCOSE SERPL-MCNC: 106 MG/DL (ref 65–140)
GLUCOSE SERPL-MCNC: 117 MG/DL (ref 65–140)
HCT VFR BLD AUTO: 33.7 % (ref 36.5–49.3)
HGB BLD-MCNC: 11.1 G/DL (ref 12–17)
IMM GRANULOCYTES # BLD AUTO: 0.06 THOUSAND/UL (ref 0–0.2)
IMM GRANULOCYTES NFR BLD AUTO: 1 % (ref 0–2)
LYMPHOCYTES # BLD AUTO: 1.44 THOUSANDS/ΜL (ref 0.6–4.47)
LYMPHOCYTES NFR BLD AUTO: 12 % (ref 14–44)
MAGNESIUM SERPL-MCNC: 2.3 MG/DL (ref 1.6–2.6)
MCH RBC QN AUTO: 30.2 PG (ref 26.8–34.3)
MCHC RBC AUTO-ENTMCNC: 32.9 G/DL (ref 31.4–37.4)
MCV RBC AUTO: 92 FL (ref 82–98)
MONOCYTES # BLD AUTO: 1.08 THOUSAND/ΜL (ref 0.17–1.22)
MONOCYTES NFR BLD AUTO: 9 % (ref 4–12)
NEUTROPHILS # BLD AUTO: 9.71 THOUSANDS/ΜL (ref 1.85–7.62)
NEUTS SEG NFR BLD AUTO: 77 % (ref 43–75)
NRBC BLD AUTO-RTO: 0 /100 WBCS
PHOSPHATE SERPL-MCNC: 2 MG/DL (ref 2.3–4.1)
PLATELET # BLD AUTO: 196 THOUSANDS/UL (ref 149–390)
PMV BLD AUTO: 9.5 FL (ref 8.9–12.7)
POTASSIUM SERPL-SCNC: 3.9 MMOL/L (ref 3.5–5.3)
PROT SERPL-MCNC: 7.1 G/DL (ref 6.4–8.2)
RBC # BLD AUTO: 3.68 MILLION/UL (ref 3.88–5.62)
SODIUM SERPL-SCNC: 138 MMOL/L (ref 136–145)
WBC # BLD AUTO: 12.48 THOUSAND/UL (ref 4.31–10.16)

## 2022-07-03 PROCEDURE — 99232 SBSQ HOSP IP/OBS MODERATE 35: CPT | Performed by: INTERNAL MEDICINE

## 2022-07-03 PROCEDURE — 83735 ASSAY OF MAGNESIUM: CPT | Performed by: NURSE PRACTITIONER

## 2022-07-03 PROCEDURE — 85025 COMPLETE CBC W/AUTO DIFF WBC: CPT | Performed by: NURSE PRACTITIONER

## 2022-07-03 PROCEDURE — 80053 COMPREHEN METABOLIC PANEL: CPT | Performed by: NURSE PRACTITIONER

## 2022-07-03 PROCEDURE — 84100 ASSAY OF PHOSPHORUS: CPT | Performed by: NURSE PRACTITIONER

## 2022-07-03 PROCEDURE — 82948 REAGENT STRIP/BLOOD GLUCOSE: CPT

## 2022-07-03 RX ORDER — ASPIRIN 81 MG/1
81 TABLET, CHEWABLE ORAL DAILY
Status: DISCONTINUED | OUTPATIENT
Start: 2022-07-03 | End: 2022-07-05 | Stop reason: HOSPADM

## 2022-07-03 RX ADMIN — Medication 12.5 MG: at 21:02

## 2022-07-03 RX ADMIN — HEPARIN SODIUM 5000 UNITS: 5000 INJECTION INTRAVENOUS; SUBCUTANEOUS at 21:02

## 2022-07-03 RX ADMIN — TICAGRELOR 90 MG: 90 TABLET ORAL at 21:02

## 2022-07-03 RX ADMIN — DEXTROSE MONOHYDRATE 2000 MG: 5 INJECTION, SOLUTION INTRAVENOUS at 09:04

## 2022-07-03 RX ADMIN — HEPARIN SODIUM 5000 UNITS: 5000 INJECTION INTRAVENOUS; SUBCUTANEOUS at 06:39

## 2022-07-03 RX ADMIN — Medication 6 MG: at 21:02

## 2022-07-03 RX ADMIN — ATORVASTATIN CALCIUM 40 MG: 40 TABLET, FILM COATED ORAL at 17:06

## 2022-07-03 RX ADMIN — HEPARIN SODIUM 5000 UNITS: 5000 INJECTION INTRAVENOUS; SUBCUTANEOUS at 14:07

## 2022-07-03 RX ADMIN — NICOTINE 14 MG: 14 PATCH, EXTENDED RELEASE TRANSDERMAL at 08:57

## 2022-07-03 RX ADMIN — ASPIRIN 81 MG CHEWABLE TABLET 81 MG: 81 TABLET CHEWABLE at 08:56

## 2022-07-03 RX ADMIN — TICAGRELOR 90 MG: 90 TABLET ORAL at 08:57

## 2022-07-03 RX ADMIN — Medication 12.5 MG: at 08:57

## 2022-07-03 NOTE — ASSESSMENT & PLAN NOTE
Elevated on admission, now down to 1 02  Unknown baseline but estimated currently at baseline around 1

## 2022-07-03 NOTE — PROGRESS NOTES
[address] [facility]  Progress Note - Arlis Stairs 1960, 58 y o  male MRN: [id number]  Unit/Bed#: [facility] 521-01 Encounter: [id number]  Primary Care Provider: Ben Mancera PA-C   Date and time admitted to hospital: 6/29/2022  6:03 PM    STEMI (ST elevation myocardial infarction) [facility])  Assessment & Plan  Patient admitted for cardiac arrest on 6/29, cath with moderate non obstructive disease, no intervention performed  Concern for taotsubo cardiomyopathy patient underwent iabp due to shock sp cardiac arrest  Patient on brillinta and aspirin, completed heparin drip stopped secondary to bleeding from iabp site  Start beta blocker 12 5mg bid    Pneumonia  Assessment & Plan  Secondary to aspiration/cardiac arrest, strep pneumo isolated, continue ceftriaxone for 5-7 day course pending progress  Lactic acidosis  Assessment & Plan  resolved    Hyperglycemia  Assessment & Plan  Continue to monitor    Acute respiratory failure with hypercapnia (HCC)  Assessment & Plan  resolved    Shock ([facility] )  Assessment & Plan  Secondary to cardiac arrest, now resolved weaned off vasopressors    Elevated LFTs  Assessment & Plan  Secondary to cardiac arrest/hypotension  Resolving, trend to normalization    PERCY (acute kidney injury) ([facility] )  Assessment & Plan  Elevated on admission, now down to 1 02  Unknown baseline but estimated currently at baseline around 1      * Cardiac arrest with ventricular fibrillation [facility])  Assessment & Plan  Cardiac arrest with ems on site, 15-20 minutes of cpr with mutliple shocks and achieved rosc  VTE Pharmacologic Prophylaxis: VTE Score: 8 High Risk (Score >/= 5) - Pharmacological DVT Prophylaxis Ordered: heparin  Sequential Compression Devices Ordered  Patient Centered Rounds: I performed bedside rounds with nursing staff today    Discussions with Specialists or Other Care Team Provider: n/a    Education and Discussions with Family / Patient: Patient declined call to   Time Spent for Care: 30 minutes  More than 50% of total time spent on counseling and coordination of care as described above  Current Length of Stay: 4 day(s)  Current Patient Status: Inpatient   Certification Statement: The patient will continue to require additional inpatient hospital stay due to pending icd  Discharge Plan: Anticipate discharge in 48-72 hrs to home with home services  Code Status: Level 1 - Full Code    Subjective:   Patient denies any acute complaints  [place] to leave the hospital but will await icd    Objective:     Vitals:   Temp (24hrs), Av 4 °F (36 9 °C), Min:98 2 °F (36 8 °C), Max:99 °F (37 2 °C)    Temp:  [98 2 °F (36 8 °C)-99 °F (37 2 °C)] 98 5 °F (36 9 °C)  HR:  [73-87] 87  Resp:  [18] 18  BP: (118-152)/(65-94) 152/93  SpO2:  [91 %-98 %] 98 %  Body mass index is 28 31 kg/m²  Input and Output Summary (last 24 hours): Intake/Output Summary (Last 24 hours) at 7/3/2022 1956  Last data filed at 7/3/2022 1707  Gross per 24 hour   Intake 848 ml   Output 375 ml   Net 473 ml       Physical Exam:   Physical Exam  Vitals and nursing note reviewed  Constitutional:       General: He is not in acute distress  Appearance: He is well-developed  He is not ill-appearing or toxic-appearing  HENT:      Head: Normocephalic and atraumatic  Eyes:      General: No scleral icterus  Conjunctiva/sclera: Conjunctivae normal    Cardiovascular:      Rate and Rhythm: Normal rate and regular rhythm  Heart sounds: No murmur heard  No gallop  Pulmonary:      Effort: Pulmonary effort is normal  No respiratory distress  Breath sounds: Normal breath sounds  No stridor  No wheezing, rhonchi or rales  Chest:      Chest wall: No tenderness  Abdominal:      General: There is no distension  Palpations: Abdomen is soft  There is no mass  Tenderness: There is no abdominal tenderness  There is no guarding or rebound        Hernia: No hernia is present  Musculoskeletal:      Cervical back: Neck supple  Skin:     General: Skin is warm and dry  Neurological:      Mental Status: He is alert and oriented to person, place, and time  Additional Data:     Labs:  Results from last 7 days   Lab Units 07/03/22  0638   WBC Thousand/uL 12 48*   HEMOGLOBIN g/dL 11 1*   HEMATOCRIT % 33 7*   PLATELETS Thousands/uL 196   NEUTROS PCT % 77*   LYMPHS PCT % 12*   MONOS PCT % 9   EOS PCT % 1     Results from last 7 days   Lab Units 07/03/22  0638   SODIUM mmol/L 138   POTASSIUM mmol/L 3 9   CHLORIDE mmol/L 108   CO2 mmol/L 23   BUN mg/dL 24   CREATININE mg/dL 1 02   ANION GAP mmol/L 7   CALCIUM mg/dL 9 0   ALBUMIN g/dL 2 8*   TOTAL BILIRUBIN mg/dL 1 09*   ALK PHOS U/L 77   ALT U/L 73   AST U/L 86*   GLUCOSE RANDOM mg/dL 106     Results from last 7 days   Lab Units 06/30/22  1225   INR  1 08     Results from last 7 days   Lab Units 07/03/22  1153 07/03/22  0636 07/02/22  2102 07/02/22  1613 07/02/22  1132 07/02/22  0532 07/01/22  2313 07/01/22  1710 07/01/22  1144 07/01/22  0540 06/30/22  2327 06/30/22  1809   POC GLUCOSE mg/dl 103 117 132 156* 133 111 120 160* 127 120 123 117     Results from last 7 days   Lab Units 06/30/22  0432   HEMOGLOBIN A1C % 5 9*     Results from last 7 days   Lab Units 07/01/22  0415 06/30/22  0502 06/30/22  0315 06/30/22  0002 06/29/22  2204   LACTIC ACID mmol/L  --  2 3* 3 9* 7 2* 8 6*   PROCALCITONIN ng/ml 8 95*  --   --   --   --        Lines/Drains:  Invasive Devices  Report    Peripheral Intravenous Line  Duration           Peripheral IV 07/03/22 Right;Ventral (anterior) Hand <1 day          Line  Duration           IABP 8 0 Fr   50 mL 4 days                  Telemetry:  Telemetry Orders (From admission, onward)             48 Hour Telemetry Monitoring  Continuous x 48 hours        References:    Telemetry Guidelines   Question:  Reason for 48 Hour Telemetry  Answer:  Arrhythmias Requiring Medical Therapy (eg  SVT, Vtach/fib, Bradycardia, Uncontrolled A-fib)                 Telemetry Reviewed: sinus  Indication for Continued Telemetry Use: Awaiting PCI/EP Study/CABG             Imaging: No pertinent imaging reviewed  Recent Cultures (last 7 days):   Results from last 7 days   Lab Units 22  0939   BLOOD CULTURE  No Growth at 48 hrs  No Growth at 48 hrs  SPUTUM CULTURE  2+ Growth of Streptococcus pneumoniae*  Few Colonies of Staphylococcus aureus*   GRAM STAIN RESULT  Rare Epithelial cells per low power field*  3+ Polys*  3+ Gram positive cocci in pairs and chains*       Last 24 Hours Medication List:   Current Facility-Administered Medications   Medication Dose Route Frequency Provider Last Rate    acetaminophen  650 mg Oral Q6H PRN [facility], FAINANP      aspirin  81 mg Oral Daily [facility], FAINANP      atorvastatin  40 mg Oral Daily With [employer], ASHLEY      cefTRIAXone  2,000 mg Intravenous Q24H [facility], ASHLEY Stopped (22 0934)    heparin (porcine)  5,000 Units Subcutaneous [facility] ASHLEY Elizabeth      melatonin  6 mg Oral HS ASHLEY Sharma      metoprolol tartrate  12 5 mg Oral Q12H [address] ASHLEY Elizabeth      nicotine  14 mg Transdermal Daily [facility], ASHLEY      ondansetron  4 mg Intravenous Q6H PRN [facility], ASHLEY      oxyCODONE  2 5 mg Oral Q4H PRN [facility], ASHLEY      Or    oxyCODONE  5 mg Oral Q4H PRN [facility], FAINANP      ticagrelor  90 mg Oral Q12H [address] [place] Ch[place]          Today, Patient Was Seen By: Loreto Roman DO    **Please Note: This note may have been constructed using a voice recognition system  **

## 2022-07-03 NOTE — PROGRESS NOTES
Cardiology Progress note  Unit/Bed#: [facility] 521-01 Encounter: [id number]        Georgiana Valadez 58 y o  male [id number]  Hospital Stay Days: 4    Assessment and Plan      Current Problem List   Principal Problem:    Cardiac arrest with ventricular fibrillation ([facility] 75 )  Active Problems:    STEMI (ST elevation myocardial infarction) ([facility] )    PERCY (acute kidney injury) ([facility] 75 )    Elevated LFTs    Shock ([facility] 75 )    Acute respiratory failure with hypercapnia (HCC)    Hyperglycemia    Lactic acidosis    Assessment/Plan:    1  Vt/Vf arrest   ·  Currently on amiodarone drip  · IABP removed  · Likely secondary to MI with spontaneous lysis of clot  · Will need AICD - scheduled for Tuesday  · Cont  With DAPT  · No need for heparin drip at this time  2   New onset cardiomypathy  · Can uptitrate metoprolol tartrate and switch to succinate prior to d c    · Cont  To monitor on tele  · Will need ICD prior to d c     3  STEMI - with likely spontaneous lysis of thrombos  · Cont  DAPT  · Cont  Metoprolol - up titrate  · Cont atorvastatin  · Smoking cessation  · Consider lisinopril after AICD placement  Subjective     Patient seen and examined  Creatine is 1 02  Hemoglobin is 11 1 Bp elevated today  No complaints  EF is 44%  Objective     Vitals: Temp (24hrs), Av 3 °F (36 8 °C), Min:98 2 °F (36 8 °C), Max:98 4 °F (36 9 °C)  Current: Temperature: 98 2 °F (36 8 °C)  Patient Vitals for the past 24 hrs:   BP Temp Temp src Pulse Resp SpO2 Weight   22 0725 150/94 98 2 °F (36 8 °C) -- 79 -- 96 % --   22 0600 -- -- -- -- -- -- 100 kg (220 lb 7 4 oz)   22 2337 119/66 98 2 °F (36 8 °C) -- 73 18 93 % --   22 2146 118/65 -- -- 84 -- 94 % --   22 2139 118/65 98 4 °F (36 9 °C) Oral 77 18 91 % --   22 1400 123/65 -- -- 72 17 -- --   22 1200 153/78 -- -- 80 (!) 33 91 % --   22 1100 126/73 -- -- 71 (!) 24 93 % --    Body mass index is 28 31 kg/m²    Physical Exam:  Physical Exam  Constitutional:       Appearance: Normal appearance  Cardiovascular:      Rate and Rhythm: Normal rate and regular rhythm  Pulmonary:      Effort: Pulmonary effort is normal    Musculoskeletal:         General: Normal range of motion  Skin:     General: Skin is warm  Neurological:      Mental Status: He is alert  Psychiatric:         Mood and Affect: Mood normal          Invasive Devices  Report    Peripheral Intravenous Line  Duration           Peripheral IV 07/03/22 Right;Ventral (anterior) Hand <1 day          Line  Duration           IABP 8 0 Fr   50 mL 3 days                    Labs:   Results from last 7 days   Lab Units 07/03/22  0638 07/02/22  0509 07/01/22  0415 06/30/22  0432 06/29/22 2227 06/29/22  1836 06/29/22  1630 06/29/22  1446   WBC Thousand/uL 12 48* 13 31* 16 41* 19 18*  --   --   --  11 42*   HEMOGLOBIN g/dL 11 1* 10 0* 11 1* 12 9  --   --   --  14 9   I STAT HEMOGLOBIN g/dl  --   --   --   --  14 3  --  13 9  --    HEMATOCRIT % 33 7* 30 0* 34 0* 39 5  --   --   --  45 1   HEMATOCRIT, ISTAT %  --   --   --   --  42  --  41  --    PLATELETS Thousands/uL 196 126* 154 222  --  333  --  253   NEUTROS PCT % 77* 82*  --  84*  --   --   --  56   MONOS PCT % 9 8  --  9  --   --   --  7      Results from last 7 days   Lab Units 07/03/22  0638 07/02/22  0509 07/01/22  0801 07/01/22  0415 07/01/22  0211 06/30/22  1814 06/30/22  1225 06/30/22  0432 06/30/22  0002 06/29/22  2227 06/29/22  1836 06/29/22  1630 06/29/22  1543   SODIUM mmol/L 138 138  --  137  --   --   --  139 138  --  135*  --  132*   POTASSIUM mmol/L 3 9 3 7  --  4 1  --   --   --  4 2 4 0  --  4 3  --  4 2   CHLORIDE mmol/L 108 105  --  106  --   --   --  104 104  --  102  --  96   CO2 mmol/L 23 27  --  30  --   --   --  26 25  --  22  --  19*   CO2, I-STAT mmol/L  --   --   --   --   --   --   --   --   --  22  --  26  --    BUN mg/dL 24 24  --  32*  --   --   --  33* 28*  --  25  --  23   CREATININE mg/dL 1 02 0 97  -- 1 43*  --   --   --  1 81* 2 19*  --  1 79*  --  1 56*   CALCIUM mg/dL 9 0 8 7  --  8 3  --   --   --  8 5 8 6  --  9 1  --  8 8   ALK PHOS U/L 77 66  --   --   --   --   --   --   --   --  109  --  80   ALT U/L 73 94*  --   --   --   --   --   --   --   --  281*  --  174*   AST U/L 86* 129*  --   --   --   --   --   --   --   --  215*  --  121*   GLUCOSE, ISTAT mg/dl  --   --   --   --   --   --   --   --   --  275*  --  329*  --    MAGNESIUM mg/dL 2 3 2 2  --  2 2  --   --   --  2 4  --   --  2 4  --   --    PHOSPHORUS mg/dL 2 0* 2 2*  --  2 9  --   --   --  3 9  --   --  5 7*  --   --    INR   --   --   --   --   --   --  1 08 1 06  --   --   --   --   --    PTT seconds  --   --  51*  --  60* 57* 28 28  --   --   --   --   --    EGFR ml/min/1 73sq m 78 83  --  52  --   --   --  39 31  --  39  --  46     Results from last 7 days   Lab Units 07/01/22  0801 07/01/22  0211 06/30/22  1814 06/30/22  1225 06/30/22  0432   INR   --   --   --  1 08 1 06   PTT seconds 51* 60* 57* 28 28     Results from last 7 days   Lab Units 06/30/22  0502   LACTIC ACID mmol/L 2 3*         No results found for: PHART, TYR5KJI, PO2ART, QSC1SHA, L7YSYBBC, BEART, SOURCE  No components found for: HIV1X2  No results found for: HAV, HEPAIGM, HEPBIGM, HEPBCAB, HBEAG, HEPCAB  No results found for: SPEP, UPEP   Lab Results   Component Value Date    HGBA1C 5 9 (H) 06/30/2022     No results found for: CHOL   Lab Results   Component Value Date    HDL 22 (L) 06/30/2022      Lab Results   Component Value Date    [address]  06/30/2022      Comment:      Calculated LDL invalid, triglycerides >400 mg/dl      Lab Results   Component Value Date    TRIG 359 (H) 07/02/2022    TRIG 528 (H) 06/30/2022     No components found for: PROCAL      Micro:  Results from last 7 days   Lab Units 07/01/22  0939   BLOOD CULTURE  No Growth at 24 hrs  No Growth at 24 hrs     SPUTUM CULTURE  2+ Growth of Streptococcus pneumoniae*  Few Colonies of Staphylococcus aureus*   GRAM STAIN RESULT  Rare Epithelial cells per low power field*  3+ Polys*  3+ Gram positive cocci in pairs and chains*     Urinalysis:  Lab Results   Component Value Date    ETOH <10 06/29/2022          Invalid input(s): URIBILINOGEN        Intake and Outputs:  I/O       07/01 0701  07/02 0700 07/02 0701 07/03 0700 07/03 0701 07/04 0700    P  O  290 840     I V  (mL/kg) 1145 6 (11) 249 8 (2 5)     NG/GT 50      IV Piggyback 50 50     Total Intake(mL/kg) 1535 6 (14 8) 1139 8 (11 4)     Urine (mL/kg/hr) 2290 (0 9) 1075 (0 4)     Emesis/NG output 100      Stool 0      Total Output 2390 1075     Net -854 4 +64 8            Unmeasured Urine Occurrence  2 x     Unmeasured Stool Occurrence 0 x          Nutrition:  Diet Cardiovascular; Cardiac; Fluid Restriction 1800 ML  Radiology Results:   XR chest portable ICU   Final Result by Jas Gaitan MD (07/03 1027)   No worsening seen      The proximal hole of the feeding tube lies in the lower esophagus above the diaphragm, the tip of the feeding tube likely lies above the diaphragm  Advancement of the feeding tube by 8- 9 cm may be considered with follow-up radiograph   The study was marked in EPIC for immediate notification  Workstation performed: [id number]         XR chest portable ICU   Final Result by Dre Rosario MD (06/30 1632)   Right internal jugular catheter tip in the superior vena cava  No pneumothorax  Workstation performed: [id number]         STAT CXR ICU   Final Result by Jeremi Ragsdale MD (06/30 7559)      Persistent malpositioning of the enteric tube  Repositioning is advised  Endotracheal tube is in satisfactory position  The study was marked in [facility] for immediate notification              Workstation performed: [id number]           Scheduled Medications:  aspirin, 81 mg, Daily  atorvastatin, 40 mg, Daily With Dinner  cefTRIAXone, 2,000 mg, Q24H  heparin (porcine), 5,000 Units, Q8H [facility]  insulin lispro, 1-5 Units, HS  insulin lispro, 1-6 Units, TID AC  melatonin, 6 mg, HS  metoprolol tartrate, 12 5 mg, Q12H LORENA  nicotine, 14 mg, Daily  ticagrelor, 90 mg, Q12H [facility]      PRN MEDS:  acetaminophen, 650 mg, Q6H PRN  ondansetron, 4 mg, Q6H PRN  oxyCODONE, 2 5 mg, Q4H PRN   Or  oxyCODONE, 5 mg, Q4H PRN      Last 24 Hour Meds: :   Medication Administration - last 24 hours from 07/02/2022 1043 to 07/03/2022 1043       Date/Time Order Dose Route Action Action by     07/02/2022 1330 amiodarone (CORDARONE) 900 mg in dextrose 5 % 500 mL infusion 0 mg/min Intravenous Stopped Parviz Palma RN     07/03/2022 6661 ticagrelor (BRILINTA) tablet 90 mg 90 mg Oral Given Fidel Schaefer RN     07/02/2022 2138 ticagrelor (BRILINTA) tablet 90 mg 90 mg Oral Given Gisele Pavon RN     07/02/2022 1132 heparin (porcine) 25,000 units in 0 45% NaCl 250 mL infusion (premix) 0 Units/kg/hr Intravenous Stopped Parviz Palma RN     07/03/2022 7586 cefTRIAXone (ROCEPHIN) 2,000 mg in dextrose 5 % 50 mL IVPB 2,000 mg Intravenous New Bag Fidel Schaefer RN     07/02/2022 1546 acetaminophen (TYLENOL) oral suspension 650 mg 650 mg Oral Given Gisele Pavon RN     07/02/2022 1255 oxyCODONE (ROXICODONE) IR tablet 2 5 mg   Oral See Alternative Parviz Palma RN     07/02/2022 1255 oxyCODONE (ROXICODONE) IR tablet 5 mg 5 mg Oral Given Parviz Palma RN     07/02/2022 2138 melatonin tablet 6 mg 6 mg Oral Given Gisele Pavon RN     07/03/2022 0857 nicotine (NICODERM CQ) 14 mg/24hr TD 24 hr patch 14 mg 14 mg Transdermal Medication Applied Fidel Schaefer RN     07/03/2022 0856 nicotine (NICODERM CQ) 14 mg/24hr TD 24 hr patch 14 mg 14 mg Transdermal Patch Removed Fidel Schaefer RN     07/03/2022 0644 insulin lispro (HumaLOG) 100 units/mL subcutaneous injection 1-6 Units 0 Units Subcutaneous Hold Fidel Schaefer RN     07/02/2022 1807 insulin lispro (HumaLOG) 100 units/mL subcutaneous injection 1-6 Units 1 Units Subcutaneous Not Given Panagiota IBETH Pickard     07/02/2022 1133 insulin lispro (HumaLOG) 100 units/mL subcutaneous injection 1-6 Units 1 Units Subcutaneous Not Given Janis Angelucci, RN     07/02/2022 2129 insulin lispro (HumaLOG) 100 units/mL subcutaneous injection 1-5 Units 1 Units Subcutaneous Not Given Shawnee Maza RN     07/02/2022 1547 atorvastatin (LIPITOR) tablet 40 mg 40 mg Oral Given Shawnee Maza RN     07/03/2022 0857 metoprolol tartrate (LOPRESSOR) partial tablet 12 5 mg 12 5 mg Oral Given Bladimir Huerta RN     07/02/2022 2138 metoprolol tartrate (LOPRESSOR) partial tablet 12 5 mg 12 5 mg Oral Given Shawnee Maza RN     07/02/2022 1140 metoprolol tartrate (LOPRESSOR) partial tablet 12 5 mg 12 5 mg Oral Given Janis Angelucci, RN     07/03/2022 0210 heparin (porcine) subcutaneous injection 5,000 Units 5,000 Units Subcutaneous Given Bladimir Huerta RN     07/02/2022 2138 heparin (porcine) subcutaneous injection 5,000 Units 5,000 Units Subcutaneous Given Shawnee Maza RN     07/02/2022 1330 heparin (porcine) subcutaneous injection 5,000 Units 5,000 Units Subcutaneous Given Janis Angelucci, RN     07/03/2022 4088 aspirin chewable tablet 81 mg   Oral MAR Unhold ASHLEY Ye     07/02/2022 1437 aspirin chewable tablet 81 mg   Oral MAR Hold Automatic Transfer Provider     07/02/2022 1136 aspirin chewable tablet 81 mg 81 mg Oral Not Given Janis Angelucci, RN     07/03/2022 1620 aspirin chewable tablet 81 mg 81 mg Oral Given Bladimir Huerta RN          PLEASE NOTE:  This encounter was completed utilizing the [employer] Direct Speech Voice Recognition Software  Grammatical errors, random word insertions, pronoun errors and incomplete sentences are occasional consequences of the system due to software limitations, ambient noise and hardware issues  These may be missed by proof reading prior to affixing electronic signature   Any questions or concerns about the content, text or information contained within the body of this dictation should be directly addressed to the physician for clarification  Please do not hesitate to call me directly if you have any any questions or concerns

## 2022-07-03 NOTE — ASSESSMENT & PLAN NOTE
Secondary to aspiration/cardiac arrest, strep pneumo isolated, continue ceftriaxone for 5-7 day course pending progress

## 2022-07-03 NOTE — ASSESSMENT & PLAN NOTE
Patient admitted for cardiac arrest on 6/29, cath with moderate non obstructive disease, no intervention performed  Concern for taotsubo cardiomyopathy patient underwent iabp due to shock sp cardiac arrest  Patient on brillinta and aspirin, completed heparin drip stopped secondary to bleeding from iabp site    Start beta blocker 12 5mg bid

## 2022-07-04 LAB
ALBUMIN SERPL BCP-MCNC: 3 G/DL (ref 3.5–5)
ALP SERPL-CCNC: 90 U/L (ref 46–116)
ALT SERPL W P-5'-P-CCNC: 70 U/L (ref 12–78)
ANION GAP SERPL CALCULATED.3IONS-SCNC: 7 MMOL/L (ref 4–13)
AST SERPL W P-5'-P-CCNC: 59 U/L (ref 5–45)
BACTERIA SPT RESP CULT: ABNORMAL
BASOPHILS # BLD AUTO: 0.06 THOUSANDS/ΜL (ref 0–0.1)
BASOPHILS NFR BLD AUTO: 1 % (ref 0–1)
BILIRUB SERPL-MCNC: 0.86 MG/DL (ref 0.2–1)
BUN SERPL-MCNC: 21 MG/DL (ref 5–25)
CALCIUM ALBUM COR SERPL-MCNC: 9.8 MG/DL (ref 8.3–10.1)
CALCIUM SERPL-MCNC: 9 MG/DL (ref 8.3–10.1)
CHLORIDE SERPL-SCNC: 107 MMOL/L (ref 100–108)
CO2 SERPL-SCNC: 24 MMOL/L (ref 21–32)
CREAT SERPL-MCNC: 0.93 MG/DL (ref 0.6–1.3)
EOSINOPHIL # BLD AUTO: 0.23 THOUSAND/ΜL (ref 0–0.61)
EOSINOPHIL NFR BLD AUTO: 2 % (ref 0–6)
ERYTHROCYTE [DISTWIDTH] IN BLOOD BY AUTOMATED COUNT: 12.3 % (ref 11.6–15.1)
GFR SERPL CREATININE-BSD FRML MDRD: 87 ML/MIN/1.73SQ M
GLUCOSE SERPL-MCNC: 110 MG/DL (ref 65–140)
GRAM STN SPEC: ABNORMAL
HCT VFR BLD AUTO: 34.9 % (ref 36.5–49.3)
HGB BLD-MCNC: 11.7 G/DL (ref 12–17)
IMM GRANULOCYTES # BLD AUTO: 0.1 THOUSAND/UL (ref 0–0.2)
IMM GRANULOCYTES NFR BLD AUTO: 1 % (ref 0–2)
LYMPHOCYTES # BLD AUTO: 1.86 THOUSANDS/ΜL (ref 0.6–4.47)
LYMPHOCYTES NFR BLD AUTO: 18 % (ref 14–44)
MCH RBC QN AUTO: 30.1 PG (ref 26.8–34.3)
MCHC RBC AUTO-ENTMCNC: 33.5 G/DL (ref 31.4–37.4)
MCV RBC AUTO: 90 FL (ref 82–98)
MONOCYTES # BLD AUTO: 1.09 THOUSAND/ΜL (ref 0.17–1.22)
MONOCYTES NFR BLD AUTO: 10 % (ref 4–12)
NEUTROPHILS # BLD AUTO: 7.23 THOUSANDS/ΜL (ref 1.85–7.62)
NEUTS SEG NFR BLD AUTO: 68 % (ref 43–75)
NRBC BLD AUTO-RTO: 0 /100 WBCS
PLATELET # BLD AUTO: 216 THOUSANDS/UL (ref 149–390)
PMV BLD AUTO: 9.5 FL (ref 8.9–12.7)
POTASSIUM SERPL-SCNC: 3.4 MMOL/L (ref 3.5–5.3)
PROT SERPL-MCNC: 7.2 G/DL (ref 6.4–8.2)
RBC # BLD AUTO: 3.89 MILLION/UL (ref 3.88–5.62)
SODIUM SERPL-SCNC: 138 MMOL/L (ref 136–145)
WBC # BLD AUTO: 10.57 THOUSAND/UL (ref 4.31–10.16)

## 2022-07-04 PROCEDURE — 99233 SBSQ HOSP IP/OBS HIGH 50: CPT | Performed by: PHYSICIAN ASSISTANT

## 2022-07-04 PROCEDURE — 99232 SBSQ HOSP IP/OBS MODERATE 35: CPT | Performed by: INTERNAL MEDICINE

## 2022-07-04 PROCEDURE — 85025 COMPLETE CBC W/AUTO DIFF WBC: CPT | Performed by: INTERNAL MEDICINE

## 2022-07-04 PROCEDURE — 80053 COMPREHEN METABOLIC PANEL: CPT | Performed by: INTERNAL MEDICINE

## 2022-07-04 RX ORDER — AMOXICILLIN 250 MG
1 CAPSULE ORAL
Status: DISCONTINUED | OUTPATIENT
Start: 2022-07-04 | End: 2022-07-05 | Stop reason: HOSPADM

## 2022-07-04 RX ORDER — POTASSIUM CHLORIDE 20 MEQ/1
20 TABLET, EXTENDED RELEASE ORAL ONCE
Status: COMPLETED | OUTPATIENT
Start: 2022-07-04 | End: 2022-07-04

## 2022-07-04 RX ORDER — VANCOMYCIN HYDROCHLORIDE 1 G/200ML
1000 INJECTION, SOLUTION INTRAVENOUS ONCE
Status: COMPLETED | OUTPATIENT
Start: 2022-07-05 | End: 2022-07-05

## 2022-07-04 RX ORDER — POTASSIUM CHLORIDE 20 MEQ/1
40 TABLET, EXTENDED RELEASE ORAL ONCE
Status: COMPLETED | OUTPATIENT
Start: 2022-07-04 | End: 2022-07-04

## 2022-07-04 RX ORDER — POLYETHYLENE GLYCOL 3350 17 G/17G
17 POWDER, FOR SOLUTION ORAL DAILY
Status: DISCONTINUED | OUTPATIENT
Start: 2022-07-04 | End: 2022-07-05 | Stop reason: HOSPADM

## 2022-07-04 RX ADMIN — SENNOSIDES AND DOCUSATE SODIUM 1 TABLET: 50; 8.6 TABLET ORAL at 20:22

## 2022-07-04 RX ADMIN — POTASSIUM CHLORIDE 20 MEQ: 1500 TABLET, EXTENDED RELEASE ORAL at 08:47

## 2022-07-04 RX ADMIN — DEXTROSE MONOHYDRATE 2000 MG: 5 INJECTION, SOLUTION INTRAVENOUS at 09:46

## 2022-07-04 RX ADMIN — HEPARIN SODIUM 5000 UNITS: 5000 INJECTION INTRAVENOUS; SUBCUTANEOUS at 21:46

## 2022-07-04 RX ADMIN — Medication 12.5 MG: at 08:44

## 2022-07-04 RX ADMIN — TICAGRELOR 90 MG: 90 TABLET ORAL at 20:22

## 2022-07-04 RX ADMIN — HEPARIN SODIUM 5000 UNITS: 5000 INJECTION INTRAVENOUS; SUBCUTANEOUS at 14:25

## 2022-07-04 RX ADMIN — Medication 12.5 MG: at 20:22

## 2022-07-04 RX ADMIN — ATORVASTATIN CALCIUM 40 MG: 40 TABLET, FILM COATED ORAL at 15:49

## 2022-07-04 RX ADMIN — Medication 6 MG: at 20:22

## 2022-07-04 RX ADMIN — POTASSIUM CHLORIDE 40 MEQ: 1500 TABLET, EXTENDED RELEASE ORAL at 08:44

## 2022-07-04 RX ADMIN — TICAGRELOR 90 MG: 90 TABLET ORAL at 08:44

## 2022-07-04 RX ADMIN — HEPARIN SODIUM 5000 UNITS: 5000 INJECTION INTRAVENOUS; SUBCUTANEOUS at 05:02

## 2022-07-04 RX ADMIN — POLYETHYLENE GLYCOL 3350 17 G: 17 POWDER, FOR SOLUTION ORAL at 10:47

## 2022-07-04 RX ADMIN — NICOTINE 14 MG: 14 PATCH, EXTENDED RELEASE TRANSDERMAL at 08:45

## 2022-07-04 RX ADMIN — ASPIRIN 81 MG CHEWABLE TABLET 81 MG: 81 TABLET CHEWABLE at 08:44

## 2022-07-04 NOTE — ASSESSMENT & PLAN NOTE
Cardiac arrest with ems on site, 15-20 minutes of cpr with mutliple shocks and achieved rosc     Now pending ICD tomorrow, NPO midnight

## 2022-07-04 NOTE — ASSESSMENT & PLAN NOTE
Elevated on admission, now down to 0 93  Unknown baseline but estimated currently at baseline around 1  Potassium supplemented for low level 3 4 this morning, goal above 4

## 2022-07-04 NOTE — PROGRESS NOTES
Progress Note - Electrophysiology-Cardiology (EP)   Shannan Crockett 58 y o  male MRN: [id number]  Unit/Bed#: [facility] 521-01 Encounter: [id number]      Assessment/Plan:  1  VT/VF arrest  a  Maintained on amiodarone drip  b  IABP removed 7/1  c  Likely secondary to MI with spontaneous lysis of clot  d  Up titrate beta blockers and nitrates as able  e  Maintained on Lopressor 12 5 mg BID  2  New onset cardiomyopathy  a  EF 44% and mildly abnormal diastolic function  3  STEMI  a  Maintained on DAPT - Aspirin 81 mg QD and rilinta 90 mg BID  b  Maintained on Lipitor 40 mg QD  c  Cath shows MV CAD but diffuse with small vessels and moderate obstruction throughtou (50-60% lesions)  d  No plan for revascularization      Plan for dual chamber ICD for secondary prevention on Tuesday  Patient will be NPO at midnight  Antibiotics will be given in the EP lab  Will replete potassium of 3 4 today  Subjective/Objective     Subjective: Upon evaluation, patient is resting comfotably in bed  Safety regarding ICD and ARC welding was discussed with the patient by [employer] Rep  Patient denies chest pain/heaviness/tightness/pressure, palpitations, shortness of breath, orthopnea, lightheadedness, presyncope, syncope or N/V  Telemetry review shows NSR          Objective:     Vitals: /88   Pulse 74   Temp 98 5 °F (36 9 °C)   Resp 20   Ht 6' 2" (1 88 m)   Wt 99 2 kg (218 lb 11 1 oz)   SpO2 97%   BMI 28 08 kg/m²   Vitals:    07/04/22 0558 07/04/22 0600   Weight: 99 2 kg (218 lb 11 1 oz) 99 2 kg (218 lb 11 1 oz)     Orthostatic Blood Pressures    Flowsheet Row Most Recent Value   Blood Pressure 140/88 filed at 07/04/2022 3954   Patient Position - Orthostatic VS Lying filed at 07/02/2022 2139            Intake/Output Summary (Last 24 hours) at 7/4/2022 0744  Last data filed at 7/3/2022 1707  Gross per 24 hour   Intake 250 ml   Output 375 ml   Net -125 ml       Invasive Devices  Report    Peripheral Intravenous Line  Duration Peripheral IV 07/03/22 Right;Ventral (anterior) Hand 1 day          Line  Duration           IABP 8 0 Fr  50 mL 4 days                            Scheduled Meds:  Current Facility-Administered Medications   Medication Dose Route Frequency Provider Last Rate    acetaminophen  650 mg Oral Q6H PRN Vara Cordial, CRNP      aspirin  81 mg Oral Daily Vara Cordial, CRNP      atorvastatin  40 mg Oral Daily With [employer], ASHLEY      cefTRIAXone  2,000 mg Intravenous Q24H Vara Cordial, CRNP Stopped (07/03/22 0934)    heparin (porcine)  5,000 Units Subcutaneous [facility] ASHLEY Elizabeth      melatonin  6 mg Oral HS Vara Cordial, ASHLEY      metoprolol tartrate  12 5 mg Oral Q12H [facility] ASHLEY Elizabeth      nicotine  14 mg Transdermal Daily Vara Cordial, ASHLEY      ondansetron  4 mg Intravenous Q6H PRN Vara Cordial, ASHLEY      oxyCODONE  2 5 mg Oral Q4H PRN Vara Cordial, ASHLEY      Or    oxyCODONE  5 mg Oral Q4H PRN Vara Cordial, ASHLEY      ticagrelor  90 mg Oral Q12H [facility] ASHLEY Sharma       Continuous Infusions:   PRN Meds:   acetaminophen    ondansetron    oxyCODONE **OR** oxyCODONE    Review of Systems:  ROS as noted above, otherwise 12 point review of systems was performed and is negative  Physical Exam:   Physical Exam  Vitals reviewed  Constitutional:       General: He is not in acute distress  Appearance: He is not ill-appearing or diaphoretic  HENT:      Head: Normocephalic and atraumatic  Right Ear: External ear normal       Left Ear: External ear normal       Nose: Nose normal    Eyes:      General:         Right eye: No discharge  Left eye: No discharge  Cardiovascular:      Rate and Rhythm: Normal rate and regular rhythm  Heart sounds: No murmur heard  No friction rub  Pulmonary:      Effort: Pulmonary effort is normal       Breath sounds: Normal breath sounds  No wheezing, rhonchi or rales     Abdominal:      General: There is no distension  Palpations: Abdomen is soft  Tenderness: There is no abdominal tenderness  Musculoskeletal:         General: No deformity or signs of injury  Cervical back: No rigidity  No muscular tenderness  Right lower leg: No edema  Left lower leg: No edema  Skin:     General: Skin is warm and dry  Capillary Refill: Capillary refill takes less than 2 seconds  Coloration: Skin is not jaundiced or pale  Neurological:      General: No focal deficit present  Mental Status: He is alert and oriented to person, place, and time  Mental status is at baseline  Psychiatric:         Mood and Affect: Mood normal          Behavior: Behavior normal          Thought Content: Thought content normal                      Lab Results: I have personally reviewed pertinent lab results  Results from last 7 days   Lab Units 07/04/22  0451 07/03/22  0638 07/02/22  0509   WBC Thousand/uL 10 57* 12 48* 13 31*   HEMOGLOBIN g/dL 11 7* 11 1* 10 0*   HEMATOCRIT % 34 9* 33 7* 30 0*   PLATELETS Thousands/uL 216 196 126*     Results from last 7 days   Lab Units 07/04/22  0451 07/03/22  7230 07/02/22  0509 06/30/22  0002 06/29/22  2227   POTASSIUM mmol/L 3 4* 3 9 3 7   < >  --    CHLORIDE mmol/L 107 108 105   < >  --    CO2 mmol/L 24 23 27   < >  --    CO2, I-STAT mmol/L  --   --   --   --  22   BUN mg/dL 21 24 24   < >  --    CREATININE mg/dL 0 93 1 02 0 97   < >  --    GLUCOSE, ISTAT mg/dl  --   --   --   --  275*   CALCIUM mg/dL 9 0 9 0 8 7   < >  --     < > = values in this interval not displayed       Results from last 7 days   Lab Units 07/01/22  0801 07/01/22  0211 06/30/22  1814 06/30/22  1225 06/30/22  0432   INR   --   --   --  1 08 1 06   PTT seconds 51* 60* 57* 28 28     Results from last 7 days   Lab Units 07/03/22  0638 07/02/22  0509 07/01/22  0415   MAGNESIUM mg/dL 2 3 2 2 2 2         Imaging: I have personally reviewed pertinent films in PACS  ECHO: 6/29/22     Left Ventricle: Left ventricular cavity size is normal  Wall thickness is mildly increased  The left ventricular ejection fraction is 44%  Systolic function is moderately reduced  Global longitudinal strain is reduced at -13%  Diastolic function is mildly abnormal, consistent with grade I (abnormal) relaxation  Left atrial filling pressure is normal     The following segments are akinetic: apical anterior, apical septal, apical inferior, apical lateral and apex    All other segments are normal     Aortic Valve: The aortic valve velocity is normal  Cardiac output is approximately 5 L/min    Tricuspid Valve: There is mild regurgitation  The right ventricular systolic pressure is normal  The estimated right ventricular systolic pressure is 03 96 mmHg    IVC/SVC: The right atrial pressure is estimated at 3 0 mmHg  The inferior vena cava is normal in size  Respirophasic changes were normal       CARDIAC CATH: 6/29/22  · Mid LAD lesion is 50% stenosed  · Dist LAD lesion is 70% stenosed  · 1st Diag lesion is 60% stenosed  · 2nd Diag lesion is 60% stenosed  · 1st Mrg lesion is 50% stenosed  · 3rd Mrg lesion is 99% stenosed  · Mid RCA lesion is 60% stenosed  · 3rd RPL lesion is 60% stenosed  · There is mild left ventricular systolic dysfunction            VTE Pharmacologic Prophylaxis: Aspirin and Brilinta  VTE Mechanical Prophylaxis: sequential compression device

## 2022-07-04 NOTE — PLAN OF CARE
Problem: SAFETY,RESTRAINT: NV/NON-SELF DESTRUCTIVE BEHAVIOR  Goal: Returns to optimal restraint-free functioning  Description: INTERVENTIONS:  - Assess the patient's behavior and symptoms that indicate continued need for restraint  - Identify and implement measures to help patient regain control  - Assess readiness for release of restraint   Outcome: Completed

## 2022-07-04 NOTE — ASSESSMENT & PLAN NOTE
Patient admitted for cardiac arrest on 6/29, cath with moderate non obstructive disease, no intervention performed  Concern for taotsubo cardiomyopathy patient underwent iabp due to shock sp cardiac arrest  Patient on brillinta and aspirin, completed heparin drip stopped secondary to bleeding from iabp site    Continue beta blocker 12 5mg bid  Patient denies any chest pain, shortness in breath, ambulating without difficulty around the halls  Currently pending ICD placement tomorrow

## 2022-07-04 NOTE — PROGRESS NOTES
Cardiology Progress Note - Akshat Bahena 58 y o  male MRN: [id number]    Unit/Bed#: [facility] 521-01 Encounter: [id number]      Assessment:  Principal Problem:    Card[facility] arrest [facility] ([facility] )  Active Problems:    STEMI (ST elevation myocardial infarction) ([facility] )    PERCY (acute kidney injury) ([facility] )    Elevated LFTs    Shock ([facility] )    Acute respiratory failure with hypercapnia ([email or web address] )    Hyperglycemia    Lactic acidosis    Pneumonia      Plan:  Patient is comfortable this morning  He has no chest pain or significant dyspnea  He had no issues overnight  He is in sinus rhythm on telemetry  Electrophysiology note appreciated  Patient for device placement tomorrow  BMP today with potassium of 3 4 and creatinine of 0 93  Potassium supplemented  Hemoglobin 11 7  Will continue current cardiac regimen  Subjective:   Patient seen and examined  No significant events overnight   negative  Objective:     Vitals: Blood pressure 140/88, pulse 74, temperature 98 5 °F (36 9 °C), temperature source Oral, resp  rate 20, height 6' 2" (1 88 m), weight 99 2 kg (218 lb 11 1 oz), SpO2 97 %  , Body mass index is 28 08 kg/m² ,   Orthostatic Blood Pressures    Flowsheet Row Most Recent Value   Blood Pressure 140/88 filed at 07/04/2022 0729   Patient Position - Orthostatic VS Lying filed at 07/04/2022 0729      ,      Intake/Output Summary (Last 24 hours) at 7/4/2022 1056  Last data filed at 7/3/2022 1707  Gross per 24 hour   Intake 200 ml   Output 375 ml   Net -175 ml       No significant arrhythmias seen on telemetry review         Physical Exam:    GEN: Akshat Bahena appears well, alert and oriented x 3, pleasant and cooperative   NECK: supple, no carotid bruits, no JVD or HJR  HEART: normal rate, regular rhythm, normal S1 and S2, no murmurs, clicks, gallops or rubs   LUNGS: clear to auscultation bilaterally; no wheezes, rales, or rhonchi   ABDOMEN: normal bowel sounds, soft, no tenderness, no distention  EXTREMITIES: peripheral pulses normal; no clubbing, cyanosis, or edema  SKIN: warm and well perfused, no suspicious lesions on exposed skin    Labs & Results:    No results displayed because visit has over 200 results  XR chest 1 view portable    Result Date: 6/29/2022  Narrative: CHEST INDICATION:   post intubation  COMPARISON:  1/29/2021 EXAM PERFORMED/VIEWS:  XR CHEST PORTABLE FINDINGS: Endotracheal tube approximately 4 5 cm above the sondra  Enteric tube approximately 5 2 cm below the diaphragm, likely just beyond the GE junction  Sidehole in the distal esophagus  Proximal tube is coiled in the neck, likely proximal esophagus  Low, symmetric lung volumes  Lungs are clear  No effusion or pneumothorax  Heart, mediastinal and hilar structures are within normal limits for age and technique  No acute osseous or soft tissue pathology  The study was marked in [facility] for immediate notification  The patient is currently in the Cath Lab  Impression: Recommend repositioning of enteric tube  Needs to be advanced several centimeters and proximal portion appears coiled in the proximal esophagus  Low lung volumes  Otherwise no acute cardiopulmonary findings  Workstation performed: [id number]     Cardiac catheterization    Result Date: 6/29/2022  Narrative: · Mid LAD lesion is 50% stenosed  · Dist LAD lesion is 70% stenosed  · 1st Diag lesion is 60% stenosed  · 2nd Diag lesion is 60% stenosed  · 1st Mrg lesion is 50% stenosed  · 3rd Mrg lesion is 99% stenosed  · Mid RCA lesion is 60% stenosed  · 3rd RPL lesion is 60% stenosed  · There is mild left ventricular systolic dysfunction  Moderate 3v CAD ? vasospasm +/- Takotsubo     XR chest portable ICU    Result Date: 7/3/2022  Narrative: CHEST INDICATION:   Eval for PNA   COMPARISON:  June 30, 2022 EXAM PERFORMED/VIEWS:  XR CHEST PORTABLE ICU FINDINGS:  Tubes and lines remain unchanged Heart and mediastinum remain unchanged Cardiomegaly seen No acute consolidation Hypoinflated lungs The proximal hole of the feeding tube lies in the lower esophagus  The tip of the feeding tube probably lies above the diaphragm Osseous structures appear within normal limits for patient age  Impression: No worsening seen The proximal hole of the feeding tube lies in the lower esophagus above the diaphragm, the tip of the feeding tube likely lies above the diaphragm  Advancement of the feeding tube by 8- 9 cm may be considered with follow-up radiograph The study was marked in [facility] for immediate notification  Workstation performed: [id number]     XR chest portable ICU    Result Date: 6/30/2022  Narrative: CHEST INDICATION:   RIJ Central Line Placement  COMPARISON:  Chest x-ray previous day EXAM PERFORMED/VIEWS:  XR CHEST PORTABLE ICU FINDINGS: Endotracheal tube terminates 3 cm above the sondra  Feeding tube extends below the field of view  Right internal jugular catheter has been placed with the tip in the superior vena cava  There is no pneumothorax  Intra-aortic balloon pump terminates at the level of the aortic knob  Cardiomediastinal silhouette appears unremarkable  Interstitial opacities are seen reflecting edema  No pneumothorax or pleural effusion  Osseous structures appear within normal limits for patient age  Impression: Right internal jugular catheter tip in the superior vena cava  No pneumothorax  Workstation performed: [id number]     STAT CXR ICU    Result Date: 6/30/2022  Narrative: CHEST INDICATION:   ETT position s/p transfer  COMPARISON:  Chest radiograph June 29, 2022  Correlation with CT abdomen pelvis August 11, 2021 EXAM PERFORMED/VIEWS:  XR CHEST PORTABLE ICU FINDINGS:  Tip of endotracheal tube is 4 9 cm above the sondra  Enteric tube is similar in position to prior radiograph with tip at the expected position of the gastroesophageal junction and sidehole above the diaphragm    Proximal tube is coiled in the neck, likely within the proximal esophagus, and also similar to prior study  Cardiomediastinal silhouette appears unremarkable  The lungs are clear  No pneumothorax or pleural effusion  Osseous structures appear within normal limits for patient age  Impression: Persistent malpositioning of the enteric tube  Repositioning is advised  Endotracheal tube is in satisfactory position  The study was marked in [facility] for immediate notification  Workstation performed: [id number]     [address] bedside procedure    Result Date: 7/1/2022  Narrative: 1 2 840 [id number] 2 323 [id number] 2     bedside procedure    Result Date: 6/29/2022  Narrative: 1 2 840 [id number]  2 446 161 [id number] 174 1    Echo complete w/ contrast if indicated    Result Date: 6/30/2022  Narrative: [facility]  Left Ventricle: Left ventricular cavity size is normal  Wall thickness is mildly increased  The left ventricular ejection fraction is 44%  Systolic function is moderately reduced  Global longitudinal strain is reduced at -13%  Diastolic function is mildly abnormal, consistent with grade I (abnormal) relaxation  Left atrial filling pressure is normal    The following segments are akinetic: apical anterior, apical septal, apical inferior, apical lateral and apex    All other segments are normal    Aortic Valve: The aortic valve velocity is normal  Cardiac output is approximately 5 L/min    Tricuspid Valve: There is mild regurgitation  The right ventricular systolic pressure is normal  The estimated right ventricular systolic pressure is 86 62 mmHg    IVC/SVC: The right atrial pressure is estimated at 3 0 mmHg  The inferior vena cava is normal in size  Respirophasic changes were normal        EKG personally reviewed by Wilfredo Bryant MD      Counseling / Coordination of Care  Total floor / unit time spent today 30 minutes  Greater than 50% of total time was spent with the patient and / or family counseling and / or coordination of care

## 2022-07-04 NOTE — ASSESSMENT & PLAN NOTE
Secondary to aspiration/cardiac arrest, strep pneumo isolated, continue ceftriaxone for 5-7 day course pending progress   -will likely shorten therapy to 5 day course as patient is clinically improved rapidly, last dose for tomorrow

## 2022-07-04 NOTE — PROGRESS NOTES
[facility]  Progress Note - Damaris Bañuelos 1960, 58 y o  male MRN: [id number]  Unit/Bed#: [facility] 521-01 Encounter: [id number]  Primary Care Provider: Hernando Pleitez PA-C   Date and time admitted to hospital: 6/29/2022  6:03 PM    STEMI (ST elevation myocardial infarction) [facility])  Assessment & Plan  Patient admitted for cardiac arrest on 6/29, cath with moderate non obstructive disease, no intervention performed  Concern for taotsubo cardiomyopathy patient underwent iabp due to shock sp cardiac arrest  Patient on brillinta and aspirin, completed heparin drip stopped secondary to bleeding from iabp site  Continue beta blocker 12 5mg bid  Patient denies any chest pain, shortness in breath, ambulating without difficulty around the halls  Currently pending ICD placement tomorrow    Pneumonia  Assessment & Plan  Secondary to aspiration/cardiac arrest, strep pneumo isolated, continue ceftriaxone for 5-7 day course pending progress   -will likely shorten therapy to 5 day course as patient is clinically improved rapidly, last dose for tomorrow    Lactic acidosis  Assessment & Plan  resolved    Hyperglycemia  Assessment & Plan  Continue to monitor    Acute respiratory failure with hypercapnia (HCC)  Assessment & Plan  resolved    Shock ([facility] )  Assessment & Plan  Secondary to cardiac arrest, now resolved weaned off vasopressors    Elevated LFTs  Assessment & Plan  Secondary to cardiac arrest/hypotension  Resolving, trend to normalization    PERCY (acute kidney injury) ([facility] )  Assessment & Plan  Elevated on admission, now down to 0 93  Unknown baseline but estimated currently at baseline around 1  Potassium supplemented for low level 3 4 this morning, goal above 4      * Cardiac arrest with ventricular fibrillation [facility])  Assessment & Plan  Cardiac arrest with ems on site, 15-20 minutes of cpr with mutliple shocks and achieved rosc     Now pending ICD tomorrow, NPO midnight          VTE Pharmacologic Prophylaxis: VTE Score: 8 High Risk (Score >/= 5) - Pharmacological DVT Prophylaxis Ordered: heparin  Sequential Compression Devices Ordered  Patient Centered Rounds: I performed bedside rounds with nursing staff today  Discussions with Specialists or Other Care Team Provider: aparna and clement    Education and Discussions with Family / Patient: Patient declined call to   Time Spent for Care: 30 minutes  More than 50% of total time spent on counseling and coordination of care as described above  Current Length of Stay: 5 day(s)  Current Patient Status: Inpatient   Certification Statement: The patient will continue to require additional inpatient hospital stay due to Pending ICD tomorrow  Discharge Plan: Anticipate discharge in 24-48 hrs to home  Code Status: Level 1 - Full Code    Subjective:   Patient denies any acute complaints ambulating without any difficulty denies any shortness by chest pain lightheadedness dizziness    Objective:     Vitals:   Temp (24hrs), Av 5 °F (36 9 °C), Min:98 4 °F (36 9 °C), Max:98 7 °F (37 1 °C)    Temp:  [98 4 °F (36 9 °C)-98 7 °F (37 1 °C)] 98 7 °F (37 1 °C)  HR:  [68-87] 81  Resp:  [16-20] 19  BP: (135-152)/(79-93) 138/88  SpO2:  [93 %-98 %] 95 %  Body mass index is 28 08 kg/m²  Input and Output Summary (last 24 hours): Intake/Output Summary (Last 24 hours) at 2022 1544  Last data filed at 7/3/2022 1707  Gross per 24 hour   Intake --   Output 375 ml   Net -375 ml       Physical Exam:   Physical Exam  Vitals and nursing note reviewed  Constitutional:       General: He is not in acute distress  Appearance: He is well-developed  He is not ill-appearing or toxic-appearing  HENT:      Head: Normocephalic and atraumatic  Eyes:      General: No scleral icterus  Conjunctiva/sclera: Conjunctivae normal    Cardiovascular:      Rate and Rhythm: Normal rate and regular rhythm  Heart sounds: No murmur heard  No gallop  Pulmonary:      Effort: Pulmonary effort is normal  No respiratory distress  Breath sounds: Normal breath sounds  No stridor  No wheezing, rhonchi or rales  Chest:      Chest wall: No tenderness  Abdominal:      General: There is no distension  Palpations: Abdomen is soft  There is no mass  Tenderness: There is no abdominal tenderness  There is no guarding or rebound  Hernia: No hernia is present  Musculoskeletal:      Cervical back: Neck supple  Skin:     General: Skin is warm and dry  Neurological:      Mental Status: He is alert and oriented to person, place, and time            Additional Data:     Labs:  Results from last 7 days   Lab Units 07/04/22  0451   WBC Thousand/uL 10 57*   HEMOGLOBIN g/dL 11 7*   HEMATOCRIT % 34 9*   PLATELETS Thousands/uL 216   NEUTROS PCT % 68   LYMPHS PCT % 18   MONOS PCT % 10   EOS PCT % 2     Results from last 7 days   Lab Units 07/04/22  0451   SODIUM mmol/L 138   POTASSIUM mmol/L 3 4*   CHLORIDE mmol/L 107   CO2 mmol/L 24   BUN mg/dL 21   CREATININE mg/dL 0 93   ANION GAP mmol/L 7   CALCIUM mg/dL 9 0   ALBUMIN g/dL 3 0*   TOTAL BILIRUBIN mg/dL 0 86   ALK PHOS U/L 90   ALT U/L 70   AST U/L 59*   GLUCOSE RANDOM mg/dL 110     Results from last 7 days   Lab Units 06/30/22  1225   INR  1 08     Results from last 7 days   Lab Units 07/03/22  1153 07/03/22  0636 07/02/22  2102 07/02/22  1613 07/02/22  1132 07/02/22  0532 07/01/22  2313 07/01/22  1710 07/01/22  1144 07/01/22  0540 06/30/22  2327 06/30/22  1809   POC GLUCOSE mg/dl 103 117 132 156* 133 111 120 160* 127 120 123 117     Results from last 7 days   Lab Units 06/30/22  0432   HEMOGLOBIN A1C % 5 9*     Results from last 7 days   Lab Units 07/01/22  0415 06/30/22  0502 06/30/22  0315 06/30/22  0002 06/29/22  2204   LACTIC ACID mmol/L  --  2 3* 3 9* 7 2* 8 6*   PROCALCITONIN ng/ml 8 95*  --   --   --   --        Lines/Drains:  Invasive Devices  Report    Peripheral Intravenous Line  Duration Peripheral IV 07/03/22 Right;Ventral (anterior) Hand 1 day          Line  Duration           IABP 8 0 Fr  50 mL 4 days                  Telemetry:  Telemetry Orders (From admission, onward)             48 Hour Telemetry Monitoring  Continuous x 48 hours        References:    Telemetry Guidelines   Question:  Reason for 48 Hour Telemetry  Answer:  Arrhythmias Requiring Medical Therapy (eg  SVT, Vtach/fib, Bradycardia, Uncontrolled A-fib)                 Telemetry Reviewed: nsr  Indication for Continued Telemetry Use: Awaiting PCI/EP Study/CABG             Imaging: No pertinent imaging reviewed  Recent Cultures (last 7 days):   Results from last 7 days   Lab Units 07/01/22  0939   BLOOD CULTURE  No Growth at 72 hrs  No Growth at 72 hrs     SPUTUM CULTURE  2+ Growth of Streptococcus pneumoniae*  Few Colonies of Staphylococcus aureus*  Few Colonies of    GRAM STAIN RESULT  Rare Epithelial cells per low power field*  3+ Polys*  3+ Gram positive cocci in pairs and chains*       Last 24 Hours Medication List:   Current Facility-Administered Medications   Medication Dose Route Frequency Provider Last Rate    acetaminophen  650 mg Oral Q6H PRN Lilly Police, CRNP      aspirin  81 mg Oral Daily Lilly Police, CRNP      atorvastatin  40 mg Oral Daily With [employer], CRNP      cefTRIAXone  2,000 mg Intravenous Q24H Lilly Police, CRNP 2,000 mg (07/04/22 0946)    heparin (porcine)  5,000 Units Subcutaneous [facility] ASHLEY Elizabeth      melatonin  6 mg Oral HS Lilly Police, ASHLEY      metoprolol tartrate  12 5 mg Oral Q12H [address] ASHLEY Elizabeth      nicotine  14 mg Transdermal Daily Lilly Police, ASHLEY      ondansetron  4 mg Intravenous Q6H PRN Lilly Police, CRNP      oxyCODONE  2 5 mg Oral Q4H PRN Lilly Police, CRNP      Or    oxyCODONE  5 mg Oral Q4H PRN Lilly Police, CRNP      polyethylene glycol  17 g Oral Daily DO Candice Jay-docusate sodium  1 tablet Oral HS David Montgomery DO      ticagrelor  90 mg Oral Q12H [address] ASHLEY Elizabeth      [START ON 7/5/2022] vancomycin  1,000 mg Intravenous Once Jhoana Gamboa PA-C          Today, Patient Was Seen By: Jared Acosta DO    **Please Note: This note may have been constructed using a voice recognition system  **

## 2022-07-05 ENCOUNTER — APPOINTMENT (INPATIENT)
Dept: RADIOLOGY | Facility: HOSPITAL | Age: 62
DRG: 226 | End: 2022-07-05
Payer: COMMERCIAL

## 2022-07-05 ENCOUNTER — ANESTHESIA EVENT (INPATIENT)
Dept: NON INVASIVE DIAGNOSTICS | Facility: HOSPITAL | Age: 62
DRG: 226 | End: 2022-07-05
Payer: COMMERCIAL

## 2022-07-05 VITALS
BODY MASS INDEX: 28.1 KG/M2 | SYSTOLIC BLOOD PRESSURE: 131 MMHG | HEART RATE: 86 BPM | HEIGHT: 74 IN | RESPIRATION RATE: 18 BRPM | TEMPERATURE: 98.1 F | WEIGHT: 218.92 LBS | OXYGEN SATURATION: 95 % | DIASTOLIC BLOOD PRESSURE: 89 MMHG

## 2022-07-05 PROBLEM — E87.20 LACTIC ACIDOSIS: Status: RESOLVED | Noted: 2022-06-30 | Resolved: 2022-07-05

## 2022-07-05 PROBLEM — E87.2 LACTIC ACIDOSIS: Status: RESOLVED | Noted: 2022-06-30 | Resolved: 2022-07-05

## 2022-07-05 PROBLEM — J96.02 ACUTE RESPIRATORY FAILURE WITH HYPERCAPNIA (HCC): Status: RESOLVED | Noted: 2022-06-29 | Resolved: 2022-07-05

## 2022-07-05 LAB
ANION GAP SERPL CALCULATED.3IONS-SCNC: 7 MMOL/L (ref 4–13)
ATRIAL RATE: 77 BPM
BUN SERPL-MCNC: 24 MG/DL (ref 5–25)
CALCIUM SERPL-MCNC: 9.4 MG/DL (ref 8.3–10.1)
CHLORIDE SERPL-SCNC: 105 MMOL/L (ref 100–108)
CO2 SERPL-SCNC: 23 MMOL/L (ref 21–32)
CREAT SERPL-MCNC: 1 MG/DL (ref 0.6–1.3)
ERYTHROCYTE [DISTWIDTH] IN BLOOD BY AUTOMATED COUNT: 12.4 % (ref 11.6–15.1)
GFR SERPL CREATININE-BSD FRML MDRD: 80 ML/MIN/1.73SQ M
GLUCOSE SERPL-MCNC: 111 MG/DL (ref 65–140)
HCT VFR BLD AUTO: 44.5 % (ref 36.5–49.3)
HGB BLD-MCNC: 14.5 G/DL (ref 12–17)
INR PPP: 0.95 (ref 0.84–1.19)
MCH RBC QN AUTO: 30 PG (ref 26.8–34.3)
MCHC RBC AUTO-ENTMCNC: 32.6 G/DL (ref 31.4–37.4)
MCV RBC AUTO: 92 FL (ref 82–98)
P AXIS: 57 DEGREES
PLATELET # BLD AUTO: 257 THOUSANDS/UL (ref 149–390)
PMV BLD AUTO: 9.3 FL (ref 8.9–12.7)
POTASSIUM SERPL-SCNC: 3.4 MMOL/L (ref 3.5–5.3)
PR INTERVAL: 184 MS
PROTHROMBIN TIME: 12.3 SECONDS (ref 11.6–14.5)
QRS AXIS: -46 DEGREES
QRSD INTERVAL: 102 MS
QT INTERVAL: 390 MS
QTC INTERVAL: 441 MS
RBC # BLD AUTO: 4.83 MILLION/UL (ref 3.88–5.62)
SODIUM SERPL-SCNC: 135 MMOL/L (ref 136–145)
T WAVE AXIS: 56 DEGREES
VENTRICULAR RATE: 77 BPM
WBC # BLD AUTO: 12.51 THOUSAND/UL (ref 4.31–10.16)

## 2022-07-05 PROCEDURE — 99024 POSTOP FOLLOW-UP VISIT: CPT | Performed by: PHYSICIAN ASSISTANT

## 2022-07-05 PROCEDURE — 85610 PROTHROMBIN TIME: CPT | Performed by: PHYSICIAN ASSISTANT

## 2022-07-05 PROCEDURE — 71045 X-RAY EXAM CHEST 1 VIEW: CPT

## 2022-07-05 PROCEDURE — 93010 ELECTROCARDIOGRAM REPORT: CPT | Performed by: INTERNAL MEDICINE

## 2022-07-05 PROCEDURE — C1777 LEAD, AICD, ENDO SINGLE COIL: HCPCS | Performed by: INTERNAL MEDICINE

## 2022-07-05 PROCEDURE — 02HK3KZ INSERTION OF DEFIBRILLATOR LEAD INTO RIGHT VENTRICLE, PERCUTANEOUS APPROACH: ICD-10-PCS | Performed by: INTERNAL MEDICINE

## 2022-07-05 PROCEDURE — 33249 INSJ/RPLCMT DEFIB W/LEAD(S): CPT | Performed by: INTERNAL MEDICINE

## 2022-07-05 PROCEDURE — C1892 INTRO/SHEATH,FIXED,PEEL-AWAY: HCPCS | Performed by: INTERNAL MEDICINE

## 2022-07-05 PROCEDURE — 02H63KZ INSERTION OF DEFIBRILLATOR LEAD INTO RIGHT ATRIUM, PERCUTANEOUS APPROACH: ICD-10-PCS | Performed by: INTERNAL MEDICINE

## 2022-07-05 PROCEDURE — 99239 HOSP IP/OBS DSCHRG MGMT >30: CPT | Performed by: INTERNAL MEDICINE

## 2022-07-05 PROCEDURE — 0JH608Z INSERTION OF DEFIBRILLATOR GENERATOR INTO CHEST SUBCUTANEOUS TISSUE AND FASCIA, OPEN APPROACH: ICD-10-PCS | Performed by: INTERNAL MEDICINE

## 2022-07-05 PROCEDURE — 93005 ELECTROCARDIOGRAM TRACING: CPT

## 2022-07-05 PROCEDURE — 85027 COMPLETE CBC AUTOMATED: CPT | Performed by: PHYSICIAN ASSISTANT

## 2022-07-05 PROCEDURE — 99024 POSTOP FOLLOW-UP VISIT: CPT | Performed by: INTERNAL MEDICINE

## 2022-07-05 PROCEDURE — 80048 BASIC METABOLIC PNL TOTAL CA: CPT | Performed by: PHYSICIAN ASSISTANT

## 2022-07-05 PROCEDURE — C1721 AICD, DUAL CHAMBER: HCPCS | Performed by: INTERNAL MEDICINE

## 2022-07-05 PROCEDURE — C1898 LEAD, PMKR, OTHER THAN TRANS: HCPCS | Performed by: INTERNAL MEDICINE

## 2022-07-05 DEVICE — ICD DDPA2D4 COBALT XT DR MRI DF4 USA
Type: IMPLANTABLE DEVICE | Site: CHEST | Status: FUNCTIONAL
Brand: COBALT™ XT DR MRI SURESCAN™

## 2022-07-05 DEVICE — LEAD 457453 MRI US BI RCMCRD MVC
Type: IMPLANTABLE DEVICE | Site: HEART | Status: FUNCTIONAL
Brand: CAPSURE SENSE MRI™ SURESCAN™

## 2022-07-05 DEVICE — LEAD 6935M62 QUATTRO SECURE S MRI US
Type: IMPLANTABLE DEVICE | Site: HEART | Status: FUNCTIONAL
Brand: SPRINT QUATTRO SECURE S MRI™ SURESCAN™

## 2022-07-05 RX ORDER — MIDAZOLAM HYDROCHLORIDE 2 MG/2ML
INJECTION, SOLUTION INTRAMUSCULAR; INTRAVENOUS AS NEEDED
Status: DISCONTINUED | OUTPATIENT
Start: 2022-07-05 | End: 2022-07-05

## 2022-07-05 RX ORDER — CEFDINIR 300 MG/1
300 CAPSULE ORAL EVERY 12 HOURS SCHEDULED
Qty: 4 CAPSULE | Refills: 0 | Status: SHIPPED | OUTPATIENT
Start: 2022-07-05 | End: 2022-07-07

## 2022-07-05 RX ORDER — PROPOFOL 10 MG/ML
INJECTION, EMULSION INTRAVENOUS AS NEEDED
Status: DISCONTINUED | OUTPATIENT
Start: 2022-07-05 | End: 2022-07-05

## 2022-07-05 RX ORDER — GENTAMICIN SULFATE 40 MG/ML
INJECTION, SOLUTION INTRAMUSCULAR; INTRAVENOUS AS NEEDED
Status: DISCONTINUED | OUTPATIENT
Start: 2022-07-05 | End: 2022-07-05 | Stop reason: HOSPADM

## 2022-07-05 RX ORDER — OXYCODONE HYDROCHLORIDE 5 MG/1
5 TABLET ORAL EVERY 6 HOURS PRN
Qty: 10 TABLET | Refills: 0 | Status: SHIPPED | OUTPATIENT
Start: 2022-07-05 | End: 2022-07-15

## 2022-07-05 RX ORDER — PROPOFOL 10 MG/ML
INJECTION, EMULSION INTRAVENOUS CONTINUOUS PRN
Status: DISCONTINUED | OUTPATIENT
Start: 2022-07-05 | End: 2022-07-05

## 2022-07-05 RX ORDER — METOPROLOL SUCCINATE 50 MG/1
50 TABLET, EXTENDED RELEASE ORAL DAILY
Qty: 30 TABLET | Refills: 0 | Status: SHIPPED | OUTPATIENT
Start: 2022-07-06 | End: 2022-07-18 | Stop reason: SDUPTHER

## 2022-07-05 RX ORDER — POTASSIUM CHLORIDE 20 MEQ/1
40 TABLET, EXTENDED RELEASE ORAL ONCE
Status: COMPLETED | OUTPATIENT
Start: 2022-07-05 | End: 2022-07-05

## 2022-07-05 RX ORDER — SODIUM CHLORIDE 9 MG/ML
INJECTION, SOLUTION INTRAVENOUS CONTINUOUS PRN
Status: DISCONTINUED | OUTPATIENT
Start: 2022-07-05 | End: 2022-07-05

## 2022-07-05 RX ORDER — METOPROLOL SUCCINATE 50 MG/1
50 TABLET, EXTENDED RELEASE ORAL DAILY
Status: DISCONTINUED | OUTPATIENT
Start: 2022-07-06 | End: 2022-07-05 | Stop reason: HOSPADM

## 2022-07-05 RX ORDER — CEFDINIR 300 MG/1
300 CAPSULE ORAL EVERY 12 HOURS SCHEDULED
Status: DISCONTINUED | OUTPATIENT
Start: 2022-07-05 | End: 2022-07-05 | Stop reason: HOSPADM

## 2022-07-05 RX ORDER — FENTANYL CITRATE 50 UG/ML
INJECTION, SOLUTION INTRAMUSCULAR; INTRAVENOUS AS NEEDED
Status: DISCONTINUED | OUTPATIENT
Start: 2022-07-05 | End: 2022-07-05

## 2022-07-05 RX ORDER — LISINOPRIL 10 MG/1
10 TABLET ORAL DAILY
Status: DISCONTINUED | OUTPATIENT
Start: 2022-07-05 | End: 2022-07-05 | Stop reason: HOSPADM

## 2022-07-05 RX ORDER — LISINOPRIL 10 MG/1
10 TABLET ORAL DAILY
Qty: 30 TABLET | Refills: 0 | Status: SHIPPED | OUTPATIENT
Start: 2022-07-05 | End: 2022-07-18 | Stop reason: SDUPTHER

## 2022-07-05 RX ORDER — LIDOCAINE HYDROCHLORIDE 10 MG/ML
INJECTION, SOLUTION EPIDURAL; INFILTRATION; INTRACAUDAL; PERINEURAL AS NEEDED
Status: DISCONTINUED | OUTPATIENT
Start: 2022-07-05 | End: 2022-07-05 | Stop reason: HOSPADM

## 2022-07-05 RX ORDER — ASPIRIN 81 MG/1
81 TABLET, CHEWABLE ORAL DAILY
Qty: 30 TABLET | Refills: 0 | Status: SHIPPED | OUTPATIENT
Start: 2022-07-06 | End: 2022-07-18 | Stop reason: SDUPTHER

## 2022-07-05 RX ORDER — ATORVASTATIN CALCIUM 40 MG/1
40 TABLET, FILM COATED ORAL
Qty: 30 TABLET | Refills: 0 | Status: SHIPPED | OUTPATIENT
Start: 2022-07-05 | End: 2022-07-18 | Stop reason: SDUPTHER

## 2022-07-05 RX ORDER — SACUBITRIL AND VALSARTAN 24; 26 MG/1; MG/1
1 TABLET, FILM COATED ORAL 2 TIMES DAILY
Qty: 60 TABLET | Refills: 2 | Status: SHIPPED | OUTPATIENT
Start: 2022-07-05 | End: 2022-07-05

## 2022-07-05 RX ADMIN — TICAGRELOR 90 MG: 90 TABLET ORAL at 10:18

## 2022-07-05 RX ADMIN — NICOTINE 14 MG: 14 PATCH, EXTENDED RELEASE TRANSDERMAL at 10:18

## 2022-07-05 RX ADMIN — HEPARIN SODIUM 5000 UNITS: 5000 INJECTION INTRAVENOUS; SUBCUTANEOUS at 05:39

## 2022-07-05 RX ADMIN — FENTANYL CITRATE 25 MCG: 50 INJECTION INTRAMUSCULAR; INTRAVENOUS at 09:25

## 2022-07-05 RX ADMIN — FENTANYL CITRATE 25 MCG: 50 INJECTION INTRAMUSCULAR; INTRAVENOUS at 09:04

## 2022-07-05 RX ADMIN — MIDAZOLAM 2 MG: 1 INJECTION INTRAMUSCULAR; INTRAVENOUS at 08:19

## 2022-07-05 RX ADMIN — FENTANYL CITRATE 25 MCG: 50 INJECTION INTRAMUSCULAR; INTRAVENOUS at 09:00

## 2022-07-05 RX ADMIN — FENTANYL CITRATE 25 MCG: 50 INJECTION INTRAMUSCULAR; INTRAVENOUS at 09:13

## 2022-07-05 RX ADMIN — Medication 12.5 MG: at 10:18

## 2022-07-05 RX ADMIN — DEXTROSE MONOHYDRATE 2000 MG: 5 INJECTION, SOLUTION INTRAVENOUS at 10:26

## 2022-07-05 RX ADMIN — SODIUM CHLORIDE: 0.9 INJECTION, SOLUTION INTRAVENOUS at 08:29

## 2022-07-05 RX ADMIN — PROPOFOL 50 MG: 10 INJECTION, EMULSION INTRAVENOUS at 08:32

## 2022-07-05 RX ADMIN — ASPIRIN 81 MG CHEWABLE TABLET 81 MG: 81 TABLET CHEWABLE at 10:18

## 2022-07-05 RX ADMIN — PROPOFOL 60 MCG/KG/MIN: 10 INJECTION, EMULSION INTRAVENOUS at 08:44

## 2022-07-05 RX ADMIN — ATORVASTATIN CALCIUM 40 MG: 40 TABLET, FILM COATED ORAL at 15:52

## 2022-07-05 RX ADMIN — LISINOPRIL 10 MG: 10 TABLET ORAL at 15:52

## 2022-07-05 RX ADMIN — POTASSIUM CHLORIDE 40 MEQ: 1500 TABLET, EXTENDED RELEASE ORAL at 12:25

## 2022-07-05 RX ADMIN — PROPOFOL 60 MG: 10 INJECTION, EMULSION INTRAVENOUS at 08:44

## 2022-07-05 RX ADMIN — VANCOMYCIN HYDROCHLORIDE 1000 MG: 1 INJECTION, SOLUTION INTRAVENOUS at 08:44

## 2022-07-05 NOTE — PROGRESS NOTES
Cardiology Progress Note - Salome Malik 58 y o  male MRN: [id number]    Unit/Bed#: [facility] 521-01 Encounter: [id number]      Assessment & Plan:  Principal Problem:    Cardiac arrest with ventricular fibrillation ([facility] )  Active Problems:    STEMI (ST elevation myocardial infarction) ([facility] )    PERCY (acute kidney injury) ([facility] )    Elevated LFTs    [facility]    Hyperglycemia    Pneumonia    57 y/o M presenting with Vfib arrest in the setting of anterior MI c/b cardiogenic shock  Cardiac cath showed diffuse LAD disease not amenable to PCI  Currently patient is stable and asymptomatic being uptitrated on GDMT  Today patient underwent ICD implantation which he tolerated well  Plan is to discharge the patient today  # Vfib arrest at presentation  # Anterior STEMI  # AMI shock now resolved  # HFmEF with EF 44%  # PERCY on resolved  # HTN    Plan:  - DAPT  - Atorvastatin 40 mg QD  - Toprol XL 50 mg QD  - lisinopril 10 mg QD  - Cardiology follow up in a week    Subjective:   Patient seen and examined  No significant events overnight  Denies chest pain, pnd, orthopnea, abdominal pain, nausea vomiting, fever, chills, headache, dizziness or palpitations  Objective:     Vitals: Blood pressure 131/89, pulse 86, temperature 98 1 °F (36 7 °C), temperature source Oral, resp  rate 18, height 6' 2" (1 88 m), weight 99 3 kg (218 lb 14 7 oz), SpO2 95 %  , Body mass index is 28 11 kg/m² ,   Orthostatic Blood Pressures    Flowsheet Row Most Recent Value   Blood Pressure 131/89 filed at 07/05/2022 1415   Patient Position - Orthostatic VS Lying filed at 07/05/2022 1045            Intake/Output Summary (Last 24 hours) at 7/5/2022 1617  Last data filed at 7/5/2022 1101  Gross per 24 hour   Intake 1680 ml   Output 350 ml   Net 1330 ml           Physical Exam:    GEN: Salome Malik appears well, alert and oriented x 3, pleasant and cooperative   HEENT: anicteric, mucous membranes moist  NECK: no jvd, carotid bruits   HEART: regular rhythm, normal S1 and S2, no murmurs, clicks, gallops or rubs   LUNGS: clear to auscultation bilaterally; no wheezes, rales, or rhonchi   ABDOMEN: normal bowel sounds, soft, no tenderness, no distention  EXTREMITIES: peripheral pulses normal; no clubbing, cyanosis, or edema  NEURO: no focal findings   SKIN: normal without suspicious lesions on exposed skin      Current Facility-Administered Medications:     acetaminophen (TYLENOL) oral suspension 650 mg, 650 mg, Oral, Q6H PRN, ASHLEY Sethi, 650 mg at 07/02/22 1546    aspirin chewable tablet 81 mg, 81 mg, Oral, Daily, ASHLEY Sharma, 81 mg at 07/05/22 1018    atorvastatin (LIPITOR) tablet 40 mg, 40 mg, Oral, Daily With Dinner, ASHLEY Sethi, 40 mg at 07/05/22 1552    cefdinir (OMNICEF) capsule 300 mg, 300 mg, Oral, Q12H [facility], America Llanos MD    heparin (porcine) subcutaneous injection 5,000 Units, 5,000 Units, Subcutaneous, Q8H [facility], ASHLEY Sharma, 5,000 Units at 07/05/22 0539    lisinopril (ZESTRIL) tablet 10 mg, 10 mg, Oral, Daily, America Llanos MD, 10 mg at 07/05/22 1552    melatonin tablet 6 mg, 6 mg, Oral, HS, ASHLEY Sharma, 6 mg at 07/04/22 2022    [START ON 7/6/2022] metoprolol succinate (TOPROL-XL) 24 hr tablet 50 mg, 50 mg, Oral, Daily, Jenise Bañuelos MD    metoprolol tartrate (LOPRESSOR) tablet 25 mg, 25 mg, Oral, Q12H [facility], Khoi Kearney MD    nicotine (NICODERM CQ) 14 mg/24hr TD 24 hr patch 14 mg, 14 mg, Transdermal, Daily, ASHLEY Sharma, 14 mg at 07/05/22 1018    ondansetron (ZOFRAN) injection 4 mg, 4 mg, Intravenous, Q6H PRN, ASHLEY Sharma, 4 mg at 07/02/22 1019    oxyCODONE (ROXICODONE) IR tablet 2 5 mg, 2 5 mg, Oral, Q4H PRN, 2 5 mg at 07/01/22 2307 **OR** oxyCODONE (ROXICODONE) IR tablet 5 mg, 5 mg, Oral, Q4H PRN, ASHLEY Sharma, 5 mg at 07/02/22 1255    polyethylene glycol (MIRALAX) packet 17 g, 17 g, Oral, Daily, David Montgomery DO, 17 g at 07/04/22 1047    senna-docusate sodium (SENOKOT S) 8 6-50 mg per tablet 1 tablet, 1 tablet, Oral, HS, David Ballai, DO, 1 tablet at 07/04/22 2022    ticagrelor (BRILINTA) tablet 90 mg, 90 mg, Oral, Q12H [address], Collette Moore, FAINANP, 90 mg at 07/05/22 1018    Labs & Results:    Lab Results   Component Value Date    TROPONINI <0 03 02/02/2021       Lab Results   Component Value Date    GLUCOSE 275 (H) 06/29/2022    CALCIUM 9 4 07/05/2022    K 3 4 (L) 07/05/2022    CO2 23 07/05/2022     07/05/2022    BUN 24 07/05/2022    CREATININE 1 00 07/05/2022       Lab Results   Component Value Date    WBC 12 51 (H) 07/05/2022    HGB 14 5 07/05/2022    HCT 44 5 07/05/2022    MCV 92 07/05/2022     07/05/2022     Results from last 7 days   Lab Units 07/05/22  0613   INR  0 95       No results found for: CHOL  Lab Results   Component Value Date    HDL 22 (L) 06/30/2022     Lab Results   Component Value Date    [address]  06/30/2022      Comment:      Calculated LDL invalid, triglycerides >400 mg/dl     Lab Results   Component Value Date    TRIG 359 (H) 07/02/2022    TRIG 528 (H) 06/30/2022       Lab Results   Component Value Date    ALT 70 07/04/2022    AST 59 (H) 07/04/2022         EKG personally reviewed by )Zaida Daley MD  No acute changes   TELE: No significant arrhythmias seen on telemetry review

## 2022-07-05 NOTE — ANESTHESIA POSTPROCEDURE EVALUATION
Post-Op Assessment Note    CV Status:  Stable  Pain Score: 0    Pain management: adequate     Mental Status:  Sleepy and arousable   Hydration Status:  Euvolemic and stable   PONV Controlled:  Controlled   Airway Patency:  Patent and adequate      Post Op Vitals Reviewed: Yes      Staff: CRNA         There were no known complications for this encounter      BP  125/73    Temp      Pulse 92   Resp 16   SpO2 95

## 2022-07-05 NOTE — ASSESSMENT & PLAN NOTE
Elevated on admission, now down to 1 00  Unknown baseline but estimated currently at baseline around 1  Lisinopril on discharge per cardiology

## 2022-07-05 NOTE — ASSESSMENT & PLAN NOTE
Patient admitted for cardiac arrest on 6/29, cath with moderate non obstructive disease, no intervention performed  Concern for taotsubo cardiomyopathy patient underwent iabp due to shock sp cardiac arrest  Patient on brillinta and aspirin, completed heparin drip stopped secondary to bleeding from iabp site  Continue beta blocker >  Switched to toprol XL 50 mg daily per cardiology  Patient denies any chest pain, shortness in breath, ambulating without difficulty around the halls  Status post ICD placement in 7/5>> okay to discharge per EP with Op follow up in device clinic

## 2022-07-05 NOTE — DISCHARGE INSTRUCTIONS
Please refer to post ICD implantation discharge instructions and restrictions below and your ICD booklet/temporary card  Keep incision dry for one week  Do not use lotions/powders/creams on incision  Leave outer bandage in place for 1 week - it is water proof, and as long as it is fully adhered to your skin you may shower with it  If it appears as though the bandage is coming off and/or there is any communication to the area of device incision, please then keep the whole area dry for the remaining week  After 1 week, please remove by pulling all edges away from the center of the bandage  No overhead reaching/pushing/pulling/lifting greater than 5-10lbs with left arm for six weeks  Please call the office if you notice redness, swelling, bleeding, or drainage from incision or if you develop fevers    Implantable Cardioverter Defibrillator      If you have any questions, please call [phone] to speak with a nurse (8:30am-4pm, or [phone] after hours)  For appointments, please call [phone]  WHAT YOU SHOULD KNOW:    An implantable cardioverter defibrillator (ICD) is a small device that monitors your heart rate and rhythm  It is commonly placed inside your upper chest region  It may be used if you have a ventricular arrhythmia, which is an irregular, dangerous rhythm from the bottom chamber of your heart  Some arrhythmias may cause your heart to suddenly stop beating  An ICD can give a shock to your heart to make it start beating again, or it can give pacing therapy (also known as pain-free therapy) to return your heart to normal rhythm  It is also a pacemaker, so it will pace your heart if needed to prevent it from beating too slowly  AFTER YOU LEAVE:      Follow up with your primary healthcare provider or cardiologist as directed: You will need to follow-up to have your ICD checked and make sure you are not having problems   Write down your questions so you remember to ask them during your visits  Driving: you are ok to drive 48 hours after device is implanted     Self-care:   Ask about activity: Ask if you need to avoid moving your shoulder or arm, and for how long  Ask if you should avoid lifting heavy objects  Do not play any contact sports, such as football or wrestling, until your primary healthcare provider [facility] or cardiologist tells you it is okay  You may only be able to drive for a certain amount of time per day, or during certain hours  Ask when you can return to work  Care for your skin over the ICD: see instructions above     When you get a shock from your ICD: A shock may feel like someone has hit you, or you may feel a thump in the chest  If someone is touching you when you get a shock, they may feel a tingling feeling  The first time you feel a shock, it may scare you  Sit or lie down and stay calm  Ask someone to stay with you if possible  Please either call your cardiologist or report to an emergency room  Safety instructions when you have an ICD:   Carry an ID card for the ICD: This card has important information about your ICD  Stay away from magnets or machines with electric fields: This includes MRI machines  Avoid leaning into a car engine or doing welding  These things can interfere with how your ICD works  Tell airport security you have an ICD: You may need to be searched by hand when you go through a security gate  The security gate or handheld wand could harm your ICD  Keep an ICD diary: Record when you get a shock and what you were doing before you got the shock  Keep track of how you felt before and after the shock, as well as how many shocks you received  Write down the day and time of each shock  Bring the diary with you when you see your PHP or cardiologist    Faraz Driscoll medical alert identification: Wear medical alert jewelry or carry a card that says you have an ICD  Ask your PHP or cardiologist where to get these items      Contact your primary healthcare provider or cardiologist if:   You have a fever  You feel 1 or more shocks from your ICD and feel fine afterwards  Your feet or ankles swell  The area around your ICD is painful or tender after surgery  The skin around your stitches or staples is red, swollen, or draining pus or fluid  You have chills, a cough, and feel weak or achy  You are sad or anxious and find it hard to do your usual activities  You have questions or concerns about your condition or care  Seek care immediately or call 911 if:   Your stitches or staples come apart  Blood soaks through your bandage  You feel more than 3 shocks in a row from your ICD  You become weak, dizzy, or faint  You feel your heart skip beats or beat very fast or slow, but you do not feel a shock from your ICD  You have chest pain that does not go away with rest or medicine  © 2014 [address] is for End User's use only and may not be sold, redistributed or otherwise used for commercial purposes  All illustrations and images included in CareNotes® are the copyrighted property of [employer]  or Gray Valderrama  The above information is an  only  It is not intended as medical advice for individual conditions or treatments  Talk to your doctor, nurse or pharmacist before following any medical regimen to see if it is safe and effective for you

## 2022-07-05 NOTE — CASE MANAGEMENT
Case Management Discharge Planning Note    Patient name Ada Fair  Location [facility] 521/[facility] 650-52 MRN [id number]  : 1960 Date 2022       Current Admission Date: 2022  Current Admission Diagnosis:Cardiac arrest with ventricular fibrillation [facility])   Patient Active Problem List    Diagnosis Date Noted    Pneumonia 2022    Lactic acidosis 2022    STEMI (ST elevation myocardial infarction) ([facility] ) 2022    Hypertension 2022    Cardiac arrest with ventricular fibrillation ([facility] ) 2022    PERCY (acute kidney injury) ([facility] ) 2022    Elevated LFTs 2022    Shock ([facility] ) 2022    Acute respiratory failure with hypercapnia ([facility] ) 2022    Hyperglycemia 2022    Cigarette nicotine dependence without complication     Bilateral inguinal hernia without obstruction or gangrene     Umbilical hernia       LOS (days): 6  Geometric Mean LOS (GMLOS) (days): 6 60  Days to GMLOS:0 7     OBJECTIVE:  Risk of Unplanned Readmission Score: 9 73         Current admission status: Inpatient   Preferred Pharmacy:   [address] [facility] [address]  [place], [address]  [facility] [address]  Phone: [phone] Fax: [address] [address], [facility] [address] [facility] [address] [address]  Phone: [phone] Fax: [phone]    Primary Care Provider: Mikell Leventhal, PA-C    Primary Insurance: BLUE CROSS  Secondary Insurance:     DISCHARGE DETAILS:                                          Other Referral/Resources/Interventions Provided:  Interventions: Prescription Price Check  Referral Comments: [place] check Brilinta at [facility] [phone] , is $20 00 for 30 day supply  Dr Charleen Cardona is aware  Previsouly requested price check for [facility] by cardiology fellow Dr Janett Horn and this was $600 00 a month supply from [address] [place] [address]   Dr Samia Stoddard aware and pt placed on lisinopril instead due ot  out of pocket cost     Would you like to participate in our [address] service program?  : No - Declined    Treatment Team Recommendation: Home  Discharge Destination Plan[de-identified] Home  Transport at Discharge : Family                                      Additional Comments: Pt wife to transport to home today, no identified CM needs at this time

## 2022-07-05 NOTE — ANESTHESIA PREPROCEDURE EVALUATION
Procedure:  Cardiac icd implant (N/A Chest)    Relevant Problems   CARDIO   (+) Cardiac arrest with ventricular fibrillation (HCC)   (+) Hypertension   (+) STEMI (ST elevation myocardial infarction) ([facility] )      /RENAL   (+) PERCY (acute kidney injury) ([facility] )      PULMONARY   (+) Acute respiratory failure with hypercapnia (HCC)   (+) Pneumonia      VT/VF arrest, STEMI ([facility]AP[facility]); tobacco    TTE: EF 44, DD1, normal RV systolic function, mild TR    LHC: mLAD 50, dLAD 70, D1 60, D2 60, OM1 50, OM3 99, mRCA 60, RPL3 60; IABP (out 7/1)       Anesthesia Plan  ASA Score- 4     Anesthesia Type- IV sedation with anesthesia with ASA Monitors  Additional Monitors:   Airway Plan:     Comment: IV sedation, GA back up; standard ASA monitors  Risks and benefits discussed with patient; patient consented and agrees to proceed  I saw and evaluated the patient  If seen with CRNA, we have discussed the anesthetic plan and I am in agreement that the plan is appropriate for the patient          Plan Factors-    Chart reviewed  Existing labs reviewed  Induction- intravenous  Postoperative Plan-     Informed Consent- Anesthetic plan and risks discussed with patient  I personally reviewed this patient with the CRNA  Discussed and agreed on the Anesthesia Plan with the CRNA  Aura Vera

## 2022-07-05 NOTE — ASSESSMENT & PLAN NOTE
Cardiac arrest with ems on site, 15-20 minutes of cpr with mutliple shocks and achieved rosc  Now status post ICD placement, okay to dc per EP with OP follow up

## 2022-07-05 NOTE — NURSING NOTE
Patient discharged to home with wife, belongings packed and Ivs removed  Paperwork reviewed with pt and wife  Patient walked out with wife and rn

## 2022-07-05 NOTE — DISCHARGE SUMMARY
[facility]  Discharge- Jamie Flank 1960, 58 y o  male MRN: [id number]  Unit/Bed#: [facility] 521-01 Encounter: [id number]  Primary Care Provider: Joe Maurer PA-C   Date and time admitted to hospital: 6/29/2022  6:03 PM    * Cardiac arrest with ventricular fibrillation [facility])  Assessment & Plan  Cardiac arrest with ems on site, 15-20 minutes of cpr with mutliple shocks and achieved rosc  Now status post ICD placement, okay to dc per EP with OP follow up  STEMI (ST elevation myocardial infarction) [facility])  Assessment & Plan  Patient admitted for cardiac arrest on 6/29, cath with moderate non obstructive disease, no intervention performed  Concern for taotsubo cardiomyopathy patient underwent iabp due to shock sp cardiac arrest  Patient on brillinta and aspirin, completed heparin drip stopped secondary to bleeding from iabp site  Continue beta blocker >  Switched to toprol XL 50 mg daily per cardiology  Patient denies any chest pain, shortness in breath, ambulating without difficulty around the halls  Status post ICD placement in 7/5>> okay to discharge per EP with Op follow up in device clinic  Pneumonia  Assessment & Plan  Secondary to aspiration/cardiac arrest, strep pneumo isolated, ceftriaxone witched to cefdinir to complete 7 day course  Hyperglycemia  Assessment & Plan  HBA! C 5 9  Lifestyle modification with OP PCP follow up    [facility])  Assessment & Plan  Secondary to cardiac arrest, now resolved weaned off vasopressors    Elevated LFTs  Assessment & Plan  Secondary to cardiac arrest/hypotension  Resolving    PERCY (acute kidney injury) ([facility] )  Assessment & Plan  Elevated on admission, now down to 1 00  Unknown baseline but estimated currently at baseline around 1  Lisinopril on discharge per cardiology      Acute respiratory failure with hypercapnia (HCC)-resolved as of 7/5/2022  Assessment & Plan  resolved        Discharge Summary - [facility]    Patient Information: Diamante Benavides 58 y o  male MRN: [id number]  Unit/Bed#: [facility] 521-01 Encounter: [id number]    Discharging Physician / Practitioner: Hallie Rome MD  PCP: Félix Muir PA-C  Admission Date: 6/29/2022  Discharge Date: 07/05/22    Reason for Admission: cardiac arrest    Discharge Diagnoses:     Principal Problem:    Cardiac arrest with ventricular fibrillation ([facility] )  Active Problems:    STEMI (ST elevation myocardial infarction) ([facility] )    Pneumonia    PERCY (acute kidney injury) ([facility] )    Elevated LFTs    Shock ([facility] )    Hyperglycemia  Resolved Problems:    Acute respiratory failure with hypercapnia ([address] [address] )    Lactic acidosis      Consultations During Hospital Stay:  · Heart failure team  · Electrophysiology    Procedures Performed:     Left heart catheterization and iabp  ICD placement    Significant Findings / Test Results:     XR chest portable   Final Result by Vijaya Remy MD (07/05 1254)      No acute cardiopulmonary disease  ICD in place  No pneumothorax  Workstation performed: [id number]         XR chest portable ICU   Final Result by Rosalinda Gastelum MD (07/03 1027)   No worsening seen      The proximal hole of the feeding tube lies in the lower esophagus above the diaphragm, the tip of the feeding tube likely lies above the diaphragm  Advancement of the feeding tube by 8- 9 cm may be considered with follow-up radiograph   The study was marked in EPIC for immediate notification  Workstation performed: [id number]         XR chest portable ICU   Final Result by Nelson Branham MD (06/30 7165)   Right internal jugular catheter tip in the superior vena cava  No pneumothorax  Workstation performed: [id number]         STAT CXR ICU   Final Result by Amy Hernández MD (06/30 9489)      Persistent malpositioning of the enteric tube  Repositioning is advised  Endotracheal tube is in satisfactory position        The study was marked in [facility] for immediate notification  Workstation performed: [id number]         XR chest 2 views    (Results Pending)         Hospital Course:     Akshat Bahena is a 58 y o  male patient who originally presented to the hospital on 6/29/2022 due to cardiac arrest   He was in ICU in shock and required pressors and also intra-aortic balloon pump  He was noted to have moderate CAD without need for intervention on catheterization  He was evaluated by heart failure team and electrophysiology P team   Shock state improved and he was transferred to medical floor  He is on medical therapy as per Cardiology team   He received ICD placement on 07/05  PERCY also improved  Outpatient follow-up with Cardiology  He was also noted to have pneumonia for which he is on antibiotic course, 2 more days of p o  Antibiotics to complete the course  Please refer to detail assessment and plan above for further details  Condition at Discharge: stable     Discharge Day Visit / Exam:     Subjective:  No overnight events  Patient denies any acute chest pain or shortness of breath  Wants to go home today  Was seen after ICD placement  Vitals: Blood Pressure: 131/89 (07/05/22 1415)  Pulse: 86 (07/05/22 1415)  Temperature: 98 1 °F (36 7 °C) (07/05/22 1017)  Temp Source: Oral (07/05/22 1017)  Respirations: 18 (07/05/22 1215)  Height: 6' 2" (188 cm) (06/29/22 2000)  Weight - Scale: 99 3 kg (218 lb 14 7 oz) (07/05/22 0600)  SpO2: 95 % (07/05/22 1415)  Exam:   Physical Exam  Constitutional:       General: He is not in acute distress  Cardiovascular:      Rate and Rhythm: Normal rate and regular rhythm  Heart sounds: Normal heart sounds  No murmur heard  Pulmonary:      Effort: No respiratory distress  Breath sounds: Normal breath sounds  No wheezing or rales  Abdominal:      General: Bowel sounds are normal  There is no distension  Palpations: Abdomen is soft  Tenderness:  There is no abdominal tenderness  Musculoskeletal:         General: No swelling  Skin:     General: Skin is warm  Neurological:      Mental Status: He is alert  Comments: Awake alert and communicative       Discharge instructions/Information to patient and family:   See after visit summary for information provided to patient and family  Provisions for Follow-Up Care:  See after visit summary for information related to follow-up care and any pertinent home health orders  Disposition:     Home       Discharge Statement:  I spent 45 minutes discharging the patient  This time was spent on the day of discharge  I had direct contact with the patient on the day of discharge  Greater than 50% of the total time was spent examining patient, answering all patient questions, arranging and discussing plan of care with patient as well as directly providing post-discharge instructions  Additional time then spent on discharge activities  Discharge Medications:  See after visit summary for reconciled discharge medications provided to patient and family        ** Please Note: This note has been constructed using a voice recognition system **

## 2022-07-05 NOTE — PROGRESS NOTES
Patient had dual-chamber ICD placed this morning  Called by nursing staff as patient is not adhering to 4 hour bedrest   He would like to go home today  Chest x-ray shows proper lead placement and no evidence of pneumothorax  We discussed restrictions afterwards and patient will have 2 week site check scheduled  After [employer] interrogate the device and give him his bedside monitor, will be stable from an EP standpoint for discharge  Will reach out to the primary team again once this happens  In terms of medication, will up titrate metoprolol to 25 mg twice daily, had been on 12 5 mg twice daily so far this admission

## 2022-07-05 NOTE — ASSESSMENT & PLAN NOTE
Secondary to aspiration/cardiac arrest, strep pneumo isolated, ceftriaxone witched to cefdinir to complete 7 day course

## 2022-07-06 ENCOUNTER — TRANSITIONAL CARE MANAGEMENT (OUTPATIENT)
Dept: FAMILY MEDICINE CLINIC | Facility: CLINIC | Age: 62
End: 2022-07-06

## 2022-07-06 LAB
BACTERIA BLD CULT: NORMAL
BACTERIA BLD CULT: NORMAL
GLUCOSE SERPL-MCNC: 132 MG/DL (ref 65–140)
GLUCOSE SERPL-MCNC: 156 MG/DL (ref 65–140)

## 2022-07-06 NOTE — UTILIZATION REVIEW
Notification of Discharge   This is a Notification of Discharge from our facility [address]  Please be advised that this patient has been discharge from our facility  Below you will find the admission and discharge date and time including the patients disposition  UTILIZATION REVIEW CONTACT:  Cesar Cohen  Utilization   Network Utilization Review Department  Phone: [phone] x carefully listen to the prompts  All voicemails are confidential   Email: [email or web address]     PHYSICIAN ADVISORY SERVICES:  FOR PABO-KF-OLIQ REVIEW - MEDICAL NECESSITY DENIAL  Phone: [phone]  Fax: [phone]  Email: [email or web address]     PRESENTATION DATE: 6/29/2022  6:03 PM  OBERVATION ADMISSION DATE:  INPATIENT ADMISSION DATE: 6/29/22  6:03 PM   DISCHARGE DATE: 7/5/2022  4:45 PM  DISPOSITION: Home/Self Care Home/Self Care      IMPORTANT INFORMATION:  Send all requests for admission clinical reviews, approved or denied determinations and any other requests to dedicated fax number below belonging to the campus where the patient is receiving treatment   List of dedicated fax numbers:  [address] DENIALS (Administrative/Medical Necessity) [phone]   [address] (Maternity/NICU/Pediatrics) [phone]   [place] [phone]   [address] [facility] [phone]   [address] [place] [address] [phone]   [address] [phone]   [facility]  [phone]   [address] [place], [place] [address] [address] [phone]

## 2022-07-06 NOTE — UTILIZATION REVIEW
Notification of Discharge   This is a Notification of Discharge from our facility [address]  Please be advised that this patient has been discharge from our facility  Below you will find the admission and discharge date and time including the patients disposition  UTILIZATION REVIEW CONTACT:  Yomaira Moralez MA  Utilization   Network Utilization Review Department  Phone: [phone] x carefully listen to the prompts  All voicemails are confidential   Email: [email or web address]     PHYSICIAN ADVISORY SERVICES:  FOR XLIE-VO-JGIP REVIEW - MEDICAL NECESSITY DENIAL  Phone: [phone]  Fax: [phone]  Email: [email or web address]     PRESENTATION DATE: 6/29/2022  2:32 PM  OBERVATION ADMISSION DATE:   INPATIENT ADMISSION DATE: 6/29/22  3:41 PM   DISCHARGE DATE: 6/29/2022  6:02 PM  DISPOSITION: Home/Self Care Home/Self Care      IMPORTANT INFORMATION:  Send all requests for admission clinical reviews, approved or denied determinations and any other requests to dedicated fax number below belonging to the campus where the patient is receiving treatment   List of dedicated fax numbers:  [address] [place] [address] DENIALS (Administrative/Medical Necessity) [phone]   [address]  (Maternity/NICU/Pediatrics) [phone]   [facility] [phone]   130 [facility] [phone]   [address] [place] [address] [phone]   2000 [facility] [address] [place] [address],[address]Th Floor [address] [place] [address] [phone]   [facility]  [phone]   [address] [facility]  [address] [facility] [address] [facility] [phone]

## 2022-07-09 LAB
AMPHETAMINES UR QL SCN: NEGATIVE NG/ML
BARBITURATES UR QL SCN: NEGATIVE NG/ML
BENZODIAZ UR QL: NEGATIVE
BZE UR QL: NEGATIVE NG/ML
CANNABINOIDS UR QL SCN: NEGATIVE NG/ML
METHADONE UR QL SCN: NEGATIVE NG/ML
OPIATES UR QL: NEGATIVE NG/ML
PCP UR QL: NEGATIVE NG/ML
PROPOXYPH UR QL SCN: NEGATIVE NG/ML

## 2022-07-15 ENCOUNTER — OFFICE VISIT (OUTPATIENT)
Dept: FAMILY MEDICINE CLINIC | Facility: CLINIC | Age: 62
End: 2022-07-15
Payer: COMMERCIAL

## 2022-07-15 VITALS
DIASTOLIC BLOOD PRESSURE: 92 MMHG | WEIGHT: 222 LBS | HEIGHT: 74 IN | OXYGEN SATURATION: 98 % | SYSTOLIC BLOOD PRESSURE: 136 MMHG | TEMPERATURE: 97.6 F | HEART RATE: 78 BPM | BODY MASS INDEX: 28.49 KG/M2

## 2022-07-15 DIAGNOSIS — Z95.810 ICD (IMPLANTABLE CARDIOVERTER-DEFIBRILLATOR) IN PLACE: ICD-10-CM

## 2022-07-15 DIAGNOSIS — Z09 HOSPITAL DISCHARGE FOLLOW-UP: Primary | ICD-10-CM

## 2022-07-15 DIAGNOSIS — E78.1 HYPERTRIGLYCERIDEMIA: ICD-10-CM

## 2022-07-15 DIAGNOSIS — I49.01 CARDIAC ARREST WITH VENTRICULAR FIBRILLATION (HCC): ICD-10-CM

## 2022-07-15 DIAGNOSIS — Z71.6 ENCOUNTER FOR SMOKING CESSATION COUNSELING: ICD-10-CM

## 2022-07-15 DIAGNOSIS — R73.03 PREDIABETES: ICD-10-CM

## 2022-07-15 DIAGNOSIS — I46.9 CARDIAC ARREST WITH VENTRICULAR FIBRILLATION (HCC): ICD-10-CM

## 2022-07-15 DIAGNOSIS — N17.9 AKI (ACUTE KIDNEY INJURY) (HCC): ICD-10-CM

## 2022-07-15 DIAGNOSIS — E87.6 HYPOKALEMIA: ICD-10-CM

## 2022-07-15 DIAGNOSIS — R79.89 ELEVATED LFTS: ICD-10-CM

## 2022-07-15 PROCEDURE — 1111F DSCHRG MED/CURRENT MED MERGE: CPT | Performed by: PHYSICIAN ASSISTANT

## 2022-07-15 PROCEDURE — 99495 TRANSJ CARE MGMT MOD F2F 14D: CPT | Performed by: PHYSICIAN ASSISTANT

## 2022-07-15 NOTE — PROGRESS NOTES
Transition of Care  Follow-up After Hospitalization    Cynthia Callahan 58 y o  male   Date:  7/15/2022    TCM Call (since 6/14/2022)     Date and time call was made  7/6/2022 12:12 PM    Hospital care reviewed  Records reviewed    Patient was hospitialized at  [facility]    Date of Admission  06/29/22    Date of discharge  07/05/22    Diagnosis  Cardiac arrest with ventricular fibrillation    Disposition  Home    Were the patients medications reviewed and updated  No    Current Symptoms  None      TCM Call (since 6/14/2022)     Post hospital issues  None    Scheduled for follow up? Yes    Patients specialists  Cardiologist    Did you obtain your prescribed medications  Yes    Do you need help managing your prescriptions or medications  No    Is transportation to your appointment needed  No    I have advised the patient to call PCP with any new or worsening symptoms  Lillian Casillas MA    Living Arrangements  Spouse or Significiant other    Are you recieving any outpatient services  Yes    What type of services  Cardiac Rehab    Are you recieving home care services  No    Are you using any community resources  No    Current waiver services  No    Have you fallen in the last 12 months  No    Interperter language line needed  No    Counseling  Patient        Hospital records were reviewed  Medications upon discharge reviewed/updated  Discharge Disposition:  home  Follow up visits with other specialists: cardiology      Assessment and Plan:    Rosalinda Dashawn was seen today for transition of care management      Diagnoses and all orders for this visit:    Hospital discharge follow-up  Comments:  understands need to schedule outpatient cardiology appt as well, will need cardiology recommendations on return to work     Cardiac arrest with ventricular fibrillation ([facility] [address] )  Comments:  s/p cath with triple vessel disease but no intervention needed; needs cardiology follow up; on ACE, BB, asa, statin, brilinta  Orders:  - CBC and differential; Future    PERCY (acute kidney injury) ([facility] [address] )  Comments:  2/2 to shock, improved during hospitalization, continue to monitor on repeat labs in 2 weeks  Orders:  -     Comprehensive metabolic panel; Future    Elevated LFTs  Comments:  felt 2/2 to shock, improving during hospitalization, continue to monitor, repeat labs in 2 weeks  Orders:  -     Comprehensive metabolic panel; Future    Encounter for smoking cessation counseling  Comments:  has weaned significantly, urged to complete quit; poor s/e from chantix, defers wellbutrin or patch    Hypertriglyceridemia  Comments:  discussed low fat diet    ICD (implantable cardioverter-defibrillator) in place  Comments:  healing well, has upcoming device check with cardiology    Prediabetes  Comments:  a1c 5 9; discussed low carb/sugar diet     Hypokalemia  Comments:  repeat cmp, discussed need for K rich foods      HPI:  HPI   Patient is a 59 yo male who presents for hospitalization follow up, 6/29-7/5  He presented to ER on 6/29 via EMS after 3 days of chest pain in v fib arrest  ROSC was obtained  He was treated with amiodarone drip and due to shock was on vasopressors in the cardiac ICU  He understand catheterization which showed triple vessel disease but required no intervation  He did require IABP  He was followed by cardiology and eventually ICD was placed  He remains now on lisinopril and asa, brilanta, statin, BB  While in hospital, he was treated for aspiration PNA, completed course of abx  He was treated for PERCY and transaminitis due to shock  Since discharge, he reports feeling great  He has no chest pain, sob, dizziness, edema, syncope  He reports being told that he cannot lift arm for 6 weeks  He feels ready to go back to work, has FMLA forms here today  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  He is eating and drinking well  No fevers  He has device check appt scheduled but not appt with cardiology yet  He reports swelling along L wrist where IVs where, has improved  He understands to ice the area  ROS: Review of Systems   Constitutional: Negative for activity change, appetite change, diaphoresis and fever  Respiratory: Negative  Negative for shortness of breath  Cardiovascular: Negative  Negative for chest pain, palpitations and leg swelling  Gastrointestinal: Negative  Genitourinary: Negative  Neurological: Negative  Negative for dizziness, syncope, weakness and light-headedness  Psychiatric/Behavioral: Negative  Past Medical History:   Diagnosis Date    Bilateral inguinal hernia     Dizziness 02/02/2021    isolated incident of dizziness, sweating & disorientation    Hypertension        Past Surgical History:   Procedure Laterality Date    CARDIAC CATHETERIZATION N/A 6/29/2022    Procedure: Cardiac pci;  Surgeon: Tom Islas MD;  [facility]; Service: Cardiology    CARDIAC CATHETERIZATION N/A 6/29/2022    Procedure: Cardiac iabp; Surgeon: Tom Islas MD;  [facility]; Service: Cardiology    CARDIAC ELECTROPHYSIOLOGY PROCEDURE N/A 7/5/2022    Procedure: Cardiac icd implant;  Surgeon: Odilia Daniel MD;  Location: [facility]ARD[facility];   Service: Cardiology    DC REPAIR ING HERNIA,5+Y/O,REDUCIBL Bilateral 2/9/2021    Procedure: INGUINAL HERNIA REPAIR with mesh;  Surgeon: David Godoy MD;  Location: [facility] MAIN OR;  Service: General    TONSILLECTOMY         Social History     Socioeconomic History    Marital status: /Civil Union     Spouse name: None    Number of children: None    Years of education: None    Highest education level: None   Occupational History    None   Tobacco Use    Smoking status: Current Every Day Smoker     Packs/day: 0 50     Types: Cigarettes    Smokeless tobacco: Never Used   Vaping Use    Vaping Use: Never used   Substance and Sexual Activity    Alcohol use: Not Currently    Drug use: Not Currently    Sexual activity: Not Currently     Partners: Female   Other Topics Concern    None   Social History Narrative    None     Social Determinants of Health     Financial Resource Strain: Not on file   Food Insecurity: No Food Insecurity    Worried About Running Out of Food in the Last Year: Never true    Zackery of Food in the Last Year: Never true   Transportation Needs: No Transportation Needs    Lack of Transportation (Medical): No    Lack of Transportation (Non-Medical): No   Physical Activity: Not on file   Stress: Not on file   Social Connections: Not on file   Intimate Partner Violence: Not on file   Housing Stability: Low Risk     Unable to Pay for Housing in the Last Year: No    Number of Places Lived in the Last Year: 1    Unstable Housing in the Last Year: No       Family History   Problem Relation Age of Onset    Diabetes Maternal Grandfather        No Known Allergies      Current Outpatient Medications:     aspirin 81 mg chewable tablet, Chew 1 tablet (81 mg total) daily, Disp: 30 tablet, Rfl: 0    atorvastatin (LIPITOR) 40 mg tablet, Take 1 tablet (40 mg total) by mouth daily with dinner, Disp: 30 tablet, Rfl: 0    lisinopril (ZESTRIL) 10 mg tablet, Take 1 tablet (10 mg total) by mouth daily, Disp: 30 tablet, Rfl: 0    metoprolol succinate (TOPROL-XL) 50 mg 24 hr tablet, Take 1 tablet (50 mg total) by mouth daily, Disp: 30 tablet, Rfl: 0    ticagrelor (BRILINTA) 90 MG, Take 1 tablet (90 mg total) by mouth every 12 (twelve) hours, Disp: 60 tablet, Rfl: 0      Physical Exam:  /92 (BP Location: Right arm, Patient Position: Sitting)   Pulse 78   Temp 97 6 °F (36 4 °C)   Ht 6' 2" (1 88 m)   Wt 101 kg (222 lb)   SpO2 98%   BMI 28 50 kg/m²     Physical Exam  Constitutional:       General: He is not in acute distress  Appearance: Normal appearance     HENT:      Right Ear: External ear normal       Left Ear: External ear normal    Eyes:      Conjunctiva/sclera: Conjunctivae normal       Pupils: Pupils are equal, round, and reactive to light  Cardiovascular:      Rate and Rhythm: Normal rate and regular rhythm  Heart sounds: No murmur heard  Comments: ICD placement healing well   Pulmonary:      Effort: No respiratory distress  Breath sounds: Normal breath sounds  No wheezing  Musculoskeletal:         General: No deformity  Right lower leg: No edema  Left lower leg: No edema  Comments: Mild swelling along L hand/wrist, no erythema or palpable cord   Skin:     General: Skin is warm and dry  Coloration: Skin is not pale  Neurological:      General: No focal deficit present  Mental Status: He is alert and oriented to person, place, and time     Psychiatric:         Mood and Affect: Mood normal          Behavior: Behavior normal              Labs:  Lab Results   Component Value Date    WBC 12 51 (H) 07/05/2022    HGB 14 5 07/05/2022    HCT 44 5 07/05/2022    MCV 92 07/05/2022     07/05/2022     Lab Results   Component Value Date    K 3 4 (L) 07/05/2022     07/05/2022    CO2 23 07/05/2022    BUN 24 07/05/2022    CREATININE 1 00 07/05/2022    GLUCOSE 275 (H) 06/29/2022    CALCIUM 9 4 07/05/2022    CORRECTEDCA 9 8 07/04/2022    AST 59 (H) 07/04/2022    ALT 70 07/04/2022    ALKPHOS 90 07/04/2022    EGFR 80 07/05/2022

## 2022-07-15 NOTE — PATIENT INSTRUCTIONS
Ambulatory referral to Cardiology  [facility]  [phone] [address] [facility] [address]    Call above number to schedule outpatient appointment  Low Fat Diet   AMBULATORY CARE:   A low-fat diet  is an eating plan that is low in total fat, unhealthy fat, and cholesterol  You may need to follow a low-fat diet if you have trouble digesting or absorbing fat  You may also need to follow this diet if you have high cholesterol  You can also lower your cholesterol by increasing the amount of fiber in your diet  Soluble fiber is a type of fiber that helps to decrease cholesterol levels  Different types of fat in food:   Limit unhealthy fats  A diet that is high in cholesterol, saturated fat, and trans fat may cause unhealthy cholesterol levels  Unhealthy cholesterol levels increase your risk of heart disease  Cholesterol:  Limit intake of cholesterol to less than 200 mg per day  Cholesterol is found in meat, eggs, and dairy  Saturated fat:  Limit saturated fat to less than 7% of your total daily calories  Ask your dietitian how many calories you need each day  Saturated fat is found in butter, cheese, ice cream, whole milk, and palm oil  Saturated fat is also found in meat, such as beef, pork, chicken skin, and processed meats  Processed meats include sausage, hot dogs, and bologna  Trans fat:  Avoid trans fat as much as possible  Trans fat is used in fried and baked foods  Foods that say trans fat free on the label may still have up to 0 5 grams of trans fat per serving  Include healthy fats  Replace foods that are high in saturated and trans fat with foods high in healthy fats  This may help to decrease high cholesterol levels  Monounsaturated fats: These are found in avocados, nuts, and vegetable oils, such as olive, canola, and sunflower oil  Polyunsaturated fats: These can be found in vegetable oils, such as soybean or corn oil   Omega-3 fats can help to decrease the risk of heart disease  Omega-3 fats are found in fish, such as salmon, herring, trout, and tuna  Omega-3 fats can also be found in plant foods, such as walnuts, flaxseed, soybeans, and canola oil         Foods to limit or avoid:   Grains:      Snacks that are made with partially hydrogenated oils, such as chips, regular crackers, and butter-flavored popcorn    High-fat baked goods, such as biscuits, croissants, doughnuts, pies, cookies, and pastries    Dairy:      Whole milk, 2% milk, and yogurt and ice cream made with whole milk    Half and half creamer, heavy cream, and whipping cream    Cheese, cream cheese, and sour cream    Meats and proteins:      High-fat cuts of meat (T-bone steak, regular hamburger, and ribs)    [facility], poultry (turkey and chicken), and fish    Poultry (chicken and turkey) with skin    Cold cuts (salami or bologna), hot dogs, swenson, and sausage    Whole eggs and egg yolks    Vegetables and fruits with added fat:      Fried vegetables or vegetables in butter or high-fat sauces, such as cream or cheese sauces    Fried fruit or fruit served with butter or cream    Fats:      Butter, stick margarine, and shortening    Coconut, palm oil, and palm kernel oil    Foods to include:   Grains:      Whole-grain breads, cereals, pasta, and brown rice    Low-fat crackers and pretzels    Vegetables and fruits:      Fresh, frozen, or canned vegetables (no salt or low-sodium)    Fresh, frozen, dried, or canned fruit (canned in light syrup or fruit juice)    Avocado    Low-fat dairy products:      Nonfat (skim) or 1% milk    Nonfat or low-fat cheese, yogurt, and cottage cheese    Meats and proteins:      Chicken or turkey with no skin    Baked or broiled fish    Lean beef and pork (loin, round, extra lean hamburger)    Beans and peas, unsalted nuts, soy products    Egg whites and substitutes    Seeds and nuts    Fats:      Unsaturated oil, such as canola, olive, peanut, soybean, or sunflower oil    Soft or liquid margarine and vegetable oil spread    Low-fat salad dressing    Other ways to decrease fat:   Read food labels before you buy foods  Choose foods that have less than 30% of calories from fat  Choose low-fat or fat-free dairy products  Remember that fat free does not mean calorie free  These foods still contain calories, and too many calories can lead to weight gain  Trim fat from meat and avoid fried food  Trim all visible fat from meat before you cook it  Remove the skin from poultry  Do not gutierrez meat, fish, or poultry  Bake, roast, boil, or broil these foods instead  Avoid fried foods  Eat a baked potato instead of Western Julia fries  Steam vegetables instead of sautéing them in butter  Add less fat to foods  Use imitation swenson bits on salads and baked potatoes instead of regular swenson bits  Use fat-free or low-fat salad dressings instead of regular dressings  Use low-fat or nonfat butter-flavored topping instead of regular butter or margarine on popcorn and other foods  Ways to decrease fat in recipes:  Replace high-fat ingredients with low-fat or nonfat ones  This may cause baked goods to be drier than usual  You may need to use nonfat cooking spray on pans to prevent food from sticking  You also may need to change the amount of other ingredients, such as water, in the recipe  Try the following:  Use low-fat or light margarine instead of regular margarine or shortening  Use lean ground turkey breast or chicken, or lean ground beef (less than 5% fat) instead of hamburger  Add 1 teaspoon of canola oil to 8 ounces of skim milk instead of using cream or half and half  Use grated zucchini, carrots, or apples in breads instead of coconut  Use blenderized, low-fat cottage cheese, plain tofu, or low-fat ricotta cheese instead of cream cheese       Use 1 egg white and 1 teaspoon of canola oil, or use ¼ cup (2 ounces) of fat-free egg substitute instead of a whole egg  Replace half of the oil that is called for in a recipe with applesauce when you bake  Use 3 tablespoons of cocoa powder and 1 tablespoon of canola oil instead of a square of baking chocolate  How to increase fiber:  Eat enough high-fiber foods to get 20 to 30 grams of fiber every day  Slowly increase your fiber intake to avoid stomach cramps, gas, and other problems  Eat 3 ounces of whole-grain foods each day  An ounce is about 1 slice of bread  Eat whole-grain breads, such as whole-wheat bread  Whole wheat, whole-wheat flour, or other whole grains should be listed as the first ingredient on the food label  Replace white flour with whole-grain flour or use half of each in recipes  Whole-grain flour is heavier than white flour, so you may have to add more yeast or baking powder  Eat a high-fiber cereal for breakfast   Oatmeal is a good source of soluble fiber  Look for cereals that have bran or fiber in the name  Choose whole-grain products, such as brown rice, barley, and whole-wheat pasta  Eat more beans, peas, and lentils  For example, add beans to soups or salads  Eat at least 5 cups of fruits and vegetables each day  Eat fruits and vegetables with the peel because the peel is high in fiber  © Copyright [employer] 2022 Information is for End User's use only and may not be sold, redistributed or otherwise used for commercial purposes  All illustrations and images included in CareNotes® are the copyrighted property of A D A M , Inc  or [facility] [place] Ghassan Kelly   The above information is an  only  It is not intended as medical advice for individual conditions or treatments  Talk to your doctor, nurse or pharmacist before following any medical regimen to see if it is safe and effective for you

## 2022-07-18 ENCOUNTER — TELEPHONE (OUTPATIENT)
Dept: FAMILY MEDICINE CLINIC | Facility: CLINIC | Age: 62
End: 2022-07-18

## 2022-07-18 ENCOUNTER — TELEPHONE (OUTPATIENT)
Dept: CARDIOLOGY CLINIC | Facility: CLINIC | Age: 62
End: 2022-07-18

## 2022-07-18 DIAGNOSIS — I21.02 ST ELEVATION MYOCARDIAL INFARCTION INVOLVING LEFT ANTERIOR DESCENDING (LAD) CORONARY ARTERY (HCC): ICD-10-CM

## 2022-07-18 DIAGNOSIS — I50.20 HFREF (HEART FAILURE WITH REDUCED EJECTION FRACTION) (HCC): ICD-10-CM

## 2022-07-18 RX ORDER — LISINOPRIL 10 MG/1
10 TABLET ORAL DAILY
Qty: 30 TABLET | Refills: 0 | Status: SHIPPED | OUTPATIENT
Start: 2022-07-18 | End: 2022-08-04 | Stop reason: SDUPTHER

## 2022-07-18 RX ORDER — METOPROLOL SUCCINATE 50 MG/1
50 TABLET, EXTENDED RELEASE ORAL DAILY
Qty: 30 TABLET | Refills: 0 | Status: SHIPPED | OUTPATIENT
Start: 2022-07-18 | End: 2022-07-20 | Stop reason: SDUPTHER

## 2022-07-18 RX ORDER — ASPIRIN 81 MG/1
81 TABLET, CHEWABLE ORAL DAILY
Qty: 30 TABLET | Refills: 0 | Status: SHIPPED | OUTPATIENT
Start: 2022-07-18 | End: 2022-08-04 | Stop reason: SDUPTHER

## 2022-07-18 RX ORDER — ATORVASTATIN CALCIUM 40 MG/1
40 TABLET, FILM COATED ORAL
Qty: 30 TABLET | Refills: 0 | Status: SHIPPED | OUTPATIENT
Start: 2022-07-18 | End: 2022-08-04 | Stop reason: SDUPTHER

## 2022-07-18 NOTE — TELEPHONE ENCOUNTER
Patient called this morning looking for an update from his appt on Friday  He relayed that the provider was supposed to reach out to cardiology regarding a form for work? Please advise as I didn't see anything noted in OV

## 2022-07-18 NOTE — TELEPHONE ENCOUNTER
This patient needs to be set up with a general cardiologist per Ann Marie Farnsworth Friend due to his CAD  Pt aware someone will be reaching out   Thank you!!

## 2022-07-18 NOTE — TELEPHONE ENCOUNTER
I do not see that patient scheduled a follow up visit for the cardiologist  Patient needs to schedule this so we can determine when can return to work

## 2022-07-18 NOTE — TELEPHONE ENCOUNTER
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

## 2022-07-18 NOTE — LETTER
[address] CARDIOLOGY [place] [address]  Dept: [phone]    July 18, 2022     Patient: Tonio Vega   YOB: 1960   Date of Visit: 7/5/2022       To Whom it May Concern:    Leslie Ruiz is under my professional care  He was seen in the hospital on 07/05/2022 for an ICD implant  He can return to work on 07/25/2022 without any cardiac restrictions  If you have any questions or concerns, please don't hesitate to call           Sincerely,        Carmel Alonzo [facility]  Cardiac Electrophysiology

## 2022-07-20 ENCOUNTER — IN-CLINIC DEVICE VISIT (OUTPATIENT)
Dept: CARDIOLOGY CLINIC | Facility: CLINIC | Age: 62
End: 2022-07-20

## 2022-07-20 DIAGNOSIS — I50.20 HFREF (HEART FAILURE WITH REDUCED EJECTION FRACTION) (HCC): ICD-10-CM

## 2022-07-20 DIAGNOSIS — I21.02 ST ELEVATION MYOCARDIAL INFARCTION INVOLVING LEFT ANTERIOR DESCENDING (LAD) CORONARY ARTERY (HCC): ICD-10-CM

## 2022-07-20 DIAGNOSIS — Z95.810 AICD (AUTOMATIC CARDIOVERTER/DEFIBRILLATOR) PRESENT: Primary | ICD-10-CM

## 2022-07-20 PROCEDURE — 99024 POSTOP FOLLOW-UP VISIT: CPT | Performed by: INTERNAL MEDICINE

## 2022-07-20 RX ORDER — METOPROLOL SUCCINATE 50 MG/1
50 TABLET, EXTENDED RELEASE ORAL DAILY
Qty: 30 TABLET | Refills: 0 | Status: SHIPPED | OUTPATIENT
Start: 2022-07-20 | End: 2022-08-04 | Stop reason: SDUPTHER

## 2022-07-20 NOTE — PROGRESS NOTES
Results for orders placed or performed in visit on 07/20/22   Cardiac EP device report    Narrative    MDT DUAL ICD/ [facility]  INTERROGATED IN THE [employer] OFFICE  BATTERY VOLTAGE ADEQUATE (13 YRS)  AP-5%, -0 5%  ALL LEAD PARAMETERS WITHIN NORMAL LIMITS  58  BRIEF NSVT & 15 HIGH RATE-NS EPISODES >200 BPM, MOST RECENT FOR 5 BEATS, AVG CL~210MS  PT ASYMPTOMATIC W/ EPISODES  PT WAS TAKING BOTH METOPROLOL TARTRATE & SUCCINATE IN ERROR  PT INFORMED NOT TO TAKE TARTRATE, ONLY SUCCINATE  NEW RX GIVEN  OPTIVOL INITIALIZING  NORMAL DEVICE FUNCTION  WOUND CHECK: INCISION CLEAN AND DRY WITH EDGES APPROXIMATED; WOUND CARE AND RESTRICTIONS REVIEWED WITH PATIENT   GV

## 2022-07-27 ENCOUNTER — DOCUMENTATION (OUTPATIENT)
Dept: CARDIOLOGY CLINIC | Facility: CLINIC | Age: 62
End: 2022-07-27

## 2022-07-27 NOTE — PROGRESS NOTES
Received forms via fax  Completed and signed  Faxed to [facility] at [phone]

## 2022-08-04 ENCOUNTER — OFFICE VISIT (OUTPATIENT)
Dept: CARDIOLOGY CLINIC | Facility: CLINIC | Age: 62
End: 2022-08-04
Payer: COMMERCIAL

## 2022-08-04 VITALS
WEIGHT: 222 LBS | HEART RATE: 80 BPM | SYSTOLIC BLOOD PRESSURE: 120 MMHG | HEIGHT: 74 IN | DIASTOLIC BLOOD PRESSURE: 68 MMHG | BODY MASS INDEX: 28.49 KG/M2

## 2022-08-04 DIAGNOSIS — I25.10 CORONARY ARTERY DISEASE INVOLVING NATIVE CORONARY ARTERY OF NATIVE HEART WITHOUT ANGINA PECTORIS: Primary | ICD-10-CM

## 2022-08-04 DIAGNOSIS — I21.02 ST ELEVATION MYOCARDIAL INFARCTION INVOLVING LEFT ANTERIOR DESCENDING (LAD) CORONARY ARTERY (HCC): ICD-10-CM

## 2022-08-04 DIAGNOSIS — Z95.810 ICD (IMPLANTABLE CARDIOVERTER-DEFIBRILLATOR), DUAL, IN SITU: ICD-10-CM

## 2022-08-04 DIAGNOSIS — E78.2 MIXED HYPERLIPIDEMIA: ICD-10-CM

## 2022-08-04 DIAGNOSIS — I50.20 HFREF (HEART FAILURE WITH REDUCED EJECTION FRACTION) (HCC): ICD-10-CM

## 2022-08-04 DIAGNOSIS — I46.9 CARDIAC ARREST (HCC): ICD-10-CM

## 2022-08-04 PROCEDURE — 1111F DSCHRG MED/CURRENT MED MERGE: CPT | Performed by: INTERNAL MEDICINE

## 2022-08-04 PROCEDURE — 99215 OFFICE O/P EST HI 40 MIN: CPT | Performed by: INTERNAL MEDICINE

## 2022-08-04 RX ORDER — ATORVASTATIN CALCIUM 40 MG/1
40 TABLET, FILM COATED ORAL
Qty: 90 TABLET | Refills: 3 | Status: SHIPPED | OUTPATIENT
Start: 2022-08-04 | End: 2022-08-18 | Stop reason: SDUPTHER

## 2022-08-04 RX ORDER — ASPIRIN 81 MG/1
81 TABLET, CHEWABLE ORAL DAILY
Qty: 90 TABLET | Refills: 3 | Status: SHIPPED | OUTPATIENT
Start: 2022-08-04 | End: 2022-08-18 | Stop reason: SDUPTHER

## 2022-08-04 RX ORDER — METOPROLOL SUCCINATE 50 MG/1
50 TABLET, EXTENDED RELEASE ORAL DAILY
Qty: 90 TABLET | Refills: 3 | Status: SHIPPED | OUTPATIENT
Start: 2022-08-04 | End: 2022-08-18 | Stop reason: SDUPTHER

## 2022-08-04 RX ORDER — LISINOPRIL 10 MG/1
10 TABLET ORAL DAILY
Qty: 90 TABLET | Refills: 3 | Status: SHIPPED | OUTPATIENT
Start: 2022-08-04 | End: 2022-08-18 | Stop reason: SDUPTHER

## 2022-08-04 RX ORDER — METOPROLOL SUCCINATE 50 MG/1
50 TABLET, EXTENDED RELEASE ORAL DAILY
Qty: 90 TABLET | Refills: 3 | Status: CANCELLED | OUTPATIENT
Start: 2022-08-04

## 2022-08-18 DIAGNOSIS — I50.20 HFREF (HEART FAILURE WITH REDUCED EJECTION FRACTION) (HCC): ICD-10-CM

## 2022-08-18 DIAGNOSIS — I21.02 ST ELEVATION MYOCARDIAL INFARCTION INVOLVING LEFT ANTERIOR DESCENDING (LAD) CORONARY ARTERY (HCC): ICD-10-CM

## 2022-08-18 RX ORDER — LISINOPRIL 10 MG/1
10 TABLET ORAL DAILY
Qty: 90 TABLET | Refills: 3 | Status: SHIPPED | OUTPATIENT
Start: 2022-08-18 | End: 2022-09-16

## 2022-08-18 RX ORDER — ASPIRIN 81 MG/1
81 TABLET, CHEWABLE ORAL DAILY
Qty: 90 TABLET | Refills: 3 | Status: SHIPPED | OUTPATIENT
Start: 2022-08-18 | End: 2022-09-16

## 2022-08-18 RX ORDER — ATORVASTATIN CALCIUM 40 MG/1
40 TABLET, FILM COATED ORAL
Qty: 90 TABLET | Refills: 3 | Status: SHIPPED | OUTPATIENT
Start: 2022-08-18 | End: 2022-09-16

## 2022-08-18 RX ORDER — METOPROLOL SUCCINATE 50 MG/1
50 TABLET, EXTENDED RELEASE ORAL DAILY
Qty: 90 TABLET | Refills: 3 | Status: SHIPPED | OUTPATIENT
Start: 2022-08-18 | End: 2022-09-16

## 2022-08-24 ENCOUNTER — TELEPHONE (OUTPATIENT)
Dept: CARDIOLOGY CLINIC | Facility: CLINIC | Age: 62
End: 2022-08-24

## 2022-08-24 NOTE — TELEPHONE ENCOUNTER
Patient's wife called  He will need a month supply of Brilinta and Atorvastatin called to [facility] due to not receiving mail order in time  Was able to find a $5 copay card        Atorvastatin 40 mg daily #30 x0 refills  Brilinta 90 mg bid #60 x0 refills  Aspirin chewable 81 mg #30

## 2022-08-25 ENCOUNTER — OFFICE VISIT (OUTPATIENT)
Dept: CARDIOLOGY CLINIC | Facility: CLINIC | Age: 62
End: 2022-08-25
Payer: COMMERCIAL

## 2022-08-25 VITALS
DIASTOLIC BLOOD PRESSURE: 70 MMHG | WEIGHT: 217.3 LBS | BODY MASS INDEX: 27.89 KG/M2 | SYSTOLIC BLOOD PRESSURE: 130 MMHG | HEART RATE: 82 BPM | HEIGHT: 74 IN

## 2022-08-25 DIAGNOSIS — I20.8 ANGINA OF EFFORT (HCC): Primary | ICD-10-CM

## 2022-08-25 DIAGNOSIS — I46.9 CARDIAC ARREST WITH VENTRICULAR FIBRILLATION (HCC): ICD-10-CM

## 2022-08-25 DIAGNOSIS — I49.01 CARDIAC ARREST WITH VENTRICULAR FIBRILLATION (HCC): ICD-10-CM

## 2022-08-25 PROCEDURE — 99214 OFFICE O/P EST MOD 30 MIN: CPT | Performed by: PHYSICIAN ASSISTANT

## 2022-08-25 PROCEDURE — 93000 ELECTROCARDIOGRAM COMPLETE: CPT | Performed by: PHYSICIAN ASSISTANT

## 2022-08-25 RX ORDER — AMLODIPINE BESYLATE 2.5 MG/1
2.5 TABLET ORAL DAILY
Qty: 30 TABLET | Refills: 0 | Status: SHIPPED | OUTPATIENT
Start: 2022-08-25 | End: 2022-09-16

## 2022-08-25 NOTE — PROGRESS NOTES
Electrophysiology Office Note    Jesus Lorenzo  1960  [id number]  HEART & VASCULAR Zandra Cooks  [facility]  [address] [place] [address] [address]        Assessment/Plan     Primary diagnosis:   1  VT/VF arrest in setting of acute MI without revascularization    * patient presents to [facility] EP office today for post hospital follow up    * had cardiac arrest with cardiogenic shock requiring IABP in July 2022  Cath showing moderate 3VD and severe small vessel disease  No targets for CABG or PCI due to only moderate 3VD and no acute stenosis  Thought was he had MI with spontaneous lysis of clot  * now s/p MDT DC ICD for secondary prevention prior to discharge  * no recurrent VT/VF post hospital stay  No ICD therapies    * site examined today and well healed     2  Stable angina   * having symptoms of classic stable angina  * ECG non ischemic today    * I am going to start low dose amlodipine, I am concerned he would get symptomatic hypotension on a long acting nitrate as he informs me his home BP's have been 120/60  Will start 2 5mg today  Will f/u with him on sep 4th to see how he is feeling  He is going to call me if he cannot tolerate the amlodipine  * I am going to have him f/u with general CRNP in 1 month for further angina management  Patient expressed his desire to follow with a different cardiologist  Will refer to Dr Janine Ortiz     Secondary diagnosis:   1  CAD    * see cath report from 2022    * continue on DAPT, BB and high intensity statin    2  HLD   3  Tobacco abuse    * down from 3 PPD to 1 PPD and motivated to quit   4  HTN              Rhythm History:   Atrial fibrillation:     Atrial flutter:     SVT:     VT/VF/PVC:     Device history:   Pacemaker:    Defibrillator:    BIV PPM:    BIV ICD:    ILR:      Cardiac Testing:     ECHO: No results found for this or any previous visit          History of Present Illness     HPI/INTERVAL HISTORY: Jesus Lorenzo is a 58 y o  male with history as above who presents to [facility] EP office today for post hospital follow up  Since returning home from his hospital stay he is having concerns of chest discomfort going to his neck and jaw with exertion and relieved by rest  He has not used any PRN [facility] Nitro as he was not prescribed any  Other then this has no major concerns or complaints  Review of Systems  ROS as noted above, otherwise 12 point review of systems was performed and is negative  Historical Information   Social History     Socioeconomic History    Marital status: /Civil Union     Spouse name: Not on file    Number of children: Not on file    Years of education: Not on file    Highest education level: Not on file   Occupational History    Not on file   Tobacco Use    Smoking status: Current Every Day Smoker     Packs/day: 0 50     Types: Cigarettes    Smokeless tobacco: Never Used   Vaping Use    Vaping Use: Never used   Substance and Sexual Activity    Alcohol use: Not Currently    Drug use: Not Currently    Sexual activity: Not Currently     Partners: Female   Other Topics Concern    Not on file   Social History Narrative    Not on file     Social Determinants of Health     Financial Resource Strain: Not on file   Food Insecurity: No Food Insecurity    Worried About Running Out of Food in the Last Year: Never true    Zackery of Food in the Last Year: Never true   Transportation Needs: No Transportation Needs    Lack of Transportation (Medical): No    Lack of Transportation (Non-Medical):  No   Physical Activity: Not on file   Stress: Not on file   Social Connections: Not on file   Intimate Partner Violence: Not on file   Housing Stability: Low Risk     Unable to Pay for Housing in the Last Year: No    Number of Places Lived in the Last Year: 1    Unstable Housing in the Last Year: No     Past Medical History:   Diagnosis Date    PERCY [facility]acute kidney injury) ([facility] [address] )     Bilateral inguinal hernia     [facility] arrest with ventricular fibrillation [facility] [address] [facility]     Dizziness 02/02/2021    isolated incident of dizziness, sweating & disorientation    Elevated LFTs     History of left heart catheterization     and IABP, ICD PLACEMENT, DUAL CHAMBER, [employer]    Hyperglycemia     Hypertension     Pneumonia     STEMI (ST elevation myocardial infarction) [facility])      Past Surgical History:   Procedure Laterality Date    CARDIAC CATHETERIZATION N/A 6/29/2022    Procedure: Cardiac pci;  Surgeon: Donna Hopper MD;  [facility]; Service: Cardiology    CARDIAC CATHETERIZATION N/A 6/29/2022    Procedure: Cardiac iabp; Surgeon: Donna Hopper MD;  [facility]; Service: Cardiology    CARDIAC ELECTROPHYSIOLOGY PROCEDURE N/A 7/5/2022    Procedure: Cardiac icd implant;  Surgeon: Severiano Santee, MD;  Location: [facility];   Service: Cardiology    TX REPAIR ING HERNIA,5+Y/O,REDUCIBL Bilateral 2/9/2021    Procedure: INGUINAL HERNIA REPAIR with mesh;  Surgeon: Talia March MD;  Location: [facility] MAIN OR;  Service: General    TONSILLECTOMY       Social History     Substance and Sexual Activity   Alcohol Use Not Currently     Social History     Substance and Sexual Activity   Drug Use Not Currently     Social History     Tobacco Use   Smoking Status Current Every Day Smoker    Packs/day: 0 50    Types: Cigarettes   Smokeless Tobacco Never Used     Family History   Problem Relation Age of Onset    Diabetes Maternal Grandfather        Meds/Allergies       Current Outpatient Medications:     amLODIPine (NORVASC) 2 5 mg tablet, Take 1 tablet (2 5 mg total) by mouth daily, Disp: 30 tablet, Rfl: 0    aspirin 81 mg chewable tablet, Chew 1 tablet (81 mg total) daily, Disp: 90 tablet, Rfl: 3    atorvastatin (LIPITOR) 40 mg tablet, Take 1 tablet (40 mg total) by mouth daily with dinner, Disp: 90 tablet, Rfl: 3    lisinopril (ZESTRIL) 10 mg tablet, Take 1 tablet (10 mg total) by mouth daily, Disp: 90 tablet, Rfl: 3    metoprolol succinate (TOPROL-XL) 50 mg 24 hr tablet, Take 1 tablet (50 mg total) by mouth daily, Disp: 90 tablet, Rfl: 3    ticagrelor (BRILINTA) 90 MG, Take 1 tablet (90 mg total) by mouth every 12 (twelve) hours, Disp: 180 tablet, Rfl: 3    No Known Allergies    Objective   Vitals: Blood pressure 130/70, pulse 82, height 6' 2" (1 88 m), weight 98 6 kg (217 lb 4 8 oz)  Physical Exam  Constitutional:       Appearance: He is well-developed  HENT:      Head: Normocephalic and atraumatic  Eyes:      Pupils: Pupils are equal, round, and reactive to light  Cardiovascular:      Rate and Rhythm: Normal rate and regular rhythm  Pulmonary:      Effort: Pulmonary effort is normal       Breath sounds: Normal breath sounds  Abdominal:      General: Bowel sounds are normal       Palpations: Abdomen is soft  Musculoskeletal:         General: Normal range of motion  Cervical back: Normal range of motion and neck supple  Skin:     General: Skin is warm and dry  Neurological:      Mental Status: He is alert and oriented to person, place, and time  Labs:  No results displayed because visit has over 200 results        Admission on 06/29/2022, Discharged on 06/29/2022   Component Date Value    WBC 06/29/2022 11 42 (A)    RBC 06/29/2022 4 90     Hemoglobin 06/29/2022 14 9     Hematocrit 06/29/2022 45 1     MCV 06/29/2022 92     MCH 06/29/2022 30 4     MCHC 06/29/2022 33 0     RDW 06/29/2022 12 3     MPV 06/29/2022 8 8 (A)    Platelets 71/26/6260 253     nRBC 06/29/2022 0     Neutrophils Relative 06/29/2022 56     Immat GRANS % 06/29/2022 0     Lymphocytes Relative 06/29/2022 34     Monocytes Relative 06/29/2022 7     Eosinophils Relative 06/29/2022 2     Basophils Relative 06/29/2022 1     Neutrophils Absolute 06/29/2022 6 42     Immature Grans Absolute 06/29/2022 0 04     Lymphocytes Absolute 06/29/2022 3 87     Monocytes Absolute 06/29/2022 0 82  Eosinophils Absolute 06/29/2022 0 20     Basophils Absolute 06/29/2022 0 07     hs TnI 0hr 06/29/2022 64 (A)    Ethanol Lvl 06/29/2022 <10     Cath EF Quantitative 06/29/2022 50     Sodium 06/29/2022 132 (A)    Potassium 06/29/2022 4 2     Chloride 06/29/2022 96     CO2 06/29/2022 19 (A)    ANION GAP 06/29/2022 17 (A)    BUN 06/29/2022 23     Creatinine 06/29/2022 1 56 (A)    Glucose 06/29/2022 330 (A)    Calcium 06/29/2022 8 8     Corrected Calcium 06/29/2022 9 3     AST 06/29/2022 121 (A)    ALT 06/29/2022 174 (A)    Alkaline Phosphatase 06/29/2022 80     Total Protein 06/29/2022 5 7 (A)    Albumin 06/29/2022 3 4 (A)    Total Bilirubin 06/29/2022 0 38     eGFR 06/29/2022 46     Amphetamine Screen, Ur 07/09/2022 Negative     Barbiturate Screen, Ur 07/09/2022 Negative     Cannabinoid Scrn, Ur 07/09/2022 Negative     Methadone Screen, Urine 07/09/2022 Negative     Opiate Scrn, Ur 07/09/2022 Negative     Phencyclidine (PCP), Raul* 07/09/2022 Negative     Benzodiazepines 07/09/2022 Negative     Cocaine (Metab ) Urine 07/09/2022 Negative     Propoxyphene Screen, Uri* 07/09/2022 Negative     pH, Art i-STAT 06/29/2022 7 168 (A)    pCO2, Art i-STAT 06/29/2022 65 6 (A)    pO2, ART i-STAT 06/29/2022 247 0 (A)    BE, i-STAT 06/29/2022 -6 (A)    HCO3, Art i-STAT 06/29/2022 23 8     CO2, i-STAT 06/29/2022 26     O2 Sat, i-STAT 06/29/2022 100 (A)    SODIUM, I-STAT 06/29/2022 132 (A)    Potassium, i-STAT 06/29/2022 4 3     Calcium, Ionized i-STAT 06/29/2022 1 23     Hct, i-STAT 06/29/2022 41     Hgb, i-STAT 06/29/2022 13 9     Glucose, i-STAT 06/29/2022 329 (A)    POC FIO2 06/29/2022 100     Specimen Type 06/29/2022 ARTERIAL     Delivery System 06/29/2022 AC     Respiratory Rate 06/29/2022 20     Vent Type 06/29/2022 TV     Vent Value 06/29/2022 460     Ancillary Values 06/29/2022 PEEPS     Pressure Setting 06/29/2022 6     POC Glucose 07/02/2022      POC Glucose 07/02/2022 Imaging: I have personally reviewed pertinent reports

## 2022-09-04 ENCOUNTER — NURSE TRIAGE (OUTPATIENT)
Dept: OTHER | Facility: OTHER | Age: 62
End: 2022-09-04

## 2022-09-04 NOTE — TELEPHONE ENCOUNTER
Regarding: IDC beeping   ----- Message from Betina Tavarez sent at 9/4/2022  9:26 AM EDT -----  " I have a heart ICD and its beeping "

## 2022-09-04 NOTE — TELEPHONE ENCOUNTER
Reason for Disposition   Haskell or felt device alarm (e g  beeping or buzzing)    Answer Assessment - Initial Assessment Questions  1  DESCRIPTION: "Please describe the concern you have with your pacemaker or defibrillator" (e g ,  delivered a shock, palpitations, beeping heard)      Beeping    2  ONSET: "When did this occur?" (e g , minutes, hours or days)  0930     3  DURATION: "How long did it last" (e g , seconds, minutes, hours)   Couple seconds    4  Michelle Naranjoist RECURRENT SYMPTOM: "Have you ever had this before?" If YES,  "When was the last time?" and "What happened that time?"    yes happened yesterday      5  CARDIAC HISTORY: "What other heart problems do you have?" (e g , heart attack, angina, bypass surgery, stents, heart failure, rhythm problem)       Stable angina , VT/VF arrest    6  OTHER SYMPTOMS: "Do you have any other symptoms?" (e g , dizziness, chest pain, fever, sweating, difficulty breathing)    Denies    Protocols used: ICD AND PACEMAKER SYMPTOMS AND QUESTIONS-ADULT-    Paged on call provider  Provider stated for patient to send interrogation and call office on Tuesday  Patient made aware and verbalized understanding

## 2022-09-06 ENCOUNTER — HOSPITAL ENCOUNTER (INPATIENT)
Facility: HOSPITAL | Age: 62
LOS: 2 days | DRG: 302 | End: 2022-09-08
Attending: SURGERY | Admitting: INTERNAL MEDICINE
Payer: COMMERCIAL

## 2022-09-06 ENCOUNTER — APPOINTMENT (EMERGENCY)
Dept: CT IMAGING | Facility: HOSPITAL | Age: 62
DRG: 302 | End: 2022-09-06
Payer: COMMERCIAL

## 2022-09-06 ENCOUNTER — APPOINTMENT (OUTPATIENT)
Dept: RADIOLOGY | Facility: HOSPITAL | Age: 62
DRG: 302 | End: 2022-09-06
Payer: COMMERCIAL

## 2022-09-06 DIAGNOSIS — I49.01 CARDIAC ARREST WITH VENTRICULAR FIBRILLATION (HCC): ICD-10-CM
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
## 2022-09-06 DIAGNOSIS — I46.9 CARDIAC ARREST WITH VENTRICULAR FIBRILLATION (HCC): ICD-10-CM

## 2022-09-06 DIAGNOSIS — R55 SYNCOPE, UNSPECIFIED SYNCOPE TYPE: Primary | ICD-10-CM

## 2022-09-06 PROBLEM — I20.89 ANGINA AT REST: Status: ACTIVE | Noted: 2022-09-06

## 2022-09-06 PROBLEM — Z86.74 HISTORY OF CARDIAC ARREST: Status: ACTIVE | Noted: 2022-09-06

## 2022-09-06 PROBLEM — I20.89 STABLE ANGINA: Status: ACTIVE | Noted: 2022-09-06

## 2022-09-06 PROBLEM — D72.829 LEUKOCYTOSIS: Status: ACTIVE | Noted: 2022-09-06

## 2022-09-06 PROBLEM — R79.89 ELEVATED TROPONIN: Status: ACTIVE | Noted: 2022-09-06

## 2022-09-06 PROBLEM — I20.8 ANGINA AT REST (HCC): Status: ACTIVE | Noted: 2022-09-06

## 2022-09-06 PROBLEM — I20.8 STABLE ANGINA (HCC): Status: ACTIVE | Noted: 2022-09-06

## 2022-09-06 PROBLEM — E83.52 HYPERCALCEMIA: Status: ACTIVE | Noted: 2022-09-06

## 2022-09-06 PROBLEM — R77.8 ELEVATED TROPONIN: Status: ACTIVE | Noted: 2022-09-06

## 2022-09-06 PROBLEM — Z45.02 ICD (IMPLANTABLE CARDIOVERTER-DEFIBRILLATOR) DISCHARGE: Status: ACTIVE | Noted: 2022-09-06

## 2022-09-06 LAB
2HR DELTA HS TROPONIN: 171 NG/L
2HR DELTA HS TROPONIN: 97 NG/L
4HR DELTA HS TROPONIN: 306 NG/L
4HR DELTA HS TROPONIN: 48 NG/L
ANION GAP SERPL CALCULATED.3IONS-SCNC: 6 MMOL/L (ref 4–13)
APTT PPP: 26 SECONDS (ref 23–37)
APTT PPP: 26 SECONDS (ref 23–37)
APTT PPP: 27 SECONDS (ref 23–37)
BASE EXCESS BLDA CALC-SCNC: 1 MMOL/L (ref -2–3)
BASOPHILS # BLD AUTO: 0.05 THOUSANDS/ΜL (ref 0–0.1)
BASOPHILS NFR BLD AUTO: 0 % (ref 0–1)
BUN SERPL-MCNC: 23 MG/DL (ref 5–25)
CA-I BLD-SCNC: 1.28 MMOL/L (ref 1.12–1.32)
CALCIUM SERPL-MCNC: 10.5 MG/DL (ref 8.4–10.2)
CARDIAC TROPONIN I PNL SERPL HS: 114 NG/L
CARDIAC TROPONIN I PNL SERPL HS: 211 NG/L
CARDIAC TROPONIN I PNL SERPL HS: 420 NG/L
CARDIAC TROPONIN I PNL SERPL HS: 451 NG/L
CARDIAC TROPONIN I PNL SERPL HS: 499 NG/L
CARDIAC TROPONIN I PNL SERPL HS: 622 NG/L
CHLORIDE SERPL-SCNC: 102 MMOL/L (ref 96–108)
CO2 SERPL-SCNC: 26 MMOL/L (ref 21–32)
CREAT SERPL-MCNC: 1.02 MG/DL (ref 0.6–1.3)
EOSINOPHIL # BLD AUTO: 0.17 THOUSAND/ΜL (ref 0–0.61)
EOSINOPHIL NFR BLD AUTO: 1 % (ref 0–6)
ERYTHROCYTE [DISTWIDTH] IN BLOOD BY AUTOMATED COUNT: 13 % (ref 11.6–15.1)
ERYTHROCYTE [DISTWIDTH] IN BLOOD BY AUTOMATED COUNT: 13 % (ref 11.6–15.1)
GFR SERPL CREATININE-BSD FRML MDRD: 78 ML/MIN/1.73SQ M
GLUCOSE SERPL-MCNC: 114 MG/DL (ref 65–140)
GLUCOSE SERPL-MCNC: 118 MG/DL (ref 65–140)
HCO3 BLDA-SCNC: 25.4 MMOL/L (ref 24–30)
HCT VFR BLD AUTO: 40.3 % (ref 36.5–49.3)
HCT VFR BLD AUTO: 42.5 % (ref 36.5–49.3)
HCT VFR BLD CALC: 43 % (ref 36.5–49.3)
HGB BLD-MCNC: 13.5 G/DL (ref 12–17)
HGB BLD-MCNC: 14.1 G/DL (ref 12–17)
HGB BLDA-MCNC: 14.6 G/DL (ref 12–17)
IMM GRANULOCYTES # BLD AUTO: 0.07 THOUSAND/UL (ref 0–0.2)
IMM GRANULOCYTES NFR BLD AUTO: 1 % (ref 0–2)
INR PPP: 0.91 (ref 0.84–1.19)
INR PPP: 0.95 (ref 0.84–1.19)
LYMPHOCYTES # BLD AUTO: 2.33 THOUSANDS/ΜL (ref 0.6–4.47)
LYMPHOCYTES NFR BLD AUTO: 20 % (ref 14–44)
MCH RBC QN AUTO: 30 PG (ref 26.8–34.3)
MCH RBC QN AUTO: 30.1 PG (ref 26.8–34.3)
MCHC RBC AUTO-ENTMCNC: 33.2 G/DL (ref 31.4–37.4)
MCHC RBC AUTO-ENTMCNC: 33.5 G/DL (ref 31.4–37.4)
MCV RBC AUTO: 90 FL (ref 82–98)
MCV RBC AUTO: 90 FL (ref 82–98)
MONOCYTES # BLD AUTO: 0.94 THOUSAND/ΜL (ref 0.17–1.22)
MONOCYTES NFR BLD AUTO: 8 % (ref 4–12)
NEUTROPHILS # BLD AUTO: 8.34 THOUSANDS/ΜL (ref 1.85–7.62)
NEUTS SEG NFR BLD AUTO: 70 % (ref 43–75)
NRBC BLD AUTO-RTO: 0 /100 WBCS
PCO2 BLD: 27 MMOL/L (ref 21–32)
PCO2 BLD: 39.8 MM HG (ref 42–50)
PH BLD: 7.41 [PH] (ref 7.3–7.4)
PLATELET # BLD AUTO: 238 THOUSANDS/UL (ref 149–390)
PLATELET # BLD AUTO: 257 THOUSANDS/UL (ref 149–390)
PMV BLD AUTO: 8.5 FL (ref 8.9–12.7)
PMV BLD AUTO: 8.6 FL (ref 8.9–12.7)
PO2 BLD: 26 MM HG (ref 35–45)
POTASSIUM BLD-SCNC: 4.1 MMOL/L (ref 3.5–5.3)
POTASSIUM SERPL-SCNC: 4 MMOL/L (ref 3.5–5.3)
PROTHROMBIN TIME: 12.4 SECONDS (ref 11.6–14.5)
PROTHROMBIN TIME: 12.9 SECONDS (ref 11.6–14.5)
RBC # BLD AUTO: 4.48 MILLION/UL (ref 3.88–5.62)
RBC # BLD AUTO: 4.7 MILLION/UL (ref 3.88–5.62)
SAO2 % BLD FROM PO2: 48 % (ref 60–85)
SODIUM BLD-SCNC: 138 MMOL/L (ref 136–145)
SODIUM SERPL-SCNC: 134 MMOL/L (ref 135–147)
SPECIMEN SOURCE: ABNORMAL
WBC # BLD AUTO: 11.9 THOUSAND/UL (ref 4.31–10.16)
WBC # BLD AUTO: 12.36 THOUSAND/UL (ref 4.31–10.16)

## 2022-09-06 PROCEDURE — 85014 HEMATOCRIT: CPT

## 2022-09-06 PROCEDURE — 85730 THROMBOPLASTIN TIME PARTIAL: CPT | Performed by: INTERNAL MEDICINE

## 2022-09-06 PROCEDURE — 82330 ASSAY OF CALCIUM: CPT

## 2022-09-06 PROCEDURE — 99024 POSTOP FOLLOW-UP VISIT: CPT | Performed by: INTERNAL MEDICINE

## 2022-09-06 PROCEDURE — 99285 EMERGENCY DEPT VISIT HI MDM: CPT

## 2022-09-06 PROCEDURE — 85025 COMPLETE CBC W/AUTO DIFF WBC: CPT | Performed by: SURGERY

## 2022-09-06 PROCEDURE — 87040 BLOOD CULTURE FOR BACTERIA: CPT | Performed by: INTERNAL MEDICINE

## 2022-09-06 PROCEDURE — 70450 CT HEAD/BRAIN W/O DYE: CPT

## 2022-09-06 PROCEDURE — 85610 PROTHROMBIN TIME: CPT | Performed by: SURGERY

## 2022-09-06 PROCEDURE — 85027 COMPLETE CBC AUTOMATED: CPT | Performed by: INTERNAL MEDICINE

## 2022-09-06 PROCEDURE — 82947 ASSAY GLUCOSE BLOOD QUANT: CPT

## 2022-09-06 PROCEDURE — 84295 ASSAY OF SERUM SODIUM: CPT

## 2022-09-06 PROCEDURE — 84484 ASSAY OF TROPONIN QUANT: CPT | Performed by: SURGERY

## 2022-09-06 PROCEDURE — NC001 PR NO CHARGE: Performed by: EMERGENCY MEDICINE

## 2022-09-06 PROCEDURE — 93308 TTE F-UP OR LMTD: CPT | Performed by: SURGERY

## 2022-09-06 PROCEDURE — 99223 1ST HOSP IP/OBS HIGH 75: CPT | Performed by: INTERNAL MEDICINE

## 2022-09-06 PROCEDURE — 72125 CT NECK SPINE W/O DYE: CPT

## 2022-09-06 PROCEDURE — 93005 ELECTROCARDIOGRAM TRACING: CPT

## 2022-09-06 PROCEDURE — 36415 COLL VENOUS BLD VENIPUNCTURE: CPT | Performed by: SURGERY

## 2022-09-06 PROCEDURE — 99204 OFFICE O/P NEW MOD 45 MIN: CPT | Performed by: SURGERY

## 2022-09-06 PROCEDURE — 85730 THROMBOPLASTIN TIME PARTIAL: CPT | Performed by: SURGERY

## 2022-09-06 PROCEDURE — 80048 BASIC METABOLIC PNL TOTAL CA: CPT | Performed by: SURGERY

## 2022-09-06 PROCEDURE — 84132 ASSAY OF SERUM POTASSIUM: CPT

## 2022-09-06 PROCEDURE — 82803 BLOOD GASES ANY COMBINATION: CPT

## 2022-09-06 PROCEDURE — 84484 ASSAY OF TROPONIN QUANT: CPT | Performed by: INTERNAL MEDICINE

## 2022-09-06 PROCEDURE — 76705 ECHO EXAM OF ABDOMEN: CPT | Performed by: SURGERY

## 2022-09-06 PROCEDURE — 71045 X-RAY EXAM CHEST 1 VIEW: CPT

## 2022-09-06 PROCEDURE — 85610 PROTHROMBIN TIME: CPT | Performed by: INTERNAL MEDICINE

## 2022-09-06 RX ORDER — METOPROLOL SUCCINATE 50 MG/1
50 TABLET, EXTENDED RELEASE ORAL ONCE
Status: DISCONTINUED | OUTPATIENT
Start: 2022-09-06 | End: 2022-09-08

## 2022-09-06 RX ORDER — HEPARIN SODIUM 1000 [USP'U]/ML
2000 INJECTION, SOLUTION INTRAVENOUS; SUBCUTANEOUS
Status: DISCONTINUED | OUTPATIENT
Start: 2022-09-06 | End: 2022-09-08 | Stop reason: HOSPADM

## 2022-09-06 RX ORDER — ATORVASTATIN CALCIUM 40 MG/1
40 TABLET, FILM COATED ORAL
Status: DISCONTINUED | OUTPATIENT
Start: 2022-09-06 | End: 2022-09-08 | Stop reason: HOSPADM

## 2022-09-06 RX ORDER — HEPARIN SODIUM 1000 [USP'U]/ML
4000 INJECTION, SOLUTION INTRAVENOUS; SUBCUTANEOUS
Status: DISCONTINUED | OUTPATIENT
Start: 2022-09-06 | End: 2022-09-08 | Stop reason: HOSPADM

## 2022-09-06 RX ORDER — ASPIRIN 81 MG/1
324 TABLET, CHEWABLE ORAL ONCE
Status: COMPLETED | OUTPATIENT
Start: 2022-09-07 | End: 2022-09-07

## 2022-09-06 RX ORDER — HEPARIN SODIUM 10000 [USP'U]/100ML
3-20 INJECTION, SOLUTION INTRAVENOUS
Status: DISCONTINUED | OUTPATIENT
Start: 2022-09-06 | End: 2022-09-08 | Stop reason: HOSPADM

## 2022-09-06 RX ORDER — ASPIRIN 81 MG/1
81 TABLET, CHEWABLE ORAL DAILY
Status: DISCONTINUED | OUTPATIENT
Start: 2022-09-06 | End: 2022-09-08 | Stop reason: HOSPADM

## 2022-09-06 RX ORDER — METOPROLOL SUCCINATE 50 MG/1
50 TABLET, EXTENDED RELEASE ORAL DAILY
Status: DISCONTINUED | OUTPATIENT
Start: 2022-09-07 | End: 2022-09-06

## 2022-09-06 RX ORDER — ASPIRIN 81 MG/1
81 TABLET, CHEWABLE ORAL DAILY
Status: DISCONTINUED | OUTPATIENT
Start: 2022-09-07 | End: 2022-09-06

## 2022-09-06 RX ORDER — METOPROLOL SUCCINATE 100 MG/1
100 TABLET, EXTENDED RELEASE ORAL DAILY
Status: DISCONTINUED | OUTPATIENT
Start: 2022-09-07 | End: 2022-09-08

## 2022-09-06 RX ORDER — LANOLIN ALCOHOL/MO/W.PET/CERES
3 CREAM (GRAM) TOPICAL
Status: DISCONTINUED | OUTPATIENT
Start: 2022-09-06 | End: 2022-09-07

## 2022-09-06 RX ORDER — AMLODIPINE BESYLATE 2.5 MG/1
2.5 TABLET ORAL DAILY
Status: DISCONTINUED | OUTPATIENT
Start: 2022-09-07 | End: 2022-09-08 | Stop reason: HOSPADM

## 2022-09-06 RX ORDER — SODIUM CHLORIDE 9 MG/ML
100 INJECTION, SOLUTION INTRAVENOUS CONTINUOUS
Status: DISCONTINUED | OUTPATIENT
Start: 2022-09-07 | End: 2022-09-08 | Stop reason: HOSPADM

## 2022-09-06 RX ADMIN — Medication 3 MG: at 21:27

## 2022-09-06 RX ADMIN — HEPARIN SODIUM 11.1 UNITS/KG/HR: 10000 INJECTION, SOLUTION INTRAVENOUS at 16:28

## 2022-09-06 RX ADMIN — TICAGRELOR 90 MG: 90 TABLET ORAL at 21:28

## 2022-09-06 NOTE — QUICK NOTE
The patient had a CT scan of the cervical spine demonstrating no acute fractures or traumatic malalignment  On exam, the patient had no midline cervical spine tenderness  The patient had full range of motion (was then able to flex, extend, and rotate head side to side) without pain  There were no distracting injuries and the patient was not intoxicated  The patients cervical collar was cleared radiologically and clinically      Sandhya Sylvester MD  9/6/2022  2:34 PM

## 2022-09-06 NOTE — H&P
[facility]  H&P- Georgiana Innocent 1960, 58 y o  male MRN: [id number]  Unit/Bed#: ED-02 Encounter: [id number]  Primary Care Provider: Jose Storm PA-C   Date and time admitted to hospital: 9/6/2022 10:15 AM    * Ventricular fibrillation [facility])  Assessment & Plan  Status post ICD placement at [facility] in July following PCI at [facility] after Vfib arrest  Cath results -   "  Left Main   The vessel is moderate in size  The vessel exhibits minimal luminal irregularities  Left Anterior Descending   The vessel is moderate in size  There is moderate diffuse disease throughout the vessel  Mid LAD lesion is 50% stenosed  Dist LAD lesion is 70% stenosed  The lesion is diffuse  First Diagonal Branch   1st Diag lesion is 60% stenosed  The lesion is tubular  Second Diagonal Branch   2nd Diag lesion is 60% stenosed  Left Circumflex   The vessel is moderate in size and non-dominant  There is moderate diffuse disease throughout the vessel  First Obtuse Marginal Branch   1st Mrg lesion is 50% stenosed  Third Obtuse Marginal Branch   The vessel is small  3rd Mrg lesion is 99% stenosed  JONATHAN flow is 1  The lesion is diffuse  Right Coronary Artery   The vessel is large and dominant  There is mild diffuse disease throughout the vessel  Mid RCA lesion is 60% stenosed  Third Right Posterolateral Branch   3rd RPL lesion is 60% stenosed      "  ICD Fired once- timing is approximately 7:00 a m  On Saturday morning and correlates with the patient's account of his fall  This would explain the fall and head strike but does not explain the continued rising troponin  Discussed this with Cardiology and they are planning for catheterization tomorrow and possible stenting  They recommend brillinta, asa 81, they will load tomorrow and continue all other home meds in addition to heparin   Additionally they are recommending level two step-down admission       Hypercalcemia  Assessment & Plan  Unclear etiology   Mild - will trend       Stable angina [facility])  Assessment & Plan  Has been having exertional chest pain for "months"  Patient states his chest pain has gradually been getting worse and that he took twice as much as the prescribed dose of the amlodipine because he thought that this would help his chest pain  Admittedly his chest pain does sound gastrointestinal in etiology he describes it as burning and like reflux  Unfortunately it is consistently present on exertion on and off present when not exerting himself  Coupled with than elevated troponin that is rising this is concerning for acute coronary syndrome  Patient started on heparin drip by Cardiology, we have added home meds including Brilinta and aspirin after discussing with them  Patient to be NPO from midnight for cardiac catheterization tomorrow    ICD (implantable cardioverter-defibrillator) discharge  Assessment & Plan  Device interrogation revealed firing at  Discussed with cardiology and maintain on telemetry and continue to trend trop till plateau  History of cardiac arrest  Assessment & Plan  Patient has a history of Vfib arrest s/p cardiac cath without stenting and ICD placement  He did just recently follow up with EP on 8/25 and at that time was complaining of anginal symptoms  He was started on amlodipine at that time  Patient also states that since his discharge from [facility] when he had his ICD placed he has been having exertionl chest pain  Discussed with Cardiology device interrogation revealed that ICD has fired  Leukocytosis  Assessment & Plan  Likely 2/2 device firing or/and ACS  Will draw blood cultures for completion  Elevated troponin  Assessment & Plan  Concern for ACS and plan for cardiac cath tomorrow  Heparin started by Cardiology discussed with them and they wish to give 80 of aspirin today as well as Brilinta  Tomorrow they will start 325 of aspirin      Patient denies any chest pain is new however continues to complain on our of on and off chest burning which he says has been relieved by Pepcid in the past         Fall  Assessment & Plan  Patient fell on Saturday  He denies any loss of consciousness but states that he felt "dizzy" while having his morning coffee and cigarettes  He then fell to the ground and recalls his face hitting the concrete  He states that he "doubled up" on his amlodipine that day   Seen by trauma and they are signing off and will defer to [facility] regarding admission to hospital          VTE Pharmacologic Prophylaxis:   Moderate Risk (Score 3-4) - Pharmacological DVT Prophylaxis Ordered: heparin drip  Code Status: Level 1 - Full Code   Discussion with family: called Chelly Mancera -   Anticipated Length of Stay: Patient will be admitted on an inpatient basis with an anticipated length of stay of greater than 2 midnights secondary to ICD firing and ACS  Total Time for Visit, including Counseling / Coordination of Care: 45 minutes Greater than 50% of this total time spent on direct patient counseling and coordination of care  Chief Complaint: chest pain    History of Present Illness:  Jamie Bertrand is a 58 y o  male with a PMH of Vfib arrest, known CAD seen on cardiac cath but no stents placed in June 2022, followed by ICD placement in July 2022 who presents with chest pain on exertion since discharge following ICD placement  The patient is able to tell me that he has been having atypical sounding chest pain which she describes as reflux like and relieved by Pepcid which has been ongoing since his discharge from hospital after having his ICD placed  Outpatient cardiology records from August indicate that he was having chest pain when they saw him and the plan at that time was close follow-up and starting amlodipine      In addition to this his symptoms have been getting gradually worse and on Saturday he felt particularly uncomfortable from this so he double up on his amlodipine in the morning  He was sitting and having his morning coffee and cigarette outside after his medications were taken when he felt a dizzy and remembers himself starting to fall for the for reaching out for the ground in front of him  He also recalls his head/face striking the concrete  He denies any loss of consciousness at the time and furthermore does not recall feeling that his device fired  Device interrogation in the emergency department reveals ICD firing at approximately 7:00 a m  On Saturday which correlates with the patient's fall  Initially Trauma were consulted in the ED for trauma evaluation, and no fractures or traumatic injuries were identified so they have signed off and are referring to Internal Medicine for admission to hospital     Cardiology have ordered 325 mg of aspirin to be started tomorrow and a recommending continuing baby aspirin today as well as Brilinta and they have started heparin drip  Interestingly the patient's troponins are trending up and his atypical chest pain continues so there is concerned that his chest pain is in fact secondary to coronary artery disease  Similarly it is less likely that the troponin is rising secondary to ICD firing given that time that has passed since then  Patient specifically denies any shortness of bed breath orthopnea leg swelling or any other symptoms    Review of Systems:  Review of Systems   Constitutional: Positive for fatigue  HENT: Negative  Eyes: Negative  Respiratory: Positive for chest tightness  Negative for cough, choking and shortness of breath  Cardiovascular: Positive for chest pain  Negative for palpitations and leg swelling  Gastrointestinal: Negative  Endocrine: Negative  Genitourinary: Negative  Musculoskeletal: Negative  Fall on sturday      Skin: Negative  Neurological: Positive for dizziness and light-headedness   Negative for tremors, seizures, syncope, facial asymmetry, speech difficulty, weakness, numbness and headaches  Hematological: Negative  Past Medical and Surgical History:   Past Medical History:   Diagnosis Date    PERCY [facility]acute kidney injury) ([facility] [address] )     Bilateral inguinal hernia     Cardiac arrest with ventricular fibrillation ([facility] [address] )     Dizziness 02/02/2021    isolated incident of dizziness, sweating & disorientation    Elevated LFTs     History of left heart catheterization     and IABP, ICD PLACEMENT, DUAL CHAMBER, [employer]    Hyperglycemia     Hypertension     Pneumonia     STEMI (ST elevation myocardial infarction) [facility])        Past Surgical History:   Procedure Laterality Date    CARDIAC CATHETERIZATION N/A 6/29/2022    Procedure: Cardiac pci;  Surgeon: Merlene Resendiz MD;  [facility]: [facility] CARD[facility]ATH [facility]; Service: Cardiology    CARDIAC CATHETERIZATION N/A 6/29/2022    Procedure: Cardiac iabp; Surgeon: Merlene Resendiz MD;  [facility]; Service: Cardiology    CARDIAC ELECTROPHYSIOLOGY PROCEDURE N/A 7/5/2022    Procedure: Cardiac icd implant;  Surgeon: Ronnie Kothari MD;  Location: [facility] CARDIAC CATH LAB; Service: Cardiology    NM REPAIR [address] HERNIA,5+Y/O,REDUCIBL Bilateral 2/9/2021    Procedure: INGUINAL HERNIA REPAIR with mesh;  Surgeon: Stefania De Jesus MD;  Location: [address] [place] [address] OR;  Service: General    TONSILLECTOMY         Meds/Allergies:  Prior to Admission medications    Medication Sig Start Date End Date Taking?  Authorizing Provider   amLODIPine (NORVASC) 2 5 mg tablet Take 1 tablet (2 5 mg total) by mouth daily 8/25/22   Woody Ward PA-C   aspirin 81 mg chewable tablet Chew 1 tablet (81 mg total) daily 8/18/22   Aínbal Rodriguez MD   atorvastatin (LIPITOR) 40 mg tablet Take 1 tablet (40 mg total) by mouth daily with dinner 8/18/22   Aníbal Rodriguez MD   lisinopril (ZESTRIL) 10 mg tablet Take 1 tablet (10 mg total) by mouth daily 8/18/22   Aníbal Rodriguez MD   metoprolol succinate (TOPROL-XL) 50 mg 24 hr tablet Take 1 tablet (50 mg total) by mouth daily 8/18/22   Tanvi Kirkpatrick MD   ticagrelor (BRILINTA) 90 MG Take 1 tablet (90 mg total) by mouth every 12 (twelve) hours 8/18/22   Tanvi Kirkpatrick MD   sacubitril-valsartan Markell December) 24-26 MG TABS Take 1 tablet by mouth 2 (two) times a day 7/5/22 7/5/22  Marni Lou MD     I have reviewed home medications with patient personally  Allergies: No Known Allergies    Social History:  Marital Status: /Civil Union   Occupation:  Works full-time  manual labor   Patient Pre-hospital Living Situation: Home  with wife   Patient Pre-hospital Level of Mobility: walks  Patient Pre-hospital Diet Restrictions:  None  Substance Use History:   Social History     Substance and Sexual Activity   Alcohol Use Not Currently     Social History     Tobacco Use   Smoking Status Current Every Day Smoker    Packs/day: 0 50    Types: Cigarettes   Smokeless Tobacco Never Used     Social History     Substance and Sexual Activity   Drug Use Not Currently     Of note the patient admits to smoking 1 pack of cigarettes a day- he was counseled against this by myself today  Family History:  Family History   Problem Relation Age of Onset    Diabetes Maternal Grandfather        Physical Exam:     Vitals:   Blood Pressure: 132/85 (09/06/22 1500)  Pulse: 71 (09/06/22 1500)  Temperature: 97 7 °F (36 5 °C) (09/06/22 1018)  Temp Source: Oral (09/06/22 1018)  Respirations: 18 (09/06/22 1500)  Height: 6' 2" (188 cm) (09/06/22 1017)  SpO2: 95 % (09/06/22 1500)    Physical Exam  Constitutional:       General: He is not in acute distress  Appearance: He is not ill-appearing, toxic-appearing or diaphoretic     HENT:      Head:      Comments: Abrasions on nose and forehead which patient states he sustained after fall on Saturday     Nose: Nose normal       Mouth/Throat:      Mouth: Mucous membranes are moist    Eyes:      General:         Right eye: No discharge  Left eye: No discharge  Pupils: Pupils are equal, round, and reactive to light  Cardiovascular:      Rate and Rhythm: Normal rate  Heart sounds: No murmur heard  No friction rub  No gallop  Pulmonary:      Effort: No respiratory distress  Breath sounds: No stridor  No wheezing, rhonchi or rales  Chest:      Chest wall: No tenderness  Abdominal:      General: Abdomen is flat  There is no distension  Palpations: There is no mass  Tenderness: There is no abdominal tenderness  There is no right CVA tenderness, left CVA tenderness, guarding or rebound  Hernia: No hernia is present  Skin:     Capillary Refill: Capillary refill takes less than 2 seconds  Coloration: Skin is not jaundiced or pale  Findings: No bruising, erythema, lesion or rash  Neurological:      Mental Status: He is alert  Cranial Nerves: No cranial nerve deficit  Sensory: No sensory deficit  Motor: No weakness  Gait: Gait normal       Deep Tendon Reflexes: Reflexes normal    Psychiatric:         Mood and Affect: Mood normal           Additional Data:     Lab Results:  Results from last 7 days   Lab Units 09/06/22  1615 09/06/22  1033   WBC Thousand/uL 12 36* 11 90*   HEMOGLOBIN g/dL 13 5 14 1   HEMATOCRIT % 40 3 42 5   PLATELETS Thousands/uL 238 257   NEUTROS PCT %  --  70   LYMPHS PCT %  --  20   MONOS PCT %  --  8   EOS PCT %  --  1     Results from last 7 days   Lab Units 09/06/22  1033   SODIUM mmol/L 134*   POTASSIUM mmol/L 4 0   CHLORIDE mmol/L 102   CO2 mmol/L 26   BUN mg/dL 23   CREATININE mg/dL 1 02   ANION GAP mmol/L 6   CALCIUM mg/dL 10 5*   GLUCOSE RANDOM mg/dL 114     Results from last 7 days   Lab Units 09/06/22  1615   INR  0 95                   Imaging: No pertinent imaging reviewed  TRAUMA - CT head wo contrast   Final Result by Casa Lynne MD (09/06 1057)      No acute intracranial abnormality                    Workstation performed: [id number]         TRAUMA - CT spine cervical wo contrast   Final Result by Pablo Hernandez MD (09/06 1057)      No cervical spine fracture or traumatic malalignment  I personally discussed this study with Jeannie Body on 9/6/2022 at 10:55 AM                       Workstation performed: [id number]         XR Trauma multiple (SLB/RA trauma bay ONLY)   Final Result by Tristan Callahan MD (09/06 1047)      No acute cardiopulmonary disease within limitations of supine imaging  Workstation performed: [id number]         XR chest 1 view    (Results Pending)       EKG and Other Studies Reviewed on Admission:   · EKG: NSR  HR 80s  ** Please Note: This note has been constructed using a voice recognition system   **

## 2022-09-06 NOTE — ASSESSMENT & PLAN NOTE
Patient has a history of Vfib arrest s/p cardiac cath without stenting and ICD placement  He did just recently follow up with EP on 8/25 and at that time was complaining of anginal symptoms  He was started on amlodipine at that time  Patient also states that since his discharge from [facility] when he had his ICD placed he has been having exertionl chest pain  Discussed with Cardiology device interrogation revealed that ICD has fired

## 2022-09-06 NOTE — ASSESSMENT & PLAN NOTE
Status post ICD placement at [facility] in July following PCI at [facility] after Vfib arrest  Cath results -   "  Left Main   The vessel is moderate in size  The vessel exhibits minimal luminal irregularities  Left Anterior Descending   The vessel is moderate in size  There is moderate diffuse disease throughout the vessel  Mid LAD lesion is 50% stenosed  Dist LAD lesion is 70% stenosed  The lesion is diffuse  First Diagonal Branch   1st Diag lesion is 60% stenosed  The lesion is tubular  Second Diagonal Branch   2nd Diag lesion is 60% stenosed  Left Circumflex   The vessel is moderate in size and non-dominant  There is moderate diffuse disease throughout the vessel  First Obtuse Marginal Branch   1st Mrg lesion is 50% stenosed  Third Obtuse Marginal Branch   The vessel is small  3rd Mrg lesion is 99% stenosed  JONATHAN flow is 1  The lesion is diffuse  Right Coronary Artery   The vessel is large and dominant  There is mild diffuse disease throughout the vessel  Mid RCA lesion is 60% stenosed  Third Right Posterolateral Branch   3rd RPL lesion is 60% stenosed      "  ICD Fired once- timing is approximately 7:00 a m  On Saturday morning and correlates with the patient's account of his fall  This would explain the fall and head strike but does not explain the continued rising troponin  Discussed this with Cardiology and they are planning for catheterization tomorrow and possible stenting  They recommend brillinta, asa 81, they will load tomorrow and continue all other home meds in addition to heparin   Additionally they are recommending level two step-down admission

## 2022-09-06 NOTE — ASSESSMENT & PLAN NOTE
Patient fell on Saturday  He denies any loss of consciousness but states that he felt "dizzy" while having his morning coffee and cigarettes    He then fell to the ground and recalls his face hitting the concrete  He states that he "doubled up" on his amlodipine that day   Seen by trauma and they are signing off and will defer to [facility] regarding admission to hospital

## 2022-09-06 NOTE — CONSULTS
Consultation - Cardiology Team One  Elsa Pool 58 y o  male MRN: [id number]  · Unit/Bed#: ED-02 Encounter: [id number]    Inpatient consult to Cardiology  Consult performed by: ASHLEY Rosario  Consult ordered by: Karoline Hankins MD          Physician Requesting Consult: Opal Oliver DO  Reason for Consult / Principal Problem:  Syncope with ICD firing  Assessment/ Plan:      Syncope  · Patient states he accidentally took an extra dilator pill that was prescribed during last cardiology visit  · Up upon review of records, medication prescribed was amlodipine 2 5 mg     VFib  · Treated with shock by ICD  · EKG showing ST depressions in lateral leads  · Troponin 114-> 211,  continue to trend hs  troponin  · Ischemic evaluation in view of VFib which was treated with a shock and EKG showing ST depressions in lateral leads  · Interrogation of ICD by [employer]   · Pt states" ICD beeps every night at 9 pm"    Elevated troponin with atypical chest pain   · High sensitivity troponin 114, continue to trend  · GERD type chest discomfort, described as burning in nature  Not relieved with Pepcid AC    · EKG lateral lead ST depressions  · Start heparin gtt  · NPO for cardiac cath tomorrow     Coronary artery disease  · Triple-vessel disease by cardiac catheterization (June 2022)  · Distal LAD 70% stenosis , mid LAD 50%, 1st diagonal 60%, 2nd diagonal 60%, 3rd marginal 99%, RCA 60%, 3rd RPL 60%  · EKG showing ST depressions laterally  · Will discuss case with interventional cardiologist  · Continue metoprolol succinate 50 mg daily         Tobacco abuse  · Cutting down from 3 packs per day to 1 pack per day  · Continue to encourage smoking cessation    Hypertension, controlled  · Lisinopril 10 mg daily  · Amlodipine 2 5 mg was discontinued by the patient    Hyperlipidemia  · Atorvastatin 40 mg daily  · Cholesterol 171 , HDL  22, and LDL 94 (6/30/22)  ______________________________________________________________________________________    CC:  Syncope with firing of ICD      History of Present Illness   HPI: Twan Hammond is a 58y o  year old male who fell 9/3/202 with facial lacerations  He states he was sitting in a chair felt lightheaded and passed out hitting his head with loss of consciousness, while on dual anti-platelet therapy (aspirin 81 mg daily and Brilinta 90 mg twice a day)  He denies chest pain, shortness of breath, nausea, diaphoresis or palpitations prior to falling or upon regaining consciousness  He noted GERD type symptoms, burning in chest with radiation to throat prior to syncope  He notes GERD symptoms are usually relieved with Pepcid AC  However, on the night of the syncopal episode he took an extra dose of amlodipine 2 5 mg  To help with burning sensation in his chest       High sensitivity troponin 114  CT of the head showed no acute intracranial abnormality  CT of the spine failed to reveal any cervical spinal fractures or traumatic malalignment  On 8/25/2022, he was seen by Cardiology and offered complaints of symptoms consistent with stable angina  EKG at the time of office visit failed to reveal any ischemic changes  He was started on a low-dose of amlodipine 2 5 mg rather than a long-acting nitrate in view of blood pressure 120/60  Past medical history significant for multivessel coronary artery disease, STEMI, ischemic cardiomyopathy (EF 44%), VFib arrest, status post ICD placement, hyperlipidemia, tobacco abuse, PERCY, status post ICD placement (MDT DC ICD)      In June 2022, he was hospitalized for an acute MI following a V-tach/VFib arrest   During hospitalization he was in cardiogenic shock requiring intra-aortic balloon pump  Cardiac catheterization was performed  Catheterization (performed 6/2//22): Mid LAD 50%, distal LAD 70%, D1 60%, D2 60%, om 1 50%, OM3 99%, mid RCA 60%, RPL3 60%  Distal LAD and OM3 were felt to be too small and not amenable to PCI  No acute disease was found at that time an AMI was thought to be due to spontaneous lysis of clot his office, possible vasospasm, +/- takotsubo  No surgical targets for CABG  EKG reviewed personally:  Left axis deviation and ST depressions in lateral leads  Heart rate 86 beats per minute, normal sinus rhythm  Telemetry reviewed personally:  Normal sinus rhythm 70s to 80s        Review of Systems   Constitutional: Negative for decreased appetite, fever and weight loss  HENT: Negative  Eyes: Negative  Negative for blurred vision  Cardiovascular: Positive for syncope  Negative for chest pain, leg swelling and near-syncope  Respiratory: Negative  Negative for shortness of breath  Endocrine: Negative  Skin: Negative  Musculoskeletal: Positive for neck pain  Gastrointestinal: Positive for heartburn  Negative for nausea and vomiting  Genitourinary: Negative  Neurological: Negative for light-headedness and loss of balance  Psychiatric/Behavioral: Negative  Allergic/Immunologic: Negative  Historical Information   Past Medical History:   Diagnosis Date    [facility] [address]     Bilateral inguinal hernia     Cardiac arrest with ventricular fibrillation ([facility] [address] [facility]     Dizziness 02/02/2021    isolated incident of dizziness, sweating & disorientation    Elevated LFTs     History of left heart catheterization     and IABP, ICD PLACEMENT, DUAL CHAMBER, [employer]    Hyperglycemia     Hypertension     Pneumonia     STEMI [facility])      Past Surgical History:   Procedure Laterality Date    CARDIAC CATHETERIZATION N/A 6/29/2022    Procedure: Cardiac pci;  Surgeon: Merlene Resendiz MD;  [facility]ARD[facility]; Service: Cardiology    CARDIAC CATHETERIZATION N/A 6/29/2022    Procedure: Cardiac iabp;   Surgeon: Merlene Resendiz MD;  [facility]; Service: Cardiology    CARDIAC ELECTROPHYSIOLOGY PROCEDURE N/A 7/5/2022    Procedure: Cardiac icd implant;  Surgeon: Fan Betancur MD;  [facility]: [facility] CARD[facility]AC [facility]; Service: Cardiology    [place] REPAIR ING HERNIA,5+Y/O,REDUCIBL Bilateral 2/9/2021    Procedure: INGUINAL HERNIA REPAIR with mesh;  Surgeon: Chauncey Piña MD;  Location: [facility] MAIN OR;  Service: General    TONSILLECTOMY       Social History     Substance and Sexual Activity   Alcohol Use Not Currently     Social History     Substance and Sexual Activity   Drug Use Not Currently     Social History     Tobacco Use   Smoking Status Current Every Day Smoker    Packs/day: 0 50    Types: Cigarettes   Smokeless Tobacco Never Used     Family History: non-contributory    Meds/Allergies   all current active meds have been reviewed  No current facility-administered medications for this encounter  No Known Allergies    Objective   Vitals: Blood pressure 129/72, pulse 82, temperature 97 7 °F (36 5 °C), temperature source Oral, resp  rate 16, height 6' 2" (1 88 m), SpO2 96 %  ,     Body mass index is 27 9 kg/m²  ,     Systolic (49IEU), WCR:234 , Min:117 , LZW:084     Diastolic (59ZCI), DCO:30, Min:63, Max:76    Wt Readings from Last 3 Encounters:   08/25/22 98 6 kg (217 lb 4 8 oz)   08/04/22 101 kg (222 lb)   07/15/22 101 kg (222 lb)      Lab Results   Component Value Date    CREATININE 1 02 09/06/2022    CREATININE 1 00 07/05/2022    CREATININE 0 93 07/04/2022           No intake or output data in the 24 hours ending 09/06/22 1124  Weight (last 2 days)     None        Invasive Devices  Report    Peripheral Intravenous Line  Duration           Peripheral IV 09/06/22 Right Antecubital <1 day          Line  Duration           IABP 8 0 Fr  50 mL 68 days                  Physical Exam  Constitutional:       General: He is not in acute distress  Appearance: Normal appearance  He is not ill-appearing  HENT:      Head: Normocephalic        Nose: Nose normal  Mouth/Throat:      Mouth: Mucous membranes are moist    Neck:      Vascular: No carotid bruit  Cardiovascular:      Rate and Rhythm: Normal rate and regular rhythm  Pulses: Normal pulses  Heart sounds: Normal heart sounds  No murmur heard  No friction rub  Pulmonary:      Breath sounds: Normal breath sounds  No rhonchi or rales  Chest:      Chest wall: No tenderness  Abdominal:      General: Bowel sounds are normal       Palpations: Abdomen is soft  Musculoskeletal:         General: Signs of injury present  Cervical back: Neck supple  No tenderness  Right lower leg: No edema  Left lower leg: No edema  Skin:     General: Skin is warm  Capillary Refill: Capillary refill takes less than 2 seconds  Comments: Laceration over nose, scabbed    Neurological:      General: No focal deficit present  Mental Status: He is alert and oriented to person, place, and time  Psychiatric:         Mood and Affect: Mood normal          Behavior: Behavior normal          Thought Content:  Thought content normal            LABORATORY RESULTS:      CBC with diff:   Results from last 7 days   Lab Units 09/06/22  1033 09/06/22  1031   WBC Thousand/uL 11 90*  --    HEMOGLOBIN g/dL 14 1  --    I STAT HEMOGLOBIN g/dl  --  14 6   HEMATOCRIT % 42 5  --    HEMATOCRIT, ISTAT %  --  43   MCV fL 90  --    PLATELETS Thousands/uL 257  --    MCH pg 30 0  --    MCHC g/dL 33 2  --    RDW % 13 0  --    MPV fL 8 5*  --    NRBC AUTO /100 WBCs 0  --        CMP:  Results from last 7 days   Lab Units 09/06/22  1033 09/06/22  1031   POTASSIUM mmol/L 4 0  --    CHLORIDE mmol/L 102  --    CO2 mmol/L 26  --    CO2, I-STAT mmol/L  --  27   BUN mg/dL 23  --    CREATININE mg/dL 1 02  --    GLUCOSE, ISTAT mg/dl  --  118   CALCIUM mg/dL 10 5*  --    EGFR ml/min/1 73sq m 78  --        BMP:  Results from last 7 days   Lab Units 09/06/22  1033 09/06/22  1031   POTASSIUM mmol/L 4 0  --    CHLORIDE mmol/L 102  --    CO2 mmol/L 26  --    CO2, I-STAT mmol/L  --  27   BUN mg/dL 23  --    CREATININE mg/dL 1 02  --    GLUCOSE, ISTAT mg/dl  --  118   CALCIUM mg/dL 10 5*  --           No results found for: NTBNP                           Results from last 7 days   Lab Units 09/06/22  1033   INR  0 91     Lipid Profile:   No results found for: CHOL  Lab Results   Component Value Date    HDL 22 (L) 06/30/2022     Lab Results   Component Value Date    [facility]  06/30/2022      Comment:      Calculated LDL invalid, triglycerides >400 mg/dl     Lab Results   Component Value Date    TRIG 359 (H) 07/02/2022    TRIG 528 (H) 06/30/2022         Cardiac testing:   No results found for this or any previous visit  No results found for this or any previous visit  No valid procedures specified  No results found for this or any previous visit  Imaging: I have personally reviewed pertinent reports  TRAUMA - CT head wo contrast    Result Date: 9/6/2022  Narrative: CT BRAIN - WITHOUT CONTRAST INDICATION:   TRAUMA  COMPARISON:  None  TECHNIQUE:  CT examination of the brain was performed  In addition to axial images, sagittal and coronal 2D reformatted images were created and submitted for interpretation  Radiation dose length product (DLP) for this visit:  [phone] mGy-cm   This examination, like all CT scans performed in the [facility], was performed utilizing techniques to minimize radiation dose exposure, including the use of iterative reconstruction and automated exposure control  IMAGE QUALITY:  Diagnostic  FINDINGS: PARENCHYMA:  No intracranial mass, mass effect or midline shift  No CT signs of acute infarction  No acute parenchymal hemorrhage  VENTRICLES AND EXTRA-AXIAL SPACES:  Normal for the patient's age  VISUALIZED ORBITS AND PARANASAL SINUSES:  Unremarkable  CALVARIUM AND EXTRACRANIAL SOFT TISSUES:  Normal      Impression: No acute intracranial abnormality   Workstation performed: [id number]     TRAUMA - CT spine cervical wo contrast    Result Date: 9/6/2022  Narrative: CT CERVICAL SPINE - WITHOUT CONTRAST INDICATION:   TRAUMA  COMPARISON:  None  TECHNIQUE:  CT examination of the cervical spine was performed without intravenous contrast   Contiguous axial images were obtained  Sagittal and coronal reconstructions were performed  Radiation dose length product (DLP) for this visit:  527 mGy-cm   This examination, like all CT scans performed in the [facility], was performed utilizing techniques to minimize radiation dose exposure, including the use of iterative reconstruction and automated exposure control  IMAGE QUALITY:  Diagnostic  FINDINGS: ALIGNMENT:  Normal alignment of the cervical spine  No subluxation  VERTEBRAL BODIES:  No fracture  DEGENERATIVE CHANGES:  No significant cervical degenerative changes are noted  PREVERTEBRAL AND PARASPINAL SOFT TISSUES:  Unremarkable  THORACIC INLET:  Emphysematous changes noted at the lung apices  Impression: No cervical spine fracture or traumatic malalignment  I personally discussed this study with Jv Meraz on 9/6/2022 at 10:55 AM   Workstation performed: [id number]     XR Trauma multiple ([facility] trauma bay ONLY)    Result Date: 9/6/2022  Narrative: TRAUMA SERIES INDICATION:  TRAUMA  COMPARISON:  None VIEWS:  XR TRAUMA MULTIPLE  FINDINGS: CHEST: Left chest wall pacemaker defibrillator is present  Supine frontal view of the chest is obtained  Cardiomediastinal silhouette is within normal limits accounting for technique and patient positioning  Lungs are clear  No layering pleural effusions detected  No pneumothorax is seen on this supine film  Upright images are more sensitive to detect anterior pneumothoraces if relevant  No displaced fractures  Impression: No acute cardiopulmonary disease within limitations of supine imaging   Workstation performed: [id number]     Cardiac EP device report    Result Date: 9/3/2022  Narrative: JAYSON DUAL ICD/ ACTIVE SYSTEM IS MRI CONDITIONAL NB/SHOCK-CARELINK TRANSMISSION: 1 VF TREATED W/SHOCK  PT STATES HE ACCIDENTLY TOOK THE RX'D ("DILATOR PILL-PER PT)  CLAIMS AFTER SHOCK HE STOPPED TAKING THE MED THAT ABIMAEL RIDDLE, RX'D  PT ON METO SUCC & EF 44% (2022)  1 DEVICE CLASSIFIED VHR NOTED; NO THERAPY GIVEN  DR JOSEPH & DT MADE AWARE  US bedside procedure    Result Date: 9/6/2022  Narrative: 2 [phone] [id number]  3 [id number]  5 [id number]          Counseling / Coordination of Care  Total floor / unit time spent today 45 minutes  Greater than 50% of total time was spent with the patient and / or family counseling and / or coordination of care  A description of the counseling / coordination of care: Review of history, current assessment, development of a plan  Code Status: Prior    ** Please Note: Dragon 360 Dictation voice to text software may have been used in the creation of this document   **

## 2022-09-06 NOTE — ED PROVIDER NOTES
Emergency Department Airway Evaluation and Management Form    History  Obtained from: patient  Patient has no known allergies  Chief Complaint   Patient presents with    Fall     Pt reports falling Saturday, felt like he was going to pass out, +thinners +facial injuries, unsure of syncope, states defibrillator did go off at the time  Denies dizziness, slight CP/reflux  Denies HA/blurred vision      HPI    Past Medical History:   Diagnosis Date    [facility] [address] [facility]     Bilateral inguinal hernia     Cardiac arrest with ventricular fibrillation [facility] [address] )     Dizziness 02/02/2021    isolated incident of dizziness, sweating & disorientation    Elevated LFTs     History of left heart catheterization     and IABP, ICD PLACEMENT, DUAL CHAMBER, [employer]    Hyperglycemia     Hypertension     Pneumonia     [facility]I ([facility] infarction[facility])      Past Surgical History:   Procedure Laterality Date    CARDIAC CATHETERIZATION N/A 6/29/2022    Procedure: Cardiac pci;  Surgeon: Donna Hopper MD;  [facility] CARD[facility]AC [facility]; Service: Cardiology    CARDIAC CATHETERIZATION N/A 6/29/2022    Procedure: Cardiac iabp; Surgeon: Donna Hopper MD;  [facility]: [facility] CARDIAC [facility]; Service: Cardiology    CARDIAC ELECTROPHYSIOLOGY PROCEDURE N/A 7/5/2022    Procedure: Cardiac icd implant;  Surgeon: Severiano Santee, MD;  Location: [facility] CARDI[facility];   Service: Cardiology    [place] REPAIR ING HERNIA,5+Y/O,REDUCIBL Bilateral 2/9/2021    Procedure: INGUINAL HERNIA REPAIR with mesh;  Surgeon: Talia March MD;  Location: [address] [place] [address] OR;  Service: General    TONSILLECTOMY       Family History   Problem Relation Age of Onset    Diabetes Maternal Grandfather      Social History     Tobacco Use    Smoking status: Current Every Day Smoker     Packs/day: 0 50     Types: Cigarettes    Smokeless tobacco: Never Used   Vaping Use    Vaping Use: Never used   Substance Use Topics    Alcohol use: Not Currently    Drug use: Not Currently     I have reviewed and agree with the history as documented  Review of Systems    Physical Exam  /74   Pulse 91   Temp 97 7 °F (36 5 °C) (Oral)   Resp 16   Ht 6' 2" (1 88 m)   SpO2 96%   BMI 27 90 kg/m²     Physical Exam    ED Medications  Medications - No data to display    Intubation  Procedures    Notes  59 yo M coming in for eval of head injury, fell 4 days ago, hit head and lost consciousness, fell out of a chair  Airway is intact with no indication for airway intervention at this time  Care relinquished to the trauma team for management and disposition          Final Diagnosis  Final diagnoses:   None       ED Provider  Electronically Signed by     Adamaris Collier DO  09/06/22 [id number]

## 2022-09-06 NOTE — CASE MANAGEMENT
CM responded to trauma alert  Patient transferred to trauma bay for further work up  Patient was able to walk into the trauma bay  Patient responsive to medical team questions and instructions  Cm introduced self to patient  When asked, patient did not want anyone contacted since he already called her  Cm will inform Trauma Ap  Patient No current identified CM needs  CM will follow and update screening, assessment, and discharge planning as appropriate

## 2022-09-06 NOTE — ASSESSMENT & PLAN NOTE
Device interrogation revealed firing at  Discussed with cardiology and maintain on telemetry and continue to trend trop till plateau

## 2022-09-06 NOTE — PROCEDURES
POC FAST US    Date/Time: 9/6/2022 2:09 PM  Performed by: Cuco Costello MD  Authorized by: Cuco Costello MD     Patient location:  Trauma  Procedure details:     Exam Type:  Diagnostic    Indications: blunt abdominal trauma and blunt chest trauma      Assess for:  Intra-abdominal fluid and pericardial effusion    Technique: FAST      Views obtained:  Heart - Pericardial sac, RUQ - Hatch's Pouch, LUQ - Splenorenal space and Suprapubic - Pouch of Larry    Image quality: diagnostic      Image availability:  Images available in PACS and video obtained  FAST Findings:     RUQ (Hepatorenal) free fluid: absent      LUQ (Splenorenal) free fluid: absent      Suprapubic free fluid: absent      Cardiac wall motion: identified      Pericardial effusion: absent    Interpretation:     Impressions: negative

## 2022-09-06 NOTE — H&P
[facility]  H&P- Tanvir Thomas 1960, 58 y o  male MRN: [id number]  Unit/Bed#: TR-02 Encounter: [id number]  Primary Care Provider: Chinyere Burleson PA-C   Date and time admitted to hospital: 9/6/2022 10:15 AM    Fall  Assessment & Plan  - Status post fall with the below noted injuries  - Fall precautions   - PT and OT evaluation and treatment as indicated  - Case Management consultation for disposition planning  Trauma Alert: Level B   Model of Arrival: Self    Trauma Team: Attending Dr Julia Ramirez, Residents Dr Taylor Alarcon and KATY Gutierrez PA-C  Consultants:     Other: {routine consult; Epic consult order placed and consultant notified (via phone/text @ time 10:57 am); Cardiology    History of Present Illness     Chief Complaint: I fell and hit my head  My neck is stiff  Mechanism:Fall     HPI:    Tanvir Thomas is a 58 y o  male who presents following a fall approximately 3 days ago  He states that he passed out at that time and was notified by his company for his implanted cardiac device "went off"  He states that he has abrasions over his forehead and nose that have been healing  No fevers or chills  He describes that his device "beeps every morning" and has been told it "has had issues" in the past    He comes in today in order to get check out  He is on Asprin and Brlinta  Given his fall with head strike and LOC while on dual anticoagulation, patient was was activated as a trauma level be activation  Patient stated that he has also been having a stiff neck at this time he locates the center of his neck, but endorses that he feels this is secondary to a poor mattress that he sleeps on  Secondary to potential source cardiac origin for his loss of consciousness, cardiology was consulted for continued evaluation of symptoms  Review of Systems   Constitutional: Negative  Negative for activity change, appetite change, chills, fatigue and fever  HENT: Negative    Negative for ear pain, facial swelling, nosebleeds and voice change  Eyes: Negative  Negative for photophobia, pain, redness and visual disturbance  Respiratory: Negative  Negative for cough, chest tightness, shortness of breath and wheezing  Cardiovascular: Negative for chest pain, palpitations and leg swelling  Patient reports that his defibrillator has fired and it has been making beeping sounds daily since the fall   Gastrointestinal: Negative  Negative for abdominal distention, abdominal pain, nausea and vomiting  Endocrine: Negative  Genitourinary: Negative  Negative for dysuria, flank pain, frequency and hematuria  Musculoskeletal: Positive for neck stiffness ("My neck is stiff, but it is going away ")  Negative for arthralgias, back pain, myalgias and neck pain  Skin: Positive for wound (Multiple abrasions on the face and knees)  Negative for color change, pallor and rash  Allergic/Immunologic: Negative  Neurological: Negative  Negative for dizziness, syncope, weakness, light-headedness, numbness and headaches  Hematological: Negative  Psychiatric/Behavioral: Negative  Negative for agitation, confusion, self-injury and sleep disturbance  The patient is not nervous/anxious  12-point, complete review of systems was reviewed and negative except as stated above       Historical Information     Past Medical History:   Diagnosis Date    PERCY [facility]acute [facility]) [facility]     Bilateral inguinal hernia     Cardiac arrest with ventricular fibrillation ([facility] )     Dizziness 02/02/2021    isolated incident of dizziness, sweating & disorientation    Elevated LFTs     History of left heart catheterization     and IABP, ICD PLACEMENT, DUAL CHAMBER, [employer]    Hyperglycemia     Hypertension     Pneumonia     STEMI (ST elevation myocardial infarction) [facility])      Past Surgical History:   Procedure Laterality Date    CARDIAC CATHETERIZATION N/A 6/29/2022    Procedure: Cardiac pci; Surgeon: Jarrod Marin MD;  [facility]; Service: Cardiology    CARDIAC CATHETERIZATION N/A 6/29/2022    Procedure: Cardiac iabp; Surgeon: Jarrod Marin MD;  [facility]; Service: Cardiology    CARDIAC ELECTROPHYSIOLOGY PROCEDURE N/A 7/5/2022    Procedure: Cardiac icd implant;  Surgeon: Jamari Holbrook MD;  [facility]: [facility]; Service: Cardiology    DC REPAIR ING HERNIA,5+Y/O,REDUCIBL Bilateral 2/9/2021    Procedure: INGUINAL HERNIA REPAIR with mesh;  Surgeon: Angi Pena MD;  Location: [facility] MAIN OR;  Service: General    TONSILLECTOMY          Social History     Tobacco Use    Smoking status: Current Every Day Smoker     Packs/day: 0 50     Types: Cigarettes    Smokeless tobacco: Never Used   Vaping Use    Vaping Use: Never used   Substance Use Topics    Alcohol use: Not Currently    Drug use: Not Currently     Immunization History   Administered Date(s) Administered    COVID-19 J&J (Alereon) vaccine 0 5 mL 08/12/2021     Last Tetanus:  Unknown  Family History: Non-contributory     Meds/Allergies   all current active meds have been reviewed and current meds:   No current facility-administered medications for this encounter        No Known Allergies    Objective   Initial Vitals:   Temperature: 97 7 °F (36 5 °C) (09/06/22 1017)  Pulse: (!) 43 (09/06/22 1017)  Respirations: 16 (09/06/22 1017)  Blood Pressure: 143/63 (09/06/22 1017)    Primary Survey:   Airway:        Status: patent;        Pre-hospital Interventions: none        Hospital Interventions: none  Breathing:        Pre-hospital Interventions: none       Effort: normal       Right breath sounds: normal       Left breath sounds: normal  Circulation:        Rhythm: regular       Rate: regular   Right Pulses Left Pulses    R radial: 2+  R femoral: 2+  R pedal: 2+  R carotid: 2+  R popliteal: 2+ L radial: 2+  L femoral: 2+  L pedal: 2+  L carotid: 2+  L popliteal: 2+   Disability:        GCS: Eye: 4; Verbal: 5 Motor: 6 Total: 15       Right Pupil: 3 mm;  round;  reactive         Left Pupil:  3 mm;  round;  reactive      R Motor Strength L Motor Strength    R : 5/5  R dorsiflex: 5/5  R plantarflex: 5/5 L : 5/5  L dorsiflex: 5/5  L plantarflex: 5/5        Sensory:  No sensory deficit  Exposure:       Completed: Yes      Secondary Survey:  Physical Exam  Vitals and nursing note reviewed  Exam conducted with a chaperone present  Constitutional:       General: He is awake  He is not in acute distress  Appearance: Normal appearance  He is normal weight  He is not ill-appearing, toxic-appearing or diaphoretic  Interventions: He is not intubated  Cervical collar in place  HENT:      Head: Normocephalic  Abrasion (Abrasions to forehead and nasal bridge) present  No contusion or laceration  Jaw: There is normal jaw occlusion  Right Ear: Hearing and external ear normal  No swelling or tenderness  Left Ear: Hearing and external ear normal  No swelling or tenderness  Nose: Nose normal  No nasal deformity, septal deviation, signs of injury, laceration or nasal tenderness  Mouth/Throat:      Mouth: Mucous membranes are moist       Pharynx: Oropharynx is clear  Eyes:      General: Lids are normal  Vision grossly intact  Extraocular Movements: Extraocular movements intact  Conjunctiva/sclera: Conjunctivae normal       Pupils: Pupils are equal, round, and reactive to light  Neck:      Comments: Cervical collar placed during trauma evaluation due to complained of neck stiffness  Cardiovascular:      Rate and Rhythm: Normal rate and regular rhythm  Pulses:           Carotid pulses are 2+ on the right side and 2+ on the left side  Radial pulses are 2+ on the right side and 2+ on the left side  Femoral pulses are 2+ on the right side and 2+ on the left side  Dorsalis pedis pulses are 2+ on the right side and 2+ on the left side        Heart sounds: Normal heart sounds  No murmur heard  No friction rub  No gallop  Pulmonary:      Effort: Pulmonary effort is normal  No tachypnea, bradypnea, accessory muscle usage, prolonged expiration, respiratory distress or retractions  He is not intubated  Breath sounds: Normal breath sounds and air entry  No stridor or decreased air movement  No decreased breath sounds, wheezing, rhonchi or rales  Chest:      Chest wall: No lacerations, deformity, swelling, tenderness or crepitus  Abdominal:      General: Abdomen is flat  Bowel sounds are normal  There is no distension  Palpations: Abdomen is soft  Tenderness: There is no abdominal tenderness  There is no guarding or rebound  Musculoskeletal:         General: No swelling, tenderness or deformity  Normal range of motion  Cervical back: Neck supple  No swelling, deformity, lacerations or tenderness  No pain with movement, spinous process tenderness or muscular tenderness  Normal range of motion  Thoracic back: No swelling, deformity, signs of trauma, lacerations or tenderness  Lumbar back: No swelling, deformity, signs of trauma, lacerations or tenderness  Right lower leg: No edema  Left lower leg: No edema  Comments: No midline cervical, thoracic or lumbar spine tenderness, step-offs or deformities  No paraspinal muscular tenderness in the neck or back  Normal range of motion in all 4 extremities without pain, tenderness or deformity  Skin:     General: Skin is warm and dry  Capillary Refill: Capillary refill takes less than 2 seconds  Coloration: Skin is not jaundiced or pale  Findings: Abrasion (Abrasions to face as noted as well as bilateral anterior knees) present  No bruising, ecchymosis, erythema, laceration, lesion or rash  Neurological:      General: No focal deficit present  Mental Status: He is alert and oriented to person, place, and time  Mental status is at baseline        GCS: GCS eye subscore is 4  GCS verbal subscore is 5  GCS motor subscore is 6  Sensory: Sensation is intact  No sensory deficit  Motor: Motor function is intact  No weakness  Psychiatric:         Mood and Affect: Mood normal          Behavior: Behavior is cooperative  Invasive Devices  Report    Peripheral Intravenous Line  Duration           Peripheral IV 09/06/22 Right Antecubital <1 day          Line  Duration           IABP 8 0 Fr  50 mL 68 days              Lab Results: Results: I have personally reviewed all pertinent laboratory/tests results, BMP/CMP:   Lab Results   Component Value Date    CO2 27 09/06/2022    GLUCOSE 118 09/06/2022   , CBC:   Lab Results   Component Value Date    WBC 11 90 (H) 09/06/2022    HGB 14 1 09/06/2022    HCT 42 5 09/06/2022    MCV 90 09/06/2022     09/06/2022    MCH 30 0 09/06/2022    MCHC 33 2 09/06/2022    RDW 13 0 09/06/2022    MPV 8 5 (L) 09/06/2022    NRBC 0 09/06/2022   , Coagulation: No results found for: PT, INR, APTT and ISTAT: No components found for: VBG    Imaging Results: I have personally reviewed pertinent reports  and I have personally reviewed pertinent films in PACS  Chest Xray(s): negative for acute findings   FAST exam(s): negative for acute findings   CT Scan(s): pending   Additional Xray(s): N/A     Other Studies:  Plan for cardiac device interrogation as patient reports his defibrillator fired after his fall  Code Status: Prior  Advance Directive and Living Will:      Power of :    POLST:    I have spent 40 minutes with Patient  today in which greater than 50% of this time was spent in counseling/coordination of care regarding Diagnostic results, Intructions for management, Patient and family education and Impressions

## 2022-09-06 NOTE — ASSESSMENT & PLAN NOTE
Concern for ACS and plan for cardiac cath tomorrow  Heparin started by Cardiology discussed with them and they wish to give 80 of aspirin today as well as Brilinta  Tomorrow they will start 325 of aspirin      Patient denies any chest pain is new however continues to complain on our of on and off chest burning which he says has been relieved by Pepcid in the past

## 2022-09-06 NOTE — ASSESSMENT & PLAN NOTE
Has been having exertional chest pain for "months"  Patient states his chest pain has gradually been getting worse and that he took twice as much as the prescribed dose of the amlodipine because he thought that this would help his chest pain  Admittedly his chest pain does sound gastrointestinal in etiology he describes it as burning and like reflux  Unfortunately it is consistently present on exertion on and off present when not exerting himself  Coupled with than elevated troponin that is rising this is concerning for acute coronary syndrome  Patient started on heparin drip by Cardiology, we have added home meds including Brilinta and aspirin after discussing with them    Patient to be NPO from midnight for cardiac catheterization tomorrow

## 2022-09-07 LAB
APTT PPP: 45 SECONDS (ref 23–37)
APTT PPP: 52 SECONDS (ref 23–37)
APTT PPP: 67 SECONDS (ref 23–37)
ATRIAL RATE: 74 BPM
ATRIAL RATE: 86 BPM
P AXIS: 64 DEGREES
P AXIS: 72 DEGREES
PR INTERVAL: 172 MS
PR INTERVAL: 176 MS
QRS AXIS: -50 DEGREES
QRS AXIS: -60 DEGREES
QRSD INTERVAL: 92 MS
QRSD INTERVAL: 98 MS
QT INTERVAL: 346 MS
QT INTERVAL: 392 MS
QTC INTERVAL: 414 MS
QTC INTERVAL: 435 MS
T WAVE AXIS: 55 DEGREES
T WAVE AXIS: 83 DEGREES
VENTRICULAR RATE: 74 BPM
VENTRICULAR RATE: 86 BPM

## 2022-09-07 PROCEDURE — 99232 SBSQ HOSP IP/OBS MODERATE 35: CPT | Performed by: SURGERY

## 2022-09-07 PROCEDURE — 85730 THROMBOPLASTIN TIME PARTIAL: CPT | Performed by: INTERNAL MEDICINE

## 2022-09-07 PROCEDURE — 93010 ELECTROCARDIOGRAM REPORT: CPT | Performed by: INTERNAL MEDICINE

## 2022-09-07 PROCEDURE — 99024 POSTOP FOLLOW-UP VISIT: CPT | Performed by: INTERNAL MEDICINE

## 2022-09-07 PROCEDURE — 99232 SBSQ HOSP IP/OBS MODERATE 35: CPT | Performed by: INTERNAL MEDICINE

## 2022-09-07 PROCEDURE — 93005 ELECTROCARDIOGRAM TRACING: CPT

## 2022-09-07 RX ORDER — LANOLIN ALCOHOL/MO/W.PET/CERES
6 CREAM (GRAM) TOPICAL
Status: DISCONTINUED | OUTPATIENT
Start: 2022-09-07 | End: 2022-09-08 | Stop reason: HOSPADM

## 2022-09-07 RX ADMIN — METOPROLOL SUCCINATE 100 MG: 100 TABLET, EXTENDED RELEASE ORAL at 08:31

## 2022-09-07 RX ADMIN — TICAGRELOR 90 MG: 90 TABLET ORAL at 08:30

## 2022-09-07 RX ADMIN — Medication 6 MG: at 21:14

## 2022-09-07 RX ADMIN — TICAGRELOR 90 MG: 90 TABLET ORAL at 21:14

## 2022-09-07 RX ADMIN — HEPARIN SODIUM 4000 UNITS: 1000 INJECTION INTRAVENOUS; SUBCUTANEOUS at 00:06

## 2022-09-07 RX ADMIN — ATORVASTATIN CALCIUM 40 MG: 40 TABLET, FILM COATED ORAL at 17:56

## 2022-09-07 RX ADMIN — ASPIRIN 81 MG CHEWABLE TABLET 324 MG: 81 TABLET CHEWABLE at 08:33

## 2022-09-07 RX ADMIN — ASPIRIN 81 MG CHEWABLE TABLET 81 MG: 81 TABLET CHEWABLE at 08:33

## 2022-09-07 RX ADMIN — HEPARIN SODIUM 2000 UNITS: 1000 INJECTION INTRAVENOUS; SUBCUTANEOUS at 14:48

## 2022-09-07 RX ADMIN — HEPARIN SODIUM 15.1 UNITS/KG/HR: 10000 INJECTION, SOLUTION INTRAVENOUS at 14:46

## 2022-09-07 RX ADMIN — NICOTINE 1 PATCH: 7 PATCH, EXTENDED RELEASE TRANSDERMAL at 08:30

## 2022-09-07 RX ADMIN — AMLODIPINE BESYLATE 2.5 MG: 2.5 TABLET ORAL at 08:31

## 2022-09-07 RX ADMIN — SODIUM CHLORIDE 100 ML/HR: 0.9 INJECTION, SOLUTION INTRAVENOUS at 08:29

## 2022-09-07 NOTE — ASSESSMENT & PLAN NOTE
Plan:  Continue amlodipine 2 5 mg daily  Continue lisinopril 10 mg daily  Continue metoprolol 50 mg daily

## 2022-09-07 NOTE — PLAN OF CARE
Problem: PAIN - ADULT  Goal: Verbalizes/displays adequate comfort level or baseline comfort level  Description: Interventions:  - Encourage patient to monitor pain and request assistance  - Assess pain using appropriate pain scale  - Administer analgesics based on type and severity of pain and evaluate response  - Implement non-pharmacological measures as appropriate and evaluate response  - Consider cultural and social influences on pain and pain management  - Notify physician/advanced practitioner if interventions unsuccessful or patient reports new pain  Outcome: Progressing     Problem: INFECTION - ADULT  Goal: Absence or prevention of progression during hospitalization  Description: INTERVENTIONS:  - Assess and monitor for signs and symptoms of infection  - Monitor lab/diagnostic results  - Monitor all insertion sites, i e  indwelling lines, tubes, and drains  - Monitor endotracheal if appropriate and nasal secretions for changes in amount and color  - [place] appropriate cooling/warming therapies per order  - Administer medications as ordered  - Instruct and encourage patient and family to use good hand hygiene technique  - Identify and instruct in appropriate isolation precautions for identified infection/condition  Outcome: Progressing  Goal: Absence of fever/infection during neutropenic period  Description: INTERVENTIONS:  - Monitor WBC    Outcome: Progressing     Problem: SAFETY ADULT  Goal: Patient will remain free of falls  Description: INTERVENTIONS:  - Educate patient/family on patient safety including physical limitations  - Instruct patient to call for assistance with activity   - Consult OT/PT to assist with strengthening/mobility   - Keep Call bell within reach  - Keep bed low and locked with side rails adjusted as appropriate  - Keep care items and personal belongings within reach  - Initiate and maintain comfort rounds  - Make Fall Risk Sign visible to staff  - Apply yellow socks and bracelet for high fall risk patients  - Consider moving patient to room near nurses station  Outcome: Progressing  Goal: Maintain or return to baseline ADL function  Description: INTERVENTIONS:  -  Assess patient's ability to carry out ADLs; assess patient's baseline for ADL function and identify physical deficits which impact ability to perform ADLs (bathing, care of mouth/teeth, toileting, grooming, dressing, etc )  - Assess/evaluate cause of self-care deficits   - Assess range of motion  - Assess patient's mobility; develop plan if impaired  - Assess patient's need for assistive devices and provide as appropriate  - Encourage maximum independence but intervene and supervise when necessary  - Involve family in performance of ADLs  - Assess for home care needs following discharge   - Consider OT consult to assist with ADL evaluation and planning for discharge  - Provide patient education as appropriate  Outcome: Progressing  Goal: Maintains/Returns to pre admission functional level  Description: INTERVENTIONS:  - Perform BMAT or MOVE assessment daily    - Set and communicate daily mobility goal to care team and patient/family/caregiver     - Collaborate with rehabilitation services on mobility goals if consulted  - Out of bed for toileting  - Record patient progress and toleration of activity level   Outcome: Progressing     Problem: DISCHARGE PLANNING  Goal: Discharge to home or other facility with appropriate resources  Description: INTERVENTIONS:  - Identify barriers to discharge w/patient and caregiver  - Arrange for needed discharge resources and transportation as appropriate  - Identify discharge learning needs (meds, wound care, etc )  - Arrange for interpretive services to assist at discharge as needed  - Refer to Case Management Department for coordinating discharge planning if the patient needs post-hospital services based on physician/advanced practitioner order or complex needs related to functional status, cognitive ability, or social support system  Outcome: Progressing     Problem: Knowledge Deficit  Goal: Patient/family/caregiver demonstrates understanding of disease process, treatment plan, medications, and discharge instructions  Description: Complete learning assessment and assess knowledge base    Interventions:  - Provide teaching at level of understanding  - Provide teaching via preferred learning methods  Outcome: Progressing

## 2022-09-07 NOTE — ASSESSMENT & PLAN NOTE
Likely 2/2 device firing or/and ACS    Plan:  AM CBC  Follow blood cultures for completion  ambulatory

## 2022-09-07 NOTE — ASSESSMENT & PLAN NOTE
ICD Fired once- timing is approximately 7:00 a m  On Saturday morning and correlates with the patient's account of his fall  This would explain the fall and head strike but does not explain the continued rising troponin  Plan:  Cardiology on board  planning for catheterization - patient requires transfer to [facility], as per Cardiology for high risk PCI   Continue radha, asa 81  Was loaded with  mg today    Continue heparin drip, until after catheterization  Continue telemetry monitoring

## 2022-09-07 NOTE — ASSESSMENT & PLAN NOTE
Patient has a history of Vfib arrest s/p cardiac cath without stenting and ICD placement  He did just recently follow up with EP on 8/25 and at that time was complaining of anginal symptoms  He was started on amlodipine at that time  Patient also states that since his discharge from [facility] when he had his ICD placed he has been having exertionl chest pain  Discussed with Cardiology device interrogation revealed that ICD has fired

## 2022-09-07 NOTE — PLAN OF CARE
Problem: PAIN - ADULT  Goal: Verbalizes/displays adequate comfort level or baseline comfort level  Description: Interventions:  - Encourage patient to monitor pain and request assistance  - Assess pain using appropriate pain scale  - Administer analgesics based on type and severity of pain and evaluate response  - Implement non-pharmacological measures as appropriate and evaluate response  - Consider cultural and social influences on pain and pain management  - Notify physician/advanced practitioner if interventions unsuccessful or patient reports new pain  Outcome: Progressing     Problem: INFECTION - ADULT  Goal: Absence or prevention of progression during hospitalization  Description: INTERVENTIONS:  - Assess and monitor for signs and symptoms of infection  - Monitor lab/diagnostic results  - Monitor all insertion sites, i e  indwelling lines, tubes, and drains  - Monitor endotracheal if appropriate and nasal secretions for changes in amount and color  - [place] appropriate cooling/warming therapies per order  - Administer medications as ordered  - Instruct and encourage patient and family to use good hand hygiene technique  - Identify and instruct in appropriate isolation precautions for identified infection/condition  Outcome: Progressing  Goal: Absence of fever/infection during neutropenic period  Description: INTERVENTIONS:  - Monitor WBC    Outcome: Progressing     Problem: SAFETY ADULT  Goal: Patient will remain free of falls  Description: INTERVENTIONS:  - Educate patient/family on patient safety including physical limitations  - Instruct patient to call for assistance with activity   - Consult OT/PT to assist with strengthening/mobility   - Keep Call bell within reach  - Keep bed low and locked with side rails adjusted as appropriate  - Keep care items and personal belongings within reach  - Initiate and maintain comfort rounds  - Make Fall Risk Sign visible to staff  - Obtain necessary fall risk management equipment  - Apply yellow socks and bracelet for high fall risk patients  - Consider moving patient to room near nurses station  Outcome: Progressing  Goal: Maintain or return to baseline ADL function  Description: INTERVENTIONS:  -  Assess patient's ability to carry out ADLs; assess patient's baseline for ADL function and identify physical deficits which impact ability to perform ADLs (bathing, care of mouth/teeth, toileting, grooming, dressing, etc )  - Assess/evaluate cause of self-care deficits   - Assess range of motion  - Assess patient's mobility; develop plan if impaired  - Assess patient's need for assistive devices and provide as appropriate  - Encourage maximum independence but intervene and supervise when necessary  - Involve family in performance of ADLs  - Assess for home care needs following discharge   - Consider OT consult to assist with ADL evaluation and planning for discharge  - Provide patient education as appropriate  Outcome: Progressing  Goal: Maintains/Returns to pre admission functional level  Description: INTERVENTIONS:  - Perform BMAT or MOVE assessment daily    - Set and communicate daily mobility goal to care team and patient/family/caregiver     - Collaborate with rehabilitation services on mobility goals if consulted  - Ambulate patient 2 times a day  - Out of bed for meals 3 times a day  - Out of bed for toileting  - Record patient progress and toleration of activity level   Outcome: Progressing     Problem: DISCHARGE PLANNING  Goal: Discharge to home or other facility with appropriate resources  Description: INTERVENTIONS:  - Identify barriers to discharge w/patient and caregiver  - Arrange for needed discharge resources and transportation as appropriate  - Identify discharge learning needs (meds, wound care, etc )  - Arrange for interpretive services to assist at discharge as needed  - Refer to Case Management Department for coordinating discharge planning if the patient needs post-hospital services based on physician/advanced practitioner order or complex needs related to functional status, cognitive ability, or social support system  Outcome: Progressing     Problem: Knowledge Deficit  Goal: Patient/family/caregiver demonstrates understanding of disease process, treatment plan, medications, and discharge instructions  Description: Complete learning assessment and assess knowledge base    Interventions:  - Provide teaching at level of understanding  - Provide teaching via preferred learning methods  Outcome: Progressing     Problem: Potential for Falls  Goal: Patient will remain free of falls  Description: INTERVENTIONS:  - Educate patient/family on patient safety including physical limitations  - Instruct patient to call for assistance with activity   - Consult OT/PT to assist with strengthening/mobility   - Keep Call bell within reach  - Keep bed low and locked with side rails adjusted as appropriate  - Keep care items and personal belongings within reach  - Initiate and maintain comfort rounds  - Make Fall Risk Sign visible to staff  - Obtain necessary fall risk management equipment  - Apply yellow socks and bracelet for high fall risk patients  - Consider moving patient to room near nurses station  Outcome: Progressing

## 2022-09-07 NOTE — PROGRESS NOTES
[facility]  Progress Note - [place]min[place] 1960, 58 y o  male MRN: [id number]  Unit/Bed#: S -01 Encounter: [id number]  Primary Care Provider: Cristobal Bowman PA-C   Date and time admitted to hospital: 9/6/2022 10:15 AM    Elevated troponin  Assessment & Plan  Elevated troponins 114/211/420  ECG with nonspecific change in ST segment of inferior leads, ST reciprocal changes in lateral leads; left axis deviation  Heparin delivered  Cardiology consulted with recommendations of cardiac catheterization planned for tomorrow    Fall  Assessment & Plan  - Status post fall with the below noted injuries  - Fall precautions   - PT and OT evaluation and treatment as indicated  - head ct and cervical spine ct negative for traumatic injury  - Case Management consultation for disposition planning  Hypertension  Assessment & Plan  Managed with Toprol XL tablet 50 mg b i d  and Norvasc 2 5 daily  Patient normotensive    * Ventricular fibrillation [facility])  Assessment & Plan  Status post ICD placement at [facility] in July  ICD with firing at 7:00 a m , with patient reporting falling shortly after ICD "firing"  Currently with sinus rhythm     TERTIARY TRAUMA SURVEY NOTE    Prophylaxis: Heparin    Disposition:  Stable    Code status:  Level 1 - Full Code    Consultants:  Earnest, cardiology, PT OT    SUBJECTIVE:     Transfer from:  Home  Mechanism of Injury:Fall    Chief Complaint:  Fall 3 days ago with head strike    HPI/Last 24 hour events:  Patient with no significant overnight events  Cardiology with evaluation of patient; syncope from ventricular fibrillation arrest, with coronary angiography and likely will consult EP if coronary angiography is negative; with continuing dual anti-platelet therapy, beta blockers and statins and will not increase beta-blocker given suspected hypotension with patient  Patient reports normal bowel movements and a normal appetite and dietary intake      Active medications:           Current Facility-Administered Medications:     amLODIPine (NORVASC) tablet 2 5 mg, 2 5 mg, Oral, Daily    aspirin chewable tablet 324 mg, 324 mg, Oral, Once    aspirin chewable tablet 81 mg, 81 mg, Oral, Daily    atorvastatin (LIPITOR) tablet 40 mg, 40 mg, Oral, Daily With Dinner    heparin (porcine) 25,000 units in 0 45% NaCl 250 mL infusion (premix), 3-20 Units/kg/hr (Order-Specific), Intravenous, Titrated, 15 1 Units/kg/hr at 09/07/22 0003    heparin (porcine) injection 2,000 Units, 2,000 Units, Intravenous, Q1H PRN    heparin (porcine) injection 4,000 Units, 4,000 Units, Intravenous, Q1H PRN, 4,000 Units at 09/07/22 0006    melatonin tablet 3 mg, 3 mg, Oral, HS, 3 mg at 09/06/22 2127    metoprolol succinate (TOPROL-XL) 24 hr tablet 100 mg, 100 mg, Oral, Daily    metoprolol succinate (TOPROL-XL) 24 hr tablet 50 mg, 50 mg, Oral, Once    nicotine (NICODERM CQ) 7 mg/24hr TD 24 hr patch 1 patch, 1 patch, Transdermal, Daily    sodium chloride 0 9 % infusion, 100 mL/hr, Intravenous, Continuous    ticagrelor (BRILINTA) tablet 90 mg, 90 mg, Oral, Q12H LORENA, 90 mg at 09/06/22 2128    OBJECTIVE:     Vitals:   Vitals:    09/07/22 0320   BP: 125/87   Pulse: 77   Resp:    Temp: 98 8 °F (37 1 °C)   SpO2: 96%     Physical Exam:   GENERAL APPEARANCE:  Comfortable  NEURO:  Alert and oriented x3, new focal deficits  HEENT:  EOM intact and obtain an EOM, oropharynx clear and patent  CV:  Regular rate and rhythm  LUNGS:  Clear to auscultation bilaterally  GI:  Soft nontender, no distension, normal bowel sounds  :  Voiding  MSK:  Moves all extremities neurovascularly intact  SKIN:  Warm well perfused    I/O:   I/O       09/05 0701 09/06 0700 09/06 0701 09/07 0700    Urine  200    Total Output  200    Net  -200                Invasive Devices:    Invasive Devices  Report    Peripheral Intravenous Line  Duration           Peripheral IV 09/06/22 Left;Dorsal (posterior) Forearm <1 day    Peripheral IV 09/06/22 Right Antecubital <1 day          Line  Duration           IABP 8 0 Fr  50 mL 69 days              Imaging:   TRAUMA - CT head wo contrast    Result Date: 9/6/2022  Impression: No acute intracranial abnormality  Workstation performed: [id number]     TRAUMA - CT spine cervical wo contrast    Result Date: 9/6/2022  Impression: No cervical spine fracture or traumatic malalignment  I personally discussed this study with Mery Shi on 9/6/2022 at 10:55 AM   Workstation performed: [id number]     XR Trauma multiple (SLB/SLRA trauma bay ONLY)    Result Date: 9/6/2022  Impression: No acute cardiopulmonary disease within limitations of supine imaging   Workstation performed: [id number]     Labs:   CBC:   Lab Results   Component Value Date    WBC 12 36 (H) 09/06/2022    HGB 13 5 09/06/2022    HCT 40 3 09/06/2022    MCV 90 09/06/2022     09/06/2022    MCH 30 1 09/06/2022    MCHC 33 5 09/06/2022    RDW 13 0 09/06/2022    MPV 8 6 (L) 09/06/2022    NRBC 0 09/06/2022     CMP:   Lab Results   Component Value Date     09/06/2022    CO2 26 09/06/2022    CO2 27 09/06/2022    BUN 23 09/06/2022    CREATININE 1 02 09/06/2022    GLUCOSE 118 09/06/2022    CALCIUM 10 5 (H) 09/06/2022    EGFR 78 09/06/2022

## 2022-09-07 NOTE — UTILIZATION REVIEW
Initial Clinical Review-      INITIAL REVIEW FOR [facility]     Admission: Date/Time/Statement:   Admission Orders (From admission, onward)     Ordered        09/06/22 1610  INPATIENT ADMISSION  Once                      Orders Placed This Encounter   Procedures    INPATIENT ADMISSION     Standing Status:   Standing     Number of Occurrences:   1     Order Specific Question:   Level of Care     Answer:   Med Surg [16]     Order Specific Question:   Estimated length of stay     Answer:   More than 2 Midnights     Order Specific Question:   Certification     Answer:   I certify that inpatient services are medically necessary for this patient for a duration of greater than two midnights  See H&P and MD Progress Notes for additional information about the patient's course of treatment  ED Arrival Information     Expected   -    Arrival   9/6/2022 10:09    Acuity   Emergent            Means of arrival   Walk-In    Escorted by   Self    Service   Hospitalist    Admission type   Emergency            Arrival complaint   FAll/+ thinners             Chief Complaint   Patient presents with    Fall     Pt reports falling Saturday, felt like he was going to pass out, +thinners +facial injuries, unsure of syncope, states defibrillator did go off at the time  Denies dizziness, slight CP/reflux  Denies HA/blurred vision        Initial Presentation: 58 y o  male PMH  Vfib arrest w/ MI s/p cardiac cath without stenting for diffuse 3 vessel CAD w/  ICD placement, OP follow up w EP 8/25 due to increasing anginal symptoms , substernal chest discomfort radiating to neck & sulema arms w SOB  brought on by exertion improved w Pepcid or Prilosec, w syncopal event this AM w/ ICD discharge per patientemergently presents self s/p fall hitting his head 3 days prior w , reports accidental doubling amlodipine dose that day; reports daily alert device since fall; now no significant acute trauma injuries     EXAM  Elevating TROP, facial laceration , device interrogation reveals shock 3 days prior; Admit to medical service as Inpatient SD 2 ICU due anginal symptoms in setting of known CAD, non revascularized & ventricular fibrillation arrest , w Outpatient ICD shock for Ventricular fib, syncope   Consult to Cardiology  PLAN  Requires cardiac cath; tele, dual antiPLT therapy, BB, statin, if cardiac cath neg will consult discussion w EP for up titrate BB, IV Heparin drip, NPO @ MN    Date:   9/7/2022   Day 2:   Trauma  No significant events overnight; Cardiology eval for syncope from ventricular fibrillation arrest w pending  cath & rec  from EP pending  Cardiology Attending  V fib  Treated w shock from ICD, EKG reveal ST depression in lat leads; elevated trops trending down w GERD type chest discomfort  On IV Heparin trip, Up titrate metoprolol incr to 100mg daily   Will need coronary angiography, prior angiography showed multivessel disease without any specific revascularizable lesions for PCI and was turned down for CABG    Transfer to [facility] for planned PCI & potential revascularization  Cont ASA, BB, statin    ED Triage Vitals [09/06/22 1017]   Temperature Pulse Respirations Blood Pressure SpO2   97 7 °F (36 5 °C) (!) 43 16 143/63 97 %      Temp Source Heart Rate Source Patient Position - Orthostatic VS BP Location FiO2 (%)   Oral Monitor Sitting Left arm --      Pain Score       No Pain          Wt Readings from Last 1 Encounters:   09/07/22 98 kg (216 lb 0 8 oz)     Additional Vital Signs:   Date/Time Temp Pulse Resp BP MAP (mmHg) SpO2 O2 Device Patient Position - Orthostatic VS   09/07/22 11:11:29 98 3 °F (36 8 °C) 69 16 117/77 90 96 % -- --   09/07/22 07:15:01 97 8 °F (36 6 °C) 70 16 126/78 94 95 % -- --   09/07/22 03:20:32 98 8 °F (37 1 °C) 77 -- 125/87 100 96 % -- --   09/06/22 22:38:59 98 7 °F (37 1 °C) 74 18 104/63 77 99 % None (Room air) Lying   09/06/22 19:22:30 98 5 °F (36 9 °C) 80 18 110/77 88 96 % None (Room air) Lying   09/06/22 1700 -- 72 18 115/71 88 95 % -- --   09/06/22 1600 -- 76 18 120/74 91 96 % -- --   09/06/22 1500 -- 71 18 132/85 -- 95 % -- --   09/06/22 1400 -- 77 18 124/68 -- 97 % -- --   09/06/22 1300 -- 73 18 112/65 -- 96 % -- --   09/06/22 1230 -- 74 18 127/67 -- 96 % -- --   09/06/22 1200 -- 75 16 104/70 -- 96 % -- --   09/06/22 1130 -- 75 18 108/57 -- 96 % -- --   09/06/22 1115 -- 82 16 129/72 -- 96 % -- --   09/06/22 1100 -- 86 16 119/74 -- 96 % -- --   09/06/22 1045 -- 85 16 117/76 -- 96 % -- --   09/06/22 1030 -- 87 18 133/72 -- 98 % -- --   09/06/22 1025 -- 92 -- -- -- 98 % -- --   09/06/22 1022 -- -- -- 133/72 -- -- -- --   09/06/22 1018 97 7 °F (36 5 °C) 91 16 152/74 -- 96 % None (Room air) --   09/06/22 1017 97 7 °F (36 5 °C) 43 Abnormal  16 143/63 -- 97 % None (Room air) Sitting       Weights (last 14 days)    Date/Time Weight Weight Method Height   09/07/22 0600 98 kg (216 lb 0 8 oz) Bed scale --   09/06/22 19:22:30 -- -- 6' 2" (1 88 m)   09/06/22 1017 -- -- 6' 2" (1 88 m)       Pertinent Labs/Diagnostic Test Results:   TRAUMA - CT head wo contrast   Final Result by Alesia Johnson MD (09/06 1057)      No acute intracranial abnormality  TRAUMA - CT spine cervical wo contrast   Final Result by Alesia Johnson MD (09/06 1057)      No cervical spine fracture or traumatic malalignment  XR Trauma multiple (SLB/SLRA trauma bay ONLY)   Final Result by Jaimie Garcia MD (09/06 1047)      No acute cardiopulmonary disease within limitations of supine imaging  XR chest 1 view    (Results Pending)     · 9/6/2022 EKG EKG showing ST depressions in lateral leads    9/3 ICD device interrogation  NB/SHOCK-CARELINK TRANSMISSION: 1 VF TREATED W/SHOCK  PT STATES HE ACCIDENTLY TOOK THE RX'D ("DILATOR PILL-PER PT)  CLAIMS AFTER SHOCK HE STOPPED TAKING THE MED THAT ABIMAEL RIDDLE, RX'D  PT ON METO SUCC & EF 44% (2022)  1 DEVICE CLASSIFIED VHR NOTED; NO THERAPY GIVEN   DR JOSEPH & DT MADE AWARE        Results from last 7 days   Lab Units 09/06/22  1615 09/06/22  1033 09/06/22  1031   WBC Thousand/uL 12 36* 11 90*  --    HEMOGLOBIN g/dL 13 5 14 1  --    I STAT HEMOGLOBIN g/dl  --   --  14 6   HEMATOCRIT % 40 3 42 5  --    HEMATOCRIT, ISTAT %  --   --  43   PLATELETS Thousands/uL 238 257  --    NEUTROS ABS Thousands/µL  --  8 34*  --          Results from last 7 days   Lab Units 09/06/22  1033 09/06/22  1031   SODIUM mmol/L 134*  --    POTASSIUM mmol/L 4 0  --    CHLORIDE mmol/L 102  --    CO2 mmol/L 26  --    CO2, I-STAT mmol/L  --  27   ANION GAP mmol/L 6  --    BUN mg/dL 23  --    CREATININE mg/dL 1 02  --    EGFR ml/min/1 73sq m 78  --    CALCIUM mg/dL 10 5*  --    CALCIUM, IONIZED, ISTAT mmol/L  --  1 28             Results from last 7 days   Lab Units 09/06/22  1033   GLUCOSE RANDOM mg/dL 114             No results found for: BETA-HYDROXYBUTYRATE           Results from last 7 days   Lab Units 09/06/22  1031   PH, TAHIRA I-STAT  7 413*   PCO2, TAHIRA ISTAT mm HG 39 8*   PO2, TAHIRA ISTAT mm HG 26 0*   HCO3, TAHIRA ISTAT mmol/L 25 4   I STAT BASE EXC mmol/L 1   I STAT O2 SAT % 48*         Results from last 7 days   Lab Units 09/06/22  2236 09/06/22  1937 09/06/22  1711 09/06/22  1515 09/06/22  1254 09/06/22  1044   HS TNI 0HR ng/L  --   --  451*  --   --  114*   HS TNI 2HR ng/L  --  622*  --   --  211*  --    HSTNI D2 ng/L  --  171*  --   --  97*  --    HS TNI 4HR ng/L 499*  --   --  420*  --   --    HSTNI D4 ng/L 48*  --   --  306*  --   --          Results from last 7 days   Lab Units 09/07/22  0517 09/06/22  2300 09/06/22  1615 09/06/22  1033   PROTIME seconds  --   --  12 9 12 4   INR   --   --  0 95 0 91   PTT seconds 67* 26 27 26       Results from last 7 days   Lab Units 09/06/22  1936   BLOOD CULTURE  Received in Microbiology Lab  Culture in Progress  Received in Microbiology Lab  Culture in Progress           ED Treatment:   Medication Administration from 09/06/2022 1009 to 09/06/2022 1751       Date/Time Order Dose Route Action     09/06/2022 1628 heparin (porcine) 25,000 units in 0 45% NaCl 250 mL infusion (premix) 11 1 Units/kg/hr Intravenous New Bag        Past Medical History:   Diagnosis Date    [facility]     Bilateral inguinal hernia     [facility]     Dizziness 02/02/2021    isolated incident of dizziness, sweating & disorientation    Elevated LFTs     History of left heart catheterization     and IABP, ICD PLACEMENT, DUAL CHAMBER, [employer]    Hyperglycemia     Hypertension     Pneumonia     STEMI (ST elevation myocardial infarction) ([facility] )      Present on Admission:   Fall   Hypertension      Admitting Diagnosis: Chest pain [R07 9]  Syncope, unspecified syncope type [R55]  Cardiac arrest with ventricular fibrillation ([facility] ) [I46 9, I49 01]  Age/Sex: 58 y o  male  Admission Orders:  Continuous cardiopulmonary & pulse oximetry  Tele  Cardiac cath  Neuro checks  Npo  scd  Transfer to [facility] for Cardiac cath  Scheduled Medications:  PO  metoprolol succinate (TOPROL-XL) 24 hr tablet 100 mg  9/7 0831  Dose: 100 mg  Freq: Daily Route: PO    amLODIPine, 2 5 mg, Oral, Daily  aspirin, 81 mg, Oral, Daily  atorvastatin, 40 mg, Oral, Daily With Dinner  melatonin, 3 mg, Oral, HS  metoprolol succinate, 100 mg, Oral, Daily  metoprolol succinate, 50 mg, Oral, Once  nicotine, 1 patch, Transdermal, Daily  ticagrelor, 90 mg, Oral, Q12H [facility]    Continuous IV Infusions:  heparin (porcine), 3-20 Units/kg/hr (Order-Specific), Intravenous, Titrated  sodium chloride, 100 mL/hr, Intravenous, Continuous    PRN Meds:  heparin (porcine), 2,000 Units, Intravenous, Q1H PRN  heparin (porcine), 4,000 Units, Intravenous, Q1H PRN      IP CONSULT TO CARDIOLOGY    Network Utilization Review Department  ATTENTION: Please call with any questions or concerns to [phone] and carefully listen to the prompts so that you are directed to the right person   All voicemails are confidential   Kristen Mariee all requests for admission clinical reviews, approved or denied determinations and any other requests to dedicated fax number below belonging to the campus where the patient is receiving treatment   List of dedicated fax numbers for the Facilities:  [address] [place] [address] DENIALS (Administrative/Medical Necessity) [phone]   [address] (Maternity/NICU/Pediatrics) [phone]   [address] [place] [address]  [address] [facility] [place] [phone] [address] [facility] [address] [facility] [address] [facility] [phone]

## 2022-09-07 NOTE — ASSESSMENT & PLAN NOTE
Concern for ACS and plan for cardiac cath  Heparin started by Cardiology discussed with them and they wish to give 80 of aspirin today as well as Brilinta  Patient denies any chest pain or burning abd pain       Plan as noted above

## 2022-09-07 NOTE — ASSESSMENT & PLAN NOTE
Has been having exertional chest pain for "months"  Patient states his chest pain has gradually been getting worse and that he took twice as much as the prescribed dose of the amlodipine because he thought that this would help his chest pain  Admittedly his chest pain does sound gastrointestinal in etiology he describes it as burning and like reflux  Unfortunately it is consistently present on exertion on and off present when not exerting himself  Coupled with than elevated troponin that is rising this is concerning for acute coronary syndrome      Plan as noted above

## 2022-09-07 NOTE — PROGRESS NOTES
Cardiology Progress Note - Eulogio Wallace 58 y o  male MRN: [id number]    Unit/Bed#: S -01 Encounter: [id number]      Assessment:  Syncope  · Patient states he accidentally took an extra dilator pill that was prescribed during last cardiology visit  · Upon review of records, medication prescribed was amlodipine 2 5 mg      VFib/shock by ICD  · Status post dual chamber ICD implantation 7/5/2022  · Treated with shock by ICD  · EKG showing ST depressions in lateral leads  · Ischemic evaluation in view of VFib which was treated with a shock and EKG showing ST depressions in lateral leads  · Metoprolol increased to 100 mg daily   · Interrogation of ICD by [employer]   ? Pt states" ICD beeps every night at 9 pm"     Elevated troponin with atypical chest pain   · High sensitivity troponin [phone], trending down   · GERD type chest discomfort, described as burning in nature, noted with exertion   · EKG lateral lead ST depressions  · Continue heparin gtt  · Transfer to [place] for planned PCI     Coronary artery disease  · Triple-vessel disease by cardiac catheterization (June 2022)  · Distal LAD 70% stenosis , mid LAD 50%, 1st diagonal 60%, 2nd diagonal 60%, 3rd marginal 99%, RCA 60%, 3rd RPL 60%  · EKG showing ST depressions laterally            Tobacco abuse  · Cutting down from 3 packs per day to 1 pack per day  · Continue to encourage smoking cessation     Hypertension, controlled  · Lisinopril 10 mg daily  · Amlodipine 2 5 mg was discontinued by the patient     Hyperlipidemia  · Atorvastatin 40 mg daily  · Cholesterol 171 , HDL  22, and LDL 94 (6/30/22)            Subjective:   Patient seen and examined  No significant events overnight  Denies chest pain, pnd, orthopnea, denies abdominal pain or  Nausea  No recurrent GERD type atypical chest discomfort over the last 24 hours  Objective:     Vitals: Blood pressure 117/77, pulse 69, temperature 98 3 °F (36 8 °C), resp   rate 16, height 6' 2" (1 88 m), weight 98 kg (216 lb 0 8 oz), SpO2 96 %  , Body mass index is 27 74 kg/m² ,   Orthostatic Blood Pressures    Flowsheet Row Most Recent Value   Blood Pressure 117/77 filed at 09/07/2022 1111   Patient Position - Orthostatic VS Lying filed at 09/06/2022 2238            Intake/Output Summary (Last 24 hours) at 9/7/2022 1113  Last data filed at 9/6/2022 1804  Gross per 24 hour   Intake --   Output 200 ml   Net -200 ml       No significant arrhythmias seen on telemetry review  NSR no significant arrhythmias    Physical Exam: Physical Exam  Constitutional:       Appearance: Normal appearance  HENT:      Nose: Nose normal       Mouth/Throat:      Mouth: Mucous membranes are moist    Neck:      Vascular: No carotid bruit  Cardiovascular:      Rate and Rhythm: Normal rate and regular rhythm  Pulses: Normal pulses  Heart sounds: Normal heart sounds  No murmur heard  No friction rub  Pulmonary:      Effort: Pulmonary effort is normal       Breath sounds: Normal breath sounds  No rales  Chest:      Chest wall: No tenderness  Abdominal:      General: Bowel sounds are normal  There is no distension  Palpations: Abdomen is soft  Musculoskeletal:      Cervical back: Neck supple  No tenderness  Right lower leg: No edema  Left lower leg: Edema present  Neurological:      Mental Status: He is alert and oriented to person, place, and time  Mental status is at baseline  Psychiatric:         Mood and Affect: Mood normal          Behavior: Behavior normal          Thought Content:  Thought content normal          Judgment: Judgment normal        Labs & Results:    Lab Results   Component Value Date    TROPONINI <0 03 02/02/2021       Lab Results   Component Value Date    GLUCOSE 118 09/06/2022    CALCIUM 10 5 (H) 09/06/2022    K 4 0 09/06/2022    CO2 26 09/06/2022     09/06/2022    BUN 23 09/06/2022    CREATININE 1 02 09/06/2022       Lab Results   Component Value Date WBC 12 36 (H) 09/06/2022    HGB 13 5 09/06/2022    HCT 40 3 09/06/2022    MCV 90 09/06/2022     09/06/2022     Results from last 7 days   Lab Units 09/06/22  1615   INR  0 95       No results found for: CHOL  Lab Results   Component Value Date    HDL 22 (L) 06/30/2022     Lab Results   Component Value Date    [address]  06/30/2022      Comment:      Calculated LDL invalid, triglycerides >400 mg/dl     Lab Results   Component Value Date    TRIG 359 (H) 07/02/2022    TRIG 528 (H) 06/30/2022       Lab Results   Component Value Date    ALT 70 07/04/2022    AST 59 (H) 07/04/2022         Telemetry:  NSR, occasional PVC

## 2022-09-07 NOTE — PROGRESS NOTES
[facility]  Progress Note - Ceci Berman 1960, 58 y o  male MRN: [id number]  Unit/Bed#: S -01 Encounter: [id number]  Primary Care Provider: Shiva Fierro PA-C   Date and time admitted to hospital: 9/6/2022 10:15 AM    * Ventricular fibrillation [facility])  Assessment & Plan  ICD Fired once- timing is approximately 7:00 a m  On Saturday morning and correlates with the patient's account of his fall  This would explain the fall and head strike but does not explain the continued rising troponin  Plan:  Cardiology on board  planning for catheterization today and possible stenting  Continue brillinta, asa 81  Was loaded with  mg today  Continue heparin drip, until after catheterization  Continue telemetry monitoring  Continue level two step-down      Stable angina [facility])  Assessment & Plan  Has been having exertional chest pain for "months"  Patient states his chest pain has gradually been getting worse and that he took twice as much as the prescribed dose of the amlodipine because he thought that this would help his chest pain  Admittedly his chest pain does sound gastrointestinal in etiology he describes it as burning and like reflux  Unfortunately it is consistently present on exertion on and off present when not exerting himself  Coupled with than elevated troponin that is rising this is concerning for acute coronary syndrome  Plan as noted above    ICD (implantable cardioverter-defibrillator) discharge  Assessment & Plan  Device interrogation revealed firing at  7:00 a m  On Saturday morning and correlates with the patient's account of his fall      History of cardiac arrest  Assessment & Plan  Patient has a history of Vfib arrest s/p cardiac cath without stenting and ICD placement  He did just recently follow up with EP on 8/25 and at that time was complaining of anginal symptoms  He was started on amlodipine at that time    Patient also states that since his discharge from [facility] when he had his ICD placed he has been having exertionl chest pain  Discussed with Cardiology device interrogation revealed that ICD has fired  Hypercalcemia  Assessment & Plan  Unclear etiology   Mild 10 5     Plan:  · trend AM CMP      Leukocytosis  Assessment & Plan  Likely 2/2 device firing or/and ACS    Plan:  AM CBC  Follow blood cultures for completion  Elevated troponin  Assessment & Plan  Concern for ACS and plan for cardiac cath  Heparin started by Cardiology discussed with them and they wish to give 80 of aspirin today as well as Brilinta  Patient denies any chest pain or burning abd pain  Plan as noted above         [address]  Patient fell on Saturday  He denies any loss of consciousness but states that he felt "dizzy" while having his morning coffee and cigarettes  He then fell to the ground and recalls his face hitting the concrete  He states that he "doubled up" on his amlodipine that day   Seen by trauma and they are signing off and will defer to [facility] regarding admission to hospital        Hypertension  Assessment & Plan  Plan:  Continue amlodipine 2 5 mg daily  Continue lisinopril 10 mg daily  Continue metoprolol 50 mg daily        VTE Pharmacologic Prophylaxis: VTE Score: 3 Moderate Risk (Score 3-4) - Pharmacological DVT Prophylaxis Ordered: heparin drip  Patient Centered Rounds: I performed bedside rounds with nursing staff today  Discussions with Specialists or Other Care Team Provider:  Nursing team, Cardiology    Education and Discussions with Family / Patient: Updated  (wife) at bedside  Current Length of Stay: 1 day(s)  Current Patient Status: Inpatient   Discharge Plan: Anticipate discharge in 24-48 hrs to home  Code Status: Level 1 - Full Code    Subjective:   Nursing reports no overnight events  Patient denied chest pain, shortness a breath, palpitations, dizziness, abdominal pain  Reported poor sleep given overnight interruptions from treatment team     Objective:     Vitals:   Temp (24hrs), Av 2 °F (36 8 °C), Min:97 7 °F (36 5 °C), Max:98 8 °F (37 1 °C)    Temp:  [97 7 °F (36 5 °C)-98 8 °F (37 1 °C)] 97 8 °F (36 6 °C)  HR:  [43-92] 70  Resp:  [16-18] 16  BP: (104-152)/(57-87) 126/78  SpO2:  [95 %-99 %] 95 %  Body mass index is 27 74 kg/m²  Input and Output Summary (last 24 hours): Intake/Output Summary (Last 24 hours) at 2022 0858  Last data filed at 2022 1804  Gross per 24 hour   Intake --   Output 200 ml   Net -200 ml       Physical Exam:   Physical Exam  Constitutional:       General: He is not in acute distress  Appearance: He is not ill-appearing, toxic-appearing or diaphoretic  HENT:      Head:      Comments: Abrasions on nose and forehead - with scabs, forehead started to bleed after patient accidentally touched area     Nose: Nose normal       Mouth/Throat:      Mouth: Mucous membranes are moist    Eyes:      General:         Right eye: No discharge  Left eye: No discharge  Pupils: Pupils are equal, round, and reactive to light  Cardiovascular:      Rate and Rhythm: Normal rate  Heart sounds: No murmur heard  No friction rub  No gallop  Pulmonary:      Effort: No respiratory distress  Breath sounds: No stridor  No wheezing, rhonchi or rales  Chest:      Chest wall: No tenderness  Abdominal:      General: Abdomen is flat  There is no distension  Palpations: There is no mass  Tenderness: There is no abdominal tenderness  There is no right CVA tenderness, left CVA tenderness, guarding or rebound  Hernia: No hernia is present  Skin:     Capillary Refill: Capillary refill takes less than 2 seconds  Coloration: Skin is not jaundiced or pale  Findings: No bruising, erythema, lesion or rash  Neurological:      Mental Status: He is alert  Cranial Nerves: No cranial nerve deficit        Sensory: No sensory deficit  Motor: No weakness  Gait: Gait normal       Deep Tendon Reflexes: Reflexes normal    Psychiatric:         Mood and Affect: Mood normal           Additional Data:     Labs:  Results from last 7 days   Lab Units 09/06/22  1615 09/06/22  1033   WBC Thousand/uL 12 36* 11 90*   HEMOGLOBIN g/dL 13 5 14 1   HEMATOCRIT % 40 3 42 5   PLATELETS Thousands/uL 238 257   NEUTROS PCT %  --  70   LYMPHS PCT %  --  20   MONOS PCT %  --  8   EOS PCT %  --  1     Results from last 7 days   Lab Units 09/06/22  1033   SODIUM mmol/L 134*   POTASSIUM mmol/L 4 0   CHLORIDE mmol/L 102   CO2 mmol/L 26   BUN mg/dL 23   CREATININE mg/dL 1 02   ANION GAP mmol/L 6   CALCIUM mg/dL 10 5*   GLUCOSE RANDOM mg/dL 114     Results from last 7 days   Lab Units 09/06/22  1615   INR  0 95                   Lines/Drains:  Invasive Devices  Report    Peripheral Intravenous Line  Duration           Peripheral IV 09/06/22 Left;Dorsal (posterior) Forearm <1 day    Peripheral IV 09/06/22 Right Antecubital <1 day          Line  Duration           IABP 8 0 Fr  50 mL 69 days                  Telemetry:  Telemetry Orders (From admission, onward)             48 Hour Telemetry Monitoring  Continuous x 48 hours        References:    Telemetry Guidelines   Question:  Reason for 48 Hour Telemetry  Answer:  Arrhythmias Requiring Medical Therapy (eg  SVT, Vtach/fib, Bradycardia, Uncontrolled A-fib)                 Telemetry Reviewed: Normal Sinus Rhythm and Six beats of nonsustained V-tach overnight  Indication for Continued Telemetry Use: Arrthymias requiring medical therapy             Imaging: No pertinent imaging reviewed  Recent Cultures (last 7 days):   Results from last 7 days   Lab Units 09/06/22  1936   BLOOD CULTURE  Received in Microbiology Lab  Culture in Progress  Received in Microbiology Lab  Culture in Progress         Last 24 Hours Medication List:   Current Facility-Administered Medications   Medication Dose Route Frequency Provider Last Rate    amLODIPine  2 5 mg Oral Daily Gemma Yee MD      aspirin  81 mg Oral Daily Gemma Yee MD      atorvastatin  40 mg Oral Daily With Jp Gomez MD      heparin (porcine)  3-20 Units/kg/hr (Order-Specific) Intravenous Titrated Gemma Yee MD 15 1 Units/kg/hr (09/07/22 0003)    heparin (porcine)  2,000 Units Intravenous Q1H PRN Gemma Yee MD      heparin (porcine)  4,000 Units Intravenous Q1H PRN Gemma Yee MD      melatonin  3 mg Oral HS Kim Wilson PA-C      metoprolol succinate  100 mg Oral Daily Yancy Carter MD      metoprolol succinate  50 mg Oral Once Yancy Carter MD      nicotine  1 patch Transdermal Daily Gemma Yee MD      sodium chloride  100 mL/hr Intravenous Continuous Gemma Yee  mL/hr (09/07/22 0829)    ticagrelor  90 mg Oral Q12H [facility] Gemma Yee MD          Today, Patient Was Seen By: Arnaud Anthony MD    **Please Note: This note may have been constructed using a voice recognition system  **

## 2022-09-07 NOTE — ASSESSMENT & PLAN NOTE
Elevated troponins 114/211/420  ECG with nonspecific change in ST segment of inferior leads, ST reciprocal changes in lateral leads; left axis deviation  Heparin delivered  Cardiology consulted with recommendations of cardiac catheterization planned for tomorrow

## 2022-09-07 NOTE — DISCHARGE SUMMARY
[facility]  Discharge- [facility] 1960, 58 y o  male MRN: [id number]  Unit/Bed#: S -01 Encounter: [id number]  Primary Care Provider: Williams Castillo PA-C   Date and time admitted to hospital: 9/6/2022 10:15 AM    * Ventricular fibrillation [facility])  Assessment & Plan  ICD Fired once- timing is approximately 7:00 a m  On Saturday morning and correlates with the patient's account of his fall  This would explain the fall and head strike but does not explain the continued rising troponin  Plan:  Cardiology on board  planning for catheterization - patient requires transfer to [facility], as per Cardiology for high risk PCI   Continue brillinta, asa 81  Was loaded with  mg today  Continue heparin drip, until after catheterization  Continue telemetry monitoring     Stable angina [facility])  Assessment & Plan  Has been having exertional chest pain for "months"  Patient states his chest pain has gradually been getting worse and that he took twice as much as the prescribed dose of the amlodipine because he thought that this would help his chest pain  Admittedly his chest pain does sound gastrointestinal in etiology he describes it as burning and like reflux  Unfortunately it is consistently present on exertion on and off present when not exerting himself  Coupled with than elevated troponin that is rising this is concerning for acute coronary syndrome  Plan as noted above    ICD (implantable cardioverter-defibrillator) discharge  Assessment & Plan  Device interrogation revealed firing at  7:00 a m  On Saturday morning and correlates with the patient's account of his fall      History of cardiac arrest  Assessment & Plan  Patient has a history of Vfib arrest s/p cardiac cath without stenting and ICD placement  He did just recently follow up with EP on 8/25 and at that time was complaining of anginal symptoms  He was started on amlodipine at that time    Patient also states that since his discharge from [facility] when he had his ICD placed he has been having exertionl chest pain  Discussed with Cardiology device interrogation revealed that ICD has fired  Hypercalcemia  Assessment & Plan  Unclear etiology   Mild 10 5     Plan:  · trend AM CMP      Leukocytosis  Assessment & Plan  Likely 2/2 device firing or/and ACS    Plan:  AM CBC  Follow blood cultures for completion  Elevated troponin  Assessment & Plan  Concern for ACS and plan for cardiac cath  Heparin started by Cardiology discussed with them and they wish to give 80 of aspirin today as well as Brilinta  Patient denies any chest pain or burning abd pain  Plan as noted above         [address]  Patient fell on Saturday  He denies any loss of consciousness but states that he felt "dizzy" while having his morning coffee and cigarettes  He then fell to the ground and recalls his face hitting the concrete  He states that he "doubled up" on his amlodipine that day   Seen by trauma and they are signing off and will defer to [facility] regarding admission to hospital        Hypertension  Assessment & Plan  Plan:  Continue amlodipine 2 5 mg daily  Continue lisinopril 10 mg daily  Continue metoprolol 50 mg daily    Medical Problems             Resolved Problems  Date Reviewed: 9/7/2022   None               Discharging Resident: Severa Ditty, MD  Discharging Attending: Andrei Scott MD  PCP: Mikell Leventhal, PA-C  Admission Date:   Admission Orders (From admission, onward)     Ordered        09/06/22 1610  INPATIENT ADMISSION  Once                      Discharge/Transfer Date: 09/07/22    Disposition:   Transfer to:  [facility]  Reason for Transfer:  Patient requiring transfer for high risk PCI, not available at [facility]    Accepting Provider at Receiving Wall: Dr Angi Morel Stay:  · Cardiology    Procedures Performed:   · None    Significant Findings / Test Results:   TRAUMA - CT head wo contrast    Result Date: 9/6/2022  Impression: No acute intracranial abnormality  Workstation performed: [id number]     TRAUMA - CT spine cervical wo contrast    Result Date: 9/6/2022  Impression: No cervical spine fracture or traumatic malalignment  I personally discussed this study with Hossein Bai on 9/6/2022 at 10:55 AM   Workstation performed: [id number]     XR Trauma multiple (SLB/SLRA trauma bay ONLY)    Result Date: 9/6/2022  Impression: No acute cardiopulmonary disease within limitations of supine imaging  Workstation performed: [id number]     Incidental Findings:   · None     Test Results Pending at Discharge (will require follow up): · Blood cultures - pending preliminary result  Can be followed up at transfer location  Outpatient Tests Requested:  · None at this time    Complications:  None    Reason for Admission:  Chest pain    Hospital Course:   Akshat Bahena is a 58 y o  male patient who originally presented to the hospital on 9/6/2022 due to chest pain on exertion  On presentation, patient reported that he was experiencing chest pain since discharge following ICD placement in July  Also during July, coronary angiography  Was seen by outpatient Cardiology in August for same chief complaint and was started on amlodipine  Last Saturday, patient reported that chest pain had changed and was feeling uncomfortable, therefore he increased his dose of amlodipine 2 5 mg to double the dose  That morning while sitting drinking coffee and smoking a cigarette, patient then felt dizzy and fell to the ground striking his face  On presentation to the hospital, patient's ICD device was interrogated which demonstrated an ICD fire at around 7:00 a m  on Saturday which correlates with patient's fall    Lab work was significant for uptrending troponin which then plateaued at 439H, and his EKG demonstrated lateral lead ST depressions  Telemetry demonstrated normal sinus rhythm this morning, and overnight events included 6 runs of V-tach  Patient was assessed by Cardiology and was planned for cardiac catheterization today, however, given high risk for PCI requires transfer to [facility]  Please see above list of diagnoses and related plan for additional information  Condition at Discharge: stable    Discharge Day Visit / Exam:   Subjective:  Nursing reports no overnight events  Patient denied chest pain, shortness a breath, palpitations, dizziness, abdominal pain  Reported poor sleep given overnight interruptions from treatment team   Vitals: Blood Pressure: 117/77 (09/07/22 1111)  Pulse: 69 (09/07/22 1111)  Temperature: 98 3 °F (36 8 °C) (09/07/22 1111)  Temp Source: Oral (09/06/22 2238)  Respirations: 16 (09/07/22 1111)  Height: 6' 2" (188 cm) (09/06/22 1922)  Weight - Scale: 98 kg (216 lb 0 8 oz) (09/07/22 0600)  SpO2: 96 % (09/07/22 1111)  Exam:   Physical Exam  Constitutional:       General: He is not in acute distress  Appearance: He is not ill-appearing, toxic-appearing or diaphoretic  HENT:      Head:      Comments: Abrasions on nose and forehead - with scabs, forehead started to bleed after patient accidentally touched area     Nose: Nose normal       Mouth/Throat:      Mouth: Mucous membranes are moist    Eyes:      General:         Right eye: No discharge  Left eye: No discharge  Pupils: Pupils are equal, round, and reactive to light  Cardiovascular:      Rate and Rhythm: Normal rate  Heart sounds: No murmur heard  No friction rub  No gallop  Pulmonary:      Effort: No respiratory distress  Breath sounds: No stridor  No wheezing, rhonchi or rales  Chest:      Chest wall: No tenderness  Abdominal:      General: Abdomen is flat  There is no distension  Palpations: There is no mass  Tenderness: There is no abdominal tenderness   There is no right CVA tenderness, left CVA tenderness, guarding or rebound  Hernia: No hernia is present  Skin:     Capillary Refill: Capillary refill takes less than 2 seconds  Coloration: Skin is not jaundiced or pale  Findings: No bruising, erythema, lesion or rash  Neurological:      Mental Status: He is alert  Cranial Nerves: No cranial nerve deficit  Sensory: No sensory deficit  Motor: No weakness  Gait: Gait normal       Deep Tendon Reflexes: Reflexes normal    Psychiatric:         Mood and Affect: Mood normal          Discussion with Family: Updated  (wife) at bedside  ** Please Note: This note may have been constructed using a voice recognition system  **

## 2022-09-07 NOTE — ASSESSMENT & PLAN NOTE
Patient has a history of Vfib arrest s/p cardiac cath without stenting and ICD placement  He did just recently follow up with EP on 8/25 and at that time was complaining of anginal symptoms  He was started on amlodipine at that time  Patient also states that since his discharge from [facility] when he had his ICD placed he has been having exertionl chest pain  Discussed with Cardiology device interrogation revealed that ICD has fired

## 2022-09-07 NOTE — ASSESSMENT & PLAN NOTE
Status post ICD placement at [facility] in July  ICD with firing at 7:00 a m , with patient reporting falling shortly after ICD "firing"  Currently with sinus rhythm

## 2022-09-07 NOTE — ASSESSMENT & PLAN NOTE
- Status post fall with the below noted injuries  - Fall precautions   - PT and OT evaluation and treatment as indicated  - head ct and cervical spine ct negative for traumatic injury  - Case Management consultation for disposition planning

## 2022-09-07 NOTE — ASSESSMENT & PLAN NOTE
ICD Fired once- timing is approximately 7:00 a m  On Saturday morning and correlates with the patient's account of his fall  This would explain the fall and head strike but does not explain the continued rising troponin  Plan:  Cardiology on board  planning for catheterization today and possible stenting  Continue radha, asa 81  Was loaded with  mg today    Continue heparin drip, until after catheterization  Continue telemetry monitoring  Continue level two step-down

## 2022-09-07 NOTE — ASSESSMENT & PLAN NOTE
Device interrogation revealed firing at  7:00 a m   On Saturday morning and correlates with the patient's account of his fall

## 2022-09-08 ENCOUNTER — HOSPITAL ENCOUNTER (INPATIENT)
Facility: HOSPITAL | Age: 62
LOS: 8 days | Discharge: HOME HEALTH | DRG: 233 | End: 2022-09-16
Attending: FAMILY MEDICINE | Admitting: THORACIC SURGERY (CARDIOTHORACIC VASCULAR SURGERY)
Payer: COMMERCIAL

## 2022-09-08 ENCOUNTER — APPOINTMENT (INPATIENT)
Dept: NON INVASIVE DIAGNOSTICS | Facility: HOSPITAL | Age: 62
DRG: 302 | End: 2022-09-08
Payer: COMMERCIAL

## 2022-09-08 VITALS
WEIGHT: 214 LBS | TEMPERATURE: 98.2 F | SYSTOLIC BLOOD PRESSURE: 128 MMHG | DIASTOLIC BLOOD PRESSURE: 80 MMHG | HEIGHT: 74 IN | HEART RATE: 72 BPM | OXYGEN SATURATION: 98 % | RESPIRATION RATE: 18 BRPM | BODY MASS INDEX: 27.46 KG/M2

## 2022-09-08 DIAGNOSIS — I49.01 VENTRICULAR FIBRILLATION (HCC): ICD-10-CM

## 2022-09-08 DIAGNOSIS — I25.10 2-VESSEL CORONARY ARTERY DISEASE: ICD-10-CM

## 2022-09-08 DIAGNOSIS — Z95.1 S/P CABG (CORONARY ARTERY BYPASS GRAFT): ICD-10-CM

## 2022-09-08 DIAGNOSIS — I20.8 STABLE ANGINA (HCC): Primary | ICD-10-CM

## 2022-09-08 DIAGNOSIS — Z45.02 ICD (IMPLANTABLE CARDIOVERTER-DEFIBRILLATOR) DISCHARGE: ICD-10-CM

## 2022-09-08 DIAGNOSIS — I25.10 CAD IN NATIVE ARTERY: ICD-10-CM

## 2022-09-08 DIAGNOSIS — Z86.74 HISTORY OF CARDIAC ARREST: ICD-10-CM

## 2022-09-08 DIAGNOSIS — I20.8 ANGINA AT REST (HCC): ICD-10-CM

## 2022-09-08 PROBLEM — I10 ESSENTIAL HYPERTENSION: Status: ACTIVE | Noted: 2022-06-29

## 2022-09-08 LAB
ANION GAP SERPL CALCULATED.3IONS-SCNC: 6 MMOL/L (ref 4–13)
APICAL FOUR CHAMBER EJECTION FRACTION: 32 %
APTT PPP: 62 SECONDS (ref 23–37)
APTT PPP: 82 SECONDS (ref 23–37)
BUN SERPL-MCNC: 20 MG/DL (ref 5–25)
CALCIUM SERPL-MCNC: 9.3 MG/DL (ref 8.4–10.2)
CHLORIDE SERPL-SCNC: 108 MMOL/L (ref 96–108)
CO2 SERPL-SCNC: 23 MMOL/L (ref 21–32)
CREAT SERPL-MCNC: 0.86 MG/DL (ref 0.6–1.3)
ERYTHROCYTE [DISTWIDTH] IN BLOOD BY AUTOMATED COUNT: 13.1 % (ref 11.6–15.1)
FLUAV RNA RESP QL NAA+PROBE: NEGATIVE
FLUBV RNA RESP QL NAA+PROBE: NEGATIVE
FRACTIONAL SHORTENING: 32 % (ref 28–44)
GFR SERPL CREATININE-BSD FRML MDRD: 92 ML/MIN/1.73SQ M
GLUCOSE SERPL-MCNC: 108 MG/DL (ref 65–140)
HCT VFR BLD AUTO: 39.4 % (ref 36.5–49.3)
HGB BLD-MCNC: 12.8 G/DL (ref 12–17)
INTERVENTRICULAR SEPTUM IN DIASTOLE (PARASTERNAL SHORT AXIS VIEW): 1 CM
INTERVENTRICULAR SEPTUM: 1 CM (ref 0.6–1.1)
LEFT INTERNAL DIMENSION IN SYSTOLE: 4.1 CM (ref 2.1–4)
LEFT VENTRICLE DIASTOLIC VOLUME (MOD BIPLANE): 103 ML
LEFT VENTRICLE SYSTOLIC VOLUME (MOD BIPLANE): 69 ML
LEFT VENTRICULAR INTERNAL DIMENSION IN DIASTOLE: 6 CM (ref 3.5–6)
LEFT VENTRICULAR POSTERIOR WALL IN END DIASTOLE: 0.9 CM
LEFT VENTRICULAR STROKE VOLUME: 105 ML
LV EF: 33 %
LVSV (TEICH): 105 ML
MCH RBC QN AUTO: 30.5 PG (ref 26.8–34.3)
MCHC RBC AUTO-ENTMCNC: 32.5 G/DL (ref 31.4–37.4)
MCV RBC AUTO: 94 FL (ref 82–98)
PA SYSTOLIC PRESSURE: 14 MMHG
PLATELET # BLD AUTO: 225 THOUSANDS/UL (ref 149–390)
PMV BLD AUTO: 8.6 FL (ref 8.9–12.7)
POTASSIUM SERPL-SCNC: 4.2 MMOL/L (ref 3.5–5.3)
RBC # BLD AUTO: 4.2 MILLION/UL (ref 3.88–5.62)
RSV RNA RESP QL NAA+PROBE: NEGATIVE
SARS-COV-2 RNA RESP QL NAA+PROBE: NEGATIVE
SL CV LV EF: 40
SL CV PED ECHO LEFT VENTRICLE DIASTOLIC VOLUME (MOD BIPLANE) 2D: 179 ML
SL CV PED ECHO LEFT VENTRICLE SYSTOLIC VOLUME (MOD BIPLANE) 2D: 73 ML
SODIUM SERPL-SCNC: 137 MMOL/L (ref 135–147)
TR MAX PG: 11 MMHG
TR PEAK VELOCITY: 1.6 M/S
TRICUSPID VALVE PEAK REGURGITATION VELOCITY: 1.62 M/S
WBC # BLD AUTO: 9.41 THOUSAND/UL (ref 4.31–10.16)

## 2022-09-08 PROCEDURE — 80048 BASIC METABOLIC PNL TOTAL CA: CPT

## 2022-09-08 PROCEDURE — 93325 DOPPLER ECHO COLOR FLOW MAPG: CPT | Performed by: INTERNAL MEDICINE

## 2022-09-08 PROCEDURE — 0241U HB NFCT DS VIR RESP RNA 4 TRGT: CPT

## 2022-09-08 PROCEDURE — 99024 POSTOP FOLLOW-UP VISIT: CPT | Performed by: INTERNAL MEDICINE

## 2022-09-08 PROCEDURE — 85027 COMPLETE CBC AUTOMATED: CPT

## 2022-09-08 PROCEDURE — 99223 1ST HOSP IP/OBS HIGH 75: CPT | Performed by: HOSPITALIST

## 2022-09-08 PROCEDURE — 80061 LIPID PANEL: CPT | Performed by: STUDENT IN AN ORGANIZED HEALTH CARE EDUCATION/TRAINING PROGRAM

## 2022-09-08 PROCEDURE — 99239 HOSP IP/OBS DSCHRG MGMT >30: CPT | Performed by: INTERNAL MEDICINE

## 2022-09-08 PROCEDURE — 93321 DOPPLER ECHO F-UP/LMTD STD: CPT | Performed by: INTERNAL MEDICINE

## 2022-09-08 PROCEDURE — 93325 DOPPLER ECHO COLOR FLOW MAPG: CPT

## 2022-09-08 PROCEDURE — 83721 ASSAY OF BLOOD LIPOPROTEIN: CPT | Performed by: STUDENT IN AN ORGANIZED HEALTH CARE EDUCATION/TRAINING PROGRAM

## 2022-09-08 PROCEDURE — 99233 SBSQ HOSP IP/OBS HIGH 50: CPT | Performed by: HOSPITALIST

## 2022-09-08 PROCEDURE — 93308 TTE F-UP OR LMTD: CPT

## 2022-09-08 PROCEDURE — 93308 TTE F-UP OR LMTD: CPT | Performed by: INTERNAL MEDICINE

## 2022-09-08 PROCEDURE — 85730 THROMBOPLASTIN TIME PARTIAL: CPT | Performed by: INTERNAL MEDICINE

## 2022-09-08 PROCEDURE — 93321 DOPPLER ECHO F-UP/LMTD STD: CPT

## 2022-09-08 RX ORDER — LANOLIN ALCOHOL/MO/W.PET/CERES
3 CREAM (GRAM) TOPICAL
Status: DISCONTINUED | OUTPATIENT
Start: 2022-09-08 | End: 2022-09-16 | Stop reason: HOSPADM

## 2022-09-08 RX ORDER — ASPIRIN 81 MG/1
81 TABLET, CHEWABLE ORAL DAILY
Status: DISCONTINUED | OUTPATIENT
Start: 2022-09-09 | End: 2022-09-12

## 2022-09-08 RX ORDER — AMLODIPINE BESYLATE 2.5 MG/1
2.5 TABLET ORAL DAILY
Status: DISCONTINUED | OUTPATIENT
Start: 2022-09-09 | End: 2022-09-12

## 2022-09-08 RX ORDER — AMLODIPINE BESYLATE 2.5 MG/1
2.5 TABLET ORAL DAILY
Status: CANCELLED | OUTPATIENT
Start: 2022-09-09

## 2022-09-08 RX ORDER — POLYETHYLENE GLYCOL 3350 17 G/17G
17 POWDER, FOR SOLUTION ORAL DAILY
Status: DISCONTINUED | OUTPATIENT
Start: 2022-09-09 | End: 2022-09-12

## 2022-09-08 RX ORDER — HEPARIN SODIUM 10000 [USP'U]/100ML
3-20 INJECTION, SOLUTION INTRAVENOUS
Status: CANCELLED | OUTPATIENT
Start: 2022-09-08

## 2022-09-08 RX ORDER — HEPARIN SODIUM 1000 [USP'U]/ML
2000 INJECTION, SOLUTION INTRAVENOUS; SUBCUTANEOUS
Status: DISCONTINUED | OUTPATIENT
Start: 2022-09-08 | End: 2022-09-09

## 2022-09-08 RX ORDER — METOPROLOL SUCCINATE 100 MG/1
100 TABLET, EXTENDED RELEASE ORAL 2 TIMES DAILY
Status: CANCELLED | OUTPATIENT
Start: 2022-09-08

## 2022-09-08 RX ORDER — LANOLIN ALCOHOL/MO/W.PET/CERES
6 CREAM (GRAM) TOPICAL
Status: DISCONTINUED | OUTPATIENT
Start: 2022-09-08 | End: 2022-09-12

## 2022-09-08 RX ORDER — METOPROLOL SUCCINATE 100 MG/1
100 TABLET, EXTENDED RELEASE ORAL 2 TIMES DAILY
Status: DISCONTINUED | OUTPATIENT
Start: 2022-09-08 | End: 2022-09-08 | Stop reason: HOSPADM

## 2022-09-08 RX ORDER — SENNOSIDES 8.6 MG
1 TABLET ORAL
Status: DISCONTINUED | OUTPATIENT
Start: 2022-09-08 | End: 2022-09-12

## 2022-09-08 RX ORDER — LANOLIN ALCOHOL/MO/W.PET/CERES
6 CREAM (GRAM) TOPICAL
Status: DISCONTINUED | OUTPATIENT
Start: 2022-09-08 | End: 2022-09-08

## 2022-09-08 RX ORDER — SODIUM CHLORIDE 9 MG/ML
100 INJECTION, SOLUTION INTRAVENOUS CONTINUOUS
Status: DISCONTINUED | OUTPATIENT
Start: 2022-09-08 | End: 2022-09-09

## 2022-09-08 RX ORDER — POLYETHYLENE GLYCOL 3350 17 G/17G
17 POWDER, FOR SOLUTION ORAL DAILY
Status: DISCONTINUED | OUTPATIENT
Start: 2022-09-08 | End: 2022-09-08 | Stop reason: HOSPADM

## 2022-09-08 RX ORDER — ASPIRIN 81 MG/1
81 TABLET, CHEWABLE ORAL DAILY
Status: CANCELLED | OUTPATIENT
Start: 2022-09-09

## 2022-09-08 RX ORDER — SODIUM CHLORIDE 9 MG/ML
100 INJECTION, SOLUTION INTRAVENOUS CONTINUOUS
Status: CANCELLED | OUTPATIENT
Start: 2022-09-08

## 2022-09-08 RX ORDER — METOPROLOL SUCCINATE 100 MG/1
100 TABLET, EXTENDED RELEASE ORAL 2 TIMES DAILY
Status: DISCONTINUED | OUTPATIENT
Start: 2022-09-08 | End: 2022-09-12

## 2022-09-08 RX ORDER — SENNOSIDES 8.6 MG
1 TABLET ORAL
Status: DISCONTINUED | OUTPATIENT
Start: 2022-09-08 | End: 2022-09-08 | Stop reason: HOSPADM

## 2022-09-08 RX ORDER — POLYETHYLENE GLYCOL 3350 17 G/17G
17 POWDER, FOR SOLUTION ORAL DAILY
Status: CANCELLED | OUTPATIENT
Start: 2022-09-08

## 2022-09-08 RX ORDER — ATORVASTATIN CALCIUM 40 MG/1
40 TABLET, FILM COATED ORAL
Status: DISCONTINUED | OUTPATIENT
Start: 2022-09-09 | End: 2022-09-12

## 2022-09-08 RX ORDER — SENNOSIDES 8.6 MG
1 TABLET ORAL
Status: CANCELLED | OUTPATIENT
Start: 2022-09-08

## 2022-09-08 RX ORDER — HEPARIN SODIUM 1000 [USP'U]/ML
4000 INJECTION, SOLUTION INTRAVENOUS; SUBCUTANEOUS
Status: CANCELLED | OUTPATIENT
Start: 2022-09-08

## 2022-09-08 RX ORDER — HEPARIN SODIUM 1000 [USP'U]/ML
2000 INJECTION, SOLUTION INTRAVENOUS; SUBCUTANEOUS
Status: CANCELLED | OUTPATIENT
Start: 2022-09-08

## 2022-09-08 RX ORDER — LANOLIN ALCOHOL/MO/W.PET/CERES
6 CREAM (GRAM) TOPICAL
Status: CANCELLED | OUTPATIENT
Start: 2022-09-08

## 2022-09-08 RX ORDER — HEPARIN SODIUM 1000 [USP'U]/ML
4000 INJECTION, SOLUTION INTRAVENOUS; SUBCUTANEOUS
Status: DISCONTINUED | OUTPATIENT
Start: 2022-09-08 | End: 2022-09-09

## 2022-09-08 RX ORDER — ATORVASTATIN CALCIUM 40 MG/1
40 TABLET, FILM COATED ORAL
Status: CANCELLED | OUTPATIENT
Start: 2022-09-08

## 2022-09-08 RX ORDER — HEPARIN SODIUM 10000 [USP'U]/100ML
3-20 INJECTION, SOLUTION INTRAVENOUS
Status: DISCONTINUED | OUTPATIENT
Start: 2022-09-08 | End: 2022-09-09

## 2022-09-08 RX ADMIN — TICAGRELOR 90 MG: 90 TABLET ORAL at 09:48

## 2022-09-08 RX ADMIN — METOPROLOL SUCCINATE 100 MG: 100 TABLET, EXTENDED RELEASE ORAL at 21:12

## 2022-09-08 RX ADMIN — POLYETHYLENE GLYCOL 3350 17 G: 17 POWDER, FOR SOLUTION ORAL at 15:33

## 2022-09-08 RX ADMIN — SODIUM CHLORIDE 100 ML/HR: 0.9 INJECTION, SOLUTION INTRAVENOUS at 05:42

## 2022-09-08 RX ADMIN — ATORVASTATIN CALCIUM 40 MG: 40 TABLET, FILM COATED ORAL at 17:07

## 2022-09-08 RX ADMIN — NICOTINE 1 PATCH: 7 PATCH, EXTENDED RELEASE TRANSDERMAL at 09:47

## 2022-09-08 RX ADMIN — ASPIRIN 81 MG CHEWABLE TABLET 81 MG: 81 TABLET CHEWABLE at 09:48

## 2022-09-08 RX ADMIN — HEPARIN SODIUM 17.3 UNITS/KG/HR: 10000 INJECTION, SOLUTION INTRAVENOUS at 05:42

## 2022-09-08 RX ADMIN — METOPROLOL SUCCINATE 100 MG: 100 TABLET, EXTENDED RELEASE ORAL at 09:48

## 2022-09-08 RX ADMIN — PERFLUTREN 3 ML/MIN: 6.52 INJECTION, SUSPENSION INTRAVENOUS at 10:45

## 2022-09-08 RX ADMIN — HEPARIN SODIUM 2000 UNITS: 1000 INJECTION INTRAVENOUS; SUBCUTANEOUS at 00:42

## 2022-09-08 RX ADMIN — Medication 3 MG: at 21:12

## 2022-09-08 RX ADMIN — SENNOSIDES 8.6 MG: 8.6 TABLET, FILM COATED ORAL at 21:12

## 2022-09-08 RX ADMIN — AMLODIPINE BESYLATE 2.5 MG: 2.5 TABLET ORAL at 09:49

## 2022-09-08 NOTE — H&P
[address] [facility]  H&P- Salome Malik 1960, 58 y o  male MRN: [id number]  Unit/Bed#: CW2 214-01 Encounter: [id number]  Primary Care Provider: Xochitl Garcia PA-C   Date and time admitted to hospital: 9/8/2022  7:32 PM    * Stable angina [facility])  Assessment & Plan  Ongoing anginal symptoms with significant cardiac history of VFib V-tach cardiac arrest, cardiac catheterization showed multivessel disease without any specific revascularizable lesions for PCI and was turned down for CABG, subsequent ICD insertion  will need re-evaluation for coronary angiography and potential revascularization  According to Cardiology given prior ventricle fibrillation episodes patient needs to be transferred to [facility] for high risk PCI  Continue beta-blocker statin and dual antiplatelet therapy   Heparin ACS protocol  Cardiology will follow    Ventricular fibrillation [facility])  Assessment & Plan  ICD shock for VFib approximately at 7:00 a m  on saturday morning most likely reason of patient's  near-syncope and fall on the day of admission to [facility]  As per cardiology will await for coronary angiography if negative will discuss with EP patient might need Tikosyn/sotalol to prevent recurrent episodes of intracranial tachycardia/fibrillation  Will admit to telemetry step-down 2  Echo from today reported "  The left ventricular ejection fraction is 40%  Systolic function is moderately reduced  The following segments are dyskinetic: mid anteroseptal, mid inferoseptal, apical septal and apex "            ICD (implantable cardioverter-defibrillator) discharge  Assessment & Plan  Inserted due to history of AFib V-tach cardiac arrest  Follow-up with EP specialist    Leukocytosis  Assessment & Plan  Resolved  No evidence of underlying infectious process  Follow-up 2 sets of blood culture  Monitor temperature and CBC    Elevated troponin  Assessment & Plan  Concern for ACS, continue aspirin Brilinta statin beta-blocker and heparin    Essential hypertension  Assessment & Plan  Continue blood pressure meds with holding parameters    VTE Pharmacologic Prophylaxis: VTE Score: 3 Moderate Risk (Score 3-4) - Pharmacological DVT Prophylaxis Ordered: heparin drip  Code Status: Level 1 - Full Code     Anticipated Length of Stay: Patient will be admitted on an inpatient basis with an anticipated length of stay of greater than 2 midnights secondary to Cardiology consult IV heparin drip cardiac catheterization  Total Time for Visit, including Counseling / Coordination of Care: 45 minutes Greater than 50% of this total time spent on direct patient counseling and coordination of care  Chief Complaint:  I felt lightheaded and almost lost consciousness last Saturday  and felt down  striking my face    History of Present Illness:  Twan Hammond is a 58 y o  pleasant male with a complicated cardiac PMH of acute MI following event recorder all fibrillation/ventricular tachycardia with cardiac catheterization showing diffuse three-vessel coronary artery disease with no specific lesion for revascularization, subsequent Medtronic dual-chamber ICD implantation who presented to the [facility] ER with above-mentioned complaints  Patient also was complaining on exertional substernal chest discomfort radiating to his neck and bilateral arms,  getting better with Pepcid or Prilosec and associated with shortness of breath  His device was interrogated and found that patient had near-syncope associated with ICD shock for ventricular fibrillation  He had few elevated troponin 451=>622=>499  CT of the head and C-spine was unremarkable for intracranial pathology or fracture  Cardiology consulted, recommended to continue current management and start heparin ACS  Protocol  Echo from September 8 showed ejection fraction of 40%, dyskinetic  mid anteroseptal, mid inferoseptal, apical septal and apex    Due to high risk PCI patient was transferred to [facility]  Upon my assessment patient confirmed prior complaints, he was  asking for sleeping pill  He admitted to the hospitalist service on an inpatient basis ,step-down 2  Review of Systems:  Review of Systems   Constitutional: Positive for activity change  Skin:        Abrasion on forehead, nose bridge       Past Medical and Surgical History:   Past Medical History:   Diagnosis Date    [facility]     Bilateral inguinal hernia     Cardiac arrest with ventricular fibrillation [facility]     Dizziness 02/02/2021    isolated incident of dizziness, sweating & disorientation    Elevated LFTs     History of left heart catheterization     and IABP, ICD PLACEMENT, DUAL CHAMBER, [employer]    Hyperglycemia     Hypertension     Pneumonia     STEMI (ST elevation myocardial infarction) [facility])        Past Surgical History:   Procedure Laterality Date    CARDIAC CATHETERIZATION N/A 6/29/2022    Procedure: Cardiac pci;  Surgeon: Janna Carrasco MD;  [facility]; Service: Cardiology    CARDIAC CATHETERIZATION N/A 6/29/2022    Procedure: Cardiac iabp; Surgeon: Janna Carrasco MD;  [facility]; Service: Cardiology    CARDIAC ELECTROPHYSIOLOGY PROCEDURE N/A 7/5/2022    Procedure: Cardiac icd implant;  Surgeon: Parish Gomez MD;  [facility]: [facility] CARD[facility]; Service: Cardiology    ID REPAIR [address] HERNIA,5+Y/O,REDUCIBL Bilateral 2/9/2021    Procedure: INGUINAL HERNIA REPAIR with mesh;  Surgeon: Po Torres MD;  Location: [address] [place] [address];  Service: General    TONSILLECTOMY         Meds/Allergies:  Prior to Admission medications    Medication Sig Start Date End Date Taking?  Authorizing Provider   amLODIPine (NORVASC) 2 5 mg tablet Take 1 tablet (2 5 mg total) by mouth daily 8/25/22   Tami Francisco PA-C   aspirin 81 mg chewable tablet Chew 1 tablet (81 mg total) daily 8/18/22   Nely Rajan MD   atorvastatin (LIPITOR) 40 mg tablet Take 1 tablet (40 mg total) by mouth daily with dinner 8/18/22   Delicia Canas MD   lisinopril (ZESTRIL) 10 mg tablet Take 1 tablet (10 mg total) by mouth daily 8/18/22   Delicia Canas MD   metoprolol succinate (TOPROL-XL) 50 mg 24 hr tablet Take 1 tablet (50 mg total) by mouth daily 8/18/22   Delicia Canas MD   ticagrelor (BRILINTA) 90 MG Take 1 tablet (90 mg total) by mouth every 12 (twelve) hours 8/18/22   Delicia Canas MD   sacubitril-valsartan Corchristos Garcia) 24-26 MG TABS Take 1 tablet by mouth 2 (two) times a day 7/5/22 7/5/22  Soledad Javier MD     I have reviewed home medications with a medical source (PCP, Pharmacy, other)  Allergies: No Known Allergies    Social History:  Marital Status: /Civil Union   Occupation: none  Patient Pre-hospital Living Situation: Home  Patient Pre-hospital Level of Mobility: walks  Patient Pre-hospital Diet Restrictions: reg  Substance Use History:   Social History     Substance and Sexual Activity   Alcohol Use Not Currently     Social History     Tobacco Use   Smoking Status Current Every Day Smoker    Packs/day: 0 50    Types: Cigarettes   Smokeless Tobacco Never Used     Social History     Substance and Sexual Activity   Drug Use Not Currently       Family History:  Family History   Problem Relation Age of Onset    Diabetes Maternal Grandfather        Physical Exam:     Vitals:   Blood Pressure: 131/83 (09/08/22 1945)  Temperature: 98 1 °F (36 7 °C) (09/08/22 1945)  Respirations: 18 (09/08/22 1945)  SpO2: (!) 18 % (09/08/22 1945)    Physical Exam  Constitutional:       Appearance: He is not ill-appearing  HENT:      Head: Normocephalic  Mouth/Throat:      Mouth: Mucous membranes are moist    Eyes:      Pupils: Pupils are equal, round, and reactive to light  Cardiovascular:      Rate and Rhythm: Normal rate  Pulmonary:      Effort: No respiratory distress  Abdominal:      General: There is distension        Tenderness: There is no abdominal tenderness  Musculoskeletal:         General: No swelling  Skin:     General: Skin is warm  Neurological:      General: No focal deficit present  Mental Status: He is oriented to person, place, and time  Mental status is at baseline  Psychiatric:         Mood and Affect: Mood normal           Additional Data:     Lab Results:  Results from last 7 days   Lab Units 09/08/22  0537 09/06/22  1615 09/06/22  1033   WBC Thousand/uL 9 41   < > 11 90*   HEMOGLOBIN g/dL 12 8   < > 14 1   HEMATOCRIT % 39 4   < > 42 5   PLATELETS Thousands/uL 225   < > 257   NEUTROS PCT %  --   --  70   LYMPHS PCT %  --   --  20   MONOS PCT %  --   --  8   EOS PCT %  --   --  1    < > = values in this interval not displayed  Results from last 7 days   Lab Units 09/08/22  0537   SODIUM mmol/L 137   POTASSIUM mmol/L 4 2   CHLORIDE mmol/L 108   CO2 mmol/L 23   BUN mg/dL 20   CREATININE mg/dL 0 86   ANION GAP mmol/L 6   CALCIUM mg/dL 9 3   GLUCOSE RANDOM mg/dL 108     Results from last 7 days   Lab Units 09/06/22  1615   INR  0 95                       EKG and Other Studies Reviewed on Admission:   · EKG:  Paced rhythm     ** Please Note: This note has been constructed using a voice recognition system   **

## 2022-09-08 NOTE — PROGRESS NOTES
Progress Note - Cardiology   Vince Tiwari 58 y o  male MRN: [id number]  Unit/Bed#: S -01 Encounter: [id number]  09/08/22  8:43 AM    Impression and Plan:        45-year-old with recent admission (end of June-early July 2022) for acute MI following a ventricular fibrillation/ventricular tachycardia arrest, with coronary angiography showing diffuse disease involving all 3 coronary arteries but no interventions were performed, was turned down for CABG, differential diagnosis included spasm versusthrombus with spontaneous lysis, had    cardiogenic shock also during that admission needing intra-aortic balloon pump briefly, in the setting of non revascularized CAD and his ventricular fibrillation, also underwent a Medtronic dual-chamber ICD implantation   Ejection fraction was around 45%           He was recently seen by Palmira Kevin friend of EP, initiated on amlodipine 2 5 mg daily for symptoms suggestive of angina          He describes these chest pains as substernal chest discomfort radiating to the neck and to the bilateral arms consistently brought on by physical exertion, but also improved with Pepcid or Prilosec     Severe episodes are also accompanied by shortness of breath            Around 7:10 a m -he had a syncopal episode which did correlate with an ICD shock for ventricular fibrillation,     He presented to the ED with facial lacerations   Troponins are also elevated , peaked at 5         Plan:         Anginal symptoms, in the setting of known CAD, non revascularized and ventricular fibrillation arrest, with another ICD shock for ventricular fibrillation:     Will need coronary angiography, prior angiography showed multivessel disease without any specific revascularizable lesions for PCI and was turned down for CABG     Plan is for transfer to [place] for re-evaluation with coronary angiography and potential revascularization, considering prior ventricular fibrillation episodes   Continue dual antiplatelet therapy, beta-blocker, statin      Called transfer center this morning, should be transferred today    Syncope:  from ventricular fibrillation arrest-likely from ischemia, considering his chronic anginal symptoms and under vascularized CAD, less likely from hypotension when he took 2 doses of amlodipine, await coronary angiography tomorrow, if negative, will    discuss with the EP-might need Tikosyn/sotalol to prevent recurrent episodes of ventricular tachycardia/fibrillation       In the interim, up titrate beta-blocker further-increase to 100 b i d  Check echo       Dyslipidemia:      LDL of 94, non HDL of 150, continue high-intensity statin         Hypertension:      Controlled        Plan was discussed with the patient  at bedside, n      ===================================================================    Chief Complaint:   Chief Complaint   Patient presents with    Fall     Pt reports falling Saturday, felt like he was going to pass out, +thinners +facial injuries, unsure of syncope, states defibrillator did go off at the time  Denies dizziness, slight CP/reflux   Denies HA/blurred vision          Subjective/Objective     Subjective:  Denies any new complaints    Objective:  No distress    Patient Active Problem List   Diagnosis    Bilateral inguinal hernia without obstruction or gangrene    Umbilical hernia    Cigarette nicotine dependence without complication    [facility]) [facility] [address] [facility]    Hypertension    Cardiac arrest with ventricular fibrillation (HCC)    PERCY (acute kidney injury) ([facility] )    Elevated LFTs    [facility]    Hyperglycemia    Pneumonia    Fall    Elevated troponin    Leukocytosis    History of cardiac arrest    Ventricular fibrillation ([facility]    ICD (implantable cardioverter-defibrillator) discharge    Angina at rest ([facility] )    Stable angina ([facility] )    Hypercalcemia       Vitals: /86   Pulse 75   Temp (!) 97 4 °F (36 3 °C)   Resp 16   Ht 6' 2" (1 88 m)   Wt 97 4 kg (214 lb 11 7 oz)   SpO2 96%   BMI 27 57 kg/m²     I/O this shift:  In: 192 [I V :192]  Out: -   Wt Readings from Last 3 Encounters:   09/08/22 97 4 kg (214 lb 11 7 oz)   08/25/22 98 6 kg (217 lb 4 8 oz)   08/04/22 101 kg (222 lb)       Intake/Output Summary (Last 24 hours) at 9/8/2022 0843  Last data filed at 9/8/2022 0713  Gross per 24 hour   Intake 2614 3 ml   Output --   Net 2614 3 ml     I/O last 3 completed shifts: In: 2422 3 [I V :2422 3]  Out: -     Invasive Devices  Report    Peripheral Intravenous Line  Duration           Peripheral IV 09/06/22 Left;Dorsal (posterior) Forearm 1 day    Peripheral IV 09/06/22 Right Antecubital 1 day          Line  Duration           IABP 8 0 Fr   50 mL 70 days                  Physical Exam:  GEN: Luis Kang appears well, alert and oriented x 3, pleasant and cooperative   HEENT: pupils equal, round, and reactive to light; extraocular muscles intact  NECK: supple, no carotid bruits or JVD  HEART: regular rhythm, normal S1 and S2, no murmur, no clicks, gallops or rubs   LUNGS: clear to auscultation bilaterally; no wheezes or rhonchi, no rales  ABDOMEN/GI: normal bowel sounds, soft, no tenderness, no distention  EXTREMITIES/Musculoskeltal: peripheral pulses normal; no clubbing, cyanosis, no edema  NEURO: no focal motor findings   SKIN: normal without suspicious lesions on exposed skin              Lab Results:       Results from last 7 days   Lab Units 09/08/22  0537 09/06/22  1615 09/06/22  1033   WBC Thousand/uL 9 41 12 36* 11 90*   HEMOGLOBIN g/dL 12 8 13 5 14 1   HEMATOCRIT % 39 4 40 3 42 5   PLATELETS Thousands/uL 225 238 257         Results from last 7 days   Lab Units 09/08/22  0537 09/06/22  1033 09/06/22  1031   POTASSIUM mmol/L 4 2 4 0  --    CHLORIDE mmol/L 108 102  --    CO2 mmol/L 23 26  --    CO2, I-STAT mmol/L  --   --  27   BUN mg/dL 20 23  --    CREATININE mg/dL 0 86 1 02  --    CALCIUM mg/dL 9 3 10 5*  --    GLUCOSE, ISTAT mg/dl  --   --  118     Results from last 7 days   Lab Units 09/06/22  1615 09/06/22  1033   INR  0 95 0 91       Imaging: I have personally reviewed pertinent films in PACS  EKG/Telemtry:  No events    Scheduled Meds:  Current Facility-Administered Medications   Medication Dose Route Frequency Provider Last Rate    amLODIPine  2 5 mg Oral Daily Angeline Hernandes MD      aspirin  81 mg Oral Daily Angeline Hernandes MD      atorvastatin  40 mg Oral Daily With Eduar Montoya MD      heparin (porcine)  3-20 Units/kg/hr (Order-Specific) Intravenous Titrated Angeline Hernandes MD 17 3 Units/kg/hr (09/08/22 0713)    heparin (porcine)  2,000 Units Intravenous Q1H PRN Angeline Hernandes MD      heparin (porcine)  4,000 Units Intravenous Q1H PRN Angeline Hernandes MD      melatonin  6 mg Oral HS Paula Butts MD      metoprolol succinate  100 mg Oral BID Geroldlso Hammonds MD      nicotine  1 patch Transdermal Daily Angeline Hernandes MD      sodium chloride  100 mL/hr Intravenous Continuous Angeline Hernandes  mL/hr (09/08/22 0713)    ticagrelor  90 mg Oral Q12H [address] Angeline Hernandes MD       Continuous Infusions:  heparin (porcine), 3-20 Units/kg/hr (Order-Specific), Last Rate: 17 3 Units/kg/hr (09/08/22 0713)  sodium chloride, 100 mL/hr, Last Rate: 100 mL/hr (09/08/22 0713)        VTE Pharmacologic Prophylaxis: Heparin  VTE Mechanical Prophylaxis: sequential compression device    This note was completed in part utilizing [employer] direct voice recognition software  Grammatical errors, random word insertion, spelling mistakes, occasional wrong word or "sound-alike" substitutions and incomplete sentences may be an occasional consequence of the system secondary to software limitations, ambient noise and hardware issues  At the time of dictation, efforts were made to edit, clarify and /or correct errors  Please read the chart carefully and recognize, using context, where substitutions have occurred    If you have any questions or concerns about the context, text or information contained within the body of this dictation, please contact myself, the provider, for further clarification

## 2022-09-08 NOTE — PROGRESS NOTES
[facility]  Progress Note - Elsa Pool 1960, 58 y o  male MRN: [id number]  Unit/Bed#: S -01 Encounter: [id number]  Primary Care Provider: Mauri Garner PA-C   Date and time admitted to hospital: 9/6/2022 10:15 AM    * Ventricular fibrillation [facility])  Assessment & Plan  ICD Fired once- timing is approximately 7:00 a m  On Saturday morning and correlates with the patient's account of his fall  This would explain the fall and head strike but does not explain the continued rising troponin  Plan:  Cardiology on board  planning for catheterization - patient requires transfer to [place], as per Cardiology for high risk PCI   Continue radha, asa 81  Was loaded with  mg today  Continue heparin drip, until after catheterization  Continue telemetry monitoring     Stable angina [facility])  Assessment & Plan  Has been having exertional chest pain for "months"  Patient states his chest pain has gradually been getting worse and that he took twice as much as the prescribed dose of the amlodipine because he thought that this would help his chest pain  Admittedly his chest pain does sound gastrointestinal in etiology he describes it as burning and like reflux  Unfortunately it is consistently present on exertion on and off present when not exerting himself  Coupled with than elevated troponin that is rising this is concerning for acute coronary syndrome  Plan as noted above    ICD (implantable cardioverter-defibrillator) discharge  Assessment & Plan  Device interrogation revealed firing at  7:00 a m  On Saturday morning and correlates with the patient's account of his fall      History of cardiac arrest  Assessment & Plan  Patient has a history of Vfib arrest s/p cardiac cath without stenting and ICD placement  He did just recently follow up with EP on 8/25 and at that time was complaining of anginal symptoms  He was started on amlodipine at that time    Patient also states that since his discharge from [facility] when he had his ICD placed he has been having exertionl chest pain  Discussed with Cardiology device interrogation revealed that ICD has fired  Hypercalcemia  Assessment & Plan  Unclear etiology   Mild 10 5     Plan:  · trend AM CMP      Leukocytosis  Assessment & Plan  Likely 2/2 device firing or/and ACS    Plan:  AM CBC  Follow blood cultures for completion  Elevated troponin  Assessment & Plan  Concern for ACS and plan for cardiac cath  Heparin started by Cardiology discussed with them and they wish to give 80 of aspirin today as well as Brilinta  Patient denies any chest pain or burning abd pain  Plan as noted above         [address]  Patient fell on Saturday  He denies any loss of consciousness but states that he felt "dizzy" while having his morning coffee and cigarettes  He then fell to the ground and recalls his face hitting the concrete  He states that he "doubled up" on his amlodipine that day   Seen by trauma and they are signing off and will defer to [facility] regarding admission to hospital        Hypertension  Assessment & Plan  Plan:  Continue amlodipine 2 5 mg daily  Continue lisinopril 10 mg daily  Continue metoprolol 50 mg daily      VTE Pharmacologic Prophylaxis: VTE Score: 3 Moderate Risk (Score 3-4) - Pharmacological DVT Prophylaxis Ordered: heparin drip  Patient Centered Rounds: I performed bedside rounds with nursing staff today  Discussions with Specialists or Other Care Team Provider: Nursing, Cardiology      Education and Discussions with Family / Patient: Updated  (wife) at bedside      Current Length of Stay: 2 day(s)  Current Patient Status: Inpatient   Discharge Plan: Pending transfer to [facility], given high risk PCI    Code Status: Level 1 - Full Code    Subjective:   Nursing reports no overnight events   Patient denied chest pain, shortness a breath, palpitations, dizziness, abdominal pain   Reported poor sleep given overnight interruptions from treatment team     Objective:     Vitals:   Temp (24hrs), Av 1 °F (36 7 °C), Min:97 7 °F (36 5 °C), Max:98 5 °F (36 9 °C)    Temp:  [97 7 °F (36 5 °C)-98 5 °F (36 9 °C)] 97 8 °F (36 6 °C)  HR:  [68-80] 69  Resp:  [16-20] 20  BP: (115-130)/(76-82) 123/81  SpO2:  [92 %-96 %] 95 %  Body mass index is 27 57 kg/m²  Input and Output Summary (last 24 hours): Intake/Output Summary (Last 24 hours) at 2022 0649  Last data filed at 2022 0542  Gross per 24 hour   Intake 2422 33 ml   Output --   Net 2422 33 ml       Physical Exam:   Physical Exam  Constitutional:       General: He is not in acute distress  Appearance: He is not ill-appearing, toxic-appearing or diaphoretic  HENT:      Head:      Comments: Abrasions on nose and forehead - with scabs, forehead started to bleed after patient accidentally touched area     Nose: Nose normal       Mouth/Throat:      Mouth: Mucous membranes are moist    Eyes:      General:         Right eye: No discharge  Left eye: No discharge  Pupils: Pupils are equal, round, and reactive to light  Cardiovascular:      Rate and Rhythm: Normal rate  Heart sounds: No murmur heard  No friction rub  No gallop  Pulmonary:      Effort: No respiratory distress  Breath sounds: No stridor  No wheezing, rhonchi or rales  Chest:      Chest wall: No tenderness  Abdominal:      General: Abdomen is flat  There is no distension  Palpations: There is no mass  Tenderness: There is no abdominal tenderness  There is no right CVA tenderness, left CVA tenderness, guarding or rebound  Hernia: No hernia is present  Skin:     Capillary Refill: Capillary refill takes less than 2 seconds  Coloration: Skin is not jaundiced or pale  Findings: No bruising, erythema, lesion or rash  Neurological:      Mental Status: He is alert        Cranial Nerves: No cranial nerve deficit  Sensory: No sensory deficit  Motor: No weakness  Gait: Gait normal       Deep Tendon Reflexes: Reflexes normal    Psychiatric:         Mood and Affect: Mood normal           Additional Data:     Labs:  Results from last 7 days   Lab Units 09/08/22  0537 09/06/22  1615 09/06/22  1033   WBC Thousand/uL 9 41   < > 11 90*   HEMOGLOBIN g/dL 12 8   < > 14 1   HEMATOCRIT % 39 4   < > 42 5   PLATELETS Thousands/uL 225   < > 257   NEUTROS PCT %  --   --  70   LYMPHS PCT %  --   --  20   MONOS PCT %  --   --  8   EOS PCT %  --   --  1    < > = values in this interval not displayed  Results from last 7 days   Lab Units 09/08/22  0537   SODIUM mmol/L 137   POTASSIUM mmol/L 4 2   CHLORIDE mmol/L 108   CO2 mmol/L 23   BUN mg/dL 20   CREATININE mg/dL 0 86   ANION GAP mmol/L 6   CALCIUM mg/dL 9 3   GLUCOSE RANDOM mg/dL 108     Results from last 7 days   Lab Units 09/06/22  1615   INR  0 95                   Lines/Drains:  Invasive Devices  Report    Peripheral Intravenous Line  Duration           Peripheral IV 09/06/22 Left;Dorsal (posterior) Forearm 1 day    Peripheral IV 09/06/22 Right Antecubital 1 day          Line  Duration           IABP 8 0 Fr  50 mL 70 days                  Telemetry:  Telemetry Orders (From admission, onward)             48 Hour Telemetry Monitoring  Continuous x 48 hours        Expiring   References:    Telemetry Guidelines   Question:  Reason for 48 Hour Telemetry  Answer:  Arrhythmias Requiring Medical Therapy (eg  SVT, Vtach/fib, Bradycardia, Uncontrolled A-fib)                 Telemetry Reviewed: Normal Sinus Rhythm and 6 runs of v tach overnight   Indication for Continued Telemetry Use: Arrthymias requiring medical therapy             Imaging: No pertinent imaging reviewed  Recent Cultures (last 7 days):   Results from last 7 days   Lab Units 09/06/22  1936   BLOOD CULTURE  No Growth at 24 hrs  No Growth at 24 hrs         Last 24 Hours Medication List:   Current Facility-Administered Medications   Medication Dose Route Frequency Provider Last Rate    amLODIPine  2 5 mg Oral Daily Britney Cooney MD      aspirin  81 mg Oral Daily Britney Cooney MD      atorvastatin  40 mg Oral Daily With Frida Hamm MD      heparin (porcine)  3-20 Units/kg/hr (Order-Specific) Intravenous Titrated Britney Cooney MD 17 3 Units/kg/hr (09/08/22 0542)    heparin (porcine)  2,000 Units Intravenous Q1H PRN Britney Cooney MD      heparin (porcine)  4,000 Units Intravenous Q1H PRN Britney Cooney MD      melatonin  6 mg Oral HS Raimundo Loja MD      metoprolol succinate  100 mg Oral Daily Chayo Lynn MD      metoprolol succinate  50 mg Oral Once Chayo Lynn MD      nicotine  1 patch Transdermal Daily Britney Cooney MD      sodium chloride  100 mL/hr Intravenous Continuous Britney Cooney  mL/hr (09/08/22 0542)    ticagrelor  90 mg Oral Q12H [address] Britney Cooney MD          Today, Patient Was Seen By: Kelsie De La Fuente MD    **Please Note: This note may have been constructed using a voice recognition system  **

## 2022-09-08 NOTE — ASSESSMENT & PLAN NOTE
ICD shock for VFib approximately at 7:00 a m  on saturday morning most likely reason of patient's  near-syncope and fall on the day of admission to [facility]  As per cardiology will await for coronary angiography if negative will discuss with EP patient might need Tikosyn/sotalol to prevent recurrent episodes of intracranial tachycardia/fibrillation  Will admit to telemetry step-down 2  Echo from today reported "  The left ventricular ejection fraction is 40%  Systolic function is moderately reduced  The following segments are dyskinetic: mid anteroseptal, mid inferoseptal, apical septal and apex  "

## 2022-09-08 NOTE — PLAN OF CARE
Problem: INFECTION - ADULT  Goal: Absence or prevention of progression during hospitalization  Description: INTERVENTIONS:  - Assess and monitor for signs and symptoms of infection  - Monitor lab/diagnostic results  - Monitor all insertion sites, i e  indwelling lines, tubes, and drains  - Monitor endotracheal if appropriate and nasal secretions for changes in amount and color  - [place] appropriate cooling/warming therapies per order  - Administer medications as ordered  - Instruct and encourage patient and family to use good hand hygiene technique  - Identify and instruct in appropriate isolation precautions for identified infection/condition  Outcome: Progressing

## 2022-09-08 NOTE — DISCHARGE SUMMARY
[facility]  Discharge- Green Lomeli 1960, 58 y o  male MRN: [id number]  Unit/Bed#: S -01 Encounter: [id number]  Primary Care Provider: Cher Cardenas PA-C   Date and time admitted to hospital: 9/6/2022 10:15 AM    * Ventricular fibrillation [facility])  Assessment & Plan  ICD Fired once- timing is approximately 7:00 a m  On Saturday morning and correlates with the patient's account of his fall  This would explain the fall and head strike but does not explain the continued rising troponin  Plan:  Cardiology on board  planning for catheterization - patient requires transfer to [facility], as per Cardiology for high risk PCI   Continue brillinta, asa 81  Continue heparin drip, until after catheterization  Continue telemetry monitoring     Multi-vessel CAD with stable angina [facility])  Assessment & Plan  Has been having exertional chest pain for "months"  Patient states his chest pain has gradually been getting worse and that he took twice as much as the prescribed dose of the amlodipine because he thought that this would help his chest pain  Admittedly his chest pain does sound gastrointestinal in etiology he describes it as burning and like reflux  Unfortunately it is consistently present on exertion on and off present when not exerting himself  Coupled with than elevated troponin that is rising this is concerning for acute coronary syndrome  Plan as noted above    ICD (implantable cardioverter-defibrillator) discharge  Assessment & Plan  Device interrogation revealed firing at  7:00 a m  On Saturday morning and correlates with the patient's account of his fall      History of cardiac arrest  Assessment & Plan  Patient has a history of Vfib arrest s/p cardiac cath without stenting and ICD placement  He did just recently follow up with EP on 8/25 and at that time was complaining of anginal symptoms  He was started on amlodipine at that time    Patient also states that since his discharge from [facility] when he had his ICD placed he has been having exertionl chest pain  Discussed with Cardiology device interrogation revealed that ICD has fired  Hypercalcemia  Assessment & Plan  Unclear etiology   Mild 10 5     Plan:  · trend AM CMP      Fall  Assessment & Plan  Patient fell on Saturday  He denies any loss of consciousness but states that he felt "dizzy" while having his morning coffee and cigarettes  He then fell to the ground and recalls his face hitting the concrete  He states that he "doubled up" on his amlodipine that day   Seen by trauma and they are signing off and will defer to [facility] regarding admission to hospital        Essential hypertension  Assessment & Plan  Plan:  Continue amlodipine 2 5 mg daily  Continue lisinopril 10 mg daily  Continue metoprolol 50 mg daily    Medical Problems             Resolved Problems  Date Reviewed: 9/8/2022   None               Discharging Resident: Alexandru Aragon MD  Discharging Attending: Dr Montana Giles MD  PCP: Xochitl Garcia PA-C  Admission Date:   Admission Orders (From admission, onward)     Ordered        09/06/22 [address] [place] [address]  Once                      Discharge/Transfer Date: 09/8/22    Disposition:   Transfer to:  [facility]  Reason for Transfer:  Patient requiring transfer for high risk PCI, not available at [facility] Provider at [facility] [place]: Dr Stalin Tracey Stay:  · Cardiology    Procedures Performed:   · None    Significant Findings / Test Results:   TRAUMA - CT head wo contrast    Result Date: 9/6/2022  Impression: No acute intracranial abnormality  Workstation performed: [id number]     TRAUMA - CT spine cervical wo contrast    Result Date: 9/6/2022  Impression: No cervical spine fracture or traumatic malalignment    I personally discussed this study with Ruthann Givens on 9/6/2022 at 10:55 AM  Workstation performed: [id number]     XR Trauma multiple (SLB/SLRA trauma bay ONLY)    Result Date: 9/6/2022  Impression: No acute cardiopulmonary disease within limitations of supine imaging  Workstation performed: [id number]     Incidental Findings:   · None     Test Results Pending at Discharge (will require follow up): · Blood cultures - pending preliminary result  Can be followed up at transfer location  Outpatient Tests Requested:  · None at this time    Complications:  None    Reason for Admission:  Chest pain    Hospital Course:   Jamie Bertrand is a 58 y o  male patient who originally presented to the hospital on 9/6/2022 due to chest pain on exertion  On presentation, patient reported that he was experiencing chest pain since discharge following ICD placement in July  Also in July, coronary angiography demonstrated 3 vessels demonstrating coronary artery disease, but intervention was performed  Was seen by outpatient Cardiology in August for same chief complaint and was started on amlodipine  Last Saturday, patient reported that chest pain had changed and was feeling uncomfortable, therefore he increased his dose of amlodipine 2 5 mg to double the dose  That morning while sitting drinking coffee and smoking a cigarette, patient then felt dizzy and fell to the ground striking his face  On presentation to the hospital, patient's ICD device was interrogated which demonstrated an ICD fire at around 7:00 a m  on Saturday which correlates with patient's fall  Lab work was significant for uptrending troponin which then plateaued at 400H, and his EKG demonstrated lateral lead ST depressions  Telemetry demonstrated normal sinus rhythm this morning, and overnight events included 6 runs of V-tach  Patient was assessed by Cardiology and was planned for cardiac catheterization, however, given high risk for PCI requires transfer to [facility]        Please see above list of diagnoses and related plan for additional information  Condition at Discharge: stable    Discharge Day Visit / Exam:   Subjective:  Nursing reports no overnight events  Patient denied chest pain, shortness a breath, palpitations, dizziness, abdominal pain  Reported poor sleep given overnight interruptions from treatment team   Vitals: Blood Pressure: 128/80 (09/08/22 1641)  Pulse: 72 (09/08/22 1641)  Temperature: 98 2 °F (36 8 °C) (09/08/22 1641)  Temp Source: Oral (09/08/22 1641)  Respirations: 18 (09/08/22 1641)  Height: 6' 2" (188 cm) (09/08/22 1020)  Weight - Scale: 97 1 kg (214 lb) (09/08/22 1020)  SpO2: 98 % (09/08/22 1641)  Exam:   Physical Exam  Constitutional:       General: He is not in acute distress  Appearance: He is not ill-appearing, toxic-appearing or diaphoretic  HENT:      Head:      Comments: Abrasions on nose and forehead - with scabs, forehead started to bleed after patient accidentally touched area     Nose: Nose normal       Mouth/Throat:      Mouth: Mucous membranes are moist    Eyes:      General:         Right eye: No discharge  Left eye: No discharge  Pupils: Pupils are equal, round, and reactive to light  Cardiovascular:      Rate and Rhythm: Normal rate  Heart sounds: No murmur heard  No friction rub  No gallop  Pulmonary:      Effort: No respiratory distress  Breath sounds: No stridor  No wheezing, rhonchi or rales  Chest:      Chest wall: No tenderness  Abdominal:      General: Abdomen is flat  There is no distension  Palpations: There is no mass  Tenderness: There is no abdominal tenderness  There is no right CVA tenderness, left CVA tenderness, guarding or rebound  Hernia: No hernia is present  Skin:     Capillary Refill: Capillary refill takes less than 2 seconds  Coloration: Skin is not jaundiced or pale  Findings: No bruising, erythema, lesion or rash  Neurological:      Mental Status: He is alert  Cranial Nerves: No cranial nerve deficit  Sensory: No sensory deficit  Motor: No weakness  Gait: Gait normal       Deep Tendon Reflexes: Reflexes normal    Psychiatric:         Mood and Affect: Mood normal          Discussion with Family: Updated  (wife) at bedside  ** Please Note: This note may have been constructed using a voice recognition system  **

## 2022-09-08 NOTE — ASSESSMENT & PLAN NOTE
Resolved  No evidence of underlying infectious process  Follow-up 2 sets of blood culture  Monitor temperature and CBC

## 2022-09-08 NOTE — ASSESSMENT & PLAN NOTE
Patient has a history of Vfib arrest s/p cardiac cath without stenting and ICD placement  He did just recently follow up with EP on 8/25 and at that time was complaining of anginal symptoms  He was started on amlodipine at that time  Patient also states that since his discharge from [facility] when he had his ICD placed he has been having exertionl chest pain  Discussed with Cardiology device interrogation revealed that ICD has fired

## 2022-09-08 NOTE — ASSESSMENT & PLAN NOTE
ICD Fired once- timing is approximately 7:00 a m  On Saturday morning and correlates with the patient's account of his fall  This would explain the fall and head strike but does not explain the continued rising troponin  Plan:  Cardiology on board  planning for catheterization - patient requires transfer to [place], as per Cardiology for high risk PCI   Continue brillinta, asa 81    Continue heparin drip, until after catheterization  Continue telemetry monitoring

## 2022-09-08 NOTE — ASSESSMENT & PLAN NOTE
Ongoing anginal symptoms with significant cardiac history of VFib V-tach cardiac arrest, cardiac catheterization showed multivessel disease without any specific revascularizable lesions for PCI and was turned down for CABG, subsequent ICD insertion  will need re-evaluation for coronary angiography and potential revascularization    According to Cardiology given prior ventricle fibrillation episodes patient needs to be transferred to [facility] for high risk PCI  Continue beta-blocker statin and dual antiplatelet therapy   Heparin ACS protocol  Cardiology will follow

## 2022-09-08 NOTE — ASSESSMENT & PLAN NOTE
ICD Fired once- timing is approximately 7:00 a m  On Saturday morning and correlates with the patient's account of his fall  This would explain the fall and head strike but does not explain the continued rising troponin  Plan:  Cardiology on board  planning for catheterization - patient requires transfer to [place], as per Cardiology for high risk PCI   Continue radha, asa 81  Was loaded with  mg today    Continue heparin drip, until after catheterization  Continue telemetry monitoring

## 2022-09-08 NOTE — ASSESSMENT & PLAN NOTE
Likely 2/2 device firing or/and ACS    Plan:  AM CBC  Follow blood cultures for completion - -24 hours

## 2022-09-08 NOTE — ASSESSMENT & PLAN NOTE
Patient has a history of Vfib arrest s/p cardiac cath without stenting and ICD placement  He did just recently follow up with EP on 8/25 and at that time was complaining of anginal symptoms  He was started on amlodipine at that time  Patient also states that since his discharge from [facility] when he had his ICD placed he has been having exertionl chest pain  Discussed with Cardiology device interrogation revealed that ICD has fired

## 2022-09-08 NOTE — PROGRESS NOTES
[facility]  Progress Note - Twan Dates 1960, 58 y o  male MRN: [id number]  Unit/Bed#: S -01 Encounter: [id number]  Primary Care Provider: Romain Allison PA-C   Date and time admitted to hospital: 9/6/2022 10:15 AM    * Ventricular fibrillation [facility])  Assessment & Plan  ICD Fired once- timing is approximately 7:00 a m  On Saturday morning and correlates with the patient's account of his fall  This would explain the fall and head strike but does not explain the continued rising troponin  Plan:  Cardiology on board  planning for catheterization - patient requires transfer to [place], as per Cardiology for high risk PCI   Continue brillinta, asa 81  Continue heparin drip, until after catheterization  Continue telemetry monitoring     Stable angina [facility])  Assessment & Plan  Has been having exertional chest pain for "months"  Patient states his chest pain has gradually been getting worse and that he took twice as much as the prescribed dose of the amlodipine because he thought that this would help his chest pain  Admittedly his chest pain does sound gastrointestinal in etiology he describes it as burning and like reflux  Unfortunately it is consistently present on exertion on and off present when not exerting himself  Coupled with than elevated troponin that is rising this is concerning for acute coronary syndrome  Plan as noted above    ICD (implantable cardioverter-defibrillator) discharge  Assessment & Plan  Device interrogation revealed firing at  7:00 a m  On Saturday morning and correlates with the patient's account of his fall      History of cardiac arrest  Assessment & Plan  Patient has a history of Vfib arrest s/p cardiac cath without stenting and ICD placement  He did just recently follow up with EP on 8/25 and at that time was complaining of anginal symptoms  He was started on amlodipine at that time    Patient also states that since his discharge from [facility] when he had his ICD placed he has been having exertionl chest pain  Discussed with Cardiology device interrogation revealed that ICD has fired  Hypercalcemia  Assessment & Plan  Unclear etiology   Mild 10 5     Plan:  · trend AM CMP      Leukocytosis  Assessment & Plan  Likely 2/2 device firing or/and ACS    Plan:  AM CBC  Follow blood cultures for completion - -24 hours    Elevated troponin  Assessment & Plan  Concern for ACS and plan for cardiac cath  Heparin started by Cardiology discussed with them and they wish to give 80 of aspirin today as well as Brilinta  Patient denies any chest pain or burning abd pain  Plan as noted above         [address]  Patient fell on Saturday  He denies any loss of consciousness but states that he felt "dizzy" while having his morning coffee and cigarettes  He then fell to the ground and recalls his face hitting the concrete  He states that he "doubled up" on his amlodipine that day   Seen by trauma and they are signing off and will defer to [address] regarding admission to hospital        Hypertension  Assessment & Plan  Plan:  Continue amlodipine 2 5 mg daily  Continue lisinopril 10 mg daily  Continue metoprolol 50 mg daily      VTE Pharmacologic Prophylaxis: VTE Score: 3 Moderate Risk (Score 3-4) - Pharmacological DVT Prophylaxis Ordered: heparin drip  Patient Centered Rounds: I performed bedside rounds with nursing staff today  Discussions with Specialists or Other Care Team Provider: Nursing, Cardiology      Education and Discussions with Family / Patient: Updated  (wife) at bedside   Wife Melodie Boateng     Current Length of Stay: 2 day(s)  Current Patient Status: Inpatient   Discharge Plan: Pending transfer to [facility], given high risk PCI    Code Status: Level 1 - Full Code    Subjective:   Nursing reports no overnight events   Patient denied chest pain, shortness a breath, palpitations, dizziness, abdominal pain   Reported frustration given overnight stay after plans for transfer  Discussed with patient about how transfer is dependent on bed availability  Objective:     Vitals:   Temp (24hrs), Av °F (36 7 °C), Min:97 4 °F (36 3 °C), Max:98 5 °F (36 9 °C)    Temp:  [97 4 °F (36 3 °C)-98 5 °F (36 9 °C)] 97 4 °F (36 3 °C)  HR:  [68-80] 75  Resp:  [16-20] 16  BP: (115-134)/(76-86) 134/86  SpO2:  [92 %-96 %] 96 %  Body mass index is 27 57 kg/m²  Input and Output Summary (last 24 hours): Intake/Output Summary (Last 24 hours) at 2022 0819  Last data filed at 2022 5498  Gross per 24 hour   Intake 2614 3 ml   Output --   Net 2614 3 ml       Physical Exam:   Physical Exam  Constitutional:       General: He is not in acute distress  Appearance: He is not ill-appearing, toxic-appearing or diaphoretic  HENT:      Head:      Comments: Abrasions on nose and forehead - with scabs, forehead started to bleed after patient accidentally touched area     Nose: Nose normal       Mouth/Throat:      Mouth: Mucous membranes are moist    Eyes:      General:         Right eye: No discharge  Left eye: No discharge  Pupils: Pupils are equal, round, and reactive to light  Cardiovascular:      Rate and Rhythm: Normal rate  Heart sounds: No murmur heard  No friction rub  No gallop  Pulmonary:      Effort: No respiratory distress  Breath sounds: No stridor  No wheezing, rhonchi or rales  Chest:      Chest wall: No tenderness  Abdominal:      General: Abdomen is flat  There is no distension  Palpations: There is no mass  Tenderness: There is no abdominal tenderness  There is no right CVA tenderness, left CVA tenderness, guarding or rebound  Hernia: No hernia is present  Skin:     Capillary Refill: Capillary refill takes less than 2 seconds  Coloration: Skin is not jaundiced or pale  Findings: No bruising, erythema, lesion or rash  Neurological:      Mental Status: He is alert  Cranial Nerves: No cranial nerve deficit  Sensory: No sensory deficit  Motor: No weakness  Gait: Gait normal       Deep Tendon Reflexes: Reflexes normal    Psychiatric:         Mood and Affect: Mood normal           Additional Data:     Labs:  Results from last 7 days   Lab Units 09/08/22  0537 09/06/22  1615 09/06/22  1033   WBC Thousand/uL 9 41   < > 11 90*   HEMOGLOBIN g/dL 12 8   < > 14 1   HEMATOCRIT % 39 4   < > 42 5   PLATELETS Thousands/uL 225   < > 257   NEUTROS PCT %  --   --  70   LYMPHS PCT %  --   --  20   MONOS PCT %  --   --  8   EOS PCT %  --   --  1    < > = values in this interval not displayed  Results from last 7 days   Lab Units 09/08/22  0537   SODIUM mmol/L 137   POTASSIUM mmol/L 4 2   CHLORIDE mmol/L 108   CO2 mmol/L 23   BUN mg/dL 20   CREATININE mg/dL 0 86   ANION GAP mmol/L 6   CALCIUM mg/dL 9 3   GLUCOSE RANDOM mg/dL 108     Results from last 7 days   Lab Units 09/06/22  1615   INR  0 95                   Lines/Drains:  Invasive Devices  Report    Peripheral Intravenous Line  Duration           Peripheral IV 09/06/22 Left;Dorsal (posterior) Forearm 1 day    Peripheral IV 09/06/22 Right Antecubital 1 day          Line  Duration           IABP 8 0 Fr  50 mL 70 days                  Telemetry:  Telemetry Orders (From admission, onward)             48 Hour Telemetry Monitoring  Continuous x 48 hours        Expiring   References:    Telemetry Guidelines   Question:  Reason for 48 Hour Telemetry  Answer:  Arrhythmias Requiring Medical Therapy (eg  SVT, Vtach/fib, Bradycardia, Uncontrolled A-fib)                 Telemetry Reviewed: Normal Sinus Rhythm and 6 runs of v tach overnight   Indication for Continued Telemetry Use: Arrthymias requiring medical therapy             Imaging: No pertinent imaging reviewed      Recent Cultures (last 7 days):   Results from last 7 days   Lab Units 09/06/22  1936   BLOOD CULTURE  No Growth at 24 hrs  No Growth at 24 hrs  Last 24 Hours Medication List:   Current Facility-Administered Medications   Medication Dose Route Frequency Provider Last Rate    amLODIPine  2 5 mg Oral Daily Panda Sears MD      aspirin  81 mg Oral Daily Panda Sears MD      atorvastatin  40 mg Oral Daily With Tere Miranda MD      heparin (porcine)  3-20 Units/kg/hr (Order-Specific) Intravenous Titrated Panda Sears MD 17 3 Units/kg/hr (09/08/22 0713)    heparin (porcine)  2,000 Units Intravenous Q1H PRN Panda Sears MD      heparin (porcine)  4,000 Units Intravenous Q1H PRN Panda Sears MD      melatonin  6 mg Oral HS Dionne Irvin MD      metoprolol succinate  100 mg Oral BID Tadeo Graham MD      nicotine  1 patch Transdermal Daily Panda Sears MD      sodium chloride  100 mL/hr Intravenous Continuous Panda Sears  mL/hr (09/08/22 0713)    ticagrelor  90 mg Oral Q12H [address] Panda Sears MD          Today, Patient Was Seen By: Wm Renee MD    **Please Note: This note may have been constructed using a voice recognition system  **

## 2022-09-09 ENCOUNTER — APPOINTMENT (INPATIENT)
Dept: NON INVASIVE DIAGNOSTICS | Facility: HOSPITAL | Age: 62
DRG: 233 | End: 2022-09-09
Payer: COMMERCIAL

## 2022-09-09 ENCOUNTER — APPOINTMENT (INPATIENT)
Dept: RADIOLOGY | Facility: HOSPITAL | Age: 62
DRG: 233 | End: 2022-09-09
Payer: COMMERCIAL

## 2022-09-09 ENCOUNTER — APPOINTMENT (OUTPATIENT)
Dept: PULMONOLOGY | Facility: HOSPITAL | Age: 62
DRG: 233 | End: 2022-09-09
Payer: COMMERCIAL

## 2022-09-09 PROBLEM — D72.829 LEUKOCYTOSIS: Status: RESOLVED | Noted: 2022-09-06 | Resolved: 2022-09-09

## 2022-09-09 LAB
APTT PPP: 57 SECONDS (ref 23–37)
ATRIAL RATE: 78 BPM
CHOLEST SERPL-MCNC: 132 MG/DL
EST. AVERAGE GLUCOSE BLD GHB EST-MCNC: 120 MG/DL
HBA1C MFR BLD: 5.8 %
HDLC SERPL-MCNC: 35 MG/DL
LDLC SERPL CALC-MCNC: 62 MG/DL (ref 0–100)
LDLC SERPL DIRECT ASSAY-MCNC: 81 MG/DL (ref 0–100)
NONHDLC SERPL-MCNC: 97 MG/DL
P AXIS: 65 DEGREES
PR INTERVAL: 196 MS
QRS AXIS: -62 DEGREES
QRSD INTERVAL: 92 MS
QT INTERVAL: 404 MS
QTC INTERVAL: 461 MS
T WAVE AXIS: 32 DEGREES
TRIGL SERPL-MCNC: 173 MG/DL
VENTRICULAR RATE: 78 BPM

## 2022-09-09 PROCEDURE — C1769 GUIDE WIRE: HCPCS | Performed by: INTERNAL MEDICINE

## 2022-09-09 PROCEDURE — 71250 CT THORAX DX C-: CPT

## 2022-09-09 PROCEDURE — 99153 MOD SED SAME PHYS/QHP EA: CPT | Performed by: INTERNAL MEDICINE

## 2022-09-09 PROCEDURE — 93005 ELECTROCARDIOGRAM TRACING: CPT

## 2022-09-09 PROCEDURE — 94060 EVALUATION OF WHEEZING: CPT

## 2022-09-09 PROCEDURE — 87081 CULTURE SCREEN ONLY: CPT | Performed by: PHYSICIAN ASSISTANT

## 2022-09-09 PROCEDURE — G1004 CDSM NDSC: HCPCS

## 2022-09-09 PROCEDURE — 93971 EXTREMITY STUDY: CPT

## 2022-09-09 PROCEDURE — 93458 L HRT ARTERY/VENTRICLE ANGIO: CPT | Performed by: INTERNAL MEDICINE

## 2022-09-09 PROCEDURE — 99233 SBSQ HOSP IP/OBS HIGH 50: CPT | Performed by: PHYSICIAN ASSISTANT

## 2022-09-09 PROCEDURE — 99223 1ST HOSP IP/OBS HIGH 75: CPT | Performed by: THORACIC SURGERY (CARDIOTHORACIC VASCULAR SURGERY)

## 2022-09-09 PROCEDURE — 83036 HEMOGLOBIN GLYCOSYLATED A1C: CPT | Performed by: PHYSICIAN ASSISTANT

## 2022-09-09 PROCEDURE — 93880 EXTRACRANIAL BILAT STUDY: CPT

## 2022-09-09 PROCEDURE — 94640 AIRWAY INHALATION TREATMENT: CPT

## 2022-09-09 PROCEDURE — 99223 1ST HOSP IP/OBS HIGH 75: CPT | Performed by: INTERNAL MEDICINE

## 2022-09-09 PROCEDURE — 85730 THROMBOPLASTIN TIME PARTIAL: CPT | Performed by: HOSPITALIST

## 2022-09-09 PROCEDURE — 4A023N7 MEASUREMENT OF CARDIAC SAMPLING AND PRESSURE, LEFT HEART, PERCUTANEOUS APPROACH: ICD-10-PCS | Performed by: INTERNAL MEDICINE

## 2022-09-09 PROCEDURE — 94010 BREATHING CAPACITY TEST: CPT

## 2022-09-09 PROCEDURE — 94060 EVALUATION OF WHEEZING: CPT | Performed by: INTERNAL MEDICINE

## 2022-09-09 PROCEDURE — 99152 MOD SED SAME PHYS/QHP 5/>YRS: CPT | Performed by: INTERNAL MEDICINE

## 2022-09-09 PROCEDURE — 93010 ELECTROCARDIOGRAM REPORT: CPT | Performed by: INTERNAL MEDICINE

## 2022-09-09 PROCEDURE — B2111ZZ FLUOROSCOPY OF MULTIPLE CORONARY ARTERIES USING LOW OSMOLAR CONTRAST: ICD-10-PCS | Performed by: INTERNAL MEDICINE

## 2022-09-09 PROCEDURE — C1894 INTRO/SHEATH, NON-LASER: HCPCS | Performed by: INTERNAL MEDICINE

## 2022-09-09 RX ORDER — ALBUTEROL SULFATE 2.5 MG/3ML
SOLUTION RESPIRATORY (INHALATION)
Status: COMPLETED
Start: 2022-09-09 | End: 2022-09-09

## 2022-09-09 RX ORDER — CHLORHEXIDINE GLUCONATE 0.12 MG/ML
15 RINSE ORAL EVERY 12 HOURS SCHEDULED
Status: DISCONTINUED | OUTPATIENT
Start: 2022-09-09 | End: 2022-09-12

## 2022-09-09 RX ORDER — HEPARIN SODIUM 1000 [USP'U]/ML
10000 INJECTION, SOLUTION INTRAVENOUS; SUBCUTANEOUS ONCE
Status: CANCELLED | OUTPATIENT
Start: 2022-09-12

## 2022-09-09 RX ORDER — HEPARIN SODIUM 1000 [USP'U]/ML
INJECTION, SOLUTION INTRAVENOUS; SUBCUTANEOUS AS NEEDED
Status: DISCONTINUED | OUTPATIENT
Start: 2022-09-09 | End: 2022-09-09 | Stop reason: HOSPADM

## 2022-09-09 RX ORDER — HEPARIN SODIUM 1000 [USP'U]/ML
400 INJECTION, SOLUTION INTRAVENOUS; SUBCUTANEOUS ONCE
Status: CANCELLED | OUTPATIENT
Start: 2022-09-12

## 2022-09-09 RX ORDER — ONDANSETRON 2 MG/ML
4 INJECTION INTRAMUSCULAR; INTRAVENOUS EVERY 6 HOURS PRN
Status: DISCONTINUED | OUTPATIENT
Start: 2022-09-09 | End: 2022-09-12

## 2022-09-09 RX ORDER — ALBUTEROL SULFATE 2.5 MG/3ML
2.5 SOLUTION RESPIRATORY (INHALATION) ONCE
Status: COMPLETED | OUTPATIENT
Start: 2022-09-09 | End: 2022-09-09

## 2022-09-09 RX ORDER — FENTANYL CITRATE 50 UG/ML
INJECTION, SOLUTION INTRAMUSCULAR; INTRAVENOUS AS NEEDED
Status: DISCONTINUED | OUTPATIENT
Start: 2022-09-09 | End: 2022-09-09 | Stop reason: HOSPADM

## 2022-09-09 RX ORDER — HEPARIN SODIUM 5000 [USP'U]/ML
5000 INJECTION, SOLUTION INTRAVENOUS; SUBCUTANEOUS EVERY 8 HOURS SCHEDULED
Status: DISCONTINUED | OUTPATIENT
Start: 2022-09-09 | End: 2022-09-12

## 2022-09-09 RX ORDER — LIDOCAINE HYDROCHLORIDE 10 MG/ML
INJECTION, SOLUTION EPIDURAL; INFILTRATION; INTRACAUDAL; PERINEURAL AS NEEDED
Status: DISCONTINUED | OUTPATIENT
Start: 2022-09-09 | End: 2022-09-09 | Stop reason: HOSPADM

## 2022-09-09 RX ORDER — SODIUM CHLORIDE 9 MG/ML
125 INJECTION, SOLUTION INTRAVENOUS CONTINUOUS
Status: DISPENSED | OUTPATIENT
Start: 2022-09-09 | End: 2022-09-09

## 2022-09-09 RX ORDER — MIDAZOLAM HYDROCHLORIDE 2 MG/2ML
INJECTION, SOLUTION INTRAMUSCULAR; INTRAVENOUS AS NEEDED
Status: DISCONTINUED | OUTPATIENT
Start: 2022-09-09 | End: 2022-09-09 | Stop reason: HOSPADM

## 2022-09-09 RX ORDER — VERAPAMIL HCL 2.5 MG/ML
AMPUL (ML) INTRAVENOUS AS NEEDED
Status: DISCONTINUED | OUTPATIENT
Start: 2022-09-09 | End: 2022-09-09 | Stop reason: HOSPADM

## 2022-09-09 RX ORDER — NITROGLYCERIN 20 MG/100ML
INJECTION INTRAVENOUS AS NEEDED
Status: DISCONTINUED | OUTPATIENT
Start: 2022-09-09 | End: 2022-09-09 | Stop reason: HOSPADM

## 2022-09-09 RX ORDER — ACETAMINOPHEN 325 MG/1
650 TABLET ORAL EVERY 4 HOURS PRN
Status: DISCONTINUED | OUTPATIENT
Start: 2022-09-09 | End: 2022-09-12

## 2022-09-09 RX ADMIN — HEPARIN SODIUM 5000 UNITS: 5000 INJECTION INTRAVENOUS; SUBCUTANEOUS at 21:30

## 2022-09-09 RX ADMIN — NICOTINE 1 PATCH: 7 PATCH, EXTENDED RELEASE TRANSDERMAL at 08:01

## 2022-09-09 RX ADMIN — ASPIRIN 81 MG CHEWABLE TABLET 81 MG: 81 TABLET CHEWABLE at 08:01

## 2022-09-09 RX ADMIN — ALBUTEROL SULFATE 2.5 MG: 2.5 SOLUTION RESPIRATORY (INHALATION) at 18:06

## 2022-09-09 RX ADMIN — AMLODIPINE BESYLATE 2.5 MG: 2.5 TABLET ORAL at 08:01

## 2022-09-09 RX ADMIN — SODIUM CHLORIDE 125 ML/HR: 0.9 INJECTION, SOLUTION INTRAVENOUS at 15:14

## 2022-09-09 RX ADMIN — Medication 3 MG: at 21:31

## 2022-09-09 RX ADMIN — ATORVASTATIN CALCIUM 40 MG: 40 TABLET, FILM COATED ORAL at 16:54

## 2022-09-09 RX ADMIN — METOPROLOL SUCCINATE 100 MG: 100 TABLET, EXTENDED RELEASE ORAL at 08:01

## 2022-09-09 RX ADMIN — HEPARIN SODIUM 2000 UNITS: 1000 INJECTION INTRAVENOUS; SUBCUTANEOUS at 06:26

## 2022-09-09 RX ADMIN — SENNOSIDES 8.6 MG: 8.6 TABLET, FILM COATED ORAL at 21:30

## 2022-09-09 NOTE — PLAN OF CARE
Problem: SAFETY ADULT  Goal: Patient will remain free of falls  Description: INTERVENTIONS:  - Educate patient/family on patient safety including physical limitations  - Instruct patient to call for assistance with activity   - Consult OT/PT to assist with strengthening/mobility   - Keep Call bell within reach  - Keep bed low and locked with side rails adjusted as appropriate  - Keep care items and personal belongings within reach  - Initiate and maintain comfort rounds  - Make Fall Risk Sign visible to staff  - Offer Toileting every **2* Hours, in advance of need  - Initiate/Maintain *bed/chair**alarm  - Obtain necessary fall risk management equipment:   - Apply yellow socks and bracelet for high fall risk patients  - Consider moving patient to room near nurses station  Outcome: Progressing  Goal: Maintain or return to baseline ADL function  Description: INTERVENTIONS:  -  Assess patient's ability to carry out ADLs; assess patient's baseline for ADL function and identify physical deficits which impact ability to perform ADLs (bathing, care of mouth/teeth, toileting, grooming, dressing, etc )  - Assess/evaluate cause of self-care deficits   - Assess range of motion  - Assess patient's mobility; develop plan if impaired  - Assess patient's need for assistive devices and provide as appropriate  - Encourage maximum independence but intervene and supervise when necessary  - Involve family in performance of ADLs  - Assess for home care needs following discharge   - Consider OT consult to assist with ADL evaluation and planning for discharge  - Provide patient education as appropriate  Outcome: Progressing  Goal: Maintains/Returns to pre admission functional level  Description: INTERVENTIONS:  - Perform BMAT or MOVE assessment daily    - Set and communicate daily mobility goal to care team and patient/family/caregiver     - Collaborate with rehabilitation services on mobility goals if consulted  - Perform Range of Motion *3** times a day  - Reposition patient every *2** hours  - Dangle patient **3* times a day  - Stand patient *3** times a day  - Ambulate patient *3** times a day  - Out of bed to chair *3** times a day   - Out of bed for meals *3** times a day  - Out of bed for toileting  - Record patient progress and toleration of activity level   Outcome: Progressing     Problem: DISCHARGE PLANNING  Goal: Discharge to home or other facility with appropriate resources  Description: INTERVENTIONS:  - Identify barriers to discharge w/patient and caregiver  - Arrange for needed discharge resources and transportation as appropriate  - Identify discharge learning needs (meds, wound care, etc )  - Arrange for interpretive services to assist at discharge as needed  - Refer to Case Management Department for coordinating discharge planning if the patient needs post-hospital services based on physician/advanced practitioner order or complex needs related to functional status, cognitive ability, or social support system  Outcome: Progressing     Problem: Knowledge Deficit  Goal: Patient/family/caregiver demonstrates understanding of disease process, treatment plan, medications, and discharge instructions  Description: Complete learning assessment and assess knowledge base    Interventions:  - Provide teaching at level of understanding  - Provide teaching via preferred learning methods  Outcome: Progressing     Problem: CARDIOVASCULAR - ADULT  Goal: Maintains optimal cardiac output and hemodynamic stability  Description: INTERVENTIONS:  - Monitor I/O, vital signs and rhythm  - Monitor for S/S and trends of decreased cardiac output  - Administer and titrate ordered vasoactive medications to optimize hemodynamic stability  - Assess quality of pulses, skin color and temperature  - Assess for signs of decreased coronary artery perfusion  - Instruct patient to report change in severity of symptoms  Outcome: Progressing  Goal: Absence of cardiac dysrhythmias or at baseline rhythm  Description: INTERVENTIONS:  - Continuous cardiac monitoring, vital signs, obtain 12 lead EKG if ordered  - Administer antiarrhythmic and heart rate control medications as ordered  - Monitor electrolytes and administer replacement therapy as ordered  Outcome: Progressing     Problem: SKIN/TISSUE INTEGRITY - ADULT  Goal: Skin Integrity remains intact(Skin Breakdown Prevention)  Description: Assess:  -Perform Chad assessment-Clean and moisturize skin -Inspect skin when repositioning, toileting, and assisting with ADLS  -Assess under medical devices -Assess extremities for adequate circulation and sensation     Bed Management:  -Have minimal linens on bed & keep smooth, unwrinkled  -Change linens as needed when moist or perspiring  -Avoid sitting or lying in one position for more than **2* hours while in bed  -Keep HOB at *30**degrees     Toileting:  -Offer bedside commode  -Assess for incontinence  -Use incontinent care products after each incontinent episode such as     Activity:  -Mobilize patient *3** times a day  -Encourage activity and walks on unit  -Encourage or provide ROM exercises   -Turn and reposition patient every *2** Hours  -Use appropriate equipment to lift or move patient in bed  -Instruct/ Assist with weight shifting every **20minutes * when out of bed in chair  -Consider limitation of chair time **2* hour intervals    Skin Care:  -Avoid use of baby powder, tape, friction and shearing, hot water or constrictive clothing  -Relieve pressure over bony prominences   -Do not massage red bony areas    Next Steps:  -Teach patient strategies to minimize risks -Consider consults to  interdisciplinary teams Outcome: Progressing  Goal: Incision(s), wounds(s) or drain site(s) healing without S/S of infection  Description: INTERVENTIONS  - Assess and document dressing, incision, wound bed, drain sites and surrounding tissue  - Provide patient and family education  - Perform skin care/dressing changes Outcome: Progressing  Goal: Pressure injury heals and does not worsen  Description: Interventions:  - Implement low air loss mattress or specialty surface (Criteria met)  - Apply silicone foam dressing  - Instruct/assist with weight shifting every *20** minutes when in chair   - Limit chair time to *2** hour intervals  - Use special pressure reducing interventions  when in chair   - Apply fecal or urinary incontinence containment device   - Perform passive or active ROM   - Turn and reposition patient & offload bony prominences hours   - Utilize friction reducing device or surface for transfers   - Consider consults to  interdisciplinary teams   - Use incontinent care products after each incontinent episode   - Consider nutrition services referral as needed  Outcome: Progressing

## 2022-09-09 NOTE — UTILIZATION REVIEW
Initial Clinical Review    Admission: Date/Time/Statement:   Admission Orders (From admission, onward)     Ordered        09/08/22 2010  Inpatient Admission  Once                      Orders Placed This Encounter   Procedures    Inpatient Admission     Standing Status:   Standing     Number of Occurrences:   1     Order Specific Question:   Level of Care     Answer:   Level 2 Stepdown / HOT [14]     Order Specific Question:   Estimated length of stay     Answer:   More than 2 Midnights     Order Specific Question:   Certification     Answer:   I certify that inpatient services are medically necessary for this patient for a duration of greater than two midnights  See H&P and MD Progress Notes for additional information about the patient's course of treatment  Initial Presentation: 58 y o  male with PMH of  acute MI following event recorder all fibrillation/ventricular tachycardia with cardiac catheterization showing diffuse three-vessel coronary artery disease with no specific lesion for revascularization, subsequent Medtronic dual-chamber ICD implantation who initially presented to [facility] ED on 9/6 with c/o exertional substernal chest discomfort radiating to his neck and bilateral arms with associated SOB  His device was interrogated and found that patient had near-syncope associated with ICD shock for ventricular fibrillation  Elevated troponin 451=>622=>499  Cardiology consulted, recommended to continue current management and start heparin drip  Echo from September 8 showed ejection fraction of 40%, dyskinetic  mid anteroseptal, mid inferoseptal, apical septal and apex  Due to high risk PCI patient was transferred to [facility] stable angina and further eval needed    Admit Inpatient, Level 2 stepdown unit:  Continue ASA, statin, BB, heparin drip, cardiology following, monitor BP, temp and cbc,     Date: 9/9   Day 2: Cardiology Consult  Pt offers no new complaints today, normal cardiac rate and rhythm noted  CAD:  Patient presented in June with STEMI and cath revealed 3 vessel moderate CAD  No intervention was performed because there was no clear culprit  It was thought there was thrombus and lysis or vasospasm which caused the stemi  CABG was not pursued given only moderate disease  He continues to have chest pain on exertion and had an episodes of VF s/p ICD shock which prompted this admission  Troponins are were elevated , peaked at 499  Continue aspirin, ticagrelor, atorvastatin and metoprolol  [facility]-- if significant CAD will ask for CT surgery evaluation  VT/VF s/p ICD shock:  Syncope with collapse and patient found to have VF which was terminated by ICD shock  He was on amiodarone last admission but it was not continued  Ischemic evaluation with [facility]  If similar disease will ask EP to evaluate for antiarrythmic therapy  Continue home metoprolol  Triage Vitals   Temperature Pulse Respirations Blood Pressure SpO2   09/08/22 1945 09/09/22 0735 09/08/22 1945 09/08/22 1945 09/08/22 1945   98 1 °F (36 7 °C) 68 18 131/83       Temp src Heart Rate Source Patient Position - Orthostatic VS BP Location FiO2 (%)   -- -- -- -- --             Pain Score       09/08/22 2000       No Pain          Wt Readings from Last 1 Encounters:   09/09/22 96 9 kg (213 lb 9 6 oz)     Additional Vital Signs:   Date/Time Temp Pulse Resp BP MAP (mmHg) SpO2   09/09/22 07:35:02 97 7 °F (36 5 °C) 68 18 118/74 89 95 %   09/09/22 03:10:02 -- -- 17 102/61 75 --   09/08/22 22:41:26 98 °F (36 7 °C) -- 18 101/62 75 95 %   09/08/22 19:45:10 98 1 °F (36 7 °C) -- 18 131/83 99        Pertinent Labs/Diagnostic Test Results:   9/7 EKG:  Normal sinus rhythm  Left axis deviation  Inferior infarct , age undetermined  Abnormal ECGO  9/8 ECHO:    Left Ventricle: Left ventricular cavity size is dilated  Wall thickness is normal  There is eccentric hypertrophy  The left ventricular ejection fraction is 40%   Systolic function is moderately reduced    The following segments are dyskinetic: mid anteroseptal, mid inferoseptal, apical septal and apex      All other segments are normal        Results from last 7 days   Lab Units 09/08/22  1419   SARS-COV-2  Negative     Results from last 7 days   Lab Units 09/08/22  0537 09/06/22  1615 09/06/22  1033 09/06/22  1031   WBC Thousand/uL 9 41 12 36* 11 90*  --    HEMOGLOBIN g/dL 12 8 13 5 14 1  --    I STAT HEMOGLOBIN g/dl  --   --   --  14 6   HEMATOCRIT % 39 4 40 3 42 5  --    HEMATOCRIT, ISTAT %  --   --   --  43   PLATELETS Thousands/uL 225 238 257  --    NEUTROS ABS Thousands/µL  --   --  8 34*  --          Results from last 7 days   Lab Units 09/08/22  0537 09/06/22  1033 09/06/22  1031   SODIUM mmol/L 137 134*  --    POTASSIUM mmol/L 4 2 4 0  --    CHLORIDE mmol/L 108 102  --    CO2 mmol/L 23 26  --    CO2, I-STAT mmol/L  --   --  27   ANION GAP mmol/L 6 6  --    BUN mg/dL 20 23  --    CREATININE mg/dL 0 86 1 02  --    EGFR ml/min/1 73sq m 92 78  --    CALCIUM mg/dL 9 3 10 5*  --    CALCIUM, IONIZED, ISTAT mmol/L  --   --  1 28     Results from last 7 days   Lab Units 09/08/22  0537 09/06/22  1033   GLUCOSE RANDOM mg/dL 108 114     Results from last 7 days   Lab Units 09/06/22  1031   PH, TAHIRA I-STAT  7 413*   PCO2, TAHIRA ISTAT mm HG 39 8*   PO2, TAHIRA ISTAT mm HG 26 0*   HCO3, TAHIRA ISTAT mmol/L 25 4   I STAT BASE EXC mmol/L 1   I STAT O2 SAT % 48*         Results from last 7 days   Lab Units 09/06/22  2236 09/06/22  1937 09/06/22  1711 09/06/22  1515 09/06/22  1254 09/06/22  1044   HS TNI 0HR ng/L  --   --  451*  --   --  114*   HS TNI 2HR ng/L  --  622*  --   --  211*  --    HSTNI D2 ng/L  --  171*  --   --  97*  --    HS TNI 4HR ng/L 499*  --   --  420*  --   --    HSTNI D4 ng/L 48*  --   --  306*  --   --      Results from last 7 days   Lab Units 09/09/22  0514 09/08/22  1404 09/08/22  0537 09/06/22  1615 09/06/22  1033   PROTIME seconds  --   --   --  12 9 12 4   INR   --   --   --  0 95 0 91   PTT seconds 57* 62* 82* 27 26     Results from last 7 days   Lab Units 09/08/22  1419   INFLUENZA A PCR  Negative   INFLUENZA B PCR  Negative   RSV PCR  Negative                             Results from last 7 days   Lab Units 09/06/22  1936   BLOOD CULTURE  No Growth at 48 hrs  No Growth at 48 hrs  Past Medical History:   Diagnosis Date    PERCY [facility]acute kidney injury) ([facility] )     Bilateral inguinal hernia     Cardiac arrest with ventricular fibrillation (HCC)     Dizziness 02/02/2021    isolated incident of dizziness, sweating & disorientation    Elevated LFTs     History of left heart catheterization     and IABP, ICD PLACEMENT, DUAL CHAMBER, MEDTRONIC    Hyperglycemia     Hypertension     Pneumonia     STEMI (ST elevation myocardial infarction) ([facility] )      Present on Admission:   Stable angina (HCC)   Ventricular fibrillation (HCC)   Elevated troponin   Essential hypertension   (Resolved) Leukocytosis      Admitting Diagnosis: V tach ([place] [address] ) [I47 2]  Age/Sex: 58 y o  male  Admission Orders:  Scheduled Medications:  amLODIPine, 2 5 mg, Oral, Daily  aspirin, 81 mg, Oral, Daily  atorvastatin, 40 mg, Oral, Daily With Dinner  melatonin, 3 mg, Oral, HS  metoprolol succinate, 100 mg, Oral, BID  nicotine, 1 patch, Transdermal, Daily  polyethylene glycol, 17 g, Oral, Daily  senna, 1 tablet, Oral, HS  ticagrelor, 90 mg, Oral, Q12H [facility]      Continuous IV Infusions:  heparin (porcine), 3-20 Units/kg/hr (Order-Specific), Intravenous, Titrated  sodium chloride, 100 mL/hr, Intravenous, Continuous      PRN Meds:  heparin (porcine), 2,000 Units, Intravenous, Q1H PRN  heparin (porcine), 4,000 Units, Intravenous, Q1H PRN  melatonin, 6 mg, Oral, HS PRN    scd  IO, daily wts      IP CONSULT TO CARDIOLOGY    Network Utilization Review Department  ATTENTION: Please call with any questions or concerns to [phone] and carefully listen to the prompts so that you are directed to the right person   All voicemails are confidential   Farrel Bodily all requests for admission clinical reviews, approved or denied determinations and any other requests to dedicated fax number below belonging to the campus where the patient is receiving treatment   List of dedicated fax numbers for the Facilities:  [address] DENIALS (Administrative/Medical Necessity) [phone]   [address] (Maternity/NICU/Pediatrics) [phone]   [address]  [address] Medical [place] [address] [address] [place] [address] [address] [facility] [phone]

## 2022-09-09 NOTE — UTILIZATION REVIEW
Notification of Discharge   This is a Notification of Discharge from our facility [address]  Please be advised that this patient has been discharge from our facility  Below you will find the admission and discharge date and time including the patients disposition  UTILIZATION REVIEW CONTACT:  Ferdinand Rondon MA  Utilization   Network Utilization Review Department  Phone: [phone] x carefully listen to the prompts  All voicemails are confidential   Email: [email or web address]     PHYSICIAN ADVISORY SERVICES:  FOR [id number] REVIEW - MEDICAL NECESSITY DENIAL  Phone: [phone]  Fax: [phone]  Email: [email or web address]     PRESENTATION DATE: 9/6/2022 10:15 AM  OBERVATION ADMISSION DATE:   INPATIENT ADMISSION DATE: 9/6/22  4:10 PM   DISCHARGE DATE: 9/8/2022  6:58 PM  DISPOSITION: [address] [facility]      IMPORTANT INFORMATION:  Send all requests for admission clinical reviews, approved or denied determinations and any other requests to dedicated fax number below belonging to the campus where the patient is receiving treatment   List of dedicated fax numbers:  [address] DENIALS (Administrative/Medical Necessity) [phone]   1000  [facility] (Maternity/NICU/Pediatrics) [phone]   Cyndee Lorenzo [phone]   130 [facility] [phone]   [address] [place] [address] [phone]   [address] [phone]   [facility]  [phone]   2205 [facility] [address] [facility]  2401 [facility] 1000 [address] [facility] [phone]

## 2022-09-09 NOTE — BRIEF OP NOTE (RAD/CATH)
Right radial cath    LMCA: nonobstructive  LAD: 100 % mid, collateralized, diagonal 80% lower pole  LCX: mild disease   RCA: 90% mid    Plan: Pt with collateralized 100% mLAD (not apparent angio 6/2022) with unclear origin of its takeoff after diagonal  RCA lesion progressed mid  Therefore, suggest MRI viability study (ICD MRI conditional), re-eval cardiac surgery with possible grafts to LAD, diagonal and RCA  There is delayed septal R waves on ECG but not Q waves throughout suggestive of some viability if MRI not possible  If LAD not viable on MRI (assuming could be done) or cardiac surgery prohibitive  could perform PCI of RCA

## 2022-09-09 NOTE — CONSULTS
Consultation - Cardiothoracic Surgery   Salome Malik 58 y o  male MRN: [id number]  Unit/Bed#: ICCU 202-01 Encounter: [id number]    Physician Requesting Consult: Rafael Reyna MD    Reason for Consult / Principal Problem: MVCAD    Consults  History of Present Illness: Salome Malik is a 58y o  year old male with a significant cardiac medical history starting in June 29 of this year  He presented to the hospital as VT/VF arrest in the setting of STEMI  Cardiac catheterization was performed without any acute lesion or thrombosis, possible spontaneous lysis of clot with moderate 3V CAD  IABP placement for coronary perfusion  EP placed an ICD prior to discharge  He was discharged to home on 7/5  He had an episode of VT s/p ICD shock which prompted this admission  Troponins were elevated peaked at 499  Repeated cardiac catheterization with worsening CAD  Cardiac surgery consultation now requested  He is now seen post cardiac catheterization  He tells me that he has ongoing complaints of chest pressure with exertion  He said that pepcid and tums takes it away  He lives at home with his wife  He is an ongoing tobacco user 1 ppd down from 3 5 ppd since his admission in June  He denies alcohol or drug use  HE works full time as a superintendent in [employer]    Last dose of Brilinta was yesterday at 9 am      Past Medical History:  Past Medical History:   Diagnosis Date    [facility] )     Bilateral inguinal hernia     Cardiac arrest with ventricular fibrillation ([facility] )     Dizziness 02/02/2021    isolated incident of dizziness, sweating & disorientation    Elevated LFTs     History of left heart catheterization     and IABP, ICD PLACEMENT, DUAL CHAMBER, [employer]TRON[employer]    Hyperglycemia     Hypertension     Pneumonia     STEMI (ST elevation myocardial infarction) [facility])        Past Surgical History:   Past Surgical History:   Procedure Laterality Date    CARDIAC CATHETERIZATION N/A 2022    Procedure: Cardiac pci;  Surgeon: Blaine Magallanes MD;  Location: [facility] CARDIAC CATH LAB; Service: Cardiology    CARDIAC CATHETERIZATION N/A 2022    Procedure: Cardiac iabp; Surgeon: Blaine Magallanes MD;  [facility] CARD[facility]; Service: Cardiology    CARDIAC ELECTROPHYSIOLOGY PROCEDURE N/A 2022    Procedure: Cardiac icd implant;  Surgeon: Berto Eason MD;  Location: [facility] CARDIAC CATH LAB; Service: Cardiology    KS REPAIR ING HERNIA,5+Y/O,REDUCIBL Bilateral 2021    Procedure: INGUINAL HERNIA REPAIR with mesh;  Surgeon: Barb Olson MD;  [facility] MAIN OR;  Service: General    TONSILLECTOMY         Family History:  Family History   Problem Relation Age of Onset    Diabetes Maternal Grandfather    Both parents  in their 80s no heart disease  3 sisters no history of heart disease    Social History:  Social History     Substance and Sexual Activity   Alcohol Use Not Currently     Social History     Substance and Sexual Activity   Drug Use Not Currently     Social History     Tobacco Use   Smoking Status Current Every Day Smoker    Packs/day: 0 50    Types: Cigarettes   Smokeless Tobacco Never Used     Marital Status: /Civil Union      Home Medications:   Prior to Admission medications    Medication Sig Start Date End Date Taking?  Authorizing Provider   amLODIPine (NORVASC) 2 5 mg tablet Take 1 tablet (2 5 mg total) by mouth daily 22   Lidia Branch PA-C   aspirin 81 mg chewable tablet Chew 1 tablet (81 mg total) daily 22   Tanvi Kirkpatrick MD   atorvastatin (LIPITOR) 40 mg tablet Take 1 tablet (40 mg total) by mouth daily with dinner 22   Tanvi Kirkpatrick MD   lisinopril (ZESTRIL) 10 mg tablet Take 1 tablet (10 mg total) by mouth daily 22   Tanvi Kirkpatrick MD   metoprolol succinate (TOPROL-XL) 50 mg 24 hr tablet Take 1 tablet (50 mg total) by mouth daily 22   Tanvi Kirkpatrick MD ticagrelor (BRILINTA) 90 MG Take 1 tablet (90 mg total) by mouth every 12 (twelve) hours 8/18/22   Swetha St MD   sacubitril-valsartan Saul Gene) 24-26 MG TABS Take 1 tablet by mouth 2 (two) times a day 7/5/22 7/5/22  Kenya Rodriguez MD       Inpatient Medications:  Scheduled Meds:   Current Facility-Administered Medications   Medication Dose Route Frequency Provider Last Rate    acetaminophen  650 mg Oral Q4H PRN Juan Reese MD      amLODIPine  2 5 mg Oral Daily Sukh Mclain MD      aspirin  81 mg Oral Daily Sukh Mclain MD      atorvastatin  40 mg Oral Daily With Gigi Lyane MD      heparin (porcine)  5,000 Units Subcutaneous [facility] Juan Reese MD      melatonin  3 mg Oral HS Sukh Mclain MD      melatonin  6 mg Oral HS PRN Sukh Mclain MD      metoprolol succinate  100 mg Oral BID Sukh Mclain MD      nicotine  1 patch Transdermal Daily Sukh Mclain MD      ondansetron  4 mg Intravenous Q6H PRN Juan Reese MD      polyethylene glycol  17 g Oral Daily Sukh Mclain MD      senna  1 tablet Oral HS Sukh Mclain MD      sodium chloride  125 mL/hr Intravenous Continuous Juan Reese  mL/hr (09/09/22 1514)     Continuous Infusions: sodium chloride, 125 mL/hr, Last Rate: 125 mL/hr (09/09/22 1514)      PRN Meds:  acetaminophen, 650 mg, Q4H PRN  melatonin, 6 mg, HS PRN  ondansetron, 4 mg, Q6H PRN        Allergies:  No Known Allergies    Review of Systems:  Review of Systems   Constitutional: Positive for activity change and fatigue  HENT: Negative  Eyes: Negative  Respiratory: Positive for chest tightness  Negative for shortness of breath  Cardiovascular: Positive for chest pain  Gastrointestinal: Negative  Endocrine: Negative  Genitourinary: Negative  Musculoskeletal: Negative  Skin: Negative  Allergic/Immunologic: Negative  Neurological: Negative      Hematological: Negative  Psychiatric/Behavioral: Negative  Vital Signs:     Vitals:    09/09/22 0735 09/09/22 1351 09/09/22 1500 09/09/22 1535   BP: 118/74  105/75    Pulse: 68  72    Resp: 18      Temp: 97 7 °F (36 5 °C)   98 5 °F (36 9 °C)   TempSrc:    Oral   SpO2: 95% 99% 100%    Weight:         Invasive Devices  Report    Peripheral Intravenous Line  Duration           Peripheral IV 09/06/22 Right Antecubital 3 days    Peripheral IV 09/06/22 Left;Dorsal (posterior) Forearm 2 days          Line  Duration           IABP 8 0 Fr  50 mL 71 days                Physical Exam:  Physical Exam  Constitutional:       General: He is not in acute distress  Appearance: He is normal weight  He is not toxic-appearing  HENT:      Head: Normocephalic and atraumatic  Right Ear: External ear normal       Left Ear: External ear normal       Nose: Nose normal       Mouth/Throat:      Mouth: Mucous membranes are moist       Pharynx: Oropharynx is clear  Eyes:      General: No scleral icterus  Right eye: No discharge  Left eye: No discharge  Extraocular Movements: Extraocular movements intact  Conjunctiva/sclera: Conjunctivae normal       Pupils: Pupils are equal, round, and reactive to light  Cardiovascular:      Rate and Rhythm: Normal rate  Pulses: Normal pulses  Heart sounds: No murmur heard  Comments: ICD noted with scar healed  Pulmonary:      Effort: Pulmonary effort is normal  No respiratory distress  Breath sounds: Normal breath sounds  No wheezing  Abdominal:      General: Abdomen is flat  Bowel sounds are normal  There is no distension  Palpations: Abdomen is soft  Tenderness: There is no abdominal tenderness  There is no guarding  Musculoskeletal:         General: Normal range of motion  Cervical back: Normal range of motion and neck supple  Right lower leg: No edema  Left lower leg: No edema  Skin:     General: Skin is warm and dry  Coloration: Skin is not pale  Findings: No erythema or rash  Comments: Forehead laceration small    Neurological:      General: No focal deficit present  Mental Status: He is alert and oriented to person, place, and time  Sensory: No sensory deficit  Coordination: Coordination normal    Psychiatric:         Mood and Affect: Mood normal          Behavior: Behavior normal          Thought Content: Thought content normal          Judgment: Judgment normal          Lab Results:     Results from last 7 days   Lab Units 09/08/22  0537 09/06/22  1615 09/06/22  1033   WBC Thousand/uL 9 41 12 36* 11 90*   HEMOGLOBIN g/dL 12 8 13 5 14 1   HEMATOCRIT % 39 4 40 3 42 5   PLATELETS Thousands/uL 225 238 257     Results from last 7 days   Lab Units 09/08/22  0537 09/06/22  1033 09/06/22  1031   POTASSIUM mmol/L 4 2 4 0  --    CHLORIDE mmol/L 108 102  --    CO2 mmol/L 23 26  --    CO2, I-STAT mmol/L  --   --  27   BUN mg/dL 20 23  --    CREATININE mg/dL 0 86 1 02  --    GLUCOSE, ISTAT mg/dl  --   --  118   CALCIUM mg/dL 9 3 10 5*  --      Results from last 7 days   Lab Units 09/09/22  0514 09/08/22  1404 09/08/22  0537 09/06/22  2300 09/06/22  1615 09/06/22  1033   INR   --   --   --   --  0 95 0 91   PTT seconds 57* 62* 82*   < > 27 26    < > = values in this interval not displayed  Lab Results   Component Value Date    HGBA1C 5 9 (H) 06/30/2022     Lab Results   Component Value Date    TROPONINI <0 03 02/02/2021       Imaging Studies:     Cardiac Catheterization:   Left Main   The vessel exhibits minimal luminal irregularities  Left Anterior Descending   Collaterals   Mid LAD filled by collaterals from 1st Sept    The collateral flow was limited  Collaterals   Dist LAD filled by collaterals from 1st Diag  The collateral flow was limited  Prox LAD to Mid LAD lesion is 100% stenosed  JONATHAN flow is 0  The lesion is chronically occluded  First Diagonal Branch   1st Diag-1 lesion is 80% stenosed  JONATHAN flow is 3  In lower pole of diagonal   1st Diag-2 lesion is 60% stenosed  Left Circumflex   Second Obtuse Marginal Branch   2nd Mrg lesion is 40% stenosed  Right Coronary Artery   Mid RCA lesion is 90% stenosed  JONATHAN flow is 3  Second Right Posterolateral Branch   2nd RPL lesion is 60% stenosed  JONATHAN flow is 3      Echocardiogram:          Findings    Left Ventricle Left ventricular cavity size is dilated  Wall thickness is normal  There is eccentric hypertrophy  The left ventricular ejection fraction is 40%  Systolic function is moderately reduced  Wall Scoring Baseline     The following segments are dyskinetic: mid anteroseptal, mid inferoseptal, apical septal and apex  All other segments are normal             Right Ventricle Right ventricular cavity size is normal  Systolic function is normal    Mitral Valve There is trace regurgitation  Tricuspid Valve There is trace regurgitation  Pericardium There is no pericardial effusion  Pulmonary Artery The estimated pulmonary artery systolic pressure is 68 5 mmHg  The pulmonary artery systolic pressure is normal        I have personally reviewed pertinent reports  Assessment:  Principal Problem:    Stable angina [facility])  Active Problems:    Essential hypertension    Elevated troponin    Ventricular fibrillation (HCC)    ICD (implantable cardioverter-defibrillator) discharge    Severe coronary artery disease; Ongoing CABG workup    Plan:  Risks and benefits of coronary artery bypass grafting were discussed in detail today with the patient  They understand and wish to proceed with further workup and ultimately surgical intervention  We have ordered routine preoperative laboratory and vascular studies  Pending the results of these tests, they will be scheduled for surgery with ANISH Camacho was comfortable with our recommendations, and their questions were answered to their satisfaction    We will continue to evaluate the patient daily with further recommendations as work up is completed  Thank you for allowing us to participate in the care of this patient  SIGNATURE: Abhay [place], [place]  DATE: September 9, 2022  TIME: 3:36 PM    * This note was completed in part utilizing m-modal fluency direct voice recognition software  Grammatical errors, random word insertion, spelling mistakes, and incomplete sentences may be an occasional consequence of the system secondary to software limitations, ambient noise and hardware issues  At the time of dictation, efforts were made to edit, clarify and /or correct errors  Please read the chart carefully and recognize, using context, where substitutions have occurred  If you have any questions or concerns about the context, text or information contained within the body of this dictation, please contact myself, the provider, for further clarification

## 2022-09-09 NOTE — ASSESSMENT & PLAN NOTE
· Resolved  · No evidence of underlying infectious process  · 2 sets of blood culture negative to date  · Monitor temperature and CBC

## 2022-09-09 NOTE — CONSULTS
Consultation - - Cardiology  Tara Burnham 58 y o  male MRN: [id number]  Unit/Bed#: CW2 214-01 Encounter: [id number]      Inpatient consult to Cardiology  Consult performed by: Bob Lee MD  Consult ordered by: Melissa Granados MD          History of Present Illness   Physician Requesting Consult: Bryanna Saunders MD  Reason for Consult / Principal Problem:  VF s/p ICD    Assessment/Plan   Assessment/Plan:  62M with PMH of CAD (recent admission for STEMI following VT/VF arrest which was thought to be vasospasm vs  thrombus/lysis), coronary angiogram showed moderate 3v CAD, complicated by cardiogenic shock requiring IABP, LVEF 45%, presents after syncopal episode found to be VF which was treated with ICD       #CAD   Patient presented in June with STEMI and cath revealed 3 vessel moderate CAD  No intervention was performed because there was no clear culprit  It was thought there was thrombus and lysis or vasospasm which caused the stemi  CABG was not pursued given only moderate disease  He continues to have chest pain on exertion and had an episodes of VF s/p ICD shock which prompted this admission  Troponins are were elevated , peaked at 499   -continue aspirin, ticagrelor, atorvastatin  -continue metoprolol  -[facility]-- if significant CAD will ask for CT surgery evaluation  #VT/VF s/p ICD shock  Syncope with collapse and patient found to have VF which was terminated by ICD shock  He was on amiodarone last admission but it was not continued  -ischemic evaluation with [facility]  If similar disease will ask EP to evaluate for antiarrythmic therapy  -continue home metoprolol    #HFmrEF  LVEF 40% with regional wall motion abnormality  Does not appear volume overloaded  Required IABP last admission briefly after stemi    -continue beta blocker  -home lisinopril held- will resume once BP allows       HPI:62M with PMH of CAD (recent admission for STEMI following VT/VF arrest which was thought to be vasospasm vs  thrombus/lysis), coronary angiogram showed moderate 3v CAD, complicated by cardiogenic shock requiring IABP, LVEF 45%, presents after syncopal episode found to be VF which was treated with ICD  After his recent hospitalization for VF and STEMI he has had exertional chest discomfort  He was recently seen by [facility] friend of EP, initiated on amlodipine 2 5 mg daily for symptoms suggestive of angina    The patient describes these chest pains as substernal chest discomfort radiating to the neck and to the bilateral arms consistently brought on by physical exertion, but also improved with Pepcid or Prilosec     Severe episodes are also accompanied by shortness of breath         On the day of admission he was on his porch and he suddenly felt like he was going to pass out and he fell and hit his head  He went to the hospital where his ICD was interrogated and showed VF which was terminated with ICD shock  He was transferred to [place] for further evaluation  TTE 9/8    Left Ventricle: Left ventricular cavity size is dilated  Wall thickness is normal  There is eccentric hypertrophy  The left ventricular ejection fraction is 40%  Systolic function is moderately reduced    The following segments are dyskinetic: mid anteroseptal, mid inferoseptal, apical septal and apex    All other segments are normal     June 2022 Left heart cath  · Mid LAD lesion is 50% stenosed  · Dist LAD lesion is 70% stenosed  · 1st Diag lesion is 60% stenosed  · 2nd Diag lesion is 60% stenosed  · 1st Mrg lesion is 50% stenosed  · 3rd Mrg lesion is 99% stenosed  · Mid RCA lesion is 60% stenosed  · 3rd RPL lesion is 60% stenosed  · There is mild left ventricular systolic dysfunction      Historical Information   Past Medical History:   Diagnosis Date    PERCY [facility]acute kidney injury) ([facility] [address] )     Bilateral inguinal hernia     Cardiac arrest with ventricular fibrillation ([facility] [address] )     Dizziness 02/02/2021    isolated incident of dizziness, sweating & disorientation    Elevated LFTs     History of left heart catheterization     and IABP, ICD PLACEMENT, DUAL CHAMBER, [employer]    Hyperglycemia     Hypertension     Pneumonia     STEMI [facility]ST [facility])      Past Surgical History:   Procedure Laterality Date    CARDIAC CATHETERIZATION N/A 6/29/2022    Procedure: Cardiac pci;  Surgeon: Khanh Allison MD;  [facility] CARD[facility]ATH [facility]; Service: Cardiology    CARDIAC CATHETERIZATION N/A 6/29/2022    Procedure: Cardiac iabp; Surgeon: Khanh Allison MD;  [facility]; Service: Cardiology    CARDIAC ELECTROPHYSIOLOGY PROCEDURE N/A 7/5/2022    Procedure: Cardiac icd implant;  Surgeon: Crys Gallego MD;  Location: [facility] CARDIAC CATH [facility];   Service: Cardiology    NV REPAIR ING HERNIA,5+Y/O,REDUCIBL Bilateral 2/9/2021    Procedure: INGUINAL HERNIA REPAIR with mesh;  Surgeon: Nano Wesley MD;  Location: [address] MAIN OR;  Service: General    TONSILLECTOMY       Social History     Substance and Sexual Activity   Alcohol Use Not Currently     Social History     Substance and Sexual Activity   Drug Use Not Currently     Social History     Tobacco Use   Smoking Status Current Every Day Smoker    Packs/day: 0 50    Types: Cigarettes   Smokeless Tobacco Never Used       Meds/Allergies   Hospital Medications:   Current Facility-Administered Medications   Medication Dose Route Frequency    amLODIPine (NORVASC) tablet 2 5 mg  2 5 mg Oral Daily    aspirin chewable tablet 81 mg  81 mg Oral Daily    atorvastatin (LIPITOR) tablet 40 mg  40 mg Oral Daily With Dinner    heparin (porcine) 25,000 units in 0 45% NaCl 250 mL infusion (premix)  3-20 Units/kg/hr (Order-Specific) Intravenous Titrated    heparin (porcine) injection 2,000 Units  2,000 Units Intravenous Q1H PRN    heparin (porcine) injection 4,000 Units  4,000 Units Intravenous Q1H PRN    melatonin tablet 3 mg  3 mg Oral HS    melatonin tablet 6 mg  6 mg Oral HS PRN    metoprolol succinate (TOPROL-XL) 24 hr tablet 100 mg  100 mg Oral BID    nicotine (NICODERM CQ) 7 mg/24hr TD 24 hr patch 1 patch  1 patch Transdermal Daily    polyethylene glycol (MIRALAX) packet 17 g  17 g Oral Daily    senna (SENOKOT) tablet 8 6 mg  1 tablet Oral HS    sodium chloride 0 9 % infusion  100 mL/hr Intravenous Continuous    ticagrelor (BRILINTA) tablet 90 mg  90 mg Oral Q12H [address]     Home Medications:   Medications Prior to Admission   Medication    amLODIPine (NORVASC) 2 5 mg tablet    aspirin 81 mg chewable tablet    atorvastatin (LIPITOR) 40 mg tablet    lisinopril (ZESTRIL) 10 mg tablet    metoprolol succinate (TOPROL-XL) 50 mg 24 hr tablet    ticagrelor (BRILINTA) 90 MG       No Known Allergies    Objective   Vitals: Blood pressure 118/74, pulse 68, temperature 97 7 °F (36 5 °C), resp  rate 18, weight 96 9 kg (213 lb 9 6 oz), SpO2 95 %  Orthostatic Blood Pressures    Flowsheet Row Most Recent Value   Blood Pressure 118/74 filed at 09/09/2022 0735          No intake or output data in the 24 hours ending 09/09/22 0909    Invasive Devices  Report    Peripheral Intravenous Line  Duration           Peripheral IV 09/06/22 Left;Dorsal (posterior) Forearm 2 days    Peripheral IV 09/06/22 Right Antecubital 2 days          Line  Duration           IABP 8 0 Fr  50 mL 71 days                Review of Systems:  ROS  ROS as noted above, otherwise 12 point review of systems was performed and is negative  Physical Exam:   Physical Exam  Constitutional:       General: He is not in acute distress  HENT:      Head:      Comments: Multiple scabs on face  Cardiovascular:      Rate and Rhythm: Normal rate and regular rhythm  Pulses: Normal pulses  Heart sounds: No murmur heard  Pulmonary:      Effort: Pulmonary effort is normal  No respiratory distress  Breath sounds: No wheezing or rales  Abdominal:      General: Abdomen is flat  There is no distension  Tenderness: There is no abdominal tenderness  Musculoskeletal:         General: No swelling  Skin:     General: Skin is warm and dry  Neurological:      General: No focal deficit present  Mental Status: He is alert  Psychiatric:         Mood and Affect: Mood normal          Lab Results: I have personally reviewed pertinent lab results  Results from last 7 days   Lab Units 09/08/22  0537 09/06/22  1615 09/06/22  1033   WBC Thousand/uL 9 41 12 36* 11 90*   HEMOGLOBIN g/dL 12 8 13 5 14 1   HEMATOCRIT % 39 4 40 3 42 5   PLATELETS Thousands/uL 225 238 257     Results from last 7 days   Lab Units 09/08/22  0537 09/06/22  1033 09/06/22  1031   POTASSIUM mmol/L 4 2 4 0  --    CHLORIDE mmol/L 108 102  --    CO2 mmol/L 23 26  --    CO2, I-STAT mmol/L  --   --  27   BUN mg/dL 20 23  --    CREATININE mg/dL 0 86 1 02  --    GLUCOSE, ISTAT mg/dl  --   --  118   CALCIUM mg/dL 9 3 10 5*  --      Results from last 7 days   Lab Units 09/09/22  0514 09/08/22  1404 09/08/22  0537 09/06/22  2300 09/06/22  1615 09/06/22  1033   INR   --   --   --   --  0 95 0 91   PTT seconds 57* 62* 82*   < > 27 26    < > = values in this interval not displayed

## 2022-09-09 NOTE — PROGRESS NOTES
[address] [facility]  Progress Note - Shad Donovan 1960, 58 y o  male MRN: [id number]  Unit/Bed#: CW2 214-01 Encounter: [id number]  Primary Care Provider: Emerson Salazar PA-C   Date and time admitted to hospital: 9/8/2022  7:32 PM    * Stable angina [facility])  Assessment & Plan  · Ongoing anginal symptoms with significant cardiac history of VFib/V-tach cardiac arrest, cardiac catheterization showed multivessel disease without any specific revascularizable lesions for PCI and was turned down for CABG, subsequent ICD insertion  · Will need re-evaluation for coronary angiography and potential revascularization  · Continue beta-blocker statin and dual antiplatelet therapy   · Heparin ACS protocol  · Cardiology will follow    Ventricular fibrillation [facility])  Assessment & Plan  · ICD shock for VFib approximately at 7:00 a m  on 9/3/22 most likely reason of patient's near-syncope and fall on the day of admission to [facility]  · As per cardiology will await for coronary angiography if negative will discuss with EP patient might need Tikosyn/sotalol to prevent recurrent episodes of intracranial tachycardia/fibrillation  · Will admit to telemetry step-down 2  · Echo from 9/8 reported; "The left ventricular ejection fraction is 40%  Systolic function is moderately reduced  The following segments are dyskinetic: mid anteroseptal, mid inferoseptal, apical septal and apex "            ICD (implantable cardioverter-defibrillator) discharge  Assessment & Plan  · Inserted due to history of Vfib/V-tach cardiac arrest June 2022  · EP consulted    Elevated troponin  Assessment & Plan  · Concern for ACS, continue aspirin Brilinta statin beta-blocker and heparin    Essential hypertension  Assessment & Plan  · Continue blood pressure meds with holding parameters    Leukocytosis-resolved as of 9/9/2022  Assessment & Plan  · Resolved  · No evidence of underlying infectious process  · 2 sets of blood culture negative to date  · Monitor temperature and CBC        VTE Pharmacologic Prophylaxis: VTE Score: 3 Moderate Risk (Score 3-4) - Pharmacological DVT Prophylaxis Ordered: heparin drip  Patient Centered Rounds: I performed bedside rounds with nursing staff today  Discussions with Specialists or Other Care Team Provider: will discuss with cards after the cath    Education and Discussions with Family / Patient: Patient declined call to   Time Spent for Care: 30 minutes  More than 50% of total time spent on counseling and coordination of care as described above  Current Length of Stay: 1 day(s)  Current Patient Status: Inpatient   Certification Statement: The patient will continue to require additional inpatient hospital stay due to cath today and plan pending those results  Discharge Plan: Anticipate discharge in 24-48 hrs to home  Code Status: Level 1 - Full Code    Subjective:   No acute complaints    Objective:     Vitals:   Temp (24hrs), Av 9 °F (36 6 °C), Min:97 6 °F (36 4 °C), Max:98 2 °F (36 8 °C)    Temp:  [97 6 °F (36 4 °C)-98 2 °F (36 8 °C)] 97 7 °F (36 5 °C)  HR:  [68-75] 68  Resp:  [16-18] 18  BP: (101-134)/(61-86) 118/74  SpO2:  [18 %-98 %] 95 %  Body mass index is 27 42 kg/m²  Input and Output Summary (last 24 hours):   No intake or output data in the 24 hours ending 22 0808    Physical Exam:   Physical Exam  Vitals and nursing note reviewed  HENT:      Head: Normocephalic  Comments: Old scabs on the tip of nose and the forehead     Nose: Nose normal       Mouth/Throat:      Mouth: Mucous membranes are moist    Eyes:      Conjunctiva/sclera: Conjunctivae normal    Neck:      Comments: Left chest icd scar  Cardiovascular:      Rate and Rhythm: Normal rate  Pulses: Normal pulses  Heart sounds: Normal heart sounds  Pulmonary:      Effort: Pulmonary effort is normal       Breath sounds: Normal breath sounds     Abdominal:      General: Bowel sounds are normal       Palpations: Abdomen is soft  Musculoskeletal:         General: Normal range of motion  Cervical back: Normal range of motion  Skin:     General: Skin is warm and dry  Neurological:      General: No focal deficit present  Mental Status: He is alert  Psychiatric:         Mood and Affect: Mood normal          Behavior: Behavior normal           Additional Data:     Labs:  Results from last 7 days   Lab Units 09/08/22  0537 09/06/22  1615 09/06/22  1033   WBC Thousand/uL 9 41   < > 11 90*   HEMOGLOBIN g/dL 12 8   < > 14 1   HEMATOCRIT % 39 4   < > 42 5   PLATELETS Thousands/uL 225   < > 257   NEUTROS PCT %  --   --  70   LYMPHS PCT %  --   --  20   MONOS PCT %  --   --  8   EOS PCT %  --   --  1    < > = values in this interval not displayed  Results from last 7 days   Lab Units 09/08/22  0537   SODIUM mmol/L 137   POTASSIUM mmol/L 4 2   CHLORIDE mmol/L 108   CO2 mmol/L 23   BUN mg/dL 20   CREATININE mg/dL 0 86   ANION GAP mmol/L 6   CALCIUM mg/dL 9 3   GLUCOSE RANDOM mg/dL 108     Results from last 7 days   Lab Units 09/06/22  1615   INR  0 95                   Lines/Drains:  Invasive Devices  Report    Peripheral Intravenous Line  Duration           Peripheral IV 09/06/22 Left;Dorsal (posterior) Forearm 2 days    Peripheral IV 09/06/22 Right Antecubital 2 days          Line  Duration           IABP 8 0 Fr  50 mL 71 days                  Telemetry:  Telemetry Orders (From admission, onward)             48 Hour Telemetry Monitoring  Continuous x 48 hours        References:    Telemetry Guidelines   Question:  Reason for 48 Hour Telemetry  Answer:  Acute MI, chest pain - R/O MI, or unstable angina                 Telemetry Reviewed: Normal Sinus Rhythm  Indication for Continued Telemetry Use: Awaiting PCI/EP Study/CABG             Imaging: No pertinent imaging reviewed      Recent Cultures (last 7 days):   Results from last 7 days   Lab Units 09/06/22  1936   BLOOD CULTURE  No Growth at 48 hrs  No Growth at 48 hrs  Last 24 Hours Medication List:   Current Facility-Administered Medications   Medication Dose Route Frequency Provider Last Rate    amLODIPine  2 5 mg Oral Daily Jaida Myrick MD      aspirin  81 mg Oral Daily Jaida Myrick MD      atorvastatin  40 mg Oral Daily With Memo Jones MD      heparin (porcine)  3-20 Units/kg/hr (Order-Specific) Intravenous Titrated Jaida Myrick MD 19 1 Units/kg/hr (09/09/22 0753)    heparin (porcine)  2,000 Units Intravenous Q1H PRN Jaida Myrick MD      heparin (porcine)  4,000 Units Intravenous Q1H PRN Jaida Myrick MD      melatonin  3 mg Oral HS Jaida Myrick MD      melatonin  6 mg Oral HS PRN Jaida Myrick MD      metoprolol succinate  100 mg Oral BID Jaida Myrikc MD      nicotine  1 patch Transdermal Daily Jaida Myrick MD      polyethylene glycol  17 g Oral Daily Jaida Myrick MD      senna  1 tablet Oral HS Jaida Myrick MD      sodium chloride  100 mL/hr Intravenous Continuous Jaida Myrick MD      ticagrelor  90 mg Oral Q12H [facility] Jaida Myrick MD          Today, Patient Was Seen By: Shabana Xiao PA-C    **Please Note: This note may have been constructed using a voice recognition system  **

## 2022-09-09 NOTE — PLAN OF CARE
Problem: SAFETY ADULT  Goal: Patient will remain free of falls  Description: INTERVENTIONS:  - Educate patient/family on patient safety including physical limitations  - Instruct patient to call for assistance with activity   - Consult OT/PT to assist with strengthening/mobility   - Keep Call bell within reach  - Keep bed low and locked with side rails adjusted as appropriate  - Keep care items and personal belongings within reach  - Initiate and maintain comfort rounds  - Make Fall Risk Sign visible to staff  - Offer Toileting every  Hours, in advance of need  - Initiate/Maintainalarm  - Obtain necessary fall risk management equipment:   - Apply yellow socks and bracelet for high fall risk patients  - Consider moving patient to room near nurses station  Outcome: Progressing  Goal: Maintain or return to baseline ADL function  Description: INTERVENTIONS:  -  Assess patient's ability to carry out ADLs; assess patient's baseline for ADL function and identify physical deficits which impact ability to perform ADLs (bathing, care of mouth/teeth, toileting, grooming, dressing, etc )  - Assess/evaluate cause of self-care deficits   - Assess range of motion  - Assess patient's mobility; develop plan if impaired  - Assess patient's need for assistive devices and provide as appropriate  - Encourage maximum independence but intervene and supervise when necessary  - Involve family in performance of ADLs  - Assess for home care needs following discharge   - Consider OT consult to assist with ADL evaluation and planning for discharge  - Provide patient education as appropriate  Outcome: Progressing  Goal: Maintains/Returns to pre admission functional level  Description: INTERVENTIONS:  - Perform BMAT or MOVE assessment daily    - Set and communicate daily mobility goal to care team and patient/family/caregiver  - Collaborate with rehabilitation services on mobility goals if consulted  - Perform Range of Motion  times a day    - Reposition patient every hours    - Dangle patient  times a day  - Stand patient  times a day  - Ambulate patient  times a day  - Out of bed to chair times a day   - Out of bed for meals  times a day  - Out of bed for toileting  - Record patient progress and toleration of activity level   Outcome: Progressing     Problem: CARDIOVASCULAR - ADULT  Goal: Maintains optimal cardiac output and hemodynamic stability  Description: INTERVENTIONS:  - Monitor I/O, vital signs and rhythm  - Monitor for S/S and trends of decreased cardiac output  - Administer and titrate ordered vasoactive medications to optimize hemodynamic stability  - Assess quality of pulses, skin color and temperature  - Assess for signs of decreased coronary artery perfusion  - Instruct patient to report change in severity of symptoms  Outcome: Progressing  Goal: Absence of cardiac dysrhythmias or at baseline rhythm  Description: INTERVENTIONS:  - Continuous cardiac monitoring, vital signs, obtain 12 lead EKG if ordered  - Administer antiarrhythmic and heart rate control medications as ordered  - Monitor electrolytes and administer replacement therapy as ordered  Outcome: Progressing     Problem: SKIN/TISSUE INTEGRITY - ADULT  Goal: Skin Integrity remains intact(Skin Breakdown Prevention)  Description: Assess:  -Perform Chad assessment every   -Clean and moisturize skin every   -Inspect skin when repositioning, toileting, and assisting with ADLS  -Assess under medical devices such as every   -Assess extremities for adequate circulation and sensation     Bed Management:  -Have minimal linens on bed & keep smooth, unwrinkled  -Change linens as needed when moist or perspiring  -Avoid sitting or lying in one position for more than  hours while in bed  -Keep HOB atdegrees     Toileting:  -Offer bedside commode  -Assess for incontinence every   -Use incontinent care products after each incontinent episode such as     Activity:  -Mobilize patient times a day  -Encourage activity and walks on unit  -Encourage or provide ROM exercises   -Turn and reposition patient every  Hours  -Use appropriate equipment to lift or move patient in bed  -Instruct/ Assist with weight shifting every  when out of bed in chair  -Consider limitation of chair time hour intervals    Skin Care:  -Avoid use of baby powder, tape, friction and shearing, hot water or constrictive clothing  -Relieve pressure over bony prominences using   -Do not massage red bony areas    Next Steps:  -Teach patient strategies to minimize risks such as    -Consider consults to  interdisciplinary teams such as  Outcome: Progressing  Goal: Incision(s), wounds(s) or drain site(s) healing without S/S of infection  Description: INTERVENTIONS  - Assess and document dressing, incision, wound bed, drain sites and surrounding tissue  - Provide patient and family education  - Perform skin care/dressing changes every   Outcome: Progressing  Goal: Pressure injury heals and does not worsen  Description: Interventions:  - Implement low air loss mattress or specialty surface (Criteria met)  - Apply silicone foam dressing  - Instruct/assist with weight shifting every  minutes when in chair   - Limit chair time to 1 hour intervals  - Use special pressure reducing interventions such as  when in chair   - Apply fecal or urinary incontinence containment device   - Perform passive or active ROM every   - Turn and reposition patient & offload bony prominences every hours   - Utilize friction reducing device or surface for transfers   - Consider consults to  interdisciplinary teams such as   - Use incontinent care products after each incontinent episode such as   - Consider nutrition services referral as needed  Outcome: Progressing

## 2022-09-09 NOTE — ASSESSMENT & PLAN NOTE
· Ongoing anginal symptoms with significant cardiac history of VFib/V-tach cardiac arrest, cardiac catheterization showed multivessel disease without any specific revascularizable lesions for PCI and was turned down for CABG, subsequent ICD insertion  · Will need re-evaluation for coronary angiography and potential revascularization      · Continue beta-blocker statin and dual antiplatelet therapy   · Heparin ACS protocol  · Cardiology will follow

## 2022-09-09 NOTE — ASSESSMENT & PLAN NOTE
· ICD shock for VFib approximately at 7:00 a m  on 9/3/22 most likely reason of patient's near-syncope and fall on the day of admission to [facility]  · As per cardiology will await for coronary angiography if negative will discuss with EP patient might need Tikosyn/sotalol to prevent recurrent episodes of intracranial tachycardia/fibrillation  · Will admit to telemetry step-down 2  · Echo from 9/8 reported; "The left ventricular ejection fraction is 40%  Systolic function is moderately reduced  The following segments are dyskinetic: mid anteroseptal, mid inferoseptal, apical septal and apex  "

## 2022-09-09 NOTE — H&P (VIEW-ONLY)
Consultation - Cardiothoracic Surgery   Parminder Grove 58 y o  male MRN: [id number]  Unit/Bed#: [facility] 202-01 Encounter: [id number]    Physician Requesting Consult: Bennie Isaacs MD    Reason for Consult / Principal Problem: MVCAD    Consults  History of Present Illness: Parminder Grove is a 58y o  year old male with a significant cardiac medical history starting in June 29 of this year  He presented to the hospital as VT/VF arrest in the setting of STEMI  Cardiac catheterization was performed without any acute lesion or thrombosis, possible spontaneous lysis of clot with moderate 3V CAD  IABP placement for coronary perfusion  EP placed an ICD prior to discharge  He was discharged to home on 7/5  He had an episode of VT s/p ICD shock which prompted this admission  Troponins were elevated peaked at 499  Repeated cardiac catheterization with worsening CAD  Cardiac surgery consultation now requested  He is now seen post cardiac catheterization  He tells me that he has ongoing complaints of chest pressure with exertion  He said that pepcid and tums takes it away  He lives at home with his wife  He is an ongoing tobacco user 1 ppd down from 3 5 ppd since his admission in June  He denies alcohol or drug use  HE works full time as a superintendent in [employer]    Last dose of Brilinta was yesterday at 9 am      Past Medical History:  Past Medical History:   Diagnosis Date    [facility]CY [facility]) [facility] [address] [facility]     Bilateral inguinal hernia     Cardiac arrest with ventricular fibrillation ([facility] [address] )     Dizziness 02/02/2021    isolated incident of dizziness, sweating & disorientation    Elevated LFTs     History of left heart catheterization     and IABP, ICD PLACEMENT, DUAL CHAMBER, [employer]    Hyperglycemia     Hypertension     Pneumonia     STEMI (ST elevation myocardial infarction) [facility])        Past Surgical History:   Past Surgical History:   Procedure Laterality Date    CARDIAC CATHETERIZATION N/A 2022    Procedure: Cardiac pci;  Surgeon: Mahsa Argueta MD;  [facility]; Service: Cardiology    CARDIAC CATHETERIZATION N/A 2022    Procedure: Cardiac iabp; Surgeon: Mahsa Argueta MD;  [facility]; Service: Cardiology    CARDIAC ELECTROPHYSIOLOGY PROCEDURE N/A 2022    Procedure: Cardiac icd implant;  Surgeon: Bryanna Watson MD;  [facility]: [facility]ARD[facility]; Service: Cardiology    PA REPAIR ING HERNIA,5+Y/O,REDUCIBL Bilateral 2021    Procedure: INGUINAL HERNIA REPAIR with mesh;  Surgeon: Mora Gallardo MD;  Location: [facility] MAIN OR;  Service: General    TONSILLECTOMY         Family History:  Family History   Problem Relation Age of Onset    Diabetes Maternal Grandfather    Both parents  in their 80s no heart disease  3 sisters no history of heart disease    Social History:  Social History     Substance and Sexual Activity   Alcohol Use Not Currently     Social History     Substance and Sexual Activity   Drug Use Not Currently     Social History     Tobacco Use   Smoking Status Current Every Day Smoker    Packs/day: 0 50    Types: Cigarettes   Smokeless Tobacco Never Used     Marital Status: /Civil Union      Home Medications:   Prior to Admission medications    Medication Sig Start Date End Date Taking?  Authorizing Provider   amLODIPine (NORVASC) 2 5 mg tablet Take 1 tablet (2 5 mg total) by mouth daily 22   Houston Godoy PA-C   aspirin 81 mg chewable tablet Chew 1 tablet (81 mg total) daily 22   Michael Dempsey MD   atorvastatin (LIPITOR) 40 mg tablet Take 1 tablet (40 mg total) by mouth daily with dinner 22   Michael Dempsey MD   lisinopril (ZESTRIL) 10 mg tablet Take 1 tablet (10 mg total) by mouth daily 22   Michael Dempsey MD   metoprolol succinate (TOPROL-XL) 50 mg 24 hr tablet Take 1 tablet (50 mg total) by mouth daily 22   Michael Dempsey MD ticagrelor (BRILINTA) 90 MG Take 1 tablet (90 mg total) by mouth every 12 (twelve) hours 8/18/22   Joseluis Youssef MD   sacubitril-valsartan Marjorie Govern) 24-26 MG TABS Take 1 tablet by mouth 2 (two) times a day 7/5/22 7/5/22  Jenise Mackay MD       Inpatient Medications:  Scheduled Meds:   Current Facility-Administered Medications   Medication Dose Route Frequency Provider Last Rate    acetaminophen  650 mg Oral Q4H PRN Jennifer Costa MD      amLODIPine  2 5 mg Oral Daily Laxmi Carreon MD      aspirin  81 mg Oral Daily Laxmi Carreon MD      atorvastatin  40 mg Oral Daily With Alecia Ko MD      heparin (porcine)  5,000 Units Subcutaneous [facility] Jennifer Costa MD      melatonin  3 mg Oral HS Laxmi Carreon MD      melatonin  6 mg Oral HS PRN Laxmi Carreon MD      metoprolol succinate  100 mg Oral BID Laxmi Carreon MD      nicotine  1 patch Transdermal Daily Laxmi Carreon MD      ondansetron  4 mg Intravenous Q6H PRN Jennifer Costa MD      polyethylene glycol  17 g Oral Daily Laxmi Carreon MD      senna  1 tablet Oral HS Laxmi Carreon MD      sodium chloride  125 mL/hr Intravenous Continuous Jennifer Costa  mL/hr (09/09/22 1514)     Continuous Infusions: sodium chloride, 125 mL/hr, Last Rate: 125 mL/hr (09/09/22 1514)      PRN Meds:  acetaminophen, 650 mg, Q4H PRN  melatonin, 6 mg, HS PRN  ondansetron, 4 mg, Q6H PRN        Allergies:  No Known Allergies    Review of Systems:  Review of Systems   Constitutional: Positive for activity change and fatigue  HENT: Negative  Eyes: Negative  Respiratory: Positive for chest tightness  Negative for shortness of breath  Cardiovascular: Positive for chest pain  Gastrointestinal: Negative  Endocrine: Negative  Genitourinary: Negative  Musculoskeletal: Negative  Skin: Negative  Allergic/Immunologic: Negative  Neurological: Negative      Hematological: Negative  Psychiatric/Behavioral: Negative  Vital Signs:     Vitals:    09/09/22 0735 09/09/22 1351 09/09/22 1500 09/09/22 1535   BP: 118/74  105/75    Pulse: 68  72    Resp: 18      Temp: 97 7 °F (36 5 °C)   98 5 °F (36 9 °C)   TempSrc:    Oral   SpO2: 95% 99% 100%    Weight:         Invasive Devices  Report    Peripheral Intravenous Line  Duration           Peripheral IV 09/06/22 Right Antecubital 3 days    Peripheral IV 09/06/22 Left;Dorsal (posterior) Forearm 2 days          Line  Duration           IABP 8 0 Fr  50 mL 71 days                Physical Exam:  Physical Exam  Constitutional:       General: He is not in acute distress  Appearance: He is normal weight  He is not toxic-appearing  HENT:      Head: Normocephalic and atraumatic  Right Ear: External ear normal       Left Ear: External ear normal       Nose: Nose normal       Mouth/Throat:      Mouth: Mucous membranes are moist       Pharynx: Oropharynx is clear  Eyes:      General: No scleral icterus  Right eye: No discharge  Left eye: No discharge  Extraocular Movements: Extraocular movements intact  Conjunctiva/sclera: Conjunctivae normal       Pupils: Pupils are equal, round, and reactive to light  Cardiovascular:      Rate and Rhythm: Normal rate  Pulses: Normal pulses  Heart sounds: No murmur heard  Comments: ICD noted with scar healed  Pulmonary:      Effort: Pulmonary effort is normal  No respiratory distress  Breath sounds: Normal breath sounds  No wheezing  Abdominal:      General: Abdomen is flat  Bowel sounds are normal  There is no distension  Palpations: Abdomen is soft  Tenderness: There is no abdominal tenderness  There is no guarding  Musculoskeletal:         General: Normal range of motion  Cervical back: Normal range of motion and neck supple  Right lower leg: No edema  Left lower leg: No edema  Skin:     General: Skin is warm and dry  Coloration: Skin is not pale  Findings: No erythema or rash  Comments: Forehead laceration small    Neurological:      General: No focal deficit present  Mental Status: He is alert and oriented to person, place, and time  Sensory: No sensory deficit  Coordination: Coordination normal    Psychiatric:         Mood and Affect: Mood normal          Behavior: Behavior normal          Thought Content: Thought content normal          Judgment: Judgment normal          Lab Results:     Results from last 7 days   Lab Units 09/08/22  0537 09/06/22  1615 09/06/22  1033   WBC Thousand/uL 9 41 12 36* 11 90*   HEMOGLOBIN g/dL 12 8 13 5 14 1   HEMATOCRIT % 39 4 40 3 42 5   PLATELETS Thousands/uL 225 238 257     Results from last 7 days   Lab Units 09/08/22  0537 09/06/22  1033 09/06/22  1031   POTASSIUM mmol/L 4 2 4 0  --    CHLORIDE mmol/L 108 102  --    CO2 mmol/L 23 26  --    CO2, I-STAT mmol/L  --   --  27   BUN mg/dL 20 23  --    CREATININE mg/dL 0 86 1 02  --    GLUCOSE, ISTAT mg/dl  --   --  118   CALCIUM mg/dL 9 3 10 5*  --      Results from last 7 days   Lab Units 09/09/22  0514 09/08/22  1404 09/08/22  0537 09/06/22  2300 09/06/22  1615 09/06/22  1033   INR   --   --   --   --  0 95 0 91   PTT seconds 57* 62* 82*   < > 27 26    < > = values in this interval not displayed  Lab Results   Component Value Date    HGBA1C 5 9 (H) 06/30/2022     Lab Results   Component Value Date    TROPONINI <0 03 02/02/2021       Imaging Studies:     Cardiac Catheterization:   Left Main   The vessel exhibits minimal luminal irregularities  Left Anterior Descending   Collaterals   Mid LAD filled by collaterals from 1st Sept    The collateral flow was limited  Collaterals   Dist LAD filled by collaterals from 1st Diag  The collateral flow was limited  Prox LAD to Mid LAD lesion is 100% stenosed  JONATHAN flow is 0  The lesion is chronically occluded  First Diagonal Branch   1st Diag-1 lesion is 80% stenosed  JONATHAN flow is 3  In lower pole of diagonal   1st Diag-2 lesion is 60% stenosed  Left Circumflex   Second Obtuse Marginal Branch   2nd Mrg lesion is 40% stenosed  Right Coronary Artery   Mid RCA lesion is 90% stenosed  JONATHAN flow is 3  Second Right Posterolateral Branch   2nd RPL lesion is 60% stenosed  JONATHAN flow is 3      Echocardiogram:          Findings    Left Ventricle Left ventricular cavity size is dilated  Wall thickness is normal  There is eccentric hypertrophy  The left ventricular ejection fraction is 40%  Systolic function is moderately reduced  Wall Scoring Baseline     The following segments are dyskinetic: mid anteroseptal, mid inferoseptal, apical septal and apex  All other segments are normal             Right Ventricle Right ventricular cavity size is normal  Systolic function is normal    Mitral Valve There is trace regurgitation  Tricuspid Valve There is trace regurgitation  Pericardium There is no pericardial effusion  Pulmonary Artery The estimated pulmonary artery systolic pressure is 52 1 mmHg  The pulmonary artery systolic pressure is normal        I have personally reviewed pertinent reports  Assessment:  Principal Problem:    Stable angina [facility])  Active Problems:    Essential hypertension    Elevated troponin    Ventricular fibrillation (HCC)    ICD (implantable cardioverter-defibrillator) discharge    Severe coronary artery disease; Ongoing CABG workup    Plan:  Risks and benefits of coronary artery bypass grafting were discussed in detail today with the patient  They understand and wish to proceed with further workup and ultimately surgical intervention  We have ordered routine preoperative laboratory and vascular studies  Pending the results of these tests, they will be scheduled for surgery with ANISH Tapia was comfortable with our recommendations, and their questions were answered to their satisfaction    We will continue to evaluate the patient daily with further recommendations as work up is completed  Thank you for allowing us to participate in the care of this patient  SIGNATURE: Kailee Weir, [place]  DATE: September 9, 2022  TIME: 3:36 PM    * This note was completed in part utilizing m-modal fluency direct voice recognition software  Grammatical errors, random word insertion, spelling mistakes, and incomplete sentences may be an occasional consequence of the system secondary to software limitations, ambient noise and hardware issues  At the time of dictation, efforts were made to edit, clarify and /or correct errors  Please read the chart carefully and recognize, using context, where substitutions have occurred  If you have any questions or concerns about the context, text or information contained within the body of this dictation, please contact myself, the provider, for further clarification

## 2022-09-10 LAB
ANION GAP SERPL CALCULATED.3IONS-SCNC: 4 MMOL/L (ref 4–13)
BUN SERPL-MCNC: 16 MG/DL (ref 5–25)
CALCIUM SERPL-MCNC: 9.5 MG/DL (ref 8.3–10.1)
CHLORIDE SERPL-SCNC: 110 MMOL/L (ref 96–108)
CO2 SERPL-SCNC: 25 MMOL/L (ref 21–32)
CREAT SERPL-MCNC: 1.07 MG/DL (ref 0.6–1.3)
ERYTHROCYTE [DISTWIDTH] IN BLOOD BY AUTOMATED COUNT: 12.9 % (ref 11.6–15.1)
GFR SERPL CREATININE-BSD FRML MDRD: 73 ML/MIN/1.73SQ M
GLUCOSE SERPL-MCNC: 112 MG/DL (ref 65–140)
HCT VFR BLD AUTO: 36.9 % (ref 36.5–49.3)
HGB BLD-MCNC: 12 G/DL (ref 12–17)
MAGNESIUM SERPL-MCNC: 2.4 MG/DL (ref 1.6–2.6)
MCH RBC QN AUTO: 30.5 PG (ref 26.8–34.3)
MCHC RBC AUTO-ENTMCNC: 32.5 G/DL (ref 31.4–37.4)
MCV RBC AUTO: 94 FL (ref 82–98)
PLATELET # BLD AUTO: 201 THOUSANDS/UL (ref 149–390)
PMV BLD AUTO: 8.9 FL (ref 8.9–12.7)
POTASSIUM SERPL-SCNC: 4 MMOL/L (ref 3.5–5.3)
RBC # BLD AUTO: 3.93 MILLION/UL (ref 3.88–5.62)
SODIUM SERPL-SCNC: 139 MMOL/L (ref 135–147)
WBC # BLD AUTO: 8.62 THOUSAND/UL (ref 4.31–10.16)

## 2022-09-10 PROCEDURE — 93880 EXTRACRANIAL BILAT STUDY: CPT | Performed by: SURGERY

## 2022-09-10 PROCEDURE — 80048 BASIC METABOLIC PNL TOTAL CA: CPT | Performed by: PHYSICIAN ASSISTANT

## 2022-09-10 PROCEDURE — 99233 SBSQ HOSP IP/OBS HIGH 50: CPT | Performed by: PHYSICIAN ASSISTANT

## 2022-09-10 PROCEDURE — 93971 EXTREMITY STUDY: CPT | Performed by: SURGERY

## 2022-09-10 PROCEDURE — 99232 SBSQ HOSP IP/OBS MODERATE 35: CPT | Performed by: INTERNAL MEDICINE

## 2022-09-10 PROCEDURE — 83735 ASSAY OF MAGNESIUM: CPT | Performed by: PHYSICIAN ASSISTANT

## 2022-09-10 PROCEDURE — 85027 COMPLETE CBC AUTOMATED: CPT | Performed by: PHYSICIAN ASSISTANT

## 2022-09-10 RX ADMIN — CHLORHEXIDINE GLUCONATE 15 ML: 1.2 SOLUTION ORAL at 22:00

## 2022-09-10 RX ADMIN — MUPIROCIN 1 APPLICATION: 20 OINTMENT TOPICAL at 09:14

## 2022-09-10 RX ADMIN — CHLORHEXIDINE GLUCONATE 15 ML: 1.2 SOLUTION ORAL at 09:14

## 2022-09-10 RX ADMIN — HEPARIN SODIUM 5000 UNITS: 5000 INJECTION INTRAVENOUS; SUBCUTANEOUS at 06:25

## 2022-09-10 RX ADMIN — HEPARIN SODIUM 5000 UNITS: 5000 INJECTION INTRAVENOUS; SUBCUTANEOUS at 13:45

## 2022-09-10 RX ADMIN — ATORVASTATIN CALCIUM 40 MG: 40 TABLET, FILM COATED ORAL at 16:25

## 2022-09-10 RX ADMIN — ASPIRIN 81 MG CHEWABLE TABLET 81 MG: 81 TABLET CHEWABLE at 09:13

## 2022-09-10 RX ADMIN — NICOTINE 1 PATCH: 7 PATCH, EXTENDED RELEASE TRANSDERMAL at 09:16

## 2022-09-10 RX ADMIN — AMLODIPINE BESYLATE 2.5 MG: 2.5 TABLET ORAL at 09:14

## 2022-09-10 RX ADMIN — METOPROLOL SUCCINATE 100 MG: 100 TABLET, EXTENDED RELEASE ORAL at 09:13

## 2022-09-10 RX ADMIN — MUPIROCIN 1 APPLICATION: 20 OINTMENT TOPICAL at 22:00

## 2022-09-10 RX ADMIN — HEPARIN SODIUM 5000 UNITS: 5000 INJECTION INTRAVENOUS; SUBCUTANEOUS at 22:15

## 2022-09-10 RX ADMIN — METOPROLOL SUCCINATE 100 MG: 100 TABLET, EXTENDED RELEASE ORAL at 22:00

## 2022-09-10 RX ADMIN — SENNOSIDES 8.6 MG: 8.6 TABLET, FILM COATED ORAL at 22:15

## 2022-09-10 RX ADMIN — Medication 3 MG: at 22:15

## 2022-09-10 NOTE — ASSESSMENT & PLAN NOTE
· Ongoing anginal symptoms with significant cardiac history of VFib/V-tach cardiac arrest, 6/29/2022 cardiac catheterization showed multivessel disease without any specific revascularizable lesions for PCI and was turned down for CABG, subsequent ICD insertion  · Had repeat cath 9/9/2022 and will have CABG on 9/12/2022  · Continue beta-blocker statin  Holding DAPT

## 2022-09-10 NOTE — ASSESSMENT & PLAN NOTE
· ICD shock for VFib approximately at 7:00 a m  on 9/3/22 most likely reason of patient's near-syncope and fall  · Tele

## 2022-09-10 NOTE — PROGRESS NOTES
Cardiology Progress Note - Reinaldo Joshi 58 y o  male MRN: [id number]    Unit/Bed#: [facility] 429-01 Encounter: [id number]        Subjective:    No significant events overnight  Feels well  Review of Systems   Cardiovascular: Negative for chest pain, leg swelling and palpitations  Respiratory: Negative for shortness of breath  Objective:   Vitals: Blood pressure 145/83, pulse 82, temperature 98 2 °F (36 8 °C), temperature source Oral, resp  rate 16, weight 93 9 kg (207 lb 0 2 oz), SpO2 95 %  , Body mass index is 26 58 kg/m² ,   Orthostatic Blood Pressures    Flowsheet Row Most Recent Value   Blood Pressure 145/83 filed at 09/10/2022 1051   Patient Position - Orthostatic VS Lying filed at 09/10/2022 9132         Systolic (90EGD), JVL:171 , Min:91 , JZC:354     Diastolic (82TNC), SQF:22, Min:56, Max:86      Intake/Output Summary (Last 24 hours) at 9/10/2022 1143  Last data filed at 9/10/2022 0800  Gross per 24 hour   Intake 480 ml   Output 1705 ml   Net -1225 ml     Weight (last 2 days)     Date/Time Weight    09/10/22 0600 93 9 (207 01)    09/09/22 0550 96 9 (213 6)              Telemetry Review:     Physical Exam  Cardiovascular:      Rate and Rhythm: Normal rate and regular rhythm  Heart sounds: Normal heart sounds  No murmur heard  No friction rub  No gallop  Pulmonary:      Breath sounds: Normal breath sounds  No wheezing or rales             Laboratory Results:        CBC with diff:   Results from last 7 days   Lab Units 09/10/22  0524 09/08/22  0537 09/06/22  1615 09/06/22  1033 09/06/22  1031   WBC Thousand/uL 8 62 9 41 12 36* 11 90*  --    HEMOGLOBIN g/dL 12 0 12 8 13 5 14 1  --    I STAT HEMOGLOBIN g/dl  --   --   --   --  14 6   HEMATOCRIT % 36 9 39 4 40 3 42 5  --    HEMATOCRIT, ISTAT %  --   --   --   --  43   MCV fL 94 94 90 90  --    PLATELETS Thousands/uL 201 225 238 257  --    MCH pg 30 5 30 5 30 1 30 0  --    MCHC g/dL 32 5 32 5 33 5 33 2  --    RDW % 12 9 13 1 13 0 13 0  --    MPV fL 8 9 8 6* 8 6* 8 5*  --    NRBC AUTO /100 WBCs  --   --   --  0  --          CMP:  Results from last 7 days   Lab Units 09/10/22  0524 09/08/22  0537 09/06/22  1033 09/06/22  1031   POTASSIUM mmol/L 4 0 4 2 4 0  --    CHLORIDE mmol/L 110* 108 102  --    CO2 mmol/L 25 23 26  --    CO2, I-STAT mmol/L  --   --   --  27   BUN mg/dL 16 20 23  --    CREATININE mg/dL 1 07 0 86 1 02  --    GLUCOSE, ISTAT mg/dl  --   --   --  118   CALCIUM mg/dL 9 5 9 3 10 5*  --    EGFR ml/min/1 73sq m 73 92 78  --          BMP:  Results from last 7 days   Lab Units 09/10/22  0524 09/08/22  0537 09/06/22  1033 09/06/22  1031   POTASSIUM mmol/L 4 0 4 2 4 0  --    CHLORIDE mmol/L 110* 108 102  --    CO2 mmol/L 25 23 26  --    CO2, I-STAT mmol/L  --   --   --  27   BUN mg/dL 16 20 23  --    CREATININE mg/dL 1 07 0 86 1 02  --    GLUCOSE, ISTAT mg/dl  --   --   --  118   CALCIUM mg/dL 9 5 9 3 10 5*  --        BNP:   Results Reviewed     None        No results for input(s): BNP in the last 72 hours  Magnesium:   Results from last 7 days   Lab Units 09/10/22  0524   MAGNESIUM mg/dL 2 4       Coags:   Results from last 7 days   Lab Units 09/09/22  0514 09/08/22  1404 09/08/22  0537 09/07/22  2125 09/07/22  1339 09/07/22  0517 09/06/22  2300 09/06/22  1615 09/06/22  1033   PTT seconds 57* 62* 82* 52* 45* 67* 26 27 26   INR   --   --   --   --   --   --   --  0 95 0 91       TSH:       Lipid Profile:   Results from last 7 days   Lab Units 09/08/22  0537   TRIGLYCERIDES mg/dL 173*   HDL mg/dL 35*           Cardiac testing:   No results found for this or any previous visit  No results found for this or any previous visit  No results found for this or any previous visit  No results found for this or any previous visit        Meds/Allergies   Current Facility-Administered Medications   Medication Dose Route Frequency Provider Last Rate    acetaminophen  650 mg Oral Q4H PRN Janine Villareal PA-C      amLODIPine  2 5 mg Oral Daily Orval Or, PA-C      aspirin  81 mg Oral Daily Orval Or, [place]      atorvastatin  40 mg Oral Daily With [employer], PA-ALAN      chlorhexidine  15 mL Mouth/Throat Q12H [address] Orval Or, PA-C      heparin (porcine)  5,000 Units Subcutaneous [facility] Orval Or, [place]      melatonin  3 mg Oral HS Orval Or, PA-C      melatonin  6 mg Oral HS PRN Orval Or, PA-C      metoprolol succinate  100 mg Oral BID Orval Or, PA-C      mupirocin  1 application Nasal X74R [address] Orval Or, PA-C      nicotine  1 patch Transdermal Daily Orval Or, PA-C      ondansetron  4 mg Intravenous Q6H PRN Orval Or, PA-C      polyethylene glycol  17 g Oral Daily Orval Or, PA-C      senna  1 tablet Oral HS Orval Or, PA-C          Medications Prior to Admission   Medication    amLODIPine (NORVASC) 2 5 mg tablet    aspirin 81 mg chewable tablet    atorvastatin (LIPITOR) 40 mg tablet    lisinopril (ZESTRIL) 10 mg tablet    metoprolol succinate (TOPROL-XL) 50 mg 24 hr tablet    ticagrelor (BRILINTA) 90 MG       Assessment:  Principal Problem:    Multi-vessel CAD with stable angina [facility])  Active Problems:    Essential hypertension    Ventricular fibrillation (HCC)    ICD (implantable cardioverter-defibrillator) discharge      Impression:  1  Multivessel CAD   2  VT/VF - due to ischemia  3  HTN     Recommendations:  1  Continue current management  2   Plan on CABG 9/12

## 2022-09-10 NOTE — PROGRESS NOTES
[address] [facility]  Progress Note - Atul Singer 1960, 58 y o  male MRN: [id number]  Unit/Bed#: [facility] 429-01 Encounter: [id number]  Primary Care Provider: Mary Rodriguez PA-C   Date and time admitted to hospital: 9/8/2022  7:32 PM    * Multi-vessel CAD with stable angina [facility])  Assessment & Plan  · Ongoing anginal symptoms with significant cardiac history of VFib/V-tach cardiac arrest, 6/29/2022 cardiac catheterization showed multivessel disease without any specific revascularizable lesions for PCI and was turned down for CABG, subsequent ICD insertion  · Had repeat cath 9/9/2022 and will have CABG on 9/12/2022  · Continue beta-blocker statin  Holding DAPT  Ventricular fibrillation [facility])  Assessment & Plan  · ICD shock for VFib approximately at 7:00 a m  on 9/3/22 most likely reason of patient's near-syncope and fall  · Tele            ICD (implantable cardioverter-defibrillator) discharge  Assessment & Plan  · Inserted due to history of Vfib/V-tach cardiac arrest June 2022    Essential hypertension  Assessment & Plan  · Continue blood pressure meds with holding parameters        VTE Pharmacologic Prophylaxis: VTE Score: 3 Moderate Risk (Score 3-4) - Pharmacological DVT Prophylaxis Ordered: heparin  Patient Centered Rounds: I performed bedside rounds with nursing staff today  Discussions with Specialists or Other Care Team Provider: none    Education and Discussions with Family / Patient: Patient declined call to   Time Spent for Care: 30 minutes  More than 50% of total time spent on counseling and coordination of care as described above  Current Length of Stay: 2 day(s)  Current Patient Status: Inpatient   Certification Statement: The patient will continue to require additional inpatient hospital stay due to cabg 9/12/22  Discharge Plan: Anticipate discharge in >72 hrs to home      Code Status: Level 1 - Full Code    Subjective:   No acute complaints    Objective:     Vitals:   Temp (24hrs), Av °F (36 7 °C), Min:97 7 °F (36 5 °C), Max:98 5 °F (36 9 °C)    Temp:  [97 7 °F (36 5 °C)-98 5 °F (36 9 °C)] 97 9 °F (36 6 °C)  HR:  [68-88] 84  Resp:  [16-20] 20  BP: ()/(56-86) 116/79  SpO2:  [95 %-100 %] 95 %  Body mass index is 26 58 kg/m²  Input and Output Summary (last 24 hours): Intake/Output Summary (Last 24 hours) at 9/10/2022 0726  Last data filed at 9/10/2022 6989  Gross per 24 hour   Intake 0 ml   Output 1705 ml   Net -1705 ml       Physical Exam:   Physical Exam  Vitals and nursing note reviewed  HENT:      Head: Normocephalic  Comments: Facial scabs are healing well     Nose: Nose normal       Mouth/Throat:      Mouth: Mucous membranes are moist    Eyes:      Conjunctiva/sclera: Conjunctivae normal    Cardiovascular:      Rate and Rhythm: Normal rate and regular rhythm  Pulses: Normal pulses  Comments: Distant heart sounds  Pulmonary:      Effort: Pulmonary effort is normal       Breath sounds: Wheezing present  Abdominal:      General: Abdomen is flat  Palpations: Abdomen is soft  Musculoskeletal:         General: Normal range of motion  Cervical back: Normal range of motion  Comments: Right radial with bandaid on it no hematoma or swelling   Skin:     General: Skin is warm and dry  Neurological:      General: No focal deficit present  Mental Status: He is alert  Psychiatric:         Behavior: Behavior normal       Comments: depressed          Additional Data:     Labs:  Results from last 7 days   Lab Units 09/10/22  0524 22  1615 22  1033   WBC Thousand/uL 8 62   < > 11 90*   HEMOGLOBIN g/dL 12 0   < > 14 1   HEMATOCRIT % 36 9   < > 42 5   PLATELETS Thousands/uL 201   < > 257   NEUTROS PCT %  --   --  70   LYMPHS PCT %  --   --  20   MONOS PCT %  --   --  8   EOS PCT %  --   --  1    < > = values in this interval not displayed       Results from last 7 days   Lab Units 09/10/22  0524   SODIUM mmol/L 139   POTASSIUM mmol/L 4 0   CHLORIDE mmol/L 110*   CO2 mmol/L 25   BUN mg/dL 16   CREATININE mg/dL 1 07   ANION GAP mmol/L 4   CALCIUM mg/dL 9 5   GLUCOSE RANDOM mg/dL 112     Results from last 7 days   Lab Units 09/06/22  1615   INR  0 95         Results from last 7 days   Lab Units 09/09/22  1922   HEMOGLOBIN A1C % 5 8*           Lines/Drains:  Invasive Devices  Report    Peripheral Intravenous Line  Duration           Peripheral IV 09/06/22 Left;Dorsal (posterior) Forearm 3 days    Peripheral IV 09/06/22 Right Antecubital 3 days          Line  Duration           IABP 8 0 Fr  50 mL 72 days                  Telemetry:  Telemetry Orders (From admission, onward)             48 Hour Telemetry Monitoring  Continuous x 48 hours        References:    Telemetry Guidelines   Question:  Reason for 48 Hour Telemetry  Answer:  Acute MI, chest pain - R/O MI, or unstable angina                 Telemetry Reviewed: Normal Sinus Rhythm  Indication for Continued Telemetry Use: Awaiting PCI/EP Study/CABG             Imaging: No pertinent imaging reviewed  Recent Cultures (last 7 days):   Results from last 7 days   Lab Units 09/06/22  1936   BLOOD CULTURE  No Growth at 72 hrs  No Growth at 72 hrs         Last 24 Hours Medication List:   Current Facility-Administered Medications   Medication Dose Route Frequency Provider Last Rate    acetaminophen  650 mg Oral Q4H PRN Libertad Barrett PA-C      amLODIPine  2 5 mg Oral Daily Libertad Barrett PA-C      aspirin  81 mg Oral Daily Libertad Barrett PA-C      atorvastatin  40 mg Oral Daily With [employer]      chlorhexidine  15 mL Mouth/Throat Q12H [address] Libertad Barrett PA-C      heparin (porcine)  5,000 Units Subcutaneous [facility]8H [facility]      melatonin  3 mg Oral HS Libertad Barrett PA-C      melatonin  6 mg Oral HS PRN Libertad Barrett PA-C      metoprolol succinate  100 mg Oral BID Jimmy Neighbor, ROSS      mupirocin  1 application Nasal P38H [address] Gae Neighbor, ROSS      nicotine  1 patch Transdermal Daily Gae Neighbor, ROSS      ondansetron  4 mg Intravenous Q6H PRN Gae Neighbor, ROSS      polyethylene glycol  17 g Oral Daily Gae Neighbor, ROSS      senna  1 tablet Oral HS Gae Neighbor, ROSS          Today, Patient Was Seen By: Harriett Rain PA-C    **Please Note: This note may have been constructed using a voice recognition system  **

## 2022-09-10 NOTE — NURSING NOTE
Educated patient on the following:    Open-Heart Surgery - What to expect    How do I get ready for surgery? The following instructions are important for you to be fully prepared for your surgery    Six days before your surgery:    the nasal ointment prescribed by your surgeon at your pharmacy    Five days before your surgery:   Apply the nasal ointment to both nostrils twice a day (morning and bedtime) until your surgery    Two nights before your surgery:   Take a shower following the Pre-Operative Showering Instructions in this booklet   Use a clean towel and washcloth  Wear clean pajamas  Sleep on clean sheets   These precautions help prevent post-operative infections    One day before your surgery:   The hospital will call you to confirm your arrival time and location  The call is usually made between 2:00 - 8:00 PM    If your surgery is on a Monday, the hospital will call you the Friday before   You may pack a small overnight bag for toiletries and other small items that you may want with you during your hospital stay  Avoid bringing valuables such as money, electronics, or jewelry  One night before your surgery:   Take a shower following the Pre-Operative Showering Instructions in this booklet   Use a clean towel and washcloth  Wear clean pajamas  Sleep on clean sheets   These precautions help prevent post-operative infections   DO NOT eat any junk foods   DO NOT smoke   DO NOT drink alcohol   DO NOT drink or eat ANYTHING after midnight, including water, gum, or lozenges  Remember to take your regular medications until the day of surgery, unless your surgeon instructs you otherwise  Certain medications may be stopped prior to surgery  Your surgeon will tell you which medications to stop and when to stop them  If you develop a cold, sore throat, cough, fever, or other illness seven days before your surgery, call the surgeons office        What happens the day of surgery?  o If you have diabetes, DO NOT take any of your diabetes medication the morning of surgery, including insulin   o Bring a list of the medications you normally take   o Bring a list of your previous surgery and other medical history, including when you last had the pneumococcal vaccine and flu vaccine  o Bring your overnight bag   o Bring your insurance cards  o Bring a valid form of identification, such as a drivers license or passport   o Do not drive yourself to the hospital  Have someone drive you  What happens when I arrive at the hospital?  o You will be greeted and registered  o A nurse will ask you questions about your medical history   o You will bath with a special antiseptic soap and change into a clean hospital gown   o You will be taken by stretcher to the [address]  You will meet your Anesthesiologist  Magaly Martinez will be asked to confirm the surgery you are scheduled for  Your Consent form will be reviewed  A nurse will place an IV in your arm and you will be given medication to help you relax    o You will be taken by stretcher to the Operating Room when it is time for your surgery  What should I expect immediately after surgery?  o You will wake up in the ICU on PPHP 4   o You will have a breathing tube in place until you are fully awake  Once you are able to move your arms and legs, the breathing tube will be removed   o You will have chest tubes, IV's in your arms and neck, and a catheter to drain urine   o You will have a lot of equipment around your bed to monitor your heart and vital signs   o Your nurses will give you pain medication to help with discomfort from the surgery  o Your loved ones will be able to visit you as soon as you have been settled into your room    o The nursing staff will be providing you with care, including:  - Asking you about your pain level  - Helping you stay comfortable in bed  - Monitoring your vital signs, heart rhythm, and incisions  - Assisting you with bathing, toileting, and mouth care  - Obtaining lab specimens    Surgeon/Physician Assistant/Nurse Rounds   Your surgeon, PA, and nurse will round on you together daily to discuss your progress and discuss your plan of care for the day   They will review your weight, lab work, vital signs, incisions, and medications   This is a good time to ask questions and be involved in your plan of care  Transition of Care   Most patients stay in the ICU for 1 or 2 days   You will be helped out of bed into a chair the morning after surgery  You may be moved out of the ICU to the stepdown unit later that day   In the stepdown unit, your vital signs will still be monitored, but it will be less frequent and less invasive  This allows you to regain your independence and gives you more quiet time   Most patients are discharged from the hospital in 3-5 days after surgery  Pain Management   Pain medicine will be ordered to reduce your level of pain  There are different medications ordered for different levels of pain  The medications can be given through your IV or taken by mouth   Too much pain medication at once can be dangerous  There is a limit on the amount of pain medication you can take at a time, so you might still experience some discomfort after taking the medication   You will be given a splinting pillow to help brace your incision  This helps reduce pain caused by deep breathing or coughing  Incentive Spirometer (IS)   Taking deep breaths after surgery can be uncomfortable, but it is important to do so  Your lungs need help recovering once the breathing tube is taken out after surgery   You will be given a device for breathing exercises called an incentive spirometer (IS)  This helps to exercise your lungs  It also helps prevent pneumonia   You must use the IS at least 10 times every hour   Use the splinting pillow during breathing exercises to reduce discomfort     The instructions sheet in this booklet reviews how to use the IS correctly  Activity/Mobility   Short, frequent walks after surgery help you regain your strength, exercise your lungs, and move your bowels   You will be helped out of bed and into a chair every morning  You will stay out of bed for most of the day   You will walk with a staff member in the hallway 3 to 4 times a day   Physical Therapists and Occupational Therapists will evaluate your functional abilities and teach you safe exercises to do in your free time   Nurses will assist you with bathing and mouth care if needed   You will be weighed early each morning  Diet and Fluid Intake   A decreased appetite is normal for a couple days after surgery  It will likely return to normal after 3 days   A diet high in protein and low in sodium, carbohydrates, and fat will speed up the healing process   Information will be given on a Heart Healthy diet  You can ask your surgeon about specific food items   After surgery, your body will retain fluid  This puts extra strain on your heart and causes it to work harder  Because of this, you will be given a limited amount of fluid each day  You will also be given medication that makes you urinate more frequently  Preparing for discharge   It is normal to have questions and concerns when you are getting ready to leave the hospital    Staff will spend every day of your hospital stay teaching what you need to know to care for yourself at home, and ways to recognize if there are complications   On the day of discharge, your nurse will give you documents with discharge instructions  They will review it with you and you will be able to ask questions   Teaching topics may include:  o New medications, what they are for, how to take them, and when to take them  Most patients will have new heart medications after surgery  o Insulin, if prescribed for diabetes   Many patients with diabetes may temporarily need a change to their diabetic medications for a short time after surgery  If you have not previously taken insulin, your nurse will teach you how to use it and how to check your blood sugars  o How to care for your incision and recognize signs of infection  o Heart Healthy Diet, including limiting your sodium and monitoring your fluid intake  o Physical activity, including frequent short walks, rest periods, and slowly increasing your activity level  Care for Chest tube puncture sites and incision  These instructions will aide in your recovery  Please follow them carefully  Ask your surgeon or physician assistant if you have any questions   On your day of discharge, your nurse will give you a special antiseptic soap called chlorhexidine (CHG) to use once you get home   Shower daily, using the CHG soap for 5 days   Use a clean washcloth each day  It is ok to use disposable washcloths   During your shower, gently wash all puncture sites and incisions  Do not scrub vigorously   Rinse thoroughly with clean water   Use a clean towel to dry off after each shower  Pat the incision area dry  Do not rub with the towel   Do not apply any lotions, powders, oils, ointments, or creams to the puncture sites or incision   Leave them open to air  A small amount of drainage may come from your puncture sites  If this happens, you may apply a sterile gauze dressing to the puncture sites using tape to secure it  Remove the dressing before your next shower  Do not leave the same gauze on for more than a day  Stop using gauze once there is no more drainage   Look at your puncture sites and incisions daily  Look for signs of infection  Call the surgeon's office immediately if you have any of the following: Increased pain, swelling, redness, chills or fever (temp over 100 5F), foul odor, or green/yellow drainage from puncture sites or incision   Do not pick at or touch the puncture sites or incisions   Scabs will come off on their own  It is normal to have scabbing even after one month from surgery  Follow-up with your doctors   You will be seen in the surgeon's office one month after discharge from the hospital  At that time, they will make sure everything is healing appropriately   At that time, you may be cleared to drive again  Do not drive before being seen by the surgeon   You may also be cleared to attend cardiac rehab  A prescription will be given to you in the office   You should see your PCP and Cardiologist before seeing the surgeon  The cardiologist will take over management of your heart medications   This will likely be the last time you need to be seen by the surgeon  PRE-OP SHOWERING INSTRUCTIONS    Before your operation, you play an important role in decreasing your risk for infection  Washing your body thoroughly in order to reduce bacteria on the skin can help prevent infections at the surgical site  Please read the following directions THE WEEK BEFORE SURGERY so you are ready! WEEK BEFORE SURGERY  If your surgeon instructs you to use a special antiseptic soap containing chlorhexidine gluconate (CHG) prior to surgery, you should obtain this soap at least one week before your operation   Common brand names include: Aplicare, Endure, and Hibiclens   CHG soap is available from some doctor's offices, [address] [place] [address] or most retail pharmacies and the [employer]   If you are allergic or sensitive to CHG, please let your doctor know so another anticeptic soap can be suggested   If you are unable to purchase soap containing CHG, use regular soap as instructed below  Notify the nurse on arrival the day of surgery  DAY BEFORE SURGERY  You will need to shower the night before AND the morning of your surgery  You will need to prepare your bed and clothing so they are clean and ready after your showers     Place clean linens (sheets) on your bed; you should sleep on clean sheets after your evening shower   Get CLEAN towel and washcloths ready - you will need enough for two showers   Set aside clean underwear, pajamas, and clothing to wear after your showers  EVENING BEFORE SURGERY  The evening before your operation, take your first shower  Shower 1:   First, shampoo your hair with regular shampoo and rinse it completely before you wash your body   Next, wash your body from head to toe with soap and a clean washcloth   If you were instructed by your surgeon to wash with CHG soap:  o Use ½ of the bottle of soap for this shower (you will use the other ½ of the bottle for you shower in the morning)   Do not get CHG in your eyes, ears, nose, mouth or vaginal area   If you are using regular soap, try to use a new bar if possible   Pay special attention to the area where your incision will be; lather this area well with the CHG soap for about 2 minutes   DO NOT use any other soap or body rinse on your skin during or after the antiseptic soap   Rinse yourself completely with running water   Use a clean towel to dry off   Put on clean underwear and clothing/pajamas   Sleep on clean bed sheets/linens  REMINDERS:  ? DO NOT use lotion, powder, deodorant, or perfume/aftershave of any kind on your skin after your antiseptic shower  ? DO NOT shave any body parts in the 24hours/the day before your operation  DAY OF SURGERY  The morning of your operation take your second shower  Shower 2:   Follow the same steps and Shower 1    If you were instructed by your surgeon to wash with CHG soap, use the second ½ of the CHG soap  HOW TO USE AN INCENTIVE SPIROMETER    WHAT YOU NEED TO KNOW:  What is an incentive spirometer? An incentive spirometer is a device that measures how deeply you can inhale (breathe in)  It helps you take slow, deep breaths to expand and fill your lungs with air  This helps prevent lung problems, such as pneumonia   The incentive spirometer is made up of a breathing tube, an air chamber  The breathing tube is connected to the air chamber and has a mouth piece at the end  The indicator is found inside the device  Why do I need to use an incentive spirometer? An incentive spirometer is most commonly used after surgery  People who are at an increased risk of airway or breathing problems may also use one  These include people who smoke or have lung disease  This may also include people who are not active or cannot move well  How do I use an incentive spirometer? Sit up as straight as possible  Do not bend your head forward or backward  Hold the incentive spirometer in an upright position  Place the target pointer to the level that you need to reach  Exhale (breathe out) normally and then do the following:   Put the mouthpiece in your mouth and close your lips tightly around it  Do not block the mouthpiece with your tongue   Inhale slowly and deeply through the mouthpiece to raise the indicator  Try to make the indicator rise up to the level of the goal marker   When you cannot inhale any longer, remove the mouthpiece and hold your breath for at least 3 seconds   Exhale normally   Repeat these steps 10-12 times every hour when you are awake, or as often as directed   Clean the mouthpiece with soap and water after each use  Do not use a disposable mouthpiece for longer than 24 hours   Keep a log of the highest level you are able to reach each time  This will help healthcare providers see If your lung function improves

## 2022-09-11 ENCOUNTER — ANESTHESIA EVENT (INPATIENT)
Dept: PERIOP | Facility: HOSPITAL | Age: 62
DRG: 233 | End: 2022-09-11
Payer: COMMERCIAL

## 2022-09-11 LAB
ABO GROUP BLD: NORMAL
ABO GROUP BLD: NORMAL
BLD GP AB SCN SERPL QL: NEGATIVE
MRSA NOSE QL CULT: NORMAL
RH BLD: NEGATIVE
RH BLD: NEGATIVE
SPECIMEN EXPIRATION DATE: NORMAL

## 2022-09-11 PROCEDURE — 86850 RBC ANTIBODY SCREEN: CPT | Performed by: PHYSICIAN ASSISTANT

## 2022-09-11 PROCEDURE — 86900 BLOOD TYPING SEROLOGIC ABO: CPT | Performed by: PHYSICIAN ASSISTANT

## 2022-09-11 PROCEDURE — 86920 COMPATIBILITY TEST SPIN: CPT

## 2022-09-11 PROCEDURE — 86901 BLOOD TYPING SEROLOGIC RH(D): CPT | Performed by: PHYSICIAN ASSISTANT

## 2022-09-11 PROCEDURE — 99232 SBSQ HOSP IP/OBS MODERATE 35: CPT | Performed by: INTERNAL MEDICINE

## 2022-09-11 PROCEDURE — 99233 SBSQ HOSP IP/OBS HIGH 50: CPT | Performed by: PHYSICIAN ASSISTANT

## 2022-09-11 PROCEDURE — NC001 PR NO CHARGE: Performed by: PHYSICIAN ASSISTANT

## 2022-09-11 RX ORDER — ALPRAZOLAM 0.5 MG/1
0.5 TABLET ORAL DAILY PRN
Status: DISCONTINUED | OUTPATIENT
Start: 2022-09-11 | End: 2022-09-16 | Stop reason: HOSPADM

## 2022-09-11 RX ADMIN — METOPROLOL SUCCINATE 100 MG: 100 TABLET, EXTENDED RELEASE ORAL at 08:28

## 2022-09-11 RX ADMIN — CHLORHEXIDINE GLUCONATE 15 ML: 1.2 SOLUTION ORAL at 08:29

## 2022-09-11 RX ADMIN — ALPRAZOLAM 0.5 MG: 0.5 TABLET ORAL at 20:51

## 2022-09-11 RX ADMIN — MUPIROCIN 1 APPLICATION: 20 OINTMENT TOPICAL at 08:29

## 2022-09-11 RX ADMIN — AMLODIPINE BESYLATE 2.5 MG: 2.5 TABLET ORAL at 08:29

## 2022-09-11 RX ADMIN — CHLORHEXIDINE GLUCONATE 15 ML: 1.2 SOLUTION ORAL at 20:51

## 2022-09-11 RX ADMIN — METOPROLOL SUCCINATE 100 MG: 100 TABLET, EXTENDED RELEASE ORAL at 20:52

## 2022-09-11 RX ADMIN — ATORVASTATIN CALCIUM 40 MG: 40 TABLET, FILM COATED ORAL at 17:41

## 2022-09-11 RX ADMIN — HEPARIN SODIUM 5000 UNITS: 5000 INJECTION INTRAVENOUS; SUBCUTANEOUS at 13:02

## 2022-09-11 RX ADMIN — NICOTINE 1 PATCH: 7 PATCH, EXTENDED RELEASE TRANSDERMAL at 08:28

## 2022-09-11 RX ADMIN — HEPARIN SODIUM 5000 UNITS: 5000 INJECTION INTRAVENOUS; SUBCUTANEOUS at 06:39

## 2022-09-11 RX ADMIN — ASPIRIN 81 MG CHEWABLE TABLET 81 MG: 81 TABLET CHEWABLE at 08:28

## 2022-09-11 RX ADMIN — HEPARIN SODIUM 5000 UNITS: 5000 INJECTION INTRAVENOUS; SUBCUTANEOUS at 20:54

## 2022-09-11 RX ADMIN — MUPIROCIN 1 APPLICATION: 20 OINTMENT TOPICAL at 20:51

## 2022-09-11 RX ADMIN — Medication 3 MG: at 20:53

## 2022-09-11 NOTE — PROGRESS NOTES
[address] [facility]  Progress Note - Ada Fair 1960, 58 y o  male MRN: [id number]  Unit/Bed#: [facility] 429-01 Encounter: [id number]  Primary Care Provider: Mikell Leventhal, PA-C   Date and time admitted to hospital: 9/8/2022  7:32 PM    * Multi-vessel CAD with stable angina [facility])  Assessment & Plan  · Ongoing anginal symptoms with significant cardiac history of VFib/V-tach cardiac arrest, 6/29/2022 cardiac catheterization showed multivessel disease without any specific revascularizable lesions for PCI and was turned down for CABG, subsequent ICD insertion  · Had repeat cath 9/9/2022 and will have CABG on 9/12/2022  · Continue beta-blocker statin  Holding DAPT  Ventricular fibrillation [facility])  Assessment & Plan  · ICD shock for VFib approximately at 7:00 a m  on 9/3/22 most likely reason of patient's near-syncope and fall  · Tele without events so far        ICD (implantable cardioverter-defibrillator) discharge  Assessment & Plan  · Inserted due to history of Vfib/V-tach cardiac arrest June 2022    Essential hypertension  Assessment & Plan  · Continue blood pressure meds with holding parameters        VTE Pharmacologic Prophylaxis: VTE Score: 3 Moderate Risk (Score 3-4) - Pharmacological DVT Prophylaxis Ordered: heparin  Patient Centered Rounds: I performed bedside rounds with nursing staff today  Discussions with Specialists or Other Care Team Provider: none    Education and Discussions with Family / Patient: Patient declined call to   Time Spent for Care: 30 minutes  More than 50% of total time spent on counseling and coordination of care as described above      Current Length of Stay: 3 day(s)  Current Patient Status: Inpatient   Certification Statement: The patient will continue to require additional inpatient hospital stay due to cabg tomorrow and recovery  Discharge Plan: Anticipate discharge in >72 hrs to discharge location to be determined pending rehab evaluations  Code Status: Level 1 - Full Code    Subjective:   Felt some intermittent "heart burn" sensation in the mid chest which resolved with a few minutes sitting upright in bed resting, did not recur  Was not assc with diaphoresis, nausea or fatigue  No sob at that time  Objective:     Vitals:   Temp (24hrs), Av 4 °F (36 9 °C), Min:98 1 °F (36 7 °C), Max:98 7 °F (37 1 °C)    Temp:  [98 1 °F (36 7 °C)-98 7 °F (37 1 °C)] 98 1 °F (36 7 °C)  HR:  [65-82] 65  Resp:  [16-19] 19  BP: (114-145)/(74-97) 120/79  SpO2:  [93 %-96 %] 94 %  Body mass index is 26 66 kg/m²  Input and Output Summary (last 24 hours): Intake/Output Summary (Last 24 hours) at 2022 0728  Last data filed at 9/10/2022 2301  Gross per 24 hour   Intake 960 ml   Output 775 ml   Net 185 ml       Physical Exam:   Physical Exam  Vitals and nursing note reviewed  HENT:      Head: Normocephalic and atraumatic  Nose: Nose normal       Mouth/Throat:      Mouth: Mucous membranes are moist    Eyes:      Conjunctiva/sclera: Conjunctivae normal    Cardiovascular:      Rate and Rhythm: Normal rate and regular rhythm  Comments: Distant heart sounds  Pulmonary:      Effort: Pulmonary effort is normal       Breath sounds: Normal breath sounds  Abdominal:      General: Bowel sounds are normal       Palpations: Abdomen is soft  Musculoskeletal:         General: Normal range of motion  Cervical back: Normal range of motion  Skin:     General: Skin is warm and dry  Neurological:      General: No focal deficit present  Mental Status: He is alert     Psychiatric:         Mood and Affect: Mood normal          Behavior: Behavior normal           Additional Data:     Labs:  Results from last 7 days   Lab Units 09/10/22  0524 22  1615 22  1033   WBC Thousand/uL 8 62   < > 11 90*   HEMOGLOBIN g/dL 12 0   < > 14 1   HEMATOCRIT % 36 9   < > 42 5   PLATELETS Thousands/uL 201   < > 257   NEUTROS PCT %  --   -- 70   LYMPHS PCT %  --   --  20   MONOS PCT %  --   --  8   EOS PCT %  --   --  1    < > = values in this interval not displayed  Results from last 7 days   Lab Units 09/10/22  0524   SODIUM mmol/L 139   POTASSIUM mmol/L 4 0   CHLORIDE mmol/L 110*   CO2 mmol/L 25   BUN mg/dL 16   CREATININE mg/dL 1 07   ANION GAP mmol/L 4   CALCIUM mg/dL 9 5   GLUCOSE RANDOM mg/dL 112     Results from last 7 days   Lab Units 09/06/22  1615   INR  0 95         Results from last 7 days   Lab Units 09/09/22  1922   HEMOGLOBIN A1C % 5 8*           Lines/Drains:  Invasive Devices  Report    Peripheral Intravenous Line  Duration           Peripheral IV 09/10/22 Left;Proximal;Ventral (anterior) Forearm 1 day          Line  Duration           IABP 8 0 Fr  50 mL 73 days                  Telemetry:  Telemetry Orders (From admission, onward)             48 Hour Telemetry Monitoring  Continuous x 48 hours        References:    Telemetry Guidelines   Question:  Reason for 48 Hour Telemetry  Answer:  Acute MI, chest pain - R/O MI, or unstable angina                 Telemetry Reviewed: Normal Sinus Rhythm  Indication for Continued Telemetry Use: Awaiting PCI/EP Study/CABG             Imaging: No pertinent imaging reviewed  Recent Cultures (last 7 days):   Results from last 7 days   Lab Units 09/06/22  1936   BLOOD CULTURE  No Growth After 4 Days  No Growth After 4 Days         Last 24 Hours Medication List:   Current Facility-Administered Medications   Medication Dose Route Frequency Provider Last Rate    acetaminophen  650 mg Oral Q4H PRN Geoffry Barajas, PA-C      amLODIPine  2 5 mg Oral Daily Bannerffry Barajas, PA-C      aspirin  81 mg Oral Daily Geoffry Barajas, PA-C      atorvastatin  40 mg Oral Daily With [employer] RobertCLAUDETTE beasley-ALAN      chlorhexidine  15 mL Mouth/Throat Q12H [facility] Geoffry Barajas, PA-C      heparin (porcine)  5,000 Units Subcutaneous Pending sale to [facility] Geoffry Barajas, PA-ALAN      melatonin  3 mg Oral HS Mertie Drivers, PA-C      melatonin  6 mg Oral HS PRN Mertie Drivers, PA-C      metoprolol succinate  100 mg Oral BID Mertie Drivers, PA-C      mupirocin  1 application Nasal D31M [address] Mertie Drivers, PA-C      nicotine  1 patch Transdermal Daily Mertie Drivers, PA-C      ondansetron  4 mg Intravenous Q6H PRN Mertie Drivers, PA-C      polyethylene glycol  17 g Oral Daily Mertie Drivers, PA-C      senna  1 tablet Oral HS Mertie Drivers, PA-C          Today, Patient Was Seen By: Carlita Rowe PA-C    **Please Note: This note may have been constructed using a voice recognition system  **

## 2022-09-11 NOTE — PROGRESS NOTES
Cardiology Progress Note - Shad Donovan 58 y o  male MRN: [id number]    Unit/Bed#: [facility] 429-01 Encounter: [id number]        Subjective:    No significant events overnight  Feels well  No chest pain  Review of Systems   Cardiovascular: Negative for chest pain, leg swelling and palpitations  Respiratory: Negative for shortness of breath  Objective:   Vitals: Blood pressure 129/79, pulse 71, temperature 98 2 °F (36 8 °C), resp  rate 20, weight 94 2 kg (207 lb 10 8 oz), SpO2 96 %  , Body mass index is 26 66 kg/m² ,   Orthostatic Blood Pressures    Flowsheet Row Most Recent Value   Blood Pressure 129/79 filed at 09/11/2022 1113   Patient Position - Orthostatic VS Lying filed at 09/10/2022 3478         Systolic (59ILA), CDJ:756 , Min:114 , NME:080     Diastolic (58SLU), UTF:93, Min:74, Max:97      Intake/Output Summary (Last 24 hours) at 9/11/2022 1202  Last data filed at 9/10/2022 2301  Gross per 24 hour   Intake --   Output 775 ml   Net -775 ml     Weight (last 2 days)     Date/Time Weight    09/11/22 0600 94 2 (207 67)    09/10/22 0600 93 9 (207 01)    09/09/22 0550 96 9 (213 6)              Telemetry Review: NSR    Physical Exam  Cardiovascular:      Rate and Rhythm: Normal rate and regular rhythm  Heart sounds: Normal heart sounds  No murmur heard  No friction rub  No gallop  Pulmonary:      Breath sounds: Normal breath sounds  No wheezing or rales             Laboratory Results:        CBC with diff:   Results from last 7 days   Lab Units 09/10/22  0524 09/08/22  0537 09/06/22  1615 09/06/22  1033 09/06/22  1031   WBC Thousand/uL 8 62 9 41 12 36* 11 90*  --    HEMOGLOBIN g/dL 12 0 12 8 13 5 14 1  --    I STAT HEMOGLOBIN g/dl  --   --   --   --  14 6   HEMATOCRIT % 36 9 39 4 40 3 42 5  --    HEMATOCRIT, ISTAT %  --   --   --   --  43   MCV fL 94 94 90 90  --    PLATELETS Thousands/uL 201 225 238 257  --    MCH pg 30 5 30 5 30 1 30 0  --    MCHC g/dL 32 5 32 5 33 5 33 2  --    RDW % 12 9 13 1 13 0 13 0  --    MPV fL 8 9 8 6* 8 6* 8 5*  --    NRBC AUTO /100 WBCs  --   --   --  0  --          CMP:  Results from last 7 days   Lab Units 09/10/22  0524 09/08/22  0537 09/06/22  1033 09/06/22  1031   POTASSIUM mmol/L 4 0 4 2 4 0  --    CHLORIDE mmol/L 110* 108 102  --    CO2 mmol/L 25 23 26  --    CO2, I-STAT mmol/L  --   --   --  27   BUN mg/dL 16 20 23  --    CREATININE mg/dL 1 07 0 86 1 02  --    GLUCOSE, ISTAT mg/dl  --   --   --  118   CALCIUM mg/dL 9 5 9 3 10 5*  --    EGFR ml/min/1 73sq m 73 92 78  --          BMP:  Results from last 7 days   Lab Units 09/10/22  0524 09/08/22  0537 09/06/22  1033 09/06/22  1031   POTASSIUM mmol/L 4 0 4 2 4 0  --    CHLORIDE mmol/L 110* 108 102  --    CO2 mmol/L 25 23 26  --    CO2, I-STAT mmol/L  --   --   --  27   BUN mg/dL 16 20 23  --    CREATININE mg/dL 1 07 0 86 1 02  --    GLUCOSE, ISTAT mg/dl  --   --   --  118   CALCIUM mg/dL 9 5 9 3 10 5*  --        BNP:   Results Reviewed     None        No results for input(s): BNP in the last 72 hours  Magnesium:   Results from last 7 days   Lab Units 09/10/22  0524   MAGNESIUM mg/dL 2 4       Coags:   Results from last 7 days   Lab Units 09/09/22  0514 09/08/22  1404 09/08/22  0537 09/07/22  2125 09/07/22  1339 09/07/22  0517 09/06/22  2300 09/06/22  1615 09/06/22  1033   PTT seconds 57* 62* 82* 52* 45* 67* 26 27 26   INR   --   --   --   --   --   --   --  0 95 0 91       TSH:       Lipid Profile:   Results from last 7 days   Lab Units 09/08/22  0537   TRIGLYCERIDES mg/dL 173*   HDL mg/dL 35*           Cardiac testing:   No results found for this or any previous visit  No results found for this or any previous visit  No results found for this or any previous visit  No results found for this or any previous visit        Meds/Allergies   Current Facility-Administered Medications   Medication Dose Route Frequency Provider Last Rate    acetaminophen  650 mg Oral Q4H PRN Bairon Knutson PA-C      ALPRAZolam 0 5 mg Oral Daily PRN Shannan Zamorano PA-C      amLODIPine  2 5 mg Oral Daily Parthenia Priestly, PA-ALAN      aspirin  81 mg Oral Daily Parthenia Priestly, PA-ALAN      atorvastatin  40 mg Oral Daily With [employer] RobertROSS      chlorhexidine  15 mL Mouth/Throat Q12H [address] Parthenia Priestly, PA-C      heparin (porcine)  5,000 Units Subcutaneous [facility] Parthenia Priestly, ROSS      melatonin  3 mg Oral HS Parthenia Priestly, PA-ALAN      melatonin  6 mg Oral HS PRN Parthenia Priestly, PA-ALAN      metoprolol succinate  100 mg Oral BID Parthenia Priestly, ROSS      mupirocin  1 application Nasal Q07X [address] Parthenia Priestly, PA-ALAN      nicotine  1 patch Transdermal Daily Parthenia Priestly, PA-ALAN      ondansetron  4 mg Intravenous Q6H PRN Parthenia Priestly, ROSS      polyethylene glycol  17 g Oral Daily Parthenia Priestly, ROSS      senna  1 tablet Oral HS Parthenia Priestly, ROSS          Medications Prior to Admission   Medication    amLODIPine (NORVASC) 2 5 mg tablet    aspirin 81 mg chewable tablet    atorvastatin (LIPITOR) 40 mg tablet    lisinopril (ZESTRIL) 10 mg tablet    metoprolol succinate (TOPROL-XL) 50 mg 24 hr tablet    ticagrelor (BRILINTA) 90 MG       Assessment:  Principal Problem:    Multi-vessel CAD with stable angina [facility])  Active Problems:    Essential hypertension    Ventricular fibrillation (HCC)    ICD (implantable cardioverter-defibrillator) discharge      Impression:  1  Multivessel CAD   2  VT/VF - due to ischemia  3  HTN     Recommendations:  1  Continue current management  2   Plan on CABG 9/12

## 2022-09-11 NOTE — RESPIRATORY THERAPY NOTE
RT Protocol Note  Danay Colin 58 y o  male MRN: [id number]  Unit/Bed#: [facility] 429-01 Encounter: [id number]    Assessment    Principal Problem:    Multi-vessel CAD with stable angina ([place] [address] )  Active Problems:    Essential hypertension    Ventricular fibrillation ([place] [address] )    ICD (implantable cardioverter-defibrillator) discharge      Home Pulmonary Medications:    Home Devices/Therapy: (P)  (none)    Past Medical History:   Diagnosis Date    PERCY (acute kidney injury) ([place] [address] )     Bilateral inguinal hernia     Cardiac arrest with ventricular fibrillation (HCC)     Dizziness 02/02/2021    isolated incident of dizziness, sweating & disorientation    Elevated LFTs     History of left heart catheterization     and IABP, ICD PLACEMENT, DUAL CHAMBER, [employer]    Hyperglycemia     Hypertension     Pneumonia     STEMI (ST elevation myocardial infarction) ([place] [address] )      Social History     Socioeconomic History    Marital status: /Civil Union     Spouse name: None    Number of children: None    Years of education: None    Highest education level: None   Occupational History    None   Tobacco Use    Smoking status: Current Every Day Smoker     Packs/day: 0 50     Types: Cigarettes    Smokeless tobacco: Never Used   Vaping Use    Vaping Use: Never used   Substance and Sexual Activity    Alcohol use: Not Currently    Drug use: Not Currently    Sexual activity: Not Currently     Partners: Female   Other Topics Concern    None   Social History Narrative    None     Social Determinants of Health     Financial Resource Strain: Not on file   Food Insecurity: No Food Insecurity    Worried About Running Out of Food in the Last Year: Never true    Ran Out of Food in the Last Year: Never true   Transportation Needs: No Transportation Needs    Lack of Transportation (Medical): No    Lack of Transportation (Non-Medical):  No   Physical Activity: Not on file   Stress: Not on file   Social Connections: Not on file   Intimate Partner Violence: Not on file   Housing Stability: Low Risk     Unable to Pay for Housing in the Last Year: No    Number of Places Lived in the Last Year: 1    Unstable Housing in the Last Year: No       Subjective         Objective    Physical Exam:   Assessment Type: (P) Assess only  General Appearance: (P) Alert, Awake  Respiratory Pattern: (P) Normal  Chest Assessment: (P) Chest expansion symmetrical  Bilateral Breath Sounds: (P) Clear  R Breath Sounds: (P) Clear  L Breath Sounds: (P) Clear  Cough: (P) None    Vitals:  Blood pressure 129/79, pulse 71, temperature 98 2 °F (36 8 °C), resp  rate 20, weight 94 2 kg (207 lb 10 8 oz), SpO2 96 %  Imaging and other studies: I have personally reviewed pertinent reports              Plan    Respiratory Plan: (P) Discontinue Protocol        Resp Comments: (P) patient assesed for RCP, DX: multi vessel CAD with stable angina, no resp hx, pos smoking hx- per pt last 6 months 3 5 ppd X 6 years , prior 1ppd x 47 years, pt appears to be in no distress, no sob, pt instructed on IS and demonstrated good technique, YN=9078 cc, answered pts questions to his satisfaction, pt satting 97% on RMA at this time, pt for heart surgery in the AM, will continue IS Q1 hour W/A

## 2022-09-11 NOTE — PROGRESS NOTES
Progress Note - Cardiothoracic Surgery   Darryle Canton 58 y o  male MRN: [id number]  [facility] Encounter: [id number]      24 Hour Events: No events       Medications:   Scheduled Meds:  Current Facility-Administered Medications   Medication Dose Route Frequency Provider Last Rate    acetaminophen  650 mg Oral Q4H PRN [employer] [facility] Neighbor, PA-C      amLODIPine  2 5 mg Oral Daily [facility], PA-C      aspirin  81 mg Oral Daily [facility], PA-C      atorvastatin  40 mg Oral Daily With [employer], PA-C      chlorhexidine  15 mL Mouth/Throat Q12H [facility], PA-C      heparin (porcine)  5,000 Units Subcutaneous Q8H [facility], PA-C      melatonin  3 mg Oral HS [facility], PA-C      melatonin  6 mg Oral HS PRN Gae Neighbor, PA-C      metoprolol succinate  100 mg Oral BID [facility], PA-C      mupirocin  1 application Nasal C89B [facility], PA-C      nicotine  1 patch Transdermal Daily [facility], PA-C      ondansetron  4 mg Intravenous Q6H PRN Gae Neighbor, PA-C      polyethylene glycol  17 g Oral Daily [facility], PA-C      senna  1 tablet Oral HS [facility], PA-C       Continuous Infusions:     Results:   Results from last 7 days   Lab Units 09/10/22  0524 09/08/22  0537 09/06/22  1615   WBC Thousand/uL 8 62 9 41 12 36*   HEMOGLOBIN g/dL 12 0 12 8 13 5   HEMATOCRIT % 36 9 39 4 40 3   PLATELETS Thousands/uL 201 225 238     Results from last 7 days   Lab Units 09/10/22  0524 09/08/22  0537 09/06/22  1033 09/06/22  1031   POTASSIUM mmol/L 4 0 4 2 4 0  --    CHLORIDE mmol/L 110* 108 102  --    CO2 mmol/L 25 23 26  --    CO2, I-STAT mmol/L  --   --   --  27   BUN mg/dL 16 20 23  --    CREATININE mg/dL 1 07 0 86 1 02  --    GLUCOSE, ISTAT mg/dl  --   --   --  118   CALCIUM mg/dL 9 5 9 3 10 5*  --      Results from last 7 days   Lab Units 09/09/22  0514 09/08/22  1404 09/08/22  0537 09/06/22  2300 09/06/22  1615 09/06/22  1033   INR   --   --   --   --  0 95 0 91   PTT seconds 57* 62* 82*   < > 27 26    < > = values in this interval not displayed  Studies:     Cardiac Catheterization:    LAD not readily apparent 6/2022 angiography, collateralized; 80% diagonal; 90% RCA progressed from June 2022    Echocardiogram:   EF 40%  Trace MR  Trace TR  Carotid artery duplex:   < 50% right carotid stenosis  Vertebral artery flow is antegrade and There is no significant subclavian artery   < 50% left carotid stenosis  Vertebral artery flow is antegrade and There is no significant subclavian artery    Greater saphenous vein mapping: Adequate conduit for CABG, bilaterally    Vitals:   Vitals:    09/11/22 0600 09/11/22 0800 09/11/22 0813 09/11/22 1113   BP:   123/79 129/79   BP Location:       Pulse:   69 71   Resp:   20    Temp:   (!) 97 3 °F (36 3 °C) 98 2 °F (36 8 °C)   TempSrc:       SpO2:  98% 96% 96%   Weight: 94 2 kg (207 lb 10 8 oz)          Physical Exam:    HEENT/NECK:  Normocephalic  Atraumatic  No jugular venous distention  Cardiac: Regular rate and rhythm and No murmurs/rubs/gallops  Pulmonary:  Breath sounds clear bilaterally and No rales/rhonchi/wheezes  Abdomen:  Non-tender, Non-distended and Normal bowel sounds  Extremities: Extremities warm/dry and No edema B/L  Neuro: Alert and oriented X 3 and No focal deficits  Skin: Warm/Dry, without rashes or lesions  Assessment:    Severe coronary artery disease; Ongoing CABG workup    Plan:  Patient agreeable to proceed with surgery;  Informed consent signed    Carotid ultrasound completed, and acceptable    Vein mapping completed, and acceptable    Blood type and cross match completed    Continue Mupirocin 2% nasal ointment q 12 hrs    Continue topical chlorhexidine bath and mouth rise    NPO after midnight  RBC prepared  Preoperative beta blocker, insulin, and antibiotics ordered  coronary artery bypass grafting scheduled for 9/11 with ANISH Arevalo Yazdanism: Juan Shah PA-C  DATE: September 11, 2022  TIME: 11:37 AM    * This note was completed in part utilizing m-modal fluency direct voice recognition software  Grammatical errors, random word insertion, spelling mistakes, and incomplete sentences may be an occasional consequence of the system secondary to software limitations, ambient noise and hardware issues  At the time of dictation, efforts were made to edit, clarify and /or correct errors  Please read the chart carefully and recognize, using context, where substitutions have occurred  If you have any questions or concerns about the context, text or information contained within the body of this dictation, please contact myself, the provider, for further clarification

## 2022-09-11 NOTE — ASSESSMENT & PLAN NOTE
· ICD shock for VFib approximately at 7:00 a m  on 9/3/22 most likely reason of patient's near-syncope and fall  · Tele without events so far

## 2022-09-12 ENCOUNTER — HOME HEALTH ADMISSION (OUTPATIENT)
Dept: HOME HEALTH SERVICES | Facility: HOME HEALTHCARE | Age: 62
End: 2022-09-12
Payer: COMMERCIAL

## 2022-09-12 ENCOUNTER — APPOINTMENT (OUTPATIENT)
Dept: RADIOLOGY | Facility: HOSPITAL | Age: 62
DRG: 233 | End: 2022-09-12
Payer: COMMERCIAL

## 2022-09-12 ENCOUNTER — APPOINTMENT (INPATIENT)
Dept: NON INVASIVE DIAGNOSTICS | Facility: HOSPITAL | Age: 62
DRG: 233 | End: 2022-09-12
Attending: THORACIC SURGERY (CARDIOTHORACIC VASCULAR SURGERY)
Payer: COMMERCIAL

## 2022-09-12 ENCOUNTER — ANESTHESIA (INPATIENT)
Dept: PERIOP | Facility: HOSPITAL | Age: 62
DRG: 233 | End: 2022-09-12
Payer: COMMERCIAL

## 2022-09-12 PROBLEM — Z95.1 S/P CABG (CORONARY ARTERY BYPASS GRAFT): Status: ACTIVE | Noted: 2022-09-12

## 2022-09-12 LAB
ANION GAP SERPL CALCULATED.3IONS-SCNC: 7 MMOL/L (ref 4–13)
ATRIAL RATE: 58 BPM
BACTERIA BLD CULT: NORMAL
BACTERIA BLD CULT: NORMAL
BASE EX.OXY STD BLDV CALC-SCNC: 54.5 % (ref 60–80)
BASE EX.OXY STD BLDV CALC-SCNC: 57.3 % (ref 60–80)
BASE EXCESS BLDA CALC-SCNC: -1 MMOL/L (ref -2–3)
BASE EXCESS BLDA CALC-SCNC: -8 MMOL/L (ref -2–3)
BASE EXCESS BLDA CALC-SCNC: 0 MMOL/L (ref -2–3)
BASE EXCESS BLDA CALC-SCNC: 1 MMOL/L (ref -2–3)
BASE EXCESS BLDA CALC-SCNC: 2 MMOL/L (ref -2–3)
BASE EXCESS BLDA CALC-SCNC: 4 MMOL/L (ref -2–3)
BASE EXCESS BLDV CALC-SCNC: -1.5 MMOL/L
BASE EXCESS BLDV CALC-SCNC: -2.9 MMOL/L
BUN SERPL-MCNC: 20 MG/DL (ref 5–25)
CA-I BLD-SCNC: 0.99 MMOL/L (ref 1.12–1.32)
CA-I BLD-SCNC: 1.05 MMOL/L (ref 1.12–1.32)
CA-I BLD-SCNC: 1.06 MMOL/L (ref 1.12–1.32)
CA-I BLD-SCNC: 1.1 MMOL/L (ref 1.12–1.32)
CA-I BLD-SCNC: 1.17 MMOL/L (ref 1.12–1.32)
CA-I BLD-SCNC: 1.22 MMOL/L (ref 1.12–1.32)
CALCIUM SERPL-MCNC: 8.7 MG/DL (ref 8.3–10.1)
CHLORIDE SERPL-SCNC: 111 MMOL/L (ref 96–108)
CO2 SERPL-SCNC: 23 MMOL/L (ref 21–32)
CREAT SERPL-MCNC: 1.08 MG/DL (ref 0.6–1.3)
GFR SERPL CREATININE-BSD FRML MDRD: 73 ML/MIN/1.73SQ M
GLUCOSE SERPL-MCNC: 102 MG/DL (ref 65–140)
GLUCOSE SERPL-MCNC: 107 MG/DL (ref 65–140)
GLUCOSE SERPL-MCNC: 113 MG/DL (ref 65–140)
GLUCOSE SERPL-MCNC: 130 MG/DL (ref 65–140)
GLUCOSE SERPL-MCNC: 146 MG/DL (ref 65–140)
GLUCOSE SERPL-MCNC: 147 MG/DL (ref 65–140)
GLUCOSE SERPL-MCNC: 147 MG/DL (ref 65–140)
GLUCOSE SERPL-MCNC: 149 MG/DL (ref 65–140)
GLUCOSE SERPL-MCNC: 157 MG/DL (ref 65–140)
GLUCOSE SERPL-MCNC: 166 MG/DL (ref 65–140)
GLUCOSE SERPL-MCNC: 98 MG/DL (ref 65–140)
GLUCOSE SERPL-MCNC: <20 MG/DL (ref 65–140)
HCO3 BLDA-SCNC: 17.6 MMOL/L (ref 22–28)
HCO3 BLDA-SCNC: 25.2 MMOL/L (ref 22–28)
HCO3 BLDA-SCNC: 25.3 MMOL/L (ref 24–30)
HCO3 BLDA-SCNC: 25.9 MMOL/L (ref 22–28)
HCO3 BLDA-SCNC: 27 MMOL/L (ref 22–28)
HCO3 BLDA-SCNC: 28.8 MMOL/L (ref 22–28)
HCO3 BLDV-SCNC: 23.4 MMOL/L (ref 24–30)
HCO3 BLDV-SCNC: 24.5 MMOL/L (ref 24–30)
HCT VFR BLD AUTO: 34.8 % (ref 36.5–49.3)
HCT VFR BLD CALC: 22 % (ref 36.5–49.3)
HCT VFR BLD CALC: 22 % (ref 36.5–49.3)
HCT VFR BLD CALC: 26 % (ref 36.5–49.3)
HCT VFR BLD CALC: 26 % (ref 36.5–49.3)
HCT VFR BLD CALC: 32 % (ref 36.5–49.3)
HCT VFR BLD CALC: 32 % (ref 36.5–49.3)
HGB BLD-MCNC: 11.5 G/DL (ref 12–17)
HGB BLDA-MCNC: 10.9 G/DL (ref 12–17)
HGB BLDA-MCNC: 10.9 G/DL (ref 12–17)
HGB BLDA-MCNC: 7.5 G/DL (ref 12–17)
HGB BLDA-MCNC: 7.5 G/DL (ref 12–17)
HGB BLDA-MCNC: 8.8 G/DL (ref 12–17)
HGB BLDA-MCNC: 8.8 G/DL (ref 12–17)
KCT BLD-ACNC: 116 SEC (ref 89–137)
KCT BLD-ACNC: 122 SEC (ref 89–137)
KCT BLD-ACNC: 447 SEC (ref 89–137)
KCT BLD-ACNC: 531 SEC (ref 89–137)
KCT BLD-ACNC: 537 SEC (ref 89–137)
KCT BLD-ACNC: 797 SEC (ref 89–137)
KCT BLD-ACNC: 913 SEC (ref 89–137)
NASAL CANNULA: 6
O2 CT BLDV-SCNC: 8.9 ML/DL
O2 CT BLDV-SCNC: 9.4 ML/DL
P AXIS: 44 DEGREES
PCO2 BLD: 19 MMOL/L (ref 21–32)
PCO2 BLD: 27 MMOL/L (ref 21–32)
PCO2 BLD: 28 MMOL/L (ref 21–32)
PCO2 BLD: 30 MMOL/L (ref 21–32)
PCO2 BLD: 33.3 MM HG (ref 36–44)
PCO2 BLD: 42.7 MM HG (ref 36–44)
PCO2 BLD: 44.8 MM HG (ref 36–44)
PCO2 BLD: 44.8 MM HG (ref 36–44)
PCO2 BLD: 45.6 MM HG (ref 42–50)
PCO2 BLD: 48.8 MM HG (ref 36–44)
PCO2 BLDV: 46.7 MM HG (ref 42–50)
PCO2 BLDV: 47.1 MM HG (ref 42–50)
PH BLD: 7.32 [PH] (ref 7.35–7.45)
PH BLD: 7.33 [PH] (ref 7.35–7.45)
PH BLD: 7.35 [PH] (ref 7.3–7.4)
PH BLD: 7.37 [PH] (ref 7.35–7.45)
PH BLD: 7.41 [PH] (ref 7.35–7.45)
PH BLD: 7.42 [PH] (ref 7.35–7.45)
PH BLDV: 7.31 [PH] (ref 7.3–7.4)
PH BLDV: 7.34 [PH] (ref 7.3–7.4)
PLATELET # BLD AUTO: 160 THOUSANDS/UL (ref 149–390)
PLATELET # BLD AUTO: 165 THOUSANDS/UL (ref 149–390)
PMV BLD AUTO: 9.1 FL (ref 8.9–12.7)
PMV BLD AUTO: 9.4 FL (ref 8.9–12.7)
PO2 BLD: 140 MM HG (ref 75–129)
PO2 BLD: 317 MM HG (ref 75–129)
PO2 BLD: 318 MM HG (ref 75–129)
PO2 BLD: 351 MM HG (ref 75–129)
PO2 BLD: 44 MM HG (ref 35–45)
PO2 BLD: >400 MM HG (ref 75–129)
PO2 BLDV: 32.4 MM HG (ref 35–45)
PO2 BLDV: 32.6 MM HG (ref 35–45)
POTASSIUM BLD-SCNC: 3.3 MMOL/L (ref 3.5–5.3)
POTASSIUM BLD-SCNC: 3.9 MMOL/L (ref 3.5–5.3)
POTASSIUM BLD-SCNC: 4.2 MMOL/L (ref 3.5–5.3)
POTASSIUM BLD-SCNC: 4.5 MMOL/L (ref 3.5–5.3)
POTASSIUM BLD-SCNC: 4.8 MMOL/L (ref 3.5–5.3)
POTASSIUM BLD-SCNC: 4.8 MMOL/L (ref 3.5–5.3)
POTASSIUM SERPL-SCNC: 3.6 MMOL/L (ref 3.5–5.3)
POTASSIUM SERPL-SCNC: 4.2 MMOL/L (ref 3.5–5.3)
QRS AXIS: -64 DEGREES
QRSD INTERVAL: 128 MS
QT INTERVAL: 470 MS
QTC INTERVAL: 488 MS
SAO2 % BLD FROM PO2: 100 % (ref 60–85)
SAO2 % BLD FROM PO2: 77 % (ref 60–85)
SAO2 % BLD FROM PO2: 99 % (ref 60–85)
SODIUM BLD-SCNC: 135 MMOL/L (ref 136–145)
SODIUM BLD-SCNC: 136 MMOL/L (ref 136–145)
SODIUM BLD-SCNC: 136 MMOL/L (ref 136–145)
SODIUM BLD-SCNC: 139 MMOL/L (ref 136–145)
SODIUM BLD-SCNC: 140 MMOL/L (ref 136–145)
SODIUM BLD-SCNC: 142 MMOL/L (ref 136–145)
SODIUM SERPL-SCNC: 141 MMOL/L (ref 135–147)
SPECIMEN SOURCE: ABNORMAL
SPECIMEN SOURCE: NORMAL
SPECIMEN SOURCE: NORMAL
T WAVE AXIS: 91 DEGREES
VENTRICULAR RATE: 65 BPM

## 2022-09-12 PROCEDURE — 82330 ASSAY OF CALCIUM: CPT

## 2022-09-12 PROCEDURE — NC001 PR NO CHARGE: Performed by: PHYSICIAN ASSISTANT

## 2022-09-12 PROCEDURE — 84295 ASSAY OF SERUM SODIUM: CPT

## 2022-09-12 PROCEDURE — 93005 ELECTROCARDIOGRAM TRACING: CPT

## 2022-09-12 PROCEDURE — 84132 ASSAY OF SERUM POTASSIUM: CPT

## 2022-09-12 PROCEDURE — 02HV33Z INSERTION OF INFUSION DEVICE INTO SUPERIOR VENA CAVA, PERCUTANEOUS APPROACH: ICD-10-PCS | Performed by: GENERAL PRACTICE

## 2022-09-12 PROCEDURE — 85347 COAGULATION TIME ACTIVATED: CPT

## 2022-09-12 PROCEDURE — 33518 CABG ARTERY-VEIN TWO: CPT | Performed by: THORACIC SURGERY (CARDIOTHORACIC VASCULAR SURGERY)

## 2022-09-12 PROCEDURE — 82805 BLOOD GASES W/O2 SATURATION: CPT | Performed by: PHYSICIAN ASSISTANT

## 2022-09-12 PROCEDURE — 021109W BYPASS CORONARY ARTERY, TWO ARTERIES FROM AORTA WITH AUTOLOGOUS VENOUS TISSUE, OPEN APPROACH: ICD-10-PCS | Performed by: THORACIC SURGERY (CARDIOTHORACIC VASCULAR SURGERY)

## 2022-09-12 PROCEDURE — C1769 GUIDE WIRE: HCPCS | Performed by: THORACIC SURGERY (CARDIOTHORACIC VASCULAR SURGERY)

## 2022-09-12 PROCEDURE — 82805 BLOOD GASES W/O2 SATURATION: CPT | Performed by: NURSE PRACTITIONER

## 2022-09-12 PROCEDURE — 33533 CABG ARTERIAL SINGLE: CPT | Performed by: THORACIC SURGERY (CARDIOTHORACIC VASCULAR SURGERY)

## 2022-09-12 PROCEDURE — 82803 BLOOD GASES ANY COMBINATION: CPT

## 2022-09-12 PROCEDURE — 06BQ4ZZ EXCISION OF LEFT SAPHENOUS VEIN, PERCUTANEOUS ENDOSCOPIC APPROACH: ICD-10-PCS | Performed by: THORACIC SURGERY (CARDIOTHORACIC VASCULAR SURGERY)

## 2022-09-12 PROCEDURE — 93355 ECHO TRANSESOPHAGEAL (TEE): CPT

## 2022-09-12 PROCEDURE — 5A1223Z PERFORMANCE OF CARDIAC PACING, CONTINUOUS: ICD-10-PCS | Performed by: THORACIC SURGERY (CARDIOTHORACIC VASCULAR SURGERY)

## 2022-09-12 PROCEDURE — 94002 VENT MGMT INPAT INIT DAY: CPT

## 2022-09-12 PROCEDURE — 02100Z9 BYPASS CORONARY ARTERY, ONE ARTERY FROM LEFT INTERNAL MAMMARY, OPEN APPROACH: ICD-10-PCS | Performed by: THORACIC SURGERY (CARDIOTHORACIC VASCULAR SURGERY)

## 2022-09-12 PROCEDURE — 85049 AUTOMATED PLATELET COUNT: CPT | Performed by: THORACIC SURGERY (CARDIOTHORACIC VASCULAR SURGERY)

## 2022-09-12 PROCEDURE — 85014 HEMATOCRIT: CPT

## 2022-09-12 PROCEDURE — 82947 ASSAY GLUCOSE BLOOD QUANT: CPT

## 2022-09-12 PROCEDURE — 71045 X-RAY EXAM CHEST 1 VIEW: CPT

## 2022-09-12 PROCEDURE — 84132 ASSAY OF SERUM POTASSIUM: CPT | Performed by: PHYSICIAN ASSISTANT

## 2022-09-12 PROCEDURE — 82948 REAGENT STRIP/BLOOD GLUCOSE: CPT

## 2022-09-12 PROCEDURE — 76377 3D RENDER W/INTRP POSTPROCES: CPT

## 2022-09-12 PROCEDURE — 33508 ENDOSCOPIC VEIN HARVEST: CPT | Performed by: THORACIC SURGERY (CARDIOTHORACIC VASCULAR SURGERY)

## 2022-09-12 PROCEDURE — 85049 AUTOMATED PLATELET COUNT: CPT | Performed by: PHYSICIAN ASSISTANT

## 2022-09-12 PROCEDURE — 99291 CRITICAL CARE FIRST HOUR: CPT | Performed by: STUDENT IN AN ORGANIZED HEALTH CARE EDUCATION/TRAINING PROGRAM

## 2022-09-12 PROCEDURE — 80048 BASIC METABOLIC PNL TOTAL CA: CPT | Performed by: PHYSICIAN ASSISTANT

## 2022-09-12 PROCEDURE — 94760 N-INVAS EAR/PLS OXIMETRY 1: CPT

## 2022-09-12 PROCEDURE — 33508 ENDOSCOPIC VEIN HARVEST: CPT | Performed by: PHYSICIAN ASSISTANT

## 2022-09-12 PROCEDURE — 85014 HEMATOCRIT: CPT | Performed by: PHYSICIAN ASSISTANT

## 2022-09-12 PROCEDURE — 33518 CABG ARTERY-VEIN TWO: CPT | Performed by: PHYSICIAN ASSISTANT

## 2022-09-12 PROCEDURE — 93010 ELECTROCARDIOGRAM REPORT: CPT | Performed by: INTERNAL MEDICINE

## 2022-09-12 PROCEDURE — 30233N0 TRANSFUSION OF AUTOLOGOUS RED BLOOD CELLS INTO PERIPHERAL VEIN, PERCUTANEOUS APPROACH: ICD-10-PCS | Performed by: THORACIC SURGERY (CARDIOTHORACIC VASCULAR SURGERY)

## 2022-09-12 PROCEDURE — 85018 HEMOGLOBIN: CPT | Performed by: PHYSICIAN ASSISTANT

## 2022-09-12 PROCEDURE — 33533 CABG ARTERIAL SINGLE: CPT | Performed by: PHYSICIAN ASSISTANT

## 2022-09-12 DEVICE — MARKER CORONARY BYPASS VOSS GRAFT: Type: IMPLANTABLE DEVICE | Site: HEART | Status: FUNCTIONAL

## 2022-09-12 RX ORDER — MILRINONE LACTATE 0.2 MG/ML
0.13 INJECTION, SOLUTION INTRAVENOUS CONTINUOUS
Status: DISCONTINUED | OUTPATIENT
Start: 2022-09-12 | End: 2022-09-13

## 2022-09-12 RX ORDER — MIDAZOLAM HYDROCHLORIDE 2 MG/2ML
INJECTION, SOLUTION INTRAMUSCULAR; INTRAVENOUS AS NEEDED
Status: DISCONTINUED | OUTPATIENT
Start: 2022-09-12 | End: 2022-09-12

## 2022-09-12 RX ORDER — SODIUM CHLORIDE 450 MG/100ML
20 INJECTION, SOLUTION INTRAVENOUS CONTINUOUS
Status: DISCONTINUED | OUTPATIENT
Start: 2022-09-12 | End: 2022-09-13

## 2022-09-12 RX ORDER — ONDANSETRON 2 MG/ML
4 INJECTION INTRAMUSCULAR; INTRAVENOUS EVERY 6 HOURS PRN
Status: DISCONTINUED | OUTPATIENT
Start: 2022-09-12 | End: 2022-09-16 | Stop reason: HOSPADM

## 2022-09-12 RX ORDER — FONDAPARINUX SODIUM 2.5 MG/.5ML
2.5 INJECTION SUBCUTANEOUS DAILY
Status: DISCONTINUED | OUTPATIENT
Start: 2022-09-13 | End: 2022-09-16 | Stop reason: HOSPADM

## 2022-09-12 RX ORDER — ALBUMIN, HUMAN INJ 5% 5 %
12.5 SOLUTION INTRAVENOUS ONCE
Status: COMPLETED | OUTPATIENT
Start: 2022-09-12 | End: 2022-09-12

## 2022-09-12 RX ORDER — ACETAMINOPHEN 650 MG/1
650 SUPPOSITORY RECTAL EVERY 4 HOURS PRN
Status: DISCONTINUED | OUTPATIENT
Start: 2022-09-12 | End: 2022-09-14

## 2022-09-12 RX ORDER — HEPARIN SODIUM 1000 [USP'U]/ML
INJECTION, SOLUTION INTRAVENOUS; SUBCUTANEOUS AS NEEDED
Status: DISCONTINUED | OUTPATIENT
Start: 2022-09-12 | End: 2022-09-12

## 2022-09-12 RX ORDER — KETAMINE HCL IN NACL, ISO-OSM 100MG/10ML
SYRINGE (ML) INJECTION AS NEEDED
Status: DISCONTINUED | OUTPATIENT
Start: 2022-09-12 | End: 2022-09-12

## 2022-09-12 RX ORDER — PROTAMINE SULFATE 10 MG/ML
INJECTION, SOLUTION INTRAVENOUS AS NEEDED
Status: DISCONTINUED | OUTPATIENT
Start: 2022-09-12 | End: 2022-09-12

## 2022-09-12 RX ORDER — POTASSIUM CHLORIDE 14.9 MG/ML
20 INJECTION INTRAVENOUS ONCE AS NEEDED
Status: DISCONTINUED | OUTPATIENT
Start: 2022-09-12 | End: 2022-09-13

## 2022-09-12 RX ORDER — ROCURONIUM BROMIDE 10 MG/ML
INJECTION, SOLUTION INTRAVENOUS AS NEEDED
Status: DISCONTINUED | OUTPATIENT
Start: 2022-09-12 | End: 2022-09-12

## 2022-09-12 RX ORDER — POLYETHYLENE GLYCOL 3350 17 G/17G
17 POWDER, FOR SOLUTION ORAL DAILY
Status: DISCONTINUED | OUTPATIENT
Start: 2022-09-13 | End: 2022-09-16 | Stop reason: HOSPADM

## 2022-09-12 RX ORDER — POTASSIUM CHLORIDE 14.9 MG/ML
20 INJECTION INTRAVENOUS
Status: DISCONTINUED | OUTPATIENT
Start: 2022-09-12 | End: 2022-09-13

## 2022-09-12 RX ORDER — ASPIRIN 325 MG
325 TABLET ORAL DAILY
Status: DISCONTINUED | OUTPATIENT
Start: 2022-09-13 | End: 2022-09-16 | Stop reason: HOSPADM

## 2022-09-12 RX ORDER — LIDOCAINE HYDROCHLORIDE 20 MG/ML
INJECTION, SOLUTION EPIDURAL; INFILTRATION; INTRACAUDAL; PERINEURAL AS NEEDED
Status: DISCONTINUED | OUTPATIENT
Start: 2022-09-12 | End: 2022-09-12

## 2022-09-12 RX ORDER — ACETAMINOPHEN 325 MG/1
975 TABLET ORAL EVERY 8 HOURS
Status: DISCONTINUED | OUTPATIENT
Start: 2022-09-12 | End: 2022-09-16 | Stop reason: HOSPADM

## 2022-09-12 RX ORDER — OXYCODONE HYDROCHLORIDE 5 MG/1
2.5 TABLET ORAL EVERY 4 HOURS PRN
Status: DISCONTINUED | OUTPATIENT
Start: 2022-09-12 | End: 2022-09-13

## 2022-09-12 RX ORDER — ATORVASTATIN CALCIUM 80 MG/1
80 TABLET, FILM COATED ORAL
Status: DISCONTINUED | OUTPATIENT
Start: 2022-09-12 | End: 2022-09-16 | Stop reason: HOSPADM

## 2022-09-12 RX ORDER — FENTANYL CITRATE 50 UG/ML
50 INJECTION, SOLUTION INTRAMUSCULAR; INTRAVENOUS ONCE
Status: DISCONTINUED | OUTPATIENT
Start: 2022-09-12 | End: 2022-09-13

## 2022-09-12 RX ORDER — FUROSEMIDE 10 MG/ML
40 INJECTION INTRAMUSCULAR; INTRAVENOUS EVERY 6 HOURS PRN
Status: DISCONTINUED | OUTPATIENT
Start: 2022-09-12 | End: 2022-09-13

## 2022-09-12 RX ORDER — FENTANYL CITRATE 50 UG/ML
50 INJECTION, SOLUTION INTRAMUSCULAR; INTRAVENOUS
Status: DISCONTINUED | OUTPATIENT
Start: 2022-09-12 | End: 2022-09-13

## 2022-09-12 RX ORDER — ALBUMIN, HUMAN INJ 5% 5 %
SOLUTION INTRAVENOUS
Status: COMPLETED
Start: 2022-09-12 | End: 2022-09-12

## 2022-09-12 RX ORDER — CEFAZOLIN SODIUM 2 G/50ML
2000 SOLUTION INTRAVENOUS ONCE
Status: DISCONTINUED | OUTPATIENT
Start: 2022-09-12 | End: 2022-09-12 | Stop reason: HOSPADM

## 2022-09-12 RX ORDER — CHLORHEXIDINE GLUCONATE 0.12 MG/ML
15 RINSE ORAL 2 TIMES DAILY
Status: DISCONTINUED | OUTPATIENT
Start: 2022-09-12 | End: 2022-09-13

## 2022-09-12 RX ORDER — MAGNESIUM HYDROXIDE 1200 MG/15ML
LIQUID ORAL AS NEEDED
Status: DISCONTINUED | OUTPATIENT
Start: 2022-09-12 | End: 2022-09-12 | Stop reason: HOSPADM

## 2022-09-12 RX ORDER — CALCIUM CHLORIDE 100 MG/ML
1 INJECTION INTRAVENOUS; INTRAVENTRICULAR ONCE
Status: DISCONTINUED | OUTPATIENT
Start: 2022-09-12 | End: 2022-09-13

## 2022-09-12 RX ORDER — SODIUM CHLORIDE, SODIUM LACTATE, POTASSIUM CHLORIDE, CALCIUM CHLORIDE 600; 310; 30; 20 MG/100ML; MG/100ML; MG/100ML; MG/100ML
INJECTION, SOLUTION INTRAVENOUS CONTINUOUS PRN
Status: DISCONTINUED | OUTPATIENT
Start: 2022-09-12 | End: 2022-09-12

## 2022-09-12 RX ORDER — OXYCODONE HYDROCHLORIDE 5 MG/1
5 TABLET ORAL EVERY 4 HOURS PRN
Status: DISCONTINUED | OUTPATIENT
Start: 2022-09-12 | End: 2022-09-13

## 2022-09-12 RX ORDER — CEFAZOLIN SODIUM 2 G/50ML
2000 SOLUTION INTRAVENOUS EVERY 8 HOURS
Status: COMPLETED | OUTPATIENT
Start: 2022-09-13 | End: 2022-09-13

## 2022-09-12 RX ORDER — CEFAZOLIN SODIUM 2 G/50ML
SOLUTION INTRAVENOUS AS NEEDED
Status: DISCONTINUED | OUTPATIENT
Start: 2022-09-12 | End: 2022-09-12

## 2022-09-12 RX ORDER — MAGNESIUM SULFATE HEPTAHYDRATE 40 MG/ML
2 INJECTION, SOLUTION INTRAVENOUS ONCE
Status: COMPLETED | OUTPATIENT
Start: 2022-09-12 | End: 2022-09-12

## 2022-09-12 RX ORDER — PANTOPRAZOLE SODIUM 40 MG/1
40 TABLET, DELAYED RELEASE ORAL DAILY
Status: DISCONTINUED | OUTPATIENT
Start: 2022-09-13 | End: 2022-09-16 | Stop reason: HOSPADM

## 2022-09-12 RX ORDER — FENTANYL CITRATE 50 UG/ML
INJECTION, SOLUTION INTRAMUSCULAR; INTRAVENOUS AS NEEDED
Status: DISCONTINUED | OUTPATIENT
Start: 2022-09-12 | End: 2022-09-12

## 2022-09-12 RX ORDER — VANCOMYCIN HYDROCHLORIDE 1 G/20ML
INJECTION, POWDER, LYOPHILIZED, FOR SOLUTION INTRAVENOUS AS NEEDED
Status: DISCONTINUED | OUTPATIENT
Start: 2022-09-12 | End: 2022-09-12 | Stop reason: HOSPADM

## 2022-09-12 RX ORDER — AMIODARONE HYDROCHLORIDE 200 MG/1
200 TABLET ORAL EVERY 8 HOURS SCHEDULED
Status: DISCONTINUED | OUTPATIENT
Start: 2022-09-12 | End: 2022-09-16 | Stop reason: HOSPADM

## 2022-09-12 RX ORDER — HYDROMORPHONE HCL/PF 1 MG/ML
0.5 SYRINGE (ML) INJECTION EVERY 2 HOUR PRN
Status: DISCONTINUED | OUTPATIENT
Start: 2022-09-12 | End: 2022-09-14

## 2022-09-12 RX ORDER — LIDOCAINE HYDROCHLORIDE 20 MG/ML
INJECTION, SOLUTION EPIDURAL; INFILTRATION; INTRACAUDAL; PERINEURAL
Status: COMPLETED | OUTPATIENT
Start: 2022-09-12 | End: 2022-09-12

## 2022-09-12 RX ORDER — BISACODYL 10 MG
10 SUPPOSITORY, RECTAL RECTAL DAILY PRN
Status: DISCONTINUED | OUTPATIENT
Start: 2022-09-12 | End: 2022-09-16 | Stop reason: HOSPADM

## 2022-09-12 RX ORDER — SODIUM CHLORIDE 9 MG/ML
INJECTION, SOLUTION INTRAVENOUS CONTINUOUS PRN
Status: DISCONTINUED | OUTPATIENT
Start: 2022-09-12 | End: 2022-09-12

## 2022-09-12 RX ORDER — GLYCOPYRROLATE 0.2 MG/ML
INJECTION INTRAMUSCULAR; INTRAVENOUS AS NEEDED
Status: DISCONTINUED | OUTPATIENT
Start: 2022-09-12 | End: 2022-09-12

## 2022-09-12 RX ORDER — NEOSTIGMINE METHYLSULFATE 1 MG/ML
INJECTION INTRAVENOUS AS NEEDED
Status: DISCONTINUED | OUTPATIENT
Start: 2022-09-12 | End: 2022-09-12

## 2022-09-12 RX ADMIN — CEFAZOLIN SODIUM 2000 MG: 2 SOLUTION INTRAVENOUS at 16:21

## 2022-09-12 RX ADMIN — SODIUM CHLORIDE: 0.9 INJECTION, SOLUTION INTRAVENOUS at 12:08

## 2022-09-12 RX ADMIN — SODIUM CHLORIDE 0.7 MCG/KG/HR: 9 INJECTION, SOLUTION INTRAVENOUS at 13:29

## 2022-09-12 RX ADMIN — MAGNESIUM SULFATE HEPTAHYDRATE 2 G: 40 INJECTION, SOLUTION INTRAVENOUS at 17:16

## 2022-09-12 RX ADMIN — KETAMINE HYDROCHLORIDE 0.5 MG/KG/HR: 50 INJECTION, SOLUTION INTRAMUSCULAR; INTRAVENOUS at 12:39

## 2022-09-12 RX ADMIN — Medication 3 MG: at 21:05

## 2022-09-12 RX ADMIN — HEPARIN SODIUM 10000 UNITS: 1000 INJECTION INTRAVENOUS; SUBCUTANEOUS at 14:42

## 2022-09-12 RX ADMIN — MUPIROCIN 1 APPLICATION: 20 OINTMENT TOPICAL at 10:53

## 2022-09-12 RX ADMIN — ALPRAZOLAM 0.5 MG: 0.5 TABLET ORAL at 20:55

## 2022-09-12 RX ADMIN — AMIODARONE HYDROCHLORIDE 200 MG: 200 TABLET ORAL at 21:05

## 2022-09-12 RX ADMIN — FENTANYL CITRATE 150 MCG: 50 INJECTION INTRAMUSCULAR; INTRAVENOUS at 12:57

## 2022-09-12 RX ADMIN — ROCURONIUM BROMIDE 100 MG: 50 INJECTION INTRAVENOUS at 12:14

## 2022-09-12 RX ADMIN — GLYCOPYRROLATE 0.6 MG: 0.2 INJECTION, SOLUTION INTRAMUSCULAR; INTRAVENOUS at 17:00

## 2022-09-12 RX ADMIN — LIDOCAINE HYDROCHLORIDE 0.5 ML: 20 INJECTION, SOLUTION EPIDURAL; INFILTRATION; INTRACAUDAL; PERINEURAL at 11:43

## 2022-09-12 RX ADMIN — SODIUM CHLORIDE 20 ML/HR: 450 INJECTION, SOLUTION INTRAVENOUS at 17:30

## 2022-09-12 RX ADMIN — ALBUMIN, HUMAN INJ 5% 12.5 G: 5 SOLUTION at 20:54

## 2022-09-12 RX ADMIN — METOPROLOL SUCCINATE 100 MG: 100 TABLET, EXTENDED RELEASE ORAL at 10:53

## 2022-09-12 RX ADMIN — OXYCODONE HYDROCHLORIDE 5 MG: 5 TABLET ORAL at 20:01

## 2022-09-12 RX ADMIN — FENTANYL CITRATE 200 MCG: 50 INJECTION INTRAMUSCULAR; INTRAVENOUS at 12:14

## 2022-09-12 RX ADMIN — MUPIROCIN 1 APPLICATION: 20 OINTMENT TOPICAL at 20:56

## 2022-09-12 RX ADMIN — LIDOCAINE HYDROCHLORIDE 100 MG: 20 INJECTION, SOLUTION EPIDURAL; INFILTRATION; INTRACAUDAL; PERINEURAL at 12:14

## 2022-09-12 RX ADMIN — MILRINONE LACTATE IN DEXTROSE 0.25 MCG/KG/MIN: 200 INJECTION, SOLUTION INTRAVENOUS at 22:58

## 2022-09-12 RX ADMIN — FENTANYL CITRATE 50 MCG: 50 INJECTION INTRAMUSCULAR; INTRAVENOUS at 17:30

## 2022-09-12 RX ADMIN — HEPARIN SODIUM 5000 UNITS: 5000 INJECTION INTRAVENOUS; SUBCUTANEOUS at 06:23

## 2022-09-12 RX ADMIN — CHLORHEXIDINE GLUCONATE 15 ML: 1.2 SOLUTION ORAL at 10:53

## 2022-09-12 RX ADMIN — Medication 50 MG: at 12:14

## 2022-09-12 RX ADMIN — ALBUMIN, HUMAN INJ 5% 12.5 G: 5 SOLUTION at 19:00

## 2022-09-12 RX ADMIN — ALBUMIN (HUMAN) 12.5 G: 12.5 INJECTION, SOLUTION INTRAVENOUS at 20:54

## 2022-09-12 RX ADMIN — PROTAMINE SULFATE 250 MG: 10 INJECTION, SOLUTION INTRAVENOUS at 15:52

## 2022-09-12 RX ADMIN — SODIUM CHLORIDE, SODIUM LACTATE, POTASSIUM CHLORIDE, AND CALCIUM CHLORIDE: .6; .31; .03; .02 INJECTION, SOLUTION INTRAVENOUS at 12:08

## 2022-09-12 RX ADMIN — AMINOCAPROIC ACID 5 G: 250 INJECTION, SOLUTION INTRAVENOUS at 14:04

## 2022-09-12 RX ADMIN — NEOSTIGMINE METHYLSULFATE 3 MG: 1 INJECTION INTRAVENOUS at 17:00

## 2022-09-12 RX ADMIN — SODIUM CHLORIDE 5 UNITS/HR: 9 INJECTION, SOLUTION INTRAVENOUS at 15:31

## 2022-09-12 RX ADMIN — HYDROMORPHONE HYDROCHLORIDE 0.5 MG: 1 INJECTION, SOLUTION INTRAMUSCULAR; INTRAVENOUS; SUBCUTANEOUS at 19:29

## 2022-09-12 RX ADMIN — ALBUMIN (HUMAN) 12.5 G: 12.5 INJECTION, SOLUTION INTRAVENOUS at 19:00

## 2022-09-12 RX ADMIN — AMINOCAPROIC ACID 1 G/HR: 250 INJECTION, SOLUTION INTRAVENOUS at 14:04

## 2022-09-12 RX ADMIN — ACETAMINOPHEN 975 MG: 325 TABLET ORAL at 20:55

## 2022-09-12 RX ADMIN — ASPIRIN 81 MG CHEWABLE TABLET 81 MG: 81 TABLET CHEWABLE at 10:53

## 2022-09-12 RX ADMIN — FENTANYL CITRATE 150 MCG: 50 INJECTION INTRAMUSCULAR; INTRAVENOUS at 14:48

## 2022-09-12 RX ADMIN — ATORVASTATIN CALCIUM 80 MG: 80 TABLET, FILM COATED ORAL at 20:56

## 2022-09-12 RX ADMIN — HEPARIN SODIUM 38800 UNITS: 1000 INJECTION INTRAVENOUS; SUBCUTANEOUS at 14:04

## 2022-09-12 RX ADMIN — CEFAZOLIN SODIUM 2000 MG: 2 SOLUTION INTRAVENOUS at 12:40

## 2022-09-12 RX ADMIN — MIDAZOLAM 2 MG: 1 INJECTION INTRAMUSCULAR; INTRAVENOUS at 12:14

## 2022-09-12 RX ADMIN — HYDROMORPHONE HYDROCHLORIDE 0.5 MG: 1 INJECTION, SOLUTION INTRAMUSCULAR; INTRAVENOUS; SUBCUTANEOUS at 22:00

## 2022-09-12 RX ADMIN — ROCURONIUM BROMIDE 50 MG: 50 INJECTION INTRAVENOUS at 14:01

## 2022-09-12 NOTE — CASE MANAGEMENT
Case Management Note    Patient name Darryle Canton  Location [facility] 429/[facility] 783-01 MRN [id number]  : 1960 Date 2022       Current Admission Date: 2022  Current Admission Diagnosis:Multi-vessel CAD with stable angina (HCC)      LOS (days): 4  Geometric Mean LOS (GMLOS) (days):   Days to GMLOS:     OBJECTIVE:  Risk of Unplanned Readmission Score: 13 66   Current admission status: Inpatient   Primary Insurance: BLUE CROSS    DISCHARGE DETAILS:    Discharge planning discussed with[de-identified] Patient  Freedom of Choice: Yes  Were Treatment Team discharge recommendations reviewed with patient/caregiver?: Yes  Did patient/caregiver verbalize understanding of patient care needs?: Yes  Were patient/caregiver advised of the risks associated with not following Treatment Team discharge recommendations?: Yes    [facility]         Is the patient interested in [facility] at discharge?: Yes  [address] requested[de-identified] [address] Name[de-identified] [facility] Provider[de-identified] PCP  [address] Needed[de-identified] Post-Op Care and Assessment  Homebound Criteria Met[de-identified] Requires the Assistance of Another Person for Safe Ambulation or to Leave the Home  Supporting Clincal Findings[de-identified] Limited Endurance    Treatment Team Recommendation: Home with [facility]    Additional Comments: Pt will undergo CABG procedure this day and is recommended to have in-home post-op skilled nursing care via a VNA for his aftercare plan  CM spoke to pt about this aftercare recommendation  Pt is agreeable w/ this recommendation  CM provided pt w/ freedom of choice for VNA referrals  Pt requested referral to [facility]  CM made AIDIN referral to same  CM to follow

## 2022-09-12 NOTE — ANESTHESIA PROCEDURE NOTES
Arterial Line Insertion  Performed by: Reji Calvo MD  Authorized by: Reji Calvo MD   Consent: Verbal consent obtained  Written consent obtained  Consent given by: patient  Patient identity confirmed: verbally with patient  Preparation: Patient was prepped and draped in the usual sterile fashion    Indications: hemodynamic monitoring  Orientation:  Right  Location: radial arterylidocaine (XYLOCAINE) 2% - Infiltration   0 5 mL - 9/12/2022 11:43:00 AM  Sedation:  Patient sedated: no    Procedure Details:  Needle gauge: 20  Number of attempts: 1    Post-procedure:  Post-procedure: dressing applied  Waveform: good waveform  Post-procedure CNS: unchanged  Patient tolerance: Patient tolerated the procedure well with no immediate complications

## 2022-09-12 NOTE — ANESTHESIA PROCEDURE NOTES
Procedure Performed: DILIP Anesthesia  Start Time:         Preanesthesia Checklist    Patient identified, IV assessed, risks and benefits discussed, monitors and equipment assessed, procedure being performed at surgeon's request and anesthesia consent obtained  Procedure     Type of DILIP: complete DILIP with interpretation  Images Saved: ultrasound permanent image saved  Physician Requesting Echo: Fatou Alonso MD   Location performed: OR  Intubated  Heart visualized  Insertion of DILIP Probe:  Easy  Probe Type:  Epiaortic and multiplane  Modalities:  Color flow mapping, 3D, pulse wave Doppler and continuous wave Doppler  Echocardiographic and Doppler Measurements    PREPROCEDURE    LEFT VENTRICLE:  Systolic Function: moderately impaired  Ejection Fraction: 40%  Cavity size: normal    Regional Wall Motion Abnormalities: dyskinesis of mid anteroseptal, basal aneteroseptal, apical septal wall and apex  RIGHT VENTRICLE:  Systolic Function: normal                 AORTIC VALVE:  Leaflets: normal  Leaflet motions normal and normal  Stenosis: none  Mean Gradient: 3 mmHg  Peak Gradient: 5 mmHg  Maximum Velocity: 1 m/s  Regurgitation: none  MITRAL VALVE:  Leaflets: normal  Leaflet Motions: normal  Regurgitation: mild  Vena Contracta Width: 0 3 cm  Stenosis: none  TRICUSPID VALVE:  Leaflets: normal  Leaflet Motions: normal  Stenosis: none  Regurgitation: trace  PULMONIC VALVE:  Leaflets: normal  Regurgitation: trace  Stenosis: none  ASCENDING AORTA:  Size:  normal  Diameter: 3 7 cm  Dissection not present  AORTIC ARCH:  Size:  normal   dissection not present  Grade 1: normal to mild intimal thickening  DESCENDING AORTA:  Size: normal   Dissection not present  Grade 1: normal to mild intimal thickening  RIGHT ATRIUM:  Size:  normal      LEFT ATRIUM:  Size: normal      LEFT ATRIAL APPENDAGE:  Size: normal   Emptying Velocity: 30 cm/sec          ATRIAL SEPTUM:  Intra-atrial septal morphology: normal           VENTRICULAR SEPTUM:  Intra-ventricular septum morphology: normal          EPIAORTIC:  Plaque Thickness: 0-5 mm  OTHER FINDINGS:  Pericardium:  normal  Pleural Effusion:  none  POSTPROCEDURE    LEFT VENTRICLE: Unchanged   RIGHT VENTRICLE: Unchanged   AORTIC VALVE: Unchanged   MITRAL VALVE: Unchanged   TRICUSPID VALVE: Unchanged   PULMONIC VALVE: Unchanged             ATRIA: Unchanged   AORTA: Unchanged   REMOVAL:  Probe Removal: atraumatic

## 2022-09-12 NOTE — RESPIRATORY THERAPY NOTE
09/12/22 1925   Respiratory Assessment   Assessment Type Assess only   General Appearance Alert; Awake   Respiratory Pattern Normal   Chest Assessment Chest expansion symmetrical   Bilateral Breath Sounds Diminished   Cough Strong;Productive   Suction Oral   Resp Comments pt extubated to 6L NC without incident, BS's equal and diminished B/L  Negative stridor, postive phonation  SpO2 96%, HR 60  Pt states he is having some pain, RN at bedside and aware  RT will continue to monitor     O2 Device NC   Cough Description   Sputum Amount Small   Sputum Color Yellow   Sputum Consistency Thin   Sputum How Obtained Spontaneous cough   Oral Suctioning/Secretions   Suction Type Oral   Suction Device Yankauer   Secretion Amount Small   Secretion Color Yellow   Secretion Consistency Thin   Suction Tolerance Tolerated well   Suctioning Adverse Effects None   Additional Assessments   Pulse 60   Respirations 20   SpO2 96 %

## 2022-09-12 NOTE — ANESTHESIA POSTPROCEDURE EVALUATION
Post-Op Assessment Note    CV Status:  Stable    Pain management: adequate     Mental Status:  Sleepy and somnolent   Hydration Status:  Stable   PONV Controlled:  Controlled   Airway Patency:  Patent      Post Op Vitals Reviewed: Yes      Staff: Anesthesiologist         No complications documented      BP   104/73       Pulse  80 - AV paced   Resp   15   SpO2   92%

## 2022-09-12 NOTE — CONSULTS
2027 [facility] 58 y o  male MRN: [id number]  Unit/Bed#: [facility] 418-01 Encounter: [id number]    Inpatient consult to Coleen Mason performed by: ASHLEY Randolph  Consult ordered by: Ander Duarte PA-C          Physician Requesting Consult: Scout Madera MD    Reason for Consult / Principal Problem: S/P CABG (coronary artery bypass graft)    HPI: Joey Titus is a 58 y o  male with complicated cardiac history with VF/VT arrest in June of this year requiring IABP for coronary perfusion  Cardiac catheterization showed moderate multivessel CAD without acute lesion or thormbosis  Prior to discharge on July 7th, ICD was placed  He presented for this admission on 9/6 after fall at home  Upon ICD interrogation, it was found that he was shocked due to VF event at the same time as fall  Given this, he underwent a cardiac catheterization which demonstrated worsening CAD  He was transferred to [facility] and CT Surgery was consulted for evaluation  Today he presents s/p CABG x 3 (LIMA to LAD, SVG to RCA, and first diagnonal)  Uneventful OR course  Post op EF 40%  He arrives to the ICU on no inotropic or vasopressor support and AV paced  Cardiopulmonary bypass time: 62 minutes  Crossclamp time: 51 minutes    Past medical history: Cardiac arrest secondary to ventricular fibrillation, HTN, CAD, systolic heart failure, ICD in place    History obtained from chart review due to patient being intubated and sedated  ---------------------------------------------------------------------------------------------------------------------------------------------------------------------  Impressions:  1  CAD s/p CABG x 3  2  Hx of Vfib arrest s/p ICU  3  Recent ICD shock due to Vfib   4  Systolic heart failure  5  HTN    Plan:    Neuro: D/C continuous sedation  PRN dilaudid, oxycodone for pain  Trend neuro exam   Delirium precautions       CV: MAP goal >65 SBP goal <130  CI>2 2  Post-op medications: None     Volume resuscitation as needed  Monitor rhythm on telemetry  Epicardial pacing wires Ax1, Vx1  Intra-op DILIP LVEF 40%  Lung: Check STAT post-op ABG and CXR  Wean vent with spontaneous breathing trial with goal to extubate when able  Significant smoking history, goal sp02 >88%  GI: GI prophylaxis with PPI  Bowel regimen  Zofran PRN for nausea  FEN: NPO  Replenish K >4 0, mag >2 0 and calcium >7 0  : Check STAT post-op BMP  Dupree in place  Monitor UOP with goal >0 5cc/kg/hour  Lasix versus volume resuscitate as needed depending on hemodynamics and volume status  ID: Prophylactic post-op abx  Maintain normothermia  Trend temps  Heme: Check STAT post-op H/H and platelets  Monitor incision site, invasive lines, and chest tube outputs for bleeding  Send coag panel if needed  Endo: Insulin gtt for blood sugar control  Results from last 6 Months   Lab Units 09/09/22  1922 06/30/22  0432   HEMOGLOBIN A1C % 5 8* 5 9*     Disposition: ICU Care   ---------------------------------------------------------------------------------------------------------------------------------------------------------------------  Historical Information   Past Medical History:   Diagnosis Date    PERCY [facility] [address] [facility]     Bilateral inguinal hernia     [facility]     Dizziness 02/02/2021    isolated incident of dizziness, sweating & disorientation    Elevated LFTs     History of left heart catheterization     and IABP, ICD PLACEMENT, DUAL CHAMBER, [employer]    Hyperglycemia     Hypertension     Pneumonia     STEMI [facility])      Past Surgical History:   Procedure Laterality Date    CARDIAC CATHETERIZATION N/A 6/29/2022    Procedure: Cardiac pci;  Surgeon: Tyra Jin MD;  Location: [facility] CARDIAC CATH LAB;   Service: Cardiology    CARDIAC CATHETERIZATION N/A 6/29/2022    Procedure: Cardiac iabp; Surgeon: Jatin Horvath MD;  [facility]: [facility] CARDIAC CATH LAB; Service: Cardiology    CARDIAC CATHETERIZATION N/A 9/9/2022    Procedure: CARDIAC CATHETERIZATION;  Surgeon: Charmaine Hamilton DO;  Location: [facility] CARDIAC CATH LAB; Service: Cardiology    CARDIAC ELECTROPHYSIOLOGY PROCEDURE N/A 7/5/2022    Procedure: Cardiac icd implant;  Surgeon: Elbert Small MD;  Location: [facility] CARDIAC CATH LAB; Service: Cardiology    CO REPAIR ING HERNIA,5+Y/O,REDUCIBL Bilateral 2/9/2021    Procedure: INGUINAL HERNIA REPAIR with mesh;  Surgeon: Claudette Brock, MD;  Location: [facility] MAIN OR;  Service: General    TONSILLECTOMY       Social History   Social History     Substance and Sexual Activity   Alcohol Use Not Currently     Social History     Substance and Sexual Activity   Drug Use Not Currently     Social History     Tobacco Use   Smoking Status Current Every Day Smoker    Packs/day: 0 50    Types: Cigarettes   Smokeless Tobacco Never Used     Family History   Problem Relation Age of Onset    Diabetes Maternal Grandfather      I have reviewed this patient's family history and commented on sigificant items within the HPI    ROS: ROS unable to be obtained due to patient being intubated and sedated  Allergies: No Known Allergies    Home Medications:   Prior to Admission medications    Medication Sig Start Date End Date Taking?  Authorizing Provider   amLODIPine (NORVASC) 2 5 mg tablet Take 1 tablet (2 5 mg total) by mouth daily 8/25/22   Rohan Michaels PA-C   aspirin 81 mg chewable tablet Chew 1 tablet (81 mg total) daily 8/18/22   Anne Mcgraw MD   atorvastatin (LIPITOR) 40 mg tablet Take 1 tablet (40 mg total) by mouth daily with dinner 8/18/22   Anne Mcgraw MD   lisinopril (ZESTRIL) 10 mg tablet Take 1 tablet (10 mg total) by mouth daily 8/18/22   Anne Mcgraw MD   metoprolol succinate (TOPROL-XL) 50 mg 24 hr tablet Take 1 tablet (50 mg total) by mouth daily 8/18/22 Emi Chou MD   ticagrelor (BRILINTA) 90 MG Take 1 tablet (90 mg total) by mouth every 12 (twelve) hours 8/18/22   Emi Chou MD   sacubitril-valsartan Raysa Bare) 24-26 MG TABS Take 1 tablet by mouth 2 (two) times a day 7/5/22 7/5/22  Vinnie Polo MD     Inpatient Medications:  Scheduled Meds:  Current Facility-Administered Medications   Medication Dose Route Frequency Provider Last Rate    acetaminophen  650 mg Rectal Q4H PRN Destini Jeffrey PA-C      acetaminophen  975 mg Oral Q8H Destini Jeffrey PA-C      ALPRAZolam  0 5 mg Oral Daily PRN Destini Jeffrey PA-C      amiodarone  200 mg Oral [facility] Destini Jeffrey [place]      [START ON 9/13/2022] aspirin  325 mg Oral Daily Destini Jeffrey PA-C      atorvastatin  80 mg Oral Daily With [employer] Pallavi, PA-C      bisacodyl  10 mg Rectal Daily PRN Destini Jeffrey PA-C      calcium chloride  1 g Intravenous Once Destini Jeffrey PA-C      [START ON 9/13/2022] cefazolin  2,000 mg Intravenous Q8H Destini Jeffrey PA-C      chlorhexidine  15 mL Swish & Spit BID Destini Jeffrey PA-C      epinephrine  1-20 mcg/min Intravenous Continuous Araceli Sauce ROSS Lopez      fentanyl citrate (PF)  50 mcg Intravenous Once Destini Jeffrey PA-C      fentanyl citrate (PF)  50 mcg Intravenous Q1H PRN Destini Jeffrey PA-C      [START ON 9/13/2022] fondaparinux  2 5 mg Subcutaneous Daily Destini Jeffrey PA-C      furosemide  40 mg Intravenous Q6H PRN Destini Jeffrey PA-C      HYDROmorphone  0 5 mg Intravenous Q2H PRN Destini Jeffrey PA-C      insulin regular (HumuLIN R,NovoLIN R) infusion  0 3-21 Units/hr Intravenous Titrated Destini Jeffrey PA-C      lactated ringers  500 mL Intravenous Q30 Min PRN Destini Jeffrey PA-C      lidocaine (cardiac)  100 mg Intravenous Q30 Min PRN Destini Jeffrey PA-C      magnesium sulfate  2 g Intravenous Once Destini Jeffrey PA-C      melatonin  3 mg Oral HS Destini Jeffrey PA-C      mupirocin  1 application Nasal X14S [address] [place] Dues, PA-C      niCARdipine  2 5-15 mg/hr Intravenous Titrated [place] Dues, PA-C      ondansetron  4 mg Intravenous Q6H PRN [place] Dues, PA-C      oxyCODONE  2 5 mg Oral Q4H PRN [place] Dues, PA-C      oxyCODONE  5 mg Oral Q4H PRN [place] Dues, PA-C      [START ON 2022] pantoprazole  40 mg Oral Daily [place] Dues, PA-C      [START ON 2022] polyethylene glycol  17 g Oral Daily [place] Dues, PA-C      potassium chloride  20 mEq Intravenous Once PRN [place] Dues, PA-C      potassium chloride  20 mEq Intravenous Q1H PRN [place] Dues, PA-C      potassium chloride  20 mEq Intravenous Q30 Min PRN [place] Dues, PA-C      sodium chloride  20 mL/hr Intravenous Continuous [place] Dues, PA-C       Continuous Infusions:epinephrine, 1-20 mcg/min  insulin regular (HumuLIN R,NovoLIN R) infusion, 0 3-21 Units/hr  niCARdipine, 2 5-15 mg/hr  sodium chloride, 20 mL/hr      PRN Meds:  acetaminophen, 650 mg, Q4H PRN  ALPRAZolam, 0 5 mg, Daily PRN  bisacodyl, 10 mg, Daily PRN  fentanyl citrate (PF), 50 mcg, Q1H PRN  furosemide, 40 mg, Q6H PRN  HYDROmorphone, 0 5 mg, Q2H PRN  lactated ringers, 500 mL, Q30 Min PRN  lidocaine (cardiac), 100 mg, Q30 Min PRN  ondansetron, 4 mg, Q6H PRN  oxyCODONE, 2 5 mg, Q4H PRN  oxyCODONE, 5 mg, Q4H PRN  potassium chloride, 20 mEq, Once PRN  potassium chloride, 20 mEq, Q1H PRN  potassium chloride, 20 mEq, Q30 Min PRN      ---------------------------------------------------------------------------------------------------------------------------------------------------------------------  Vitals:   Vitals:    22 0618 22 0804 22 1053 22 1056   BP: 118/76 115/77 138/87 138/87   BP Location:  Right arm     Pulse:   67    Resp: 18 18     Temp:  98 2 °F (36 8 °C)     TempSrc:  Oral     SpO2:       Weight: 92 9 kg (204 lb 12 9 oz)        Arterial Line:          Temperature: Temp (24hrs), Av 2 °F (36 8 °C), Min:98 2 °F (36 8 °C), Max:98 2 °F (36 8 °C)  Current: Temperature: 98 2 °F (36 8 °C)    Weights:    Body mass index is 26 3 kg/m²  Hemodynamic Monitoring:  PAP:  , CVP:  , CO:  , CI:  , SVR:      Ventilator Settings:  Respiratory  Report   Lab Data (Last 4 hours)    None         O2/Vent Data (Last 4 hours)       1654          Patient safety screen outcome: Failed                 Labs:   Results from last 7 days   Lab Units 22  1701 22  16122  1528 22  1527 22  1245 09/10/22  0524 22  0537 22  1615 22  1033   WBC Thousand/uL  --   --   --   --   --  8 62 9 41 12 36* 11 90*   HEMOGLOBIN g/dL 11 5*  --   --   --   --  12 0 12 8 13 5 14 1   I STAT HEMOGLOBIN g/dl  --  7 5*  --  8 8*   < >  --   --   --   --    HEMATOCRIT % 34 8*  --   --   --   --  36 9 39 4 40 3 42 5   HEMATOCRIT, ISTAT %  --  22*  --  26*   < >  --   --   --   --    PLATELETS Thousands/uL 165  --  160  --   --  201 225 238 257   NEUTROS PCT %  --   --   --   --   --   --   --   --  70   MONOS PCT %  --   --   --   --   --   --   --   --  8    < > = values in this interval not displayed  Results from last 7 days   Lab Units 22  1701 22  16122  1527 22  1517 22  1245 09/10/22  0524 22  0537   SODIUM mmol/L 141  --   --   --   --  139 137   POTASSIUM mmol/L 4 2  --   --   --   --  4 0 4 2   CHLORIDE mmol/L 111*  --   --   --   --  110* 108   CO2 mmol/L 23  --   --   --   --  25 23   CO2, I-STAT mmol/L  --  19* 28 27   < >  --   --    BUN mg/dL 20  --   --   --   --  16 20   CREATININE mg/dL 1 08  --   --   --   --  1 07 0 86   CALCIUM mg/dL 8 7  --   --   --   --  9 5 9 3   GLUCOSE, ISTAT mg/dl  --  146* 166* 157*   < >  --   --     < > = values in this interval not displayed  Post-op AB 33/33/140/17 6/-8/99%  Post-op CXR: CXR with lines and tubes in adequate position  Low lung volumes  No pneumothorax   I have personally reviewed pertinent films in PACS  Post-op EKG: AV paced  This was personally reviewed by myself  Physical Exam  Constitutional:       Interventions: He is sedated and intubated  HENT:      Head: Normocephalic and atraumatic  Mouth/Throat:      Mouth: Mucous membranes are dry  Pharynx: Oropharynx is clear  Eyes:      Extraocular Movements: Extraocular movements intact  Pupils: Pupils are equal, round, and reactive to light  Neck:      Comments: R IJ swan/TLC  Cardiovascular:      Rate and Rhythm: Normal rate and regular rhythm  Pulses: Normal pulses  Heart sounds: Normal heart sounds  No murmur heard  Comments: AV epicardial wires  Pulmonary:      Effort: No respiratory distress  He is intubated  Breath sounds: Normal breath sounds  No wheezing or rales  Comments: CT with minimal sanguineous drainage  Abdominal:      General: Abdomen is flat  There is no distension  Palpations: Abdomen is soft  Genitourinary:     Comments: Dupree with clear, yellow urine   Musculoskeletal:      Cervical back: Normal range of motion  Right lower leg: No edema  Left lower leg: No edema  Skin:     General: Skin is warm and dry  Capillary Refill: Capillary refill takes less than 2 seconds  Comments: MSI dressing intact   Neurological:      Comments: Sedated immediately after OR       Invasive lines and devices: Invasive Devices  Report    Central Venous Catheter Line  Duration           CVC Central Lines 09/12/22 Triple <1 day    Introducer 09/12/22 <1 day          Peripheral Intravenous Line  Duration           Peripheral IV 09/10/22 Left;Proximal;Ventral (anterior) Forearm 2 days          Arterial Line  Duration           Arterial Line 09/12/22 Right Femoral <1 day    Arterial Line 09/12/22 Right Radial <1 day          Line  Duration           IABP 8 0 Fr   50 mL 75 days    Pacer Wires <1 day    Pacer Wires <1 day          Drain  Duration           Chest Tube 1 Left Pleural 32 Fr  <1 day    Chest Tube 2 Posterior;Mediastinal 32 Fr  <1 day    Chest Tube 3 Anterior;Mediastinal 32 Fr  <1 day    Urethral Catheter Non-latex; Temperature probe 16 Fr  <1 day          Airway  Duration           ETT  Hi-Lo; Cuffed 8 mm <1 day              ---------------------------------------------------------------------------------------------------------------------------------------------------------------------  Care Time Delivered:   No Critical Care time spent     SIGNATURE: ASHLEY Mcmahan  DATE: September 12, 2022  TIME: 5:34 PM

## 2022-09-12 NOTE — ANESTHESIA PREPROCEDURE EVALUATION
Procedure:  CORONARY ARTERY BYPASS GRAFT (CABG) 3 (N/A Chest)    Relevant Problems   CARDIO   (+) Angina at rest [facility])   (+) Cardiac arrest with ventricular fibrillation ([facility])   (+) Essential hypertension   (+) Multi-vessel CAD with stable angina ([facility])   (+) [facility]   (+) Ventricular fibrillation (HCC)      /RENAL   (+) PERCY (acute kidney injury) ([facility] )      NEURO/PSYCH   (+) History of cardiac arrest   (+) Multi-vessel CAD with stable angina (HCC)      PULMONARY   (+) Pneumonia      Echo 9/8/2022:       Left Ventricle: Left ventricular cavity size is dilated  Wall thickness is normal  There is eccentric hypertrophy  The left ventricular ejection fraction is 40%  Systolic function is moderately reduced    The following segments are dyskinetic: mid anteroseptal, mid inferoseptal, apical septal and apex    All other segments are normal      Cath:     · Mid LAD lesion is 50% stenosed  · Dist LAD lesion is 70% stenosed  · 1st Diag lesion is 60% stenosed  · 2nd Diag lesion is 60% stenosed  · 1st Mrg lesion is 50% stenosed  · 3rd Mrg lesion is 99% stenosed  · Mid RCA lesion is 60% stenosed  · 3rd RPL lesion is 60% stenosed  · There is mild left ventricular systolic dysfunction  Moderate 3v CAD      Physical Exam    Airway    Mallampati score: III  TM Distance: >3 FB  Neck ROM: full     Dental   lower dentures and upper dentures,     Cardiovascular  Cardiovascular exam normal    Pulmonary  Pulmonary exam normal     Other Findings        Anesthesia Plan  ASA Score- 4     Anesthesia Type- general with ASA Monitors  Additional Monitors: arterial line  Airway Plan: ETT  Plan Factors-Exercise tolerance (METS): >4 METS  Chart reviewed  EKG reviewed  Imaging results reviewed  Existing labs reviewed  Patient is a current smoker  Patient did not smoke on day of surgery  Induction- intravenous      Postoperative Plan-     Informed Consent- Anesthetic plan and risks discussed with patient  I personally reviewed this patient with the CRNA  Discussed and agreed on the Anesthesia Plan with the IRENA Evans

## 2022-09-12 NOTE — INTERVAL H&P NOTE
Vitals:    09/12/22 0618   BP: 118/76   Pulse:    Resp: 18   Temp:    SpO2:          H&P reviewed  After examining the patient I find no changes in the patients condition since the H&P was completed  Plan for CABG  Preoperative Beta Blocker: indicated  Anticipated Length of Stay: Patient will be admitted on an inpatient basis with an anticipated length of stay of grater than 2 midnights  Justification for Hospital StaY: Post surgical recovery following open heart surgery        Lucy Aldridge MD  09/12/22  7:52 AM

## 2022-09-12 NOTE — OP NOTE
OPERATIVE REPORT  PATIENT NAME: Liz Osorio    :  1960  MRN: [id number]  Pt Location: [facility] OR ROOM 16    SURGERY DATE: 2022    SURGEON: Madison Bush MD    ASSISTANT: Moon Alex PA-C    ADDITIONAL ASSISTANT: None    PREOPERATIVE DIAGNOSIS:  Multivessel coronary artery disease    POSTOPERATIVE DIAGNOSIS:  Multivessel coronary artery disease    NYHA Class: 4    CCS Class: 4    PROCEDURE: Coronary artery bypass grafting x 3 with left internal mammary artery to left anterior descending, saphenous vein graft to right coronary artery, saphenous vein graft to first diagonal      ANESTHESIA: General endotracheal anesthesia with transesophageal echocardiogram guidance, Dr Jhonny Kim: 62 minutes  CROSSCLAMP TIME: 51 minutes  PACKS/TUBES/DRAINS: Chest tubes x 3  MATERIALS: Pacing wires: A x1  V x1  TRANSFUSION: None  SPECIMENS: None  ESTIMATED BLOOD LOSS: 200 mL    OPERATIVE TECHNIQUE:    The patient was taken to the operating room and placed supine on the operating table  Following the satisfactory induction of general anesthesia and placement of monitoring lines, the patient was prepped and draped in the usual sterile fashion  A time-out procedure was performed  The patient underwent median sternotomy, LIMA harvest, endoscopic left greater saphenous vein harvest, systemic heparinization and conduit preparation  The patient underwent pericardiotomy and epiaortic ultrasound was used to evaluate the ascending aorta, which was found to be free of significant atheromatous disease  The patient underwent aortic and right atrial cannulation and was initiated on bypass  The ascending aorta was crossclamped  [place] cardioplegia was delivered with an excellent arrest     The saphenous vein was anastomosed to the right coronary artery in end-to-side fashion using running 7-0 Prolene suture   The saphenous vein was anastomosed to the  first diagonal in end-to-side fashion using running 7-0 Prolene suture  The left internal mammary artery was anastomosed to the left anterior descending in end-to-side fashion using running 7-0 Prolene suture  The quality of all three grafts was excellent  A total of 2 proximal anastomoses were completed on the ascending aorta in end-to-side fashion using running 5-0 Prolene suture  The heart was de-aired and the crossclamp was removed  Atrial and ventricular pacing wires were placed  Following a period of reperfusion, the patient was weaned from cardiopulmonary bypass and decannulated  Protamine was administered with normalization of the ACT  Hemostasis was confirmed in all fields  Thoracostomy tubes were placed  The sternum was closed with stainless steel wires  The fascia, subdermis and skin were closed with multiple layers of running absorbable suture  As the attending surgeon, I was present and scrubbed for all critical portions of this procedure  Sponge, needle and instrument counts were reported as correct by the nursing staff  There is no cardiac residency program at this facility and for complex procedures such as this, it is vital to have a physician assistant experienced in cardiac surgery  The assistance of Ana Ma PA-C was necessary for endoscopic saphenous vein harvesting, retraction of the heart and coronary conduit with proper tissue handling techniques, and following of the suture with correct tension during construction of the proximal and distal coronary anastomoses  Final transesophageal echocardiogram demonstrated improved function of the ventricular septum  Ting Reis MD  DATE: September 12, 2022  TIME: 4:26 PM

## 2022-09-12 NOTE — ANESTHESIA PROCEDURE NOTES
Central Line Insertion  Performed by: Reji Calvo MD  Authorized by: Reji Calvo MD     Date/Time: 9/12/2022 12:22 PM  Catheter Type:  triple lumen  Consent: Verbal consent obtained  Written consent obtained    Consent given by: patient  Patient understanding: patient states understanding of the procedure being performed  Patient consent: the patient's understanding of the procedure matches consent given  Procedure consent: procedure consent matches procedure scheduled  Relevant documents: relevant documents present and verified  Patient identity confirmed: arm band and hospital-assigned identification number  Indications: central pressure monitoring and vascular access  Catheter size: 7 Fr  Patient position: Trendelenburg  Assessment: blood return through all ports  Preparation: skin prepped with 2% chlorhexidine  Skin prep agent dried: skin prep agent completely dried prior to procedure  Sterile barriers: all five maximum sterile barriers used - cap, mask, sterile gown, sterile gloves, and large sterile sheet  Hand hygiene: hand hygiene performed prior to central venous catheter insertion  Ultrasound guidance: yes  sterile gel and probe cover used in ultrasound-guided central venous catheter insertionVessel of Catheter Tip End: SVC  Number of attempts: 1  Post-procedure: line sutured and dressing applied  Patient tolerance: Patient tolerated the procedure well with no immediate complications

## 2022-09-12 NOTE — ANESTHESIA PROCEDURE NOTES
Introducer/Jimmy  Performed by: Chandan Stafford MD  Authorized by: Chandan Stafford MD     Date/Time: 9/12/2022 12:35 PM  Consent: Verbal consent obtained  Written consent obtained    Consent given by: patient  Patient identity confirmed: arm band, hospital-assigned identification number and provided demographic data  Indications: vascular access and central pressure monitoring  Catheter size: 9 Fr  Assessment: blood return through all ports  Preparation: skin prepped with 2% chlorhexidine  Skin prep agent dried: skin prep agent completely dried prior to procedure  Sterile barriers: all five maximum sterile barriers used - cap, mask, sterile gown, sterile gloves, and large sterile sheet  Hand hygiene: hand hygiene performed prior to central venous catheter insertion  Ultrasound guidance: yes  Number of attempts: 1  Successful placement: yes  Post-procedure: line sutured and dressing applied  Patient tolerance: Patient tolerated the procedure well with no immediate complications

## 2022-09-13 ENCOUNTER — APPOINTMENT (OUTPATIENT)
Dept: RADIOLOGY | Facility: HOSPITAL | Age: 62
DRG: 233 | End: 2022-09-13
Payer: COMMERCIAL

## 2022-09-13 LAB
ABO GROUP BLD BPU: NORMAL
ANION GAP SERPL CALCULATED.3IONS-SCNC: 4 MMOL/L (ref 4–13)
AORTIC VALVE ANNULUS: 2 CM
AORTIC VALVE MEAN VELOCITY: 7.2 M/S
APICAL FOUR CHAMBER EJECTION FRACTION: 42 %
ASCENDING AORTA: 3.7 CM
ATRIAL RATE: 74 BPM
AV AREA BY CONTINUOUS VTI: 2.1 CM2
AV AREA PEAK VELOCITY: 2.3 CM2
AV LVOT MEAN GRADIENT: 1 MMHG
AV LVOT PEAK GRADIENT: 2 MMHG
AV MEAN GRADIENT: 3 MMHG
AV PEAK GRADIENT: 5 MMHG
BASE EX.OXY STD BLDV CALC-SCNC: 50.7 % (ref 60–80)
BASE EX.OXY STD BLDV CALC-SCNC: 60.7 % (ref 60–80)
BASE EXCESS BLDA CALC-SCNC: -2 MMOL/L (ref -2–3)
BASE EXCESS BLDV CALC-SCNC: -0.5 MMOL/L
BASE EXCESS BLDV CALC-SCNC: -1.8 MMOL/L
BODY TEMPERATURE: 100.2 DEGREES FEHRENHEIT
BODY TEMPERATURE: 97.7 DEGREES FEHRENHEIT
BPU ID: NORMAL
BUN SERPL-MCNC: 21 MG/DL (ref 5–25)
CA-I BLD-SCNC: 1.28 MMOL/L (ref 1.12–1.32)
CALCIUM SERPL-MCNC: 6.7 MG/DL (ref 8.3–10.1)
CHLORIDE SERPL-SCNC: 115 MMOL/L (ref 96–108)
CO2 SERPL-SCNC: 21 MMOL/L (ref 21–32)
CREAT SERPL-MCNC: 0.73 MG/DL (ref 0.6–1.3)
CROSSMATCH: NORMAL
DOP CALC AO VTI: 21.5 CM
DOP CALC LVOT AREA: 3.8 CM2
DOP CALC LVOT DIAMETER: 2.2 CM
DOP CALC LVOT PEAK VEL VTI: 11.7 CM
DOP CALC LVOT PEAK VEL: 0.66 M/S
DOP CALC LVOT STROKE INDEX: 20 ML/M2
DOP CALC LVOT STROKE VOLUME: 44 CM3
E WAVE DECELERATION TIME: 137 MS
E/A RATIO: 1.42
ERYTHROCYTE [DISTWIDTH] IN BLOOD BY AUTOMATED COUNT: 13.1 % (ref 11.6–15.1)
FIO2 GAS DIL.REBREATH: 50 L
GFR SERPL CREATININE-BSD FRML MDRD: 99 ML/MIN/1.73SQ M
GLUCOSE SERPL-MCNC: 112 MG/DL (ref 65–140)
GLUCOSE SERPL-MCNC: 113 MG/DL (ref 65–140)
GLUCOSE SERPL-MCNC: 119 MG/DL (ref 65–140)
GLUCOSE SERPL-MCNC: 119 MG/DL (ref 65–140)
GLUCOSE SERPL-MCNC: 122 MG/DL (ref 65–140)
GLUCOSE SERPL-MCNC: 123 MG/DL (ref 65–140)
GLUCOSE SERPL-MCNC: 141 MG/DL (ref 65–140)
GLUCOSE SERPL-MCNC: 143 MG/DL (ref 65–140)
GLUCOSE SERPL-MCNC: 147 MG/DL (ref 65–140)
GLUCOSE SERPL-MCNC: 150 MG/DL (ref 65–140)
GLUCOSE SERPL-MCNC: 70 MG/DL (ref 65–140)
HCO3 BLDA-SCNC: 23.4 MMOL/L (ref 22–28)
HCO3 BLDV-SCNC: 23.8 MMOL/L (ref 24–30)
HCO3 BLDV-SCNC: 24.2 MMOL/L (ref 24–30)
HCT VFR BLD AUTO: 29.9 % (ref 36.5–49.3)
HCT VFR BLD AUTO: 31.8 % (ref 36.5–49.3)
HCT VFR BLD CALC: 32 % (ref 36.5–49.3)
HGB BLD-MCNC: 10.7 G/DL (ref 12–17)
HGB BLD-MCNC: 9.7 G/DL (ref 12–17)
HGB BLDA-MCNC: 10.9 G/DL (ref 12–17)
LEFT VENTRICLE DIASTOLIC VOLUME (MOD BIPLANE): 114 ML
LEFT VENTRICLE SYSTOLIC VOLUME (MOD BIPLANE): 68 ML
LV EF: 40 %
MAGNESIUM SERPL-MCNC: 2.2 MG/DL (ref 1.6–2.6)
MCH RBC QN AUTO: 30.4 PG (ref 26.8–34.3)
MCHC RBC AUTO-ENTMCNC: 32.4 G/DL (ref 31.4–37.4)
MCV RBC AUTO: 94 FL (ref 82–98)
MV PEAK A VEL: 0.33 M/S
MV PEAK E VEL: 47 CM/S
MV STENOSIS PRESSURE HALF TIME: 40 MS
MV VALVE AREA P 1/2 METHOD: 5.5 CM2
NASAL CANNULA: 2
NASAL CANNULA: 3
O2 CT BLDV-SCNC: 7.7 ML/DL
O2 CT BLDV-SCNC: 8.9 ML/DL
P AXIS: 40 DEGREES
PCO2 BLD: 25 MMOL/L (ref 21–32)
PCO2 BLD: 41.5 MM HG (ref 42–50)
PCO2 BLD: 43.2 MM HG (ref 42–50)
PCO2 BLD: 43.8 MM HG (ref 36–44)
PCO2 BLDV: 39.9 MM HG (ref 42–50)
PCO2 BLDV: 44.2 MM HG (ref 42–50)
PH BLD: 7.34 [PH] (ref 7.35–7.45)
PH BLD: 7.36 [PH] (ref 7.3–7.4)
PH BLD: 7.39 [PH] (ref 7.3–7.4)
PH BLDV: 7.35 [PH] (ref 7.3–7.4)
PH BLDV: 7.4 [PH] (ref 7.3–7.4)
PLATELET # BLD AUTO: 171 THOUSANDS/UL (ref 149–390)
PMV BLD AUTO: 9.4 FL (ref 8.9–12.7)
PO2 BLD: 65 MM HG (ref 75–129)
PO2 BLDV: 28.4 MM HG (ref 35–45)
PO2 BLDV: 33.2 MM HG (ref 35–45)
PO2 VENOUS TEMP CORRECTED: 30.3 MM HG (ref 35–45)
PO2 VENOUS TEMP CORRECTED: 32 MM HG (ref 35–45)
POTASSIUM BLD-SCNC: 4.1 MMOL/L (ref 3.5–5.3)
POTASSIUM SERPL-SCNC: 3.6 MMOL/L (ref 3.5–5.3)
POTASSIUM SERPL-SCNC: 4.2 MMOL/L (ref 3.5–5.3)
PR INTERVAL: 176 MS
PV PEAK D VEL: 0.2 M/S
PV PEAK S VEL: 0.29 M/S
QRS AXIS: -60 DEGREES
QRSD INTERVAL: 90 MS
QT INTERVAL: 390 MS
QTC INTERVAL: 432 MS
RBC # BLD AUTO: 3.19 MILLION/UL (ref 3.88–5.62)
SAO2 % BLD FROM PO2: 91 % (ref 60–85)
SINOTUBULAR JUNCTION: 3 CM
SL CV SINUS OF VALSALVA 2D: 3.3 CM
SODIUM BLD-SCNC: 139 MMOL/L (ref 136–145)
SODIUM SERPL-SCNC: 140 MMOL/L (ref 135–147)
SPECIMEN SOURCE: ABNORMAL
STJ: 3 CM
T WAVE AXIS: 1 DEGREES
TRICUSPID ANNULAR PLANE SYSTOLIC EXCURSION: 1.8 CM
UNIT DISPENSE STATUS: NORMAL
UNIT PRODUCT CODE: NORMAL
UNIT PRODUCT VOLUME: 350 ML
UNIT RH: NORMAL
VENTRICULAR RATE: 74 BPM
WBC # BLD AUTO: 16.05 THOUSAND/UL (ref 4.31–10.16)

## 2022-09-13 PROCEDURE — 84132 ASSAY OF SERUM POTASSIUM: CPT | Performed by: PHYSICIAN ASSISTANT

## 2022-09-13 PROCEDURE — 71045 X-RAY EXAM CHEST 1 VIEW: CPT

## 2022-09-13 PROCEDURE — 93005 ELECTROCARDIOGRAM TRACING: CPT

## 2022-09-13 PROCEDURE — 85018 HEMOGLOBIN: CPT | Performed by: PHYSICIAN ASSISTANT

## 2022-09-13 PROCEDURE — 85014 HEMATOCRIT: CPT | Performed by: PHYSICIAN ASSISTANT

## 2022-09-13 PROCEDURE — 83735 ASSAY OF MAGNESIUM: CPT | Performed by: PHYSICIAN ASSISTANT

## 2022-09-13 PROCEDURE — 99024 POSTOP FOLLOW-UP VISIT: CPT | Performed by: THORACIC SURGERY (CARDIOTHORACIC VASCULAR SURGERY)

## 2022-09-13 PROCEDURE — 85027 COMPLETE CBC AUTOMATED: CPT | Performed by: PHYSICIAN ASSISTANT

## 2022-09-13 PROCEDURE — 97163 PT EVAL HIGH COMPLEX 45 MIN: CPT

## 2022-09-13 PROCEDURE — 99232 SBSQ HOSP IP/OBS MODERATE 35: CPT | Performed by: INTERNAL MEDICINE

## 2022-09-13 PROCEDURE — 93010 ELECTROCARDIOGRAM REPORT: CPT | Performed by: INTERNAL MEDICINE

## 2022-09-13 PROCEDURE — 82948 REAGENT STRIP/BLOOD GLUCOSE: CPT

## 2022-09-13 PROCEDURE — 80048 BASIC METABOLIC PNL TOTAL CA: CPT | Performed by: PHYSICIAN ASSISTANT

## 2022-09-13 PROCEDURE — 3C1ZX8Z IRRIGATION OF INDWELLING DEVICE USING IRRIGATING SUBSTANCE, EXTERNAL APPROACH: ICD-10-PCS | Performed by: THORACIC SURGERY (CARDIOTHORACIC VASCULAR SURGERY)

## 2022-09-13 PROCEDURE — 97166 OT EVAL MOD COMPLEX 45 MIN: CPT

## 2022-09-13 PROCEDURE — 99291 CRITICAL CARE FIRST HOUR: CPT | Performed by: STUDENT IN AN ORGANIZED HEALTH CARE EDUCATION/TRAINING PROGRAM

## 2022-09-13 PROCEDURE — 82805 BLOOD GASES W/O2 SATURATION: CPT | Performed by: PHYSICIAN ASSISTANT

## 2022-09-13 PROCEDURE — 94760 N-INVAS EAR/PLS OXIMETRY 1: CPT

## 2022-09-13 RX ORDER — INSULIN LISPRO 100 [IU]/ML
1-6 INJECTION, SOLUTION INTRAVENOUS; SUBCUTANEOUS
Status: DISCONTINUED | OUTPATIENT
Start: 2022-09-13 | End: 2022-09-16 | Stop reason: HOSPADM

## 2022-09-13 RX ORDER — ALBUMIN, HUMAN INJ 5% 5 %
12.5 SOLUTION INTRAVENOUS ONCE
Status: COMPLETED | OUTPATIENT
Start: 2022-09-13 | End: 2022-09-13

## 2022-09-13 RX ORDER — KETOROLAC TROMETHAMINE 30 MG/ML
15 INJECTION, SOLUTION INTRAMUSCULAR; INTRAVENOUS EVERY 6 HOURS SCHEDULED
Status: COMPLETED | OUTPATIENT
Start: 2022-09-13 | End: 2022-09-13

## 2022-09-13 RX ORDER — POTASSIUM CHLORIDE 29.8 MG/ML
40 INJECTION INTRAVENOUS ONCE
Status: COMPLETED | OUTPATIENT
Start: 2022-09-13 | End: 2022-09-13

## 2022-09-13 RX ORDER — FUROSEMIDE 10 MG/ML
40 INJECTION INTRAMUSCULAR; INTRAVENOUS ONCE
Status: COMPLETED | OUTPATIENT
Start: 2022-09-13 | End: 2022-09-13

## 2022-09-13 RX ORDER — CALCIUM GLUCONATE 20 MG/ML
2 INJECTION, SOLUTION INTRAVENOUS ONCE
Status: COMPLETED | OUTPATIENT
Start: 2022-09-13 | End: 2022-09-13

## 2022-09-13 RX ORDER — FUROSEMIDE 10 MG/ML
40 INJECTION INTRAMUSCULAR; INTRAVENOUS DAILY
Status: DISCONTINUED | OUTPATIENT
Start: 2022-09-13 | End: 2022-09-14

## 2022-09-13 RX ORDER — OXYCODONE HYDROCHLORIDE 10 MG/1
10 TABLET ORAL EVERY 4 HOURS PRN
Status: DISCONTINUED | OUTPATIENT
Start: 2022-09-13 | End: 2022-09-16 | Stop reason: HOSPADM

## 2022-09-13 RX ORDER — OXYCODONE HYDROCHLORIDE 5 MG/1
5 TABLET ORAL EVERY 4 HOURS PRN
Status: DISCONTINUED | OUTPATIENT
Start: 2022-09-13 | End: 2022-09-16 | Stop reason: HOSPADM

## 2022-09-13 RX ADMIN — OXYCODONE HYDROCHLORIDE 10 MG: 10 TABLET ORAL at 16:19

## 2022-09-13 RX ADMIN — CEFAZOLIN SODIUM 2000 MG: 2 SOLUTION INTRAVENOUS at 16:16

## 2022-09-13 RX ADMIN — AMIODARONE HYDROCHLORIDE 200 MG: 200 TABLET ORAL at 05:18

## 2022-09-13 RX ADMIN — AMIODARONE HYDROCHLORIDE 200 MG: 200 TABLET ORAL at 13:24

## 2022-09-13 RX ADMIN — KETOROLAC TROMETHAMINE 15 MG: 30 INJECTION, SOLUTION INTRAMUSCULAR; INTRAVENOUS at 08:28

## 2022-09-13 RX ADMIN — CEFAZOLIN SODIUM 2000 MG: 2 SOLUTION INTRAVENOUS at 08:28

## 2022-09-13 RX ADMIN — Medication 3 MG: at 21:30

## 2022-09-13 RX ADMIN — HYDROMORPHONE HYDROCHLORIDE 0.5 MG: 1 INJECTION, SOLUTION INTRAMUSCULAR; INTRAVENOUS; SUBCUTANEOUS at 22:12

## 2022-09-13 RX ADMIN — POTASSIUM CHLORIDE 40 MEQ: 29.8 INJECTION, SOLUTION INTRAVENOUS at 09:23

## 2022-09-13 RX ADMIN — ALBUMIN (HUMAN) 12.5 G: 12.5 INJECTION, SOLUTION INTRAVENOUS at 00:38

## 2022-09-13 RX ADMIN — HYDROMORPHONE HYDROCHLORIDE 0.5 MG: 1 INJECTION, SOLUTION INTRAMUSCULAR; INTRAVENOUS; SUBCUTANEOUS at 23:49

## 2022-09-13 RX ADMIN — OXYCODONE HYDROCHLORIDE 5 MG: 5 TABLET ORAL at 00:02

## 2022-09-13 RX ADMIN — POLYETHYLENE GLYCOL 3350 17 G: 17 POWDER, FOR SOLUTION ORAL at 08:27

## 2022-09-13 RX ADMIN — ALTEPLASE 2 MG: 2.2 INJECTION, POWDER, LYOPHILIZED, FOR SOLUTION INTRAVENOUS at 16:17

## 2022-09-13 RX ADMIN — Medication 12.5 MG: at 21:32

## 2022-09-13 RX ADMIN — OXYCODONE HYDROCHLORIDE 10 MG: 10 TABLET ORAL at 21:30

## 2022-09-13 RX ADMIN — KETOROLAC TROMETHAMINE 15 MG: 30 INJECTION, SOLUTION INTRAMUSCULAR; INTRAVENOUS at 13:23

## 2022-09-13 RX ADMIN — CEFAZOLIN SODIUM 2000 MG: 2 SOLUTION INTRAVENOUS at 00:06

## 2022-09-13 RX ADMIN — PANTOPRAZOLE SODIUM 40 MG: 40 TABLET, DELAYED RELEASE ORAL at 08:28

## 2022-09-13 RX ADMIN — ACETAMINOPHEN 975 MG: 325 TABLET ORAL at 10:32

## 2022-09-13 RX ADMIN — ATORVASTATIN CALCIUM 80 MG: 80 TABLET, FILM COATED ORAL at 16:16

## 2022-09-13 RX ADMIN — INSULIN LISPRO 1 UNITS: 100 INJECTION, SOLUTION INTRAVENOUS; SUBCUTANEOUS at 11:11

## 2022-09-13 RX ADMIN — ONDANSETRON 4 MG: 2 INJECTION INTRAMUSCULAR; INTRAVENOUS at 04:59

## 2022-09-13 RX ADMIN — KETOROLAC TROMETHAMINE 15 MG: 30 INJECTION, SOLUTION INTRAMUSCULAR; INTRAVENOUS at 18:09

## 2022-09-13 RX ADMIN — ASPIRIN 325 MG ORAL TABLET 325 MG: 325 PILL ORAL at 08:27

## 2022-09-13 RX ADMIN — OXYCODONE HYDROCHLORIDE 10 MG: 10 TABLET ORAL at 10:31

## 2022-09-13 RX ADMIN — FUROSEMIDE 40 MG: 10 INJECTION, SOLUTION INTRAMUSCULAR; INTRAVENOUS at 23:50

## 2022-09-13 RX ADMIN — ACETAMINOPHEN 975 MG: 325 TABLET ORAL at 18:09

## 2022-09-13 RX ADMIN — OXYCODONE HYDROCHLORIDE 10 MG: 10 TABLET ORAL at 05:17

## 2022-09-13 RX ADMIN — Medication 12.5 MG: at 11:47

## 2022-09-13 RX ADMIN — CALCIUM GLUCONATE 2 G: 20 INJECTION, SOLUTION INTRAVENOUS at 07:22

## 2022-09-13 RX ADMIN — HYDROMORPHONE HYDROCHLORIDE 0.5 MG: 1 INJECTION, SOLUTION INTRAMUSCULAR; INTRAVENOUS; SUBCUTANEOUS at 03:02

## 2022-09-13 RX ADMIN — MUPIROCIN 1 APPLICATION: 20 OINTMENT TOPICAL at 21:30

## 2022-09-13 RX ADMIN — FUROSEMIDE 40 MG: 10 INJECTION, SOLUTION INTRAMUSCULAR; INTRAVENOUS at 08:27

## 2022-09-13 RX ADMIN — FONDAPARINUX SODIUM 2.5 MG: 2.5 INJECTION, SOLUTION SUBCUTANEOUS at 08:28

## 2022-09-13 RX ADMIN — MILRINONE LACTATE IN DEXTROSE 0.13 MCG/KG/MIN: 200 INJECTION, SOLUTION INTRAVENOUS at 09:00

## 2022-09-13 RX ADMIN — MUPIROCIN 1 APPLICATION: 20 OINTMENT TOPICAL at 08:28

## 2022-09-13 RX ADMIN — AMIODARONE HYDROCHLORIDE 200 MG: 200 TABLET ORAL at 21:30

## 2022-09-13 RX ADMIN — HYDROMORPHONE HYDROCHLORIDE 0.5 MG: 1 INJECTION, SOLUTION INTRAMUSCULAR; INTRAVENOUS; SUBCUTANEOUS at 01:10

## 2022-09-13 RX ADMIN — HYDROMORPHONE HYDROCHLORIDE 0.5 MG: 1 INJECTION, SOLUTION INTRAMUSCULAR; INTRAVENOUS; SUBCUTANEOUS at 06:03

## 2022-09-13 NOTE — OCCUPATIONAL THERAPY NOTE
Occupational Therapy Evaluation     Patient Name: Tara Burnham  FPMXW'O Date: 9/13/2022  Problem List  Principal Problem:    S/P CABG (coronary artery bypass graft)  Active Problems:    Cigarette nicotine dependence without complication    Essential hypertension    Cardiac arrest with ventricular fibrillation (HCC)    PERCY (acute kidney injury) ([facility] )    Ventricular fibrillation ([facility] )    ICD (implantable cardioverter-defibrillator) discharge    Multi-vessel CAD with stable angina [facility])    Past Medical History  Past Medical History:   Diagnosis Date    PERCY (acute kidney injury) ([facility] )     Bilateral inguinal hernia     Cardiac arrest with ventricular fibrillation ([facility] )     Dizziness 02/02/2021    isolated incident of dizziness, sweating & disorientation    Elevated LFTs     History of left heart catheterization     and IABP, ICD PLACEMENT, DUAL CHAMBER, [employer]    Hyperglycemia     Hypertension     Pneumonia     STEMI (ST elevation myocardial infarction) [facility])      Past Surgical History  Past Surgical History:   Procedure Laterality Date    CARDIAC CATHETERIZATION N/A 6/29/2022    Procedure: Cardiac pci;  Surgeon: Tom Islas MD;  Location: [facility] CARDIAC CATH LAB; Service: Cardiology    CARDIAC CATHETERIZATION N/A 6/29/2022    Procedure: Cardiac iabp; Surgeon: Tom Islas MD;  Location: [facility] CARDIAC CATH LAB; Service: Cardiology    CARDIAC CATHETERIZATION N/A 9/9/2022    Procedure: CARDIAC CATHETERIZATION;  Surgeon: Rigoberto Nogueira DO;  Location: [facility] CARDIAC CATH LAB; Service: Cardiology    CARDIAC ELECTROPHYSIOLOGY PROCEDURE N/A 7/5/2022    Procedure: Cardiac icd implant;  Surgeon: Odilia Daniel MD;  Location: [facility] CARDIAC CATH LAB;   Service: Cardiology    CORONARY ARTERY BYPASS GRAFT N/A 9/12/2022    Procedure: CORONARY ARTERY BYPASS GRAFT (CABG) x 3, SWIFT TO LAD, L LEG EVH/SVG TO RCA  AND DIAGONAL;  Surgeon: Dean Palacio MD;  Location: [facility] MAIN OR;  Service: Cardiac Surgery    HARVEST VEIN Left 9/12/2022    Procedure: HARVEST VEIN ENDOSCOPIC (EVH); Surgeon: Clarissa Dean MD;  Location:  MAIN OR;  Service: Cardiac Surgery    SD REPAIR ING HERNIA,5+Y/O,REDUCIBL Bilateral 2/9/2021    Procedure: INGUINAL HERNIA REPAIR with mesh;  Surgeon: Barb Olson MD;  Location: [address] [place] [address] MAIN OR;  Service: General    TONSILLECTOMY             09/13/22 0952   OT Last Visit   OT Visit Date 09/13/22   Note Type   Note type Evaluation   Restrictions/Precautions   Other Precautions Cardiac/sternal;Multiple lines;Telemetry; Fall Risk;Pain;O2  (swan-beatriz cath; CT; a-line)   Pain Assessment   Pain Assessment Tool 0-10   Pain Score 8   Pain Location/Orientation Location: [facility] Pain Intervention(s) Repositioned; Ambulation/increased activity; Emotional support   Home Living   Type of [address] [place] [address] One level;Stairs to enter with rails   Bathroom Shower/Tub Walk-in shower   Bathroom Toilet Standard   Bathroom Equipment Grab bars in Replaced by [facility] [address]   (denies)   Additional Comments Pt reports living in a 1 story home with 3 PRISCA     Prior Function   Level of [place] Independent with ADLs and functional mobility   Lives With Spouse   Receives Help From Family   ADL Assistance Independent   IADLs Independent   Falls in the last 6 months 0   Vocational Full time employment   Lifestyle   Autonomy Pt reports being I with ADLS, IADLS and mobility without device PTA  (+)    Reciprocal Relationships Pt lives with his wife who is able to assist as needed upon d/c   Service to Others Works full time   [address] Enjoys being with his 5 dogs   ADL   Where Assessed Chair   Eating Assistance 5  Supervision/Setup   Grooming Assistance 5  [address] Assistance  4  Minimal Assistance   Transfers   Sit to Stand 4  Minimal assistance   Additional items Assist x 1; Increased time required;Verbal cues   Stand to Sit 4  Minimal assistance   Additional items Assist x 1; Increased time required;Verbal cues   Functional Mobility   Functional Mobility 4  Minimal assistance   Additional Comments Pt demonstrated short mobility within room  Pt limited by swan-beatriz cath at this time  Additional items Rolling walker   Balance   Static Sitting Fair   Dynamic Sitting Fair -   Static Standing Poor +   Dynamic Standing Poor +   Ambulatory Poor +   Activity Tolerance   Activity Tolerance Patient limited by fatigue   Medical Staff Made Aware Seen with PT 2* medical complexity/ instability   Nurse Made Aware RN confirmed okay to see pt   RUE Assessment   RUE Assessment WFL   LUE Assessment   LUE Assessment WFL   Cognition   Overall Cognitive Status WFL   Arousal/Participation Alert; Cooperative   Attention Within functional limits   Orientation Level Oriented X4   Memory Decreased recall of precautions   Following Commands Follows one step commands without difficulty   Comments Pt is somewhat impulsive, but is pleasant and cooperative  Assessment   Assessment Pt is a 58 y o  male [facility] w/ CAD s/p CABG x3 on 9/12/22  Pt  has a past medical history of PERCY (acute kidney injury) ([facility] [address] ), Bilateral inguinal hernia, Cardiac arrest with ventricular fibrillation ([facility] ), Dizziness (02/02/2021), Elevated LFTs, History of left heart catheterization, Hyperglycemia, Hypertension, Pneumonia, and STEMI (ST elevation myocardial infarction) ([facility] )  Pt with active OT orders and ambulate  orders  Pt resides in a 1 story home with 3 PRISCA  Pt lives with his wife who is able to assist as needed upon d/c  Pt was I w/  ADLS and IADLS, (+) drove, & required no use of DME PTA  Currently pt is min A for functional transfers and functional mobility, and min A for ADLS overall   Pt is limited at this time 2*: pain  Based on the aforementioned OT evaluation, functional performance deficits, and assessments, pt has been identified as a moderate complexity evaluation  From OT standpoint, anticipate d/c home with family support  Recommend continued participation in [facility] and functional mobility with staff  No further acute OT needs, d/c OT     Goals   Patient Goals To feel better and return home   Recommendation   OT Discharge Recommendation No rehabilitation needs  (Home with increased social support)   AM-PAC Daily Activity Inpatient   Lower Body Dressing 2   Bathing 3   Toileting 3   Upper Body Dressing 3   Grooming 4   Eating 4   Daily Activity Raw Score 19   Daily Activity Standardized Score (Calc for Raw Score >=11) 40 22   AM-PAC Applied Cognition Inpatient   Following a Speech/Presentation 4   Understanding Ordinary Conversation 4   Taking Medications 4   Remembering Where Things Are Placed or Put Away 4   Remembering List of 4-5 Errands 4   Taking Care of Complicated Tasks 4   Applied Cognition Raw Score 24   Applied Cognition Standardized Score 62 21   Modified [place] Scale   Modified [place] Scale 4       Mary Ellen Crews, MOT, OTR/L

## 2022-09-13 NOTE — PLAN OF CARE
Problem: PAIN - ADULT  Goal: Verbalizes/displays adequate comfort level or baseline comfort level  Description: Interventions:  - Encourage patient to monitor pain and request assistance  - Assess pain using appropriate pain scale  - Administer analgesics based on type and severity of pain and evaluate response  - Implement non-pharmacological measures as appropriate and evaluate response  - Consider cultural and social influences on pain and pain management  - Notify physician/advanced practitioner if interventions unsuccessful or patient reports new pain  Outcome: Not Progressing     Problem: CARDIOVASCULAR - ADULT  Goal: Maintains optimal cardiac output and hemodynamic stability  Description: INTERVENTIONS:  - Monitor I/O, vital signs and rhythm  - Monitor for S/S and trends of decreased cardiac output  - Administer and titrate ordered vasoactive medications to optimize hemodynamic stability  - Assess quality of pulses, skin color and temperature  - Assess for signs of decreased coronary artery perfusion  - Instruct patient to report change in severity of symptoms  Outcome: Not Progressing  Goal: Absence of cardiac dysrhythmias or at baseline rhythm  Description: INTERVENTIONS:  - Continuous cardiac monitoring, vital signs, obtain 12 lead EKG if ordered  - Administer antiarrhythmic and heart rate control medications as ordered  - Monitor electrolytes and administer replacement therapy as ordered  Outcome: Progressing     Problem: RESPIRATORY - ADULT  Goal: Achieves optimal ventilation and oxygenation  Description: INTERVENTIONS:  - Assess for changes in respiratory status  - Assess for changes in mentation and behavior  - Position to facilitate oxygenation and minimize respiratory effort  - Oxygen administered by appropriate delivery if ordered  - Initiate smoking cessation education as indicated  - Encourage broncho-pulmonary hygiene including cough, deep breathe, Incentive Spirometry  - Assess the need for suctioning and aspirate as needed  - Assess and instruct to report SOB or any respiratory difficulty  - Respiratory Therapy support as indicated  Outcome: Progressing     Problem: METABOLIC, FLUID AND ELECTROLYTES - ADULT  Goal: Electrolytes maintained within normal limits  Description: INTERVENTIONS:  - Monitor labs and assess patient for signs and symptoms of electrolyte imbalances  - Administer electrolyte replacement as ordered  - Monitor response to electrolyte replacements, including repeat lab results as appropriate  - Instruct patient on fluid and nutrition as appropriate  Outcome: Progressing  Goal: Fluid balance maintained  Description: INTERVENTIONS:  - Monitor labs   - Monitor I/O and WT  - Instruct patient on fluid and nutrition as appropriate  - Assess for signs & symptoms of volume excess or deficit  Outcome: Not Progressing  Goal: Glucose maintained within target range  Description: INTERVENTIONS:  - Monitor Blood Glucose as ordered  - Assess for signs and symptoms of hyperglycemia and hypoglycemia  - Administer ordered medications to maintain glucose within target range  - Assess nutritional intake and initiate nutrition service referral as needed  Outcome: Progressing

## 2022-09-13 NOTE — PROGRESS NOTES
Patient with low cardiac index coming out of the operating room  Initial index was 1 4 with an SVR 1299  Patient initially received 1 L of LR with no significant improvement  His cardiac index remained 1 6 with SVR 13 90  Patient had a CVP of approximately 9 and was given additional 500 mL of albumin  [place] placement was confirmed  Mixed venous saturation was sent resulting in 54%  Alvina confirmed low cardiac output  Patient continued to have low cardiac index by thermodilution on [place] catheter and ultimately patient was started on Milrinone 0 25  We will continue to trend Q 1 hour thermodilution cardiac index until improvement  Post-op DILIP: EF 40% mild MR      Cc time 33 mins    Qian Negron PA-C

## 2022-09-13 NOTE — PROGRESS NOTES
Progress Note - Cardiothoracic Surgery   Fabrice De La Fuente 58 y o  male MRN: [id number]  Unit/Bed#: [facility] 418-01 Encounter: [id number]      POD # 1 s/p CABG    Pt seen/examined  Interval history and data reviewed with critical care team   Pt doing well  No specific complaints  Milrinone 0 25    Extubated, sitting comfortably in chair        Medications:   Scheduled Meds:  Current Facility-Administered Medications   Medication Dose Route Frequency Provider Last Rate    acetaminophen  650 mg Rectal Q4H PRN Bebeto Michele PA-C      acetaminophen  975 mg Oral Q8H Bebeto Michele PA-C      ALPRAZolam  0 5 mg Oral Daily PRN Bebeto Michele PA-C      amiodarone  200 mg Oral [facility] Bebeto Michele PA-C      aspirin  325 mg Oral Daily Bebeto Michele PA-C      atorvastatin  80 mg Oral Daily With [employer] PA-C      bisacodyl  10 mg Rectal Daily PRN Bebeto Michele PA-C      calcium gluconate  2 g Intravenous Once ASHLEY Reyes 2 g (09/13/22 2941)    cefazolin  2,000 mg Intravenous Q8H Bebeto Michele PA-C Stopped (09/13/22 0200)    fondaparinux  2 5 mg Subcutaneous Daily Bebeto Michele PA-C      HYDROmorphone  0 5 mg Intravenous Q2H PRN Bebeto Michele PA-C      insulin regular (HumuLIN R,NovoLIN R) infusion  0 3-21 Units/hr Intravenous Titrated Bebeto Michele PA-C 0 5 Units/hr (09/13/22 0200)    melatonin  3 mg Oral HS Bebeto Michele PA-C      milrinone [facility]) infusion  0 25 mcg/kg/min Intravenous Continuous Nayely Saunders PA-C 0 25 mcg/kg/min (09/12/22 2258)    mupirocin  1 application Nasal [address] Bebeto Michele PA-C      niCARdipine  2 5-15 mg/hr Intravenous Titrated Bebeto Michele PA-C Stopped (09/12/22 1909)    ondansetron  4 mg Intravenous Q6H PRN Bebeto Michele PA-C      oxyCODONE  10 mg Oral Q4H PRN Nayely Saunders PA-C      oxyCODONE  5 mg Oral Q4H PRN Nayely Saunders PA-C      pantoprazole  40 mg Oral Daily Bebeto Michele PA-C      phenylephine   mcg/min Intravenous Titrated Jenise Hernandez PA-C Stopped (09/13/22 6649)    polyethylene glycol  17 g Oral Daily Claudio Gordon PA-C      potassium chloride  40 mEq Intravenous Once Allison Gonzalez, [address]      sodium chloride  20 mL/hr Intravenous Continuous Claudio Gordon PA-C 20 mL/hr (09/12/22 1730)     Continuous Infusions:insulin regular (HumuLIN R,NovoLIN R) infusion, 0 3-21 Units/hr, Last Rate: 0 5 Units/hr (09/13/22 0200)  milrinone (PRIMACOR) infusion, 0 25 mcg/kg/min, Last Rate: 0 25 mcg/kg/min (09/12/22 2258)  niCARdipine, 2 5-15 mg/hr, Last Rate: Stopped (09/12/22 1909)  phenylephine,  mcg/min, Last Rate: Stopped (09/13/22 0639)  sodium chloride, 20 mL/hr, Last Rate: 20 mL/hr (09/12/22 1730)      PRN Meds:   acetaminophen    ALPRAZolam    bisacodyl    HYDROmorphone    ondansetron    oxyCODONE    oxyCODONE    Vitals: Blood pressure 138/87, pulse 77, temperature 98 2 °F (36 8 °C), temperature source Core, resp  rate 13, weight 96 1 kg (211 lb 13 8 oz), SpO2 94 %  ,Body mass index is 27 2 kg/m²  I/O last 24 hours: In: 5419 5 [P O :1080; I V :2489 5; IV Piggyback:1850]  Out: 4319 [Urine:1300; Chest Tube:370]  Invasive Devices  Report    Central Venous Catheter Line  Duration           CVC Central Lines 09/12/22 Triple <1 day    Introducer 09/12/22 <1 day          Peripheral Intravenous Line  Duration           Peripheral IV 09/10/22 Left;Proximal;Ventral (anterior) Forearm 3 days          Arterial Line  Duration           Arterial Line 09/12/22 Right Radial <1 day          Line  Duration           Pacer Wires <1 day    Pacer Wires <1 day          Drain  Duration           Chest Tube 1 Left Pleural 32 Fr  <1 day    Chest Tube 2 Posterior;Mediastinal 32 Fr  <1 day    Chest Tube 3 Anterior;Mediastinal 32 Fr  <1 day    Urethral Catheter Non-latex; Temperature probe 16 Fr  <1 day                  Lab, Imaging and other studies:   Results from last 7 days   Lab Units 09/13/22  4116 09/13/22 0017 09/12/22 1701 09/12/22 1615 09/12/22  1528 09/12/22  1245 09/10/22  0524 09/08/22  0537   WBC Thousand/uL 16 05*  --   --   --   --   --  8 62 9 41   HEMOGLOBIN g/dL 9 7* 10 7* 11 5*  --   --   --  12 0 12 8   I STAT HEMOGLOBIN   --   --   --    < >  --    < >  --   --    HEMATOCRIT % 29 9* 31 8* 34 8*  --   --   --  36 9 39 4   HEMATOCRIT, ISTAT   --   --   --    < >  --    < >  --   --    PLATELETS Thousands/uL 171  --  165  --  160  --  201 225    < > = values in this interval not displayed  Results from last 7 days   Lab Units 09/13/22  0356 09/13/22  0017 09/12/22  2014 09/12/22  1701 09/12/22  1615 09/12/22  1245 09/10/22  0524   POTASSIUM mmol/L 3 6 4 2 3 6 4 2  --   --  4 0   CHLORIDE mmol/L 115*  --   --  111*  --   --  110*   CO2 mmol/L 21  --   --  23  --   --  25   CO2, I-STAT mmol/L  --   --   --   --  19*   < >  --    BUN mg/dL 21  --   --  20  --   --  16   CREATININE mg/dL 0 73  --   --  1 08  --   --  1 07   GLUCOSE, ISTAT mg/dl  --   --   --   --  146*   < >  --    CALCIUM mg/dL 6 7*  --   --  8 7  --   --  9 5    < > = values in this interval not displayed  Results from last 7 days   Lab Units 09/09/22  0514 09/08/22  1404 09/08/22  0537 09/06/22  2300 09/06/22  1615 09/06/22  1033   INR   --   --   --   --  0 95 0 91   PTT seconds 57* 62* 82*   < > 27 26    < > = values in this interval not displayed  No results for input(s): PHART, WWL2EHX, PO2ART, BVS6XUE, U6DSCDSE, BEART in the last 72 hours  Plan:    Wean Milrinone to off if able  Cont  [place]  Continue chest tubes, pacing wires  Ambulate  Incentive spirometry  Diuresis  PO ASA/Statin/B blocker        SIGNATURENerika Cross MD  DATE: September 13, 2022  TIME: 7:46 AM

## 2022-09-13 NOTE — PLAN OF CARE
Problem: PHYSICAL THERAPY ADULT  Goal: Performs mobility at highest level of function for planned discharge setting  See evaluation for individualized goals  Description: Treatment/Interventions: Functional transfer training, LE strengthening/ROM, Elevations, Therapeutic exercise, Bed mobility, Gait training, Spoke to nursing, OT  Equipment Recommended: Rigo Newell (at this time)       See flowsheet documentation for full assessment, interventions and recommendations  Note: Prognosis: Good  Problem List: Decreased strength, Decreased endurance, Impaired balance, Decreased mobility  Assessment: Pt is 58 y o  male admitted with hx of VT s/p ICD shock and underwent cardiac cath which revealed worsening CAD  During the course, pt underwent Coronary artery bypass grafting x 3 with left internal mammary artery to left anterior descending, saphenous vein graft to right coronary artery, saphenous vein graft to first diagonal on 9/12/2022  Pt 's comorbidities affecting POC include: PERCY (acute kidney injury) [facility]), Cardiac arrest with ventricular fibrillation ([facility] [address] ), Dizziness, Hyperglycemia, and Hypertension and personal factors of: PRISCA  Pt's clinical presentation is currently unstable/unpredictable which is evident in ongoing telemetry monitoring while in a critical care unit w/ Rosy Moose catheter and a-line in place, abnormal lab values being monitored/trending, suppl O2 needs, CT in place and inability to progress further w/ mobilization at this time  Pt presents w/ generalized weakness, incl decreased LE strength, decreased functional endurance and activity tolerance, impaired balance w/ associated gait deviations requiring use of rw at this time and fall risk  Will cont to follow pt in PT for progressive mobilization to address above functional deficits and to max level of (I), endurance, and safety   Otherwise, anticipate pt will return home w/ available family support upon D/C provided he cont improving w/ mobility skills, safety, and endurance (incl on the steps) and when medically cleared; home PT follow up may be considered pending progress; will follow  PT Discharge Recommendation: Home with home health rehabilitation    See flowsheet documentation for full assessment

## 2022-09-13 NOTE — PHYSICAL THERAPY NOTE
Physical Therapy Evaluation     Patient's Name: Damaris Bañuelos    Admitting Diagnosis  V tach ([facility] ) [I47 2]    Problem List  Patient Active Problem List   Diagnosis    Bilateral inguinal hernia without obstruction or gangrene    Umbilical hernia    Cigarette nicotine dependence without complication    STEMI (ST elevation myocardial infarction) ([facility] )    Essential hypertension    Cardiac arrest with ventricular fibrillation (HCC)    PERCY (acute kidney injury) ([facility] )    Elevated LFTs    Shock ([place] [id number] )    Hyperglycemia    Pneumonia    Fall    History of cardiac arrest    Ventricular fibrillation ([place] [address] )    ICD (implantable cardioverter-defibrillator) discharge    Angina at rest [facility])    Multi-vessel CAD with stable angina ([place] [address] )    Hypercalcemia    S/P CABG (coronary artery bypass graft)       Past Medical History  Past Medical History:   Diagnosis Date    PERCY (acute kidney injury) ([facility] )     Bilateral inguinal hernia     Cardiac arrest with ventricular fibrillation ([facility] )     Dizziness 02/02/2021    isolated incident of dizziness, sweating & disorientation    Elevated LFTs     History of left heart catheterization     and IABP, ICD PLACEMENT, DUAL CHAMBER, [employer]    Hyperglycemia     Hypertension     Pneumonia     STEMI (ST elevation myocardial infarction) [facility])        Past Surgical History  Past Surgical History:   Procedure Laterality Date    CARDIAC CATHETERIZATION N/A 6/29/2022    Procedure: Cardiac pci;  Surgeon: Mario Sykes MD;  Location: [facility] CARDIAC CATH LAB; Service: Cardiology    CARDIAC CATHETERIZATION N/A 6/29/2022    Procedure: Cardiac iabp; Surgeon: Mario Sykes MD;  [facility]: [facility] CARDIAC CATH LAB; Service: Cardiology    CARDIAC CATHETERIZATION N/A 9/9/2022    Procedure: CARDIAC CATHETERIZATION;  Surgeon: Adalgisa Lynch DO;  Location: [facility] CARDIAC CATH LAB;   Service: Cardiology    CARDIAC ELECTROPHYSIOLOGY PROCEDURE N/A 7/5/2022    Procedure: Cardiac icd implant;  Surgeon: Simin Noble MD;  Location: [facility] CARDIAC CATH LAB; Service: Cardiology    CORONARY ARTERY BYPASS GRAFT N/A 9/12/2022    Procedure: CORONARY ARTERY BYPASS GRAFT (CABG) x 3, SWIFT TO LAD, L LEG EVH/SVG TO RCA  AND DIAGONAL;  Surgeon: Nicolle Alvarado MD;  Location: [facility] MAIN OR;  Service: Cardiac Surgery    HARVEST VEIN Left 9/12/2022    Procedure: HARVEST VEIN ENDOSCOPIC ([address]); Surgeon: Nicolle Alvarado MD;  Location: [facility] MAIN OR;  Service: Cardiac Surgery    IN REPAIR ING HERNIA,5+Y/O,REDUCIBL Bilateral 2/9/2021    Procedure: INGUINAL HERNIA REPAIR with mesh;  Surgeon: Amanda Trinh MD;  Location: [address] MAIN OR;  Service: General    TONSILLECTOMY          09/13/22 0951   PT Last Visit   PT Visit Date 09/13/22   Note Type   Note type Evaluation   Pain Assessment   Pain Assessment Tool 0-10   Pain Score 8   Pain Location/Orientation Location: Chest;Orientation: Mid;Location: Incision   Pain Onset/Description Onset: Ongoing;Frequency: Constant/Continuous; Descriptor: Aching;Descriptor: Discomfort   Effect of Pain on Daily Activities guarding   Patient's Stated Pain Goal No pain   Hospital Pain Intervention(s) Repositioned; Ambulation/increased activity; Emotional support   Pain Rating: FLACC (Rest) - Face 0   Pain Rating: FLACC (Rest) - Legs 0   Pain Rating: FLACC (Rest) - Activity 0   Pain Rating: FLACC (Rest) - Cry 0   Pain Rating: FLACC (Rest) - Consolability 0   Score: FLACC (Rest) 0   Pain Rating: FLACC (Activity) - Face 1   Pain Rating: FLACC (Activity) - Legs 0   Pain Rating: FLACC (Activity) - Activity 0   Pain Rating: FLACC (Activity) - Cry 0   Pain Rating: FLACC (Activity) - Consolability 1   Score: FLACC (Activity) 2   Restrictions/Precautions   Braces or Orthoses   (denies)   Other Precautions Cardiac/sternal;Multiple lines;Telemetry;O2;Fall Risk;Impulsive  ([place] catheter, a-line, CT)   Home Living   Type of Home House   Home Layout One level  (3 PRISCA w/ hand rail)   Prior Function   Level of [place] Independent with ADLs and functional mobility  (amb w/o AD)   Lives With Spouse   Vocational Full time employment   General   Additional Pertinent History cleared for assessment (spoke to nsg)   Cognition   Overall Cognitive Status WFL   Arousal/Participation Alert   Orientation Level Oriented to person;Oriented to place;Oriented to situation   Memory Decreased recall of precautions   Following Commands Follows one step commands without difficulty   Subjective   Subjective Alert; in the chair; agreeable to mobilize   RUE Assessment   RUE Assessment WFL  (AROM)   LUE Assessment   LUE Assessment WFL  (AROM)   RLE Assessment   RLE Assessment WFL  (AROM)   Strength RLE   RLE Overall Strength   (fair +/ good - (grossly))   LLE Assessment   LLE Assessment WFL  (AROM)   Strength LLE   LLE Overall Strength   (fair +/ good - (grossly))   Transfers   Sit to Stand 4  Minimal assistance   Additional items Assist x 1;Verbal cues; Impulsive   Stand to Sit 4  Minimal assistance   Additional items Assist x 1;Verbal cues   Ambulation/Elevation   Gait pattern Short stride; Inconsistent remington   Gait Assistance 4  Minimal assist   Additional items Assist x 1;Verbal cues; Tactile cues   Assistive Device Rolling walker   Distance 3 x 6 ft at bedside   Balance   Static Sitting Fair   Dynamic Sitting Fair -   Static Standing Poor +   Dynamic Standing Poor +   Ambulatory Poor +   Activity Tolerance   Activity Tolerance Patient limited by fatigue   Medical Staff Made Aware Co-eval performed w/ OTR due to complexity of medical status   Nurse Made Aware spoke to Sheree Baptiste RN and Isaak Santiago RN   Assessment   Prognosis Good   Problem List Decreased strength;Decreased endurance; Impaired balance;Decreased mobility   Assessment Pt is 58 y o  male admitted with hx of VT s/p ICD shock and underwent cardiac cath which revealed worsening CAD   During the course, pt underwent Coronary artery bypass grafting x 3 with left internal mammary artery to left anterior descending, saphenous vein graft to right coronary artery, saphenous vein graft to first diagonal on 9/12/2022  Pt 's comorbidities affecting POC include: PERCY (acute kidney injury) [facility]), Cardiac arrest with ventricular fibrillation ([facility] [address] ), Dizziness, Hyperglycemia, and Hypertension and personal factors of: PRISCA  Pt's clinical presentation is currently unstable/unpredictable which is evident in ongoing telemetry monitoring while in a critical care unit w/ Chalice Grise catheter and a-line in place, abnormal lab values being monitored/trending, suppl O2 needs, CT in place and inability to progress further w/ mobilization at this time  Pt presents w/ generalized weakness, incl decreased LE strength, decreased functional endurance and activity tolerance, impaired balance w/ associated gait deviations requiring use of rw at this time and fall risk  Will cont to follow pt in PT for progressive mobilization to address above functional deficits and to max level of (I), endurance, and safety  Otherwise, anticipate pt will return home w/ available family support upon D/C provided he cont improving w/ mobility skills, safety, and endurance (incl on the steps) and when medically cleared; home PT follow up may be considered pending progress; will follow  Goals   Patient Goals to walk   STG Expiration Date 09/23/22   Short Term Goal #1 7-10 days  Pt will amb 300 ft w/ least restrictive assistive device PRN, mod (I) in order to facilitate safe return to premorbid environment and community amb status  Pt will negotiate 3 steps w/ hand rail and SPC PRN, mod (I) in order to navigate in and out of home environment safely  Pt will achieve (I) level w/ bed mob in order to facilitate safety with OOB and back to bed transitions in own living environment  Pt will perform transfers w/ mod (I) to assure (I) and safety w/ functional mobility/transitions w/ all aspects of mobility/locomotion    Pt will participate in LE therex and balance activities to max progression w/ mobility skills  PT Treatment Day 0   Plan   Treatment/Interventions Functional transfer training;LE strengthening/ROM; Elevations; Therapeutic exercise; Bed mobility;Gait training;Spoke to nursing;OT   PT Frequency 4-6x/wk   Recommendation   PT Discharge Recommendation Home with home health rehabilitation   Equipment Recommended Walker  (at this time)   [address] Mobility Inpatient   Turning in Bed Without Bedrails 3   Lying on Back to Sitting on Edge of Flat Bed 2   Moving Bed to Chair 3   Standing Up From Chair 3   Walk in Room 3   Climb 3-5 Stairs 2   Basic Mobility Inpatient Raw Score 16   Basic Mobility Standardized Score 38 32   Highest Level Of Mobility   -HLM Goal 5: Stand one or more mins   JH-HLM Achieved 6: Walk 10 steps or more   Modified Jbphh Scale   Modified Nela Scale 4   End of Consult   Patient Position at End of Consult Bedside chair; All needs within reach           [facility], PT

## 2022-09-13 NOTE — PROGRESS NOTES
Progress Note - Critical Care   Adolfo Gil 58 y o  male MRN: [id number]  Unit/Bed#: [facility] 032-28 Encounter: [id number]      Attending Physician: Elizabeth León MD    24 Hour Events/HPI: POD # 1 s/p CABG x 3  Arrived to the ICU with low CI of 1 5 and high SVR  Given volume resuscitation without significant improvement  Ultimately started on milrinone at 0 25 yesterday evening with improvement  CI now 2 with Sv02 of 60%  Low dose nino overnight, now off      ---------------------------------------------------------------------------------------------------------------------------------------------------------------------  Impressions:  1  CAD s/p CABG x 3  2  Hx of Vfib arrest s/p ICU  3  Recent ICD shock due to Vfib   4  Systolic heart failure  5  HTN    Plan:    Neuro:   · Pain controlled with: PRN oxycodone (increased to 5mg/10mg), ATC tylenol  · Consider toradol   · Regulate sleep/wake cycle  · Delirium precautions  · CAM-ICU daily  · Trend neuro exam    CV:   · Cardiac infusions: Primacor, 0 25 mcg/kg/min, Neosynephrine, 30 mcg/min  · Wean primacor and nino as able  · MAP goal > 65 and CI >2 2  · Keep PA catheter  · Keep arterial line  · Rhythm: NSR  · Follow rhythm on telemetry  · Holding BB while on pressors, consider starting this afternoon  · Maintain epicardial pacing wires for POD 1 pacing  · ICD present  ·  mg QD  · Statin: Lipitor 80mg qHS  · Amiodarone 200 mg PO q8 hours       Lung:   · Acute post-op pulmonary insufficiency; Requiring 2 Liters via nasal cannula, secondary to pulmonary vascular congestion  Continue incentive spirometry/coughing/deep breathing exercises    Wean supplemental oxygen as tolerated for saturation > 90%  · Chest tube output remains persistently high; Continue chest tubes to suction today      GI:   · Continue PPI for stress ulcer prophylaxis  · Continue bowel regimen  · Trend abdominal exam and bowel function    FEN:   · Diuretic plan: Lasix 40 mg IV q QD  · K-dur 20 mEQ PO q QD  · Nutrition/diet plan: cardiac diet, 1800 fluid   · Replenish electrolytes with goals: K >4 0, Mag >2 0, and Phos >3 0    :   · Indwelling Dupree present: yes   · Keep Dupree  · Trend UOP and BUN/creat  · Strict I and O    ID:   · Trend temps and WBC count  · Maintain normothermia        Heme:   · Trend hgb and plts    Endo:   · Glycemic control plan: Glucose well-controlled   Discontinue continuous insulin infusion and add Insulin sliding scale coverage    Results from last 6 Months   Lab Units 09/09/22 1922 06/30/22  0432   HEMOGLOBIN A1C % 5 8* 5 9*     MSK/Skin:  · Mobility goal:   · PT consult: yes  · OT consult: yes  · Frequent turning and pressure off-loading  · Local wound care as needed    Disposition:  Continue ICU care  ---------------------------------------------------------------------------------------------------------------------------------------------------------------------  Allergies: No Known Allergies    Medications:     VTE Pharmacologic Prophylaxis: Fondaparinux (Arixtra)  VTE Mechanical Prophylaxis: sequential compression device    Scheduled Meds:   acetaminophen, 975 mg, Q8H  amiodarone, 200 mg, Q8H LORENA  aspirin, 325 mg, Daily  atorvastatin, 80 mg, Daily With Dinner  calcium chloride, 1 g, Once  cefazolin, 2,000 mg, Q8H  chlorhexidine, 15 mL, BID  fentanyl citrate (PF), 50 mcg, Once  fondaparinux, 2 5 mg, Daily  melatonin, 3 mg, HS  mupirocin, 1 application, U97O LORENA  pantoprazole, 40 mg, Daily  polyethylene glycol, 17 g, Daily       Continuous Infusions:  epinephrine, 1-20 mcg/min  insulin regular (HumuLIN R,NovoLIN R) infusion, 0 3-21 Units/hr, Last Rate: 0 5 Units/hr (09/13/22 0200)  milrinone (PRIMACOR) infusion, 0 25 mcg/kg/min, Last Rate: 0 25 mcg/kg/min (09/12/22 2258)  niCARdipine, 2 5-15 mg/hr, Last Rate: Stopped (09/12/22 1909)  phenylephine,  mcg/min, Last Rate: 20 mcg/min (09/13/22 0350)  sodium chloride, 20 mL/hr, Last Rate: 20 mL/hr (09/12/22 1730)      PRN Meds:  acetaminophen, 650 mg, Q4H PRN  ALPRAZolam, 0 5 mg, Daily PRN  bisacodyl, 10 mg, Daily PRN  fentanyl citrate (PF), 50 mcg, Q1H PRN  furosemide, 40 mg, Q6H PRN  HYDROmorphone, 0 5 mg, Q2H PRN  lidocaine (cardiac), 100 mg, Q30 Min PRN  ondansetron, 4 mg, Q6H PRN  oxyCODONE, 10 mg, Q4H PRN  oxyCODONE, 5 mg, Q4H PRN  potassium chloride, 20 mEq, Once PRN  potassium chloride, 20 mEq, Q1H PRN  potassium chloride, 20 mEq, Q30 Min PRN      ---------------------------------------------------------------------------------------------------------------------------------------------------------------------  Vitals:   Vitals:    22 0300 22 0400 22 0517 22 0600   BP:       BP Location:       Pulse: 75 75     Resp: (!) 30 15     Temp: 97 7 °F (36 5 °C) 97 9 °F (36 6 °C)     TempSrc: Core Core     SpO2: 96% 94%     Weight:   73 8 kg (162 lb 11 2 oz) 96 1 kg (211 lb 13 8 oz)     Arterial Line:  Arterial Line BP: 98/56  Arterial Line MAP (mmHg): 71 mmHg    Tele Rhythm: NSR (This was personally reviewed by myself )    Respiratory:  SpO2: SpO2: 94 %  SpO2 Device: O2 Device: Nasal cannula  Nasal Cannula O2 Flow Rate (L/min): 2 L/min    Ventilator:  Respiratory  Report   Lab Data (Last 4 hours)    None         O2/Vent Data (Last 4 hours)    None              Temperature: Temp (24hrs), Av 8 °F (36 6 °C), Min:97 5 °F (36 4 °C), Max:98 4 °F (36 9 °C)  Current: Temperature: 97 9 °F (36 6 °C)    Weights:   Weight (last 2 days)     Date/Time Weight    22 0600 96 1 (211 86)    22 0517 73 8 (162 7)    22 06:18:45 92 9 (204 81)    22 0600 94 2 (207 67)        Body mass index is 27 2 kg/m²      Hemodynamic Monitoring:  PAP: PAP: 28/14  CVP: CVP (mean): 11 mmHg  CO: CO (L/min): 4 1 L/min  CI: CI (L/min/m2): 1 9 L/min/m2  SVR: SVR (dyne*sec)/cm5: 1048 (dyne*sec)/cm5    Intake and Outputs:    Intake/Output Summary (Last 24 hours) at 2022 0626  Last data filed at 2022 0400  Gross per 24 hour   Intake 5364 45 ml   Output 1415 ml   Net 3949 45 ml     I/O last 24 hours: In: 5364 5 [P O :1080; I V :2434 5; IV Piggyback:1850]  Out: 8028 [QCXDK:2257; Chest Tube:240]        Chest tube Output:  Mediastinal tubes: 140 mL/8 hours  220 mL/24 hours          Labs:  Results from last 7 days   Lab Units 09/13/22 0356 09/13/22 0017 09/12/22 1701 09/12/22  1615 09/12/22  1528 09/12/22  1245 09/10/22  0524 09/08/22  0537 09/06/22  1615 09/06/22  1033   WBC Thousand/uL 16 05*  --   --   --   --   --  8 62 9 41   < > 11 90*   HEMOGLOBIN g/dL 9 7* 10 7* 11 5*  --   --   --  12 0 12 8   < > 14 1   I STAT HEMOGLOBIN   --   --   --    < >  --    < >  --   --   --   --    HEMATOCRIT % 29 9* 31 8* 34 8*  --   --   --  36 9 39 4   < > 42 5   HEMATOCRIT, ISTAT   --   --   --    < >  --    < >  --   --   --   --    PLATELETS Thousands/uL 171  --  165  --  160  --  201 225   < > 257   NEUTROS PCT %  --   --   --   --   --   --   --   --   --  70   MONOS PCT %  --   --   --   --   --   --   --   --   --  8    < > = values in this interval not displayed  Results from last 7 days   Lab Units 09/13/22  0356 09/13/22  0017 09/12/22 2014 09/12/22 1701 09/12/22  1615 09/12/22  1527 09/12/22  1517 09/12/22  1245 09/10/22  0524   SODIUM mmol/L 140  --   --  141  --   --   --   --  139   POTASSIUM mmol/L 3 6 4 2 3 6 4 2  --   --   --   --  4 0   CHLORIDE mmol/L 115*  --   --  111*  --   --   --   --  110*   CO2 mmol/L 21  --   --  23  --   --   --   --  25   CO2, I-STAT mmol/L  --   --   --   --  19* 28 27   < >  --    BUN mg/dL 21  --   --  20  --   --   --   --  16   CREATININE mg/dL 0 73  --   --  1 08  --   --   --   --  1 07   CALCIUM mg/dL 6 7*  --   --  8 7  --   --   --   --  9 5   GLUCOSE, ISTAT mg/dl  --   --   --   --  146* 166* 157*   < >  --     < > = values in this interval not displayed       Baseline Creat: 1    Results from last 7 days   Lab Units 09/13/22  0356 09/10/22  0524   MAGNESIUM mg/dL 2 2 2 4          Results from last 7 days   Lab Units 09/09/22  0514 09/08/22  1404 09/08/22  0537 09/06/22  2300 09/06/22  1615 09/06/22  1033   INR   --   --   --   --  0 95 0 91   PTT seconds 57* 62* 82*   < > 27 26    < > = values in this interval not displayed  SVO2: 60%    Micro:   Blood Culture:   Lab Results   Component Value Date    BLOODCX No Growth After 5 Days  09/06/2022    BLOODCX No Growth After 5 Days  09/06/2022    BLOODCX No Growth After 5 Days  07/01/2022    BLOODCX No Growth After 5 Days  07/01/2022     Urine Culture: No results found for: URINECX  Sputum Culture: No components found for: SPUTUMCX  Wound Culure: No results found for: WOUNDCULT    Diagnositic Studies:  09/13/22 CXR: Poor inspiratory effort, mild vascular congestion  Lines and tubes in adequate position  This was personally reviewed by myself in PACS   09/13/22 EKG: Normal sinus rhythm  This was personally reviewed by myself  Nutrition:        Diet Orders   (From admission, onward)             Start     Ordered    09/13/22 0000  Diet Cardiovascular; Cardiac; Fluid Restriction 1800 ML  Diet effective 0500        References:    Nutrtion Support Algorithm Enteral vs  Parenteral   Question Answer Comment   Diet Type Cardiovascular    Cardiac Cardiac    Other Restriction(s): Fluid Restriction 1800 ML    RD to adjust diet per protocol? Yes        09/12/22 1700              Physical Exam  Constitutional:       Appearance: Normal appearance  HENT:      Head: Normocephalic and atraumatic  Mouth/Throat:      Mouth: Mucous membranes are dry  Pharynx: Oropharynx is clear  Eyes:      Extraocular Movements: Extraocular movements intact  Pupils: Pupils are equal, round, and reactive to light  Neck:      Comments: IJ swan/TLC  Cardiovascular:      Rate and Rhythm: Normal rate and regular rhythm  Pulses: Normal pulses  Heart sounds:     Friction rub present        Comments: AV epicardial wires  Pulmonary:      Effort: Pulmonary effort is normal  No respiratory distress  Breath sounds: Normal breath sounds  Comments: Shallow inspirations, CT with sero-sang drainage  Abdominal:      General: Bowel sounds are normal  There is no distension  Palpations: Abdomen is soft  Musculoskeletal:         General: Normal range of motion  Cervical back: Normal range of motion  Right lower leg: No edema  Left lower leg: No edema  Skin:     General: Skin is warm and dry  Capillary Refill: Capillary refill takes less than 2 seconds  Comments: MSI dressing intact   Neurological:      General: No focal deficit present  Mental Status: He is alert and oriented to person, place, and time  Invasive lines and devices: Invasive Devices  Report    Central Venous Catheter Line  Duration           CVC Central Lines 09/12/22 Triple <1 day    Introducer 09/12/22 <1 day          Peripheral Intravenous Line  Duration           Peripheral IV 09/10/22 Left;Proximal;Ventral (anterior) Forearm 3 days          Arterial Line  Duration           Arterial Line 09/12/22 Right Femoral <1 day    Arterial Line 09/12/22 Right Radial <1 day          Line  Duration           IABP 8 0 Fr  50 mL 75 days    Pacer Wires <1 day    Pacer Wires <1 day          Drain  Duration           Chest Tube 1 Left Pleural 32 Fr  <1 day    Chest Tube 2 Posterior;Mediastinal 32 Fr  <1 day    Chest Tube 3 Anterior;Mediastinal 32 Fr  <1 day    Urethral Catheter Non-latex; Temperature probe 16 Fr  <1 day              ---------------------------------------------------------------------------------------------------------------------------------------------------------------------  Code Status: Level 1 - Full Code    Care Time Delivered:   No Critical Care time spent     Collaborative bedside rounds performed with cardiac surgery attending, critical care attending and bedside RN      SIGNATURE: Kathy Verma ASHLEY Rizo  DATE: September 13, 2022  TIME: 6:26 AM

## 2022-09-14 LAB
ANION GAP SERPL CALCULATED.3IONS-SCNC: 3 MMOL/L (ref 4–13)
BUN SERPL-MCNC: 19 MG/DL (ref 5–25)
CALCIUM SERPL-MCNC: 9 MG/DL (ref 8.3–10.1)
CHLORIDE SERPL-SCNC: 105 MMOL/L (ref 96–108)
CO2 SERPL-SCNC: 28 MMOL/L (ref 21–32)
CREAT SERPL-MCNC: 0.99 MG/DL (ref 0.6–1.3)
ERYTHROCYTE [DISTWIDTH] IN BLOOD BY AUTOMATED COUNT: 13.3 % (ref 11.6–15.1)
GFR SERPL CREATININE-BSD FRML MDRD: 81 ML/MIN/1.73SQ M
GLUCOSE SERPL-MCNC: 116 MG/DL (ref 65–140)
GLUCOSE SERPL-MCNC: 130 MG/DL (ref 65–140)
GLUCOSE SERPL-MCNC: 132 MG/DL (ref 65–140)
GLUCOSE SERPL-MCNC: 146 MG/DL (ref 65–140)
GLUCOSE SERPL-MCNC: 151 MG/DL (ref 65–140)
HCT VFR BLD AUTO: 31.2 % (ref 36.5–49.3)
HGB BLD-MCNC: 10 G/DL (ref 12–17)
MAGNESIUM SERPL-MCNC: 2 MG/DL (ref 1.6–2.6)
MCH RBC QN AUTO: 30.3 PG (ref 26.8–34.3)
MCHC RBC AUTO-ENTMCNC: 32.1 G/DL (ref 31.4–37.4)
MCV RBC AUTO: 95 FL (ref 82–98)
PLATELET # BLD AUTO: 134 THOUSANDS/UL (ref 149–390)
PMV BLD AUTO: 9.1 FL (ref 8.9–12.7)
POTASSIUM SERPL-SCNC: 3.7 MMOL/L (ref 3.5–5.3)
RBC # BLD AUTO: 3.3 MILLION/UL (ref 3.88–5.62)
SODIUM SERPL-SCNC: 136 MMOL/L (ref 135–147)
WBC # BLD AUTO: 12.95 THOUSAND/UL (ref 4.31–10.16)

## 2022-09-14 PROCEDURE — 99024 POSTOP FOLLOW-UP VISIT: CPT | Performed by: THORACIC SURGERY (CARDIOTHORACIC VASCULAR SURGERY)

## 2022-09-14 PROCEDURE — 99232 SBSQ HOSP IP/OBS MODERATE 35: CPT | Performed by: INTERNAL MEDICINE

## 2022-09-14 PROCEDURE — 94640 AIRWAY INHALATION TREATMENT: CPT

## 2022-09-14 PROCEDURE — 82948 REAGENT STRIP/BLOOD GLUCOSE: CPT

## 2022-09-14 PROCEDURE — 85027 COMPLETE CBC AUTOMATED: CPT | Performed by: NURSE PRACTITIONER

## 2022-09-14 PROCEDURE — NC001 PR NO CHARGE: Performed by: INTERNAL MEDICINE

## 2022-09-14 PROCEDURE — 80048 BASIC METABOLIC PNL TOTAL CA: CPT | Performed by: NURSE PRACTITIONER

## 2022-09-14 PROCEDURE — 94760 N-INVAS EAR/PLS OXIMETRY 1: CPT

## 2022-09-14 PROCEDURE — 83735 ASSAY OF MAGNESIUM: CPT | Performed by: NURSE PRACTITIONER

## 2022-09-14 PROCEDURE — 99291 CRITICAL CARE FIRST HOUR: CPT | Performed by: STUDENT IN AN ORGANIZED HEALTH CARE EDUCATION/TRAINING PROGRAM

## 2022-09-14 RX ORDER — SODIUM CHLORIDE FOR INHALATION 0.9 %
3 VIAL, NEBULIZER (ML) INHALATION ONCE
Status: COMPLETED | OUTPATIENT
Start: 2022-09-14 | End: 2022-09-14

## 2022-09-14 RX ORDER — FUROSEMIDE 10 MG/ML
40 INJECTION INTRAMUSCULAR; INTRAVENOUS
Status: DISCONTINUED | OUTPATIENT
Start: 2022-09-14 | End: 2022-09-15

## 2022-09-14 RX ORDER — SODIUM CHLORIDE FOR INHALATION 0.9 %
VIAL, NEBULIZER (ML) INHALATION
Status: DISPENSED
Start: 2022-09-14 | End: 2022-09-14

## 2022-09-14 RX ORDER — POTASSIUM CHLORIDE 20 MEQ/1
20 TABLET, EXTENDED RELEASE ORAL 2 TIMES DAILY
Status: DISCONTINUED | OUTPATIENT
Start: 2022-09-14 | End: 2022-09-16

## 2022-09-14 RX ORDER — LEVALBUTEROL 1.25 MG/.5ML
1.25 SOLUTION, CONCENTRATE RESPIRATORY (INHALATION) ONCE
Status: COMPLETED | OUTPATIENT
Start: 2022-09-14 | End: 2022-09-14

## 2022-09-14 RX ORDER — HYDROMORPHONE HCL/PF 1 MG/ML
1 SYRINGE (ML) INJECTION ONCE
Status: COMPLETED | OUTPATIENT
Start: 2022-09-14 | End: 2022-09-14

## 2022-09-14 RX ADMIN — ACETAMINOPHEN 975 MG: 325 TABLET ORAL at 17:28

## 2022-09-14 RX ADMIN — MUPIROCIN 1 APPLICATION: 20 OINTMENT TOPICAL at 08:34

## 2022-09-14 RX ADMIN — Medication 3 MG: at 21:11

## 2022-09-14 RX ADMIN — ONDANSETRON 4 MG: 2 INJECTION INTRAMUSCULAR; INTRAVENOUS at 14:37

## 2022-09-14 RX ADMIN — AMIODARONE HYDROCHLORIDE 200 MG: 200 TABLET ORAL at 06:02

## 2022-09-14 RX ADMIN — LEVALBUTEROL HYDROCHLORIDE 1.25 MG: 1.25 SOLUTION, CONCENTRATE RESPIRATORY (INHALATION) at 02:45

## 2022-09-14 RX ADMIN — Medication 12.5 MG: at 08:34

## 2022-09-14 RX ADMIN — HYDROMORPHONE HYDROCHLORIDE 1 MG: 1 INJECTION, SOLUTION INTRAMUSCULAR; INTRAVENOUS; SUBCUTANEOUS at 02:32

## 2022-09-14 RX ADMIN — INSULIN LISPRO 1 UNITS: 100 INJECTION, SOLUTION INTRAVENOUS; SUBCUTANEOUS at 16:21

## 2022-09-14 RX ADMIN — AMIODARONE HYDROCHLORIDE 200 MG: 200 TABLET ORAL at 21:11

## 2022-09-14 RX ADMIN — MUPIROCIN 1 APPLICATION: 20 OINTMENT TOPICAL at 21:10

## 2022-09-14 RX ADMIN — FONDAPARINUX SODIUM 2.5 MG: 2.5 INJECTION, SOLUTION SUBCUTANEOUS at 08:33

## 2022-09-14 RX ADMIN — ACETAMINOPHEN 975 MG: 325 TABLET ORAL at 10:10

## 2022-09-14 RX ADMIN — HYDROMORPHONE HYDROCHLORIDE 0.5 MG: 1 INJECTION, SOLUTION INTRAMUSCULAR; INTRAVENOUS; SUBCUTANEOUS at 06:13

## 2022-09-14 RX ADMIN — HYDROMORPHONE HYDROCHLORIDE 0.5 MG: 1 INJECTION, SOLUTION INTRAMUSCULAR; INTRAVENOUS; SUBCUTANEOUS at 04:44

## 2022-09-14 RX ADMIN — ATORVASTATIN CALCIUM 80 MG: 80 TABLET, FILM COATED ORAL at 15:07

## 2022-09-14 RX ADMIN — ASPIRIN 325 MG ORAL TABLET 325 MG: 325 PILL ORAL at 08:34

## 2022-09-14 RX ADMIN — OXYCODONE HYDROCHLORIDE 10 MG: 10 TABLET ORAL at 23:53

## 2022-09-14 RX ADMIN — OXYCODONE HYDROCHLORIDE 5 MG: 5 TABLET ORAL at 11:42

## 2022-09-14 RX ADMIN — OXYCODONE HYDROCHLORIDE 10 MG: 10 TABLET ORAL at 07:12

## 2022-09-14 RX ADMIN — AMIODARONE HYDROCHLORIDE 200 MG: 200 TABLET ORAL at 15:07

## 2022-09-14 RX ADMIN — OXYCODONE HYDROCHLORIDE 10 MG: 10 TABLET ORAL at 19:44

## 2022-09-14 RX ADMIN — POTASSIUM CHLORIDE 20 MEQ: 1500 TABLET, EXTENDED RELEASE ORAL at 08:34

## 2022-09-14 RX ADMIN — ACETAMINOPHEN 975 MG: 325 TABLET ORAL at 02:12

## 2022-09-14 RX ADMIN — FUROSEMIDE 40 MG: 10 INJECTION, SOLUTION INTRAMUSCULAR; INTRAVENOUS at 15:07

## 2022-09-14 RX ADMIN — Medication 12.5 MG: at 21:12

## 2022-09-14 RX ADMIN — PANTOPRAZOLE SODIUM 40 MG: 40 TABLET, DELAYED RELEASE ORAL at 08:34

## 2022-09-14 RX ADMIN — ISODIUM CHLORIDE 3 ML: 0.03 SOLUTION RESPIRATORY (INHALATION) at 02:44

## 2022-09-14 RX ADMIN — POTASSIUM CHLORIDE 20 MEQ: 1500 TABLET, EXTENDED RELEASE ORAL at 17:28

## 2022-09-14 RX ADMIN — OXYCODONE HYDROCHLORIDE 10 MG: 10 TABLET ORAL at 02:12

## 2022-09-14 NOTE — PROGRESS NOTES
[facility]    Cardiology Progress Note  Jennifer Bradshaw 58 y o  male   YOB: 1960 MRN: [id number]  Unit/Bed#: [facility] 411-01 Encounter: [id number]      Subjective:   No significant events overnight  He continues to recover very well and is now off vasopressors and has ambulated short distances without any complains  Assessments  51-year-old gentleman had previously presented to [address] [place] [address] with a VFib cardiac arrest in June 2022, and was found to have multi-vessel CAD, without any clear culprit 100% stenosis  No PCI was felt necessary  He additionally had bradycardia and junctional rhythm, and due to the Vfib arrest without any need for revascularization, an ICD was placed  He subsequently followed up with me outpatient and  was otherwise feeling better  Subsequently, he was feeling well on medical therapy, but again presented with VFib-related ICD shock  Repeat catheterization showed significant multivessel disease and he was recommended CABG evaluation  He was transferred here for the same, and underwent CABGx3 on 9/12/22, and is recovering post-operatively after this  We are currently consulted for postoperative cardiac management  Principal Problem:    S/P CABG (coronary artery bypass graft)  Active Problems:    Cigarette nicotine dependence without complication    Essential hypertension    Cardiac arrest with ventricular fibrillation (HCC)    PERCY (acute kidney injury) ([address] )    Ventricular fibrillation ([facility] [address] )    ICD (implantable cardioverter-defibrillator) discharge    Multi-vessel CAD with stable angina (HCC)    Assessment  1  CAD s/p CABGx3  2  Ischemic cardiomyopathy  3  ICD discharge  · For Vfib  4  H/o prior Vfib cardiac arrest, s/p ICD  5  Hyperlipidemia  6   Overweight, Body mass index is 27 31 kg/m²       Plan  · Intraop DILIP - LVEF 40%  · Off vasopressors  · Still has chest tubes in place --> plan per CTS  · Diuresing well with IV Lasix 40 --> continue IV lasix mg b i d  · Continue aspirin, atorvastatin  · Continue amiodarone 200 mg q 8h   · Being transferred to the floor       Review of Systems   All other systems reviewed and are negative  Telemetry Review: No significant arrhythmias seen on telemetry review  NSR    Objective:   Vitals: Blood pressure 108/68, pulse 67, temperature 98 6 °F (37 °C), temperature source Oral, resp  rate 12, height 6' 2" (1 88 m), weight 96 5 kg (212 lb 11 9 oz), SpO2 93 %  , Body mass index is 27 31 kg/m² ,   Orthostatic Blood Pressures    Flowsheet Row Most Recent Value   Blood Pressure 108/68 filed at 09/14/2022 1100   Patient Position - Orthostatic VS Sitting filed at 09/14/2022 0123         Systolic (74YIL), TTP:310 , Min:93 , DGJ:221     Diastolic (52MXM), MELANY:65, Min:58, Max:87    Wt Readings from Last 5 Encounters:   09/14/22 96 5 kg (212 lb 11 9 oz)   09/08/22 97 1 kg (214 lb)   08/25/22 98 6 kg (217 lb 4 8 oz)   08/04/22 101 kg (222 lb)   07/15/22 101 kg (222 lb)     I/O       09/12 0701  09/13 0700 09/13 0701  09/14 0700 09/14 0701  09/15 0700    P  O  1080 1560 120    I V  (mL/kg) 2489 5 (25 9) 161 5 (1 7)     IV Piggyback 1850 300     Total Intake(mL/kg) 5419 5 (56 4) 2021 5 (20 9) 120 (1 2)    Urine (mL/kg/hr) 1300 (0 6) 2770 (1 2) 115 (0 2)    Chest Tube 370 455 70    Total Output 1670 3225 185    Net +3749 5 -1203 5 -65                     Physical Exam  Vitals and nursing note reviewed  Constitutional:       General: He is not in acute distress  Appearance: Normal appearance  He is well-developed  He is not ill-appearing or diaphoretic  HENT:      Head: Normocephalic and atraumatic  Nose: No congestion  Eyes:      General: No scleral icterus  Conjunctiva/sclera: Conjunctivae normal    Neck:      Vascular: No carotid bruit or JVD  Cardiovascular:      Rate and Rhythm: Normal rate and regular rhythm  Heart sounds: Normal heart sounds  No murmur heard  No friction rub  No gallop        Comments: Surgical wound dressing noted in place  Pulmonary:      Effort: Pulmonary effort is normal  No respiratory distress  Breath sounds: Normal breath sounds  No wheezing or rales  Comments: Chest tubes remain in place  Chest:      Chest wall: No tenderness  Abdominal:      General: There is no distension  Palpations: Abdomen is soft  Tenderness: There is no abdominal tenderness  Musculoskeletal:         General: No swelling, tenderness or deformity  Cervical back: Neck supple  No muscular tenderness  Right lower leg: No edema  Left lower leg: No edema  Skin:     General: Skin is warm  Neurological:      General: No focal deficit present  Mental Status: He is alert and oriented to person, place, and time  Mental status is at baseline  Psychiatric:         Mood and Affect: Mood normal          Behavior: Behavior normal          Thought Content:  Thought content normal          Laboratory Results: personally reviewed        CBC with diff:   Results from last 7 days   Lab Units 09/14/22  0419 09/13/22  0356 09/13/22  0017 09/12/22  1704 09/12/22  1701 09/12/22  1615 09/12/22  1528 09/12/22  1527 09/12/22  1245 09/10/22  0524 09/08/22  0537   WBC Thousand/uL 12 95* 16 05*  --   --   --   --   --   --   --  8 62 9 41   HEMOGLOBIN g/dL 10 0* 9 7* 10 7*  --  11 5*  --   --   --   --  12 0 12 8   I STAT HEMOGLOBIN g/dl  --   --   --  10 9*  --  7 5*  --  8 8*   < >  --   --    HEMATOCRIT % 31 2* 29 9* 31 8*  --  34 8*  --   --   --   --  36 9 39 4   HEMATOCRIT, ISTAT %  --   --   --  32*  --  22*  --  26*   < >  --   --    MCV fL 95 94  --   --   --   --   --   --   --  94 94   PLATELETS Thousands/uL 134* 171  --   --  165  --  160  --   --  201 225   MCH pg 30 3 30 4  --   --   --   --   --   --   --  30 5 30 5   MCHC g/dL 32 1 32 4  --   --   --   --   --   --   --  32 5 32 5   RDW % 13 3 13 1  --   --   --   --   --   --   --  12 9 13 1   MPV fL 9 1 9 4  --   --  9 1  --  9 4 --   --  8 9 8 6*    < > = values in this interval not displayed  CMP:  Results from last 7 days   Lab Units 09/14/22 0419 09/13/22 0356 09/13/22 0017 09/12/22 2014 09/12/22 1704 09/12/22 1701 09/12/22 1615 09/12/22  1527 09/12/22 1517 09/12/22  1507 09/12/22  1412 09/12/22  1245 09/12/22  1245 09/10/22  0524 09/08/22  0537   POTASSIUM mmol/L 3 7 3 6 4 2 3 6  --  4 2  --   --   --   --   --   --   --  4 0 4 2   CHLORIDE mmol/L 105 115*  --   --   --  111*  --   --   --   --   --   --   --  110* 108   CO2 mmol/L 28 21  --   --   --  23  --   --   --   --   --   --   --  25 23   CO2, I-STAT mmol/L  --   --   --   --  25  --  19* 28 27 27 27   < > 30  --   --    BUN mg/dL 19 21  --   --   --  20  --   --   --   --   --   --   --  16 20   CREATININE mg/dL 0 99 0 73  --   --   --  1 08  --   --   --   --   --   --   --  1 07 0 86   GLUCOSE, ISTAT mg/dl  --   --   --   --  147*  --  146* 166* 157* 147* 130  --  98  --   --    CALCIUM mg/dL 9 0 6 7*  --   --   --  8 7  --   --   --   --   --   --   --  9 5 9 3   EGFR ml/min/1 73sq m 81 99  --   --   --  73  --   --   --   --   --   --   --  73 92    < > = values in this interval not displayed           BMP:  Results from last 7 days   Lab Units 09/14/22 0419 09/13/22 0356 09/13/22 0017 09/12/22 2014 09/12/22 1704 09/12/22 1701 09/12/22 1615 09/12/22  1527 09/12/22  1517 09/12/22  1245 09/10/22  0524 09/08/22  0537   POTASSIUM mmol/L 3 7 3 6 4 2 3 6  --  4 2  --   --   --   --  4 0 4 2   CHLORIDE mmol/L 105 115*  --   --   --  111*  --   --   --   --  110* 108   CO2 mmol/L 28 21  --   --   --  23  --   --   --   --  25 23   CO2, I-STAT mmol/L  --   --   --   --  25  --  19* 28 27   < >  --   --    BUN mg/dL 19 21  --   --   --  20  --   --   --   --  16 20   CREATININE mg/dL 0 99 0 73  --   --   --  1 08  --   --   --   --  1 07 0 86   GLUCOSE, ISTAT mg/dl  --   --   --   --  147*  --  146* 166* 157*   < >  --   --    CALCIUM mg/dL 9 0 6 7*  --   -- --  8 7  --   --   --   --  9 5 9 3    < > = values in this interval not displayed  BNP: No results for input(s): BNP in the last 72 hours      Magnesium:   Results from last 7 days   Lab Units 09/14/22  0419 09/13/22  0356 09/10/22  0524   MAGNESIUM mg/dL 2 0 2 2 2 4       Coags:   Results from last 7 days   Lab Units 09/09/22  0514 09/08/22  1404 09/08/22  0537 09/07/22  2125 09/07/22  1339   PTT seconds 57* 62* 82* 52* 45*       TSH:        Hemoglobin A1C   Results from last 7 days   Lab Units 09/09/22  1922   HEMOGLOBIN A1C % 5 8*       Lipid Profile:   Results from last 7 days   Lab Units 09/08/22  0537   TRIGLYCERIDES mg/dL 173*   HDL mg/dL 35*       Meds/Allergies   all current active meds have been reviewed and current meds:   Current Facility-Administered Medications   Medication Dose Route Frequency    [MAR Hold] acetaminophen (TYLENOL) tablet 975 mg  975 mg Oral Q8H    [MAR Hold] ALPRAZolam (XANAX) tablet 0 5 mg  0 5 mg Oral Daily PRN    [MAR Hold] amiodarone tablet 200 mg  200 mg Oral Q8H [address]    [MAR Hold] aspirin tablet 325 mg  325 mg Oral Daily    [MAR Hold] atorvastatin (LIPITOR) tablet 80 mg  80 mg Oral Daily With Dinner    [MAR Hold] bisacodyl (DULCOLAX) rectal suppository 10 mg  10 mg Rectal Daily PRN    [MAR Hold] fondaparinux (ARIXTRA) subcutaneous injection 2 5 mg  2 5 mg Subcutaneous Daily    [MAR Hold] furosemide (LASIX) injection 40 mg  40 mg Intravenous BID (diuretic)    [MAR Hold] insulin lispro (HumaLOG) 100 units/mL subcutaneous injection 1-6 Units  1-6 Units Subcutaneous TID AC    [MAR Hold] insulin lispro (HumaLOG) 100 units/mL subcutaneous injection 1-6 Units  1-6 Units Subcutaneous HS    [MAR Hold] melatonin tablet 3 mg  3 mg Oral HS    [MAR Hold] metoprolol tartrate (LOPRESSOR) partial tablet 12 5 mg  12 5 mg Oral Q12H [address]    [MAR Hold] mupirocin (BACTROBAN) 2 % nasal ointment 1 application  1 application Nasal Y55B [address]    [MAR Hold] ondansetron (ZOFRAN) injection 4 mg  4 mg Intravenous Q6H PRN    [MAR Hold] oxyCODONE (ROXICODONE) immediate release tablet 10 mg  10 mg Oral Q4H PRN    [MAR Hold] oxyCODONE (ROXICODONE) IR tablet 5 mg  5 mg Oral Q4H PRN    [MAR Hold] pantoprazole (PROTONIX) EC tablet 40 mg  40 mg Oral Daily    [MAR Hold] polyethylene glycol (MIRALAX) packet 17 g  17 g Oral Daily    [MAR Hold] potassium chloride (K-DUR,KLOR-CON) CR tablet 20 mEq  20 mEq Oral BID    sodium chloride 0 9 % inhalation solution **ADS Override Pull**         Medications Prior to Admission   Medication    amLODIPine (NORVASC) 2 5 mg tablet    aspirin 81 mg chewable tablet    atorvastatin (LIPITOR) 40 mg tablet    lisinopril (ZESTRIL) 10 mg tablet    metoprolol succinate (TOPROL-XL) 50 mg 24 hr tablet    ticagrelor (BRILINTA) 90 MG            Cardiac testing: reviewed  No results found for this or any previous visit  No results found for this or any previous visit  No results found for this or any previous visit  No results found for this or any previous visit

## 2022-09-14 NOTE — PROGRESS NOTES
Progress Note - Cardiology   Diamante Benavides 58 y o  male MRN: [id number]  Unit/Bed#: [facility] 418-01 Encounter: [id number]    Assessment/Plan  62M with PMH of CAD (recent admission for STEMI following VT/VF arrest coronary angiogram showed [id number] 8U CAD, complicated by cardiogenic shock requiring IABP, LVEF 45%, presented after syncopal episode found to be VF which was treated with ICD  Repeat C showed 3vd s/p CABG on 9/12       #CAD s/p CABG  Patient with VF x2 recently  [facility] this admission showed 3v disease and s/p CABG on 9/12   -continue aspirin, atorvastatin  -continue metoprolol  -continue amiodarone 200mg q8hr after CABG  -epicardial pacing wires in place, in NSR on tele       #Cardiomyopathy  LVEF 40% due to ischemic cardiomyopathy    -off milrinone on 9/13  -lasix IV 40mg daily    -continue metoprolol 12 5mg BID  -will consider ACEi/ARB/ARNI as BP allows        #VT/VF s/p ICD shock  Syncope with collapse and patient found to have VF which was terminated by ICD shock  He was on amiodarone last admission but it was not continued  -ischemic evaluation with [facility]  If similar disease will ask EP to evaluate for antiarrythmic therapy  -continue home metoprolol       Subjective/Objective     Subjective:patient feeling great  No chest pain or shortness of breath  Feels well when walking       Vitals: /73 (BP Location: Left arm)   Pulse 75   Temp 98 6 °F (37 °C) (Oral)   Resp 15   Ht 6' 2" (1 88 m)   Wt 96 5 kg (212 lb 11 9 oz)   SpO2 92%   BMI 27 31 kg/m²   Vitals:    09/13/22 0600 09/14/22 0600   Weight: 96 1 kg (211 lb 13 8 oz) 96 5 kg (212 lb 11 9 oz)     Orthostatic Blood Pressures    Flowsheet Row Most Recent Value   Blood Pressure 115/73 filed at 09/14/2022 0800   Patient Position - Orthostatic VS Lying filed at 09/14/2022 0800            Intake/Output Summary (Last 24 hours) at 9/14/2022 0829  Last data filed at 9/14/2022 0600  Gross per 24 hour   Intake 1716 53 ml   Output 3120 ml   Net -1403 47 ml       Invasive Devices  Report    Central Venous Catheter Line  Duration           CVC Central Lines 09/12/22 Triple 1 day          Peripheral Intravenous Line  Duration           Peripheral IV 09/10/22 Left;Proximal;Ventral (anterior) Forearm 4 days          Line  Duration           Pacer Wires 1 day    Pacer Wires 1 day          Drain  Duration           Chest Tube 1 Left Pleural 32 Fr  1 day    Chest Tube 2 Posterior;Mediastinal 32 Fr  1 day    Chest Tube 3 Anterior;Mediastinal 32 Fr  1 day    Urethral Catheter Non-latex; Temperature probe 16 Fr  1 day                Physical Exam:   Constitutional:       General: He is not in acute distress  NECK: central line catheter in right neck  Cardiovascular:      Rate and Rhythm: Normal rate and regular rhythm       Pulses: Normal pulses       Heart sounds: No murmur heard  CHEST: sternotomy dressing in place  Pulmonary:      Effort: Pulmonary effort is normal  No respiratory distress       Breath sounds: No wheezing or rales  Abdominal:      General: Abdomen is flat  There is no distension       Tenderness: There is no abdominal tenderness  Musculoskeletal:         General: No swelling  Skin:     General: Skin is warm and dry  Neurological:      General: No focal deficit present       Mental Status: He is alert  Psychiatric: Dorcas Mom and Affect: Mood normal         Lab Results: I have personally reviewed pertinent lab results  Imaging: I have personally reviewed pertinent reports

## 2022-09-14 NOTE — PROGRESS NOTES
Progress Note - Cardiothoracic Surgery   Atul Singer 58 y o  male MRN: [id number]  Unit/Bed#: [facility] 418-01 Encounter: [id number]      POD # 2 s/p CABG    Pt seen/examined  Interval history and data reviewed with critical care team   Pt doing well  No specific complaints      Milrinone gtt off      Medications:   Scheduled Meds:  Current Facility-Administered Medications   Medication Dose Route Frequency Provider Last Rate    acetaminophen  650 mg Rectal Q4H PRN Kwasi Granger PA-C      acetaminophen  975 mg Oral Q8H Kwasi Granger PA-C      ALPRAZolam  0 5 mg Oral Daily PRN Kwasi Granger PA-C      amiodarone  200 mg Oral [facility] Kwasi Granger PA-C      aspirin  325 mg Oral Daily Kwasi Granger PA-C      atorvastatin  80 mg Oral Daily With [employer] ROSS Olivo      bisacodyl  10 mg Rectal Daily PRN Kwasi Granger PA-C      fondaparinux  2 5 mg Subcutaneous Daily Kwasi Granger PA-C      furosemide  40 mg Intravenous Daily [place], [place]      HYDROmorphone  0 5 mg Intravenous Q2H PRN Kwasi Granger PA-C      insulin lispro  1-6 Units Subcutaneous TID [place], [place]      insulin lispro  1-6 Units Subcutaneous HS Lajune Sprinkle [place], [place]      melatonin  3 mg Oral HS Kwasi Granger PA-C      metoprolol tartrate  12 5 mg Oral Q12H [facility] [place]      mupirocin  1 application Nasal O85S [facility] Kwasi Granger PA-C      ondansetron  4 mg Intravenous Q6H PRN Kwasi Granger PA-C      oxyCODONE  10 mg Oral Q4H PRN Nga Andre PA-C      oxyCODONE  5 mg Oral Q4H PRN Nayely Saunders PA-C      pantoprazole  40 mg Oral Daily Kwasi Granger PA-C      phenylephine   mcg/min Intravenous Titrated Nga Andre PA-C Stopped (09/13/22 2666)    polyethylene glycol  17 g Oral Daily Kwasi Granger PA-C      sodium chloride            Continuous Infusions:phenylephine,  mcg/min, Last Rate: Stopped (09/13/22 0239)      PRN Meds:   acetaminophen    ALPRAZolam    bisacodyl    HYDROmorphone    ondansetron    oxyCODONE    oxyCODONE    Vitals: Blood pressure 128/80, pulse 76, temperature 98 3 °F (36 8 °C), resp  rate (!) 24, height 6' 2" (1 88 m), weight 96 5 kg (212 lb 11 9 oz), SpO2 95 %  ,Body mass index is 27 31 kg/m²  I/O last 24 hours: In: 2021 5 [P O :1560; I V :161 5; IV Piggyback:300]  Out: 7694 [Urine:2770; Chest Tube:455]  Invasive Devices  Report    Central Venous Catheter Line  Duration           CVC Central Lines 09/12/22 Triple 1 day    Introducer 09/12/22 1 day          Peripheral Intravenous Line  Duration           Peripheral IV 09/10/22 Left;Proximal;Ventral (anterior) Forearm 4 days          Line  Duration           Pacer Wires 1 day    Pacer Wires 1 day          Drain  Duration           Chest Tube 1 Left Pleural 32 Fr  1 day    Chest Tube 2 Posterior;Mediastinal 32 Fr  1 day    Chest Tube 3 Anterior;Mediastinal 32 Fr  1 day    Urethral Catheter Non-latex; Temperature probe 16 Fr  1 day                  Lab, Imaging and other studies:   Results from last 7 days   Lab Units 09/14/22 0419 09/13/22  0356 09/13/22 0017 09/12/22  1704 09/12/22  1701 09/12/22  1245 09/10/22  0524   WBC Thousand/uL 12 95* 16 05*  --   --   --   --  8 62   HEMOGLOBIN g/dL 10 0* 9 7* 10 7*  --  11 5*  --  12 0   I STAT HEMOGLOBIN   --   --   --    < >  --    < >  --    HEMATOCRIT % 31 2* 29 9* 31 8*  --  34 8*  --  36 9   HEMATOCRIT, ISTAT   --   --   --    < >  --    < >  --    PLATELETS Thousands/uL 134* 171  --   --  165   < > 201    < > = values in this interval not displayed       Results from last 7 days   Lab Units 09/14/22 0419 09/13/22  0356 09/13/22  0017 09/12/22 2014 09/12/22  1704 09/12/22  1701   POTASSIUM mmol/L 3 7 3 6 4 2   < >  --  4 2   CHLORIDE mmol/L 105 115*  --   --   --  111*   CO2 mmol/L 28 21  --   --   --  23   CO2, I-STAT mmol/L  --   --   --   --  25  --    BUN mg/dL 19 21  --   --   --  20   CREATININE mg/dL 0 99 0 73  --   --   --  1 08   GLUCOSE, ISTAT mg/dl  --   --   --   --  147*  --    CALCIUM mg/dL 9 0 6 7*  --   --   --  8 7    < > = values in this interval not displayed  Results from last 7 days   Lab Units 09/09/22  0514 09/08/22  1404 09/08/22  0537   PTT seconds 57* 62* 82*     No results for input(s): PHART, PZA4VLC, PO2ART, BBC8TTM, U7EGVWZN, BEART in the last 72 hours  Plan:      Foster/Cordis/Terri are out  DC [place]  Continue chest tubes, pacing wires  Ambulate  Incentive spirometry  Diuresis  PO ASA/Statin/B blocker  Tx to floor        Nichole Barrett MD  DATE: September 14, 2022  TIME: 7:49 AM

## 2022-09-14 NOTE — PROGRESS NOTES
Progress Note - Critical Care   Diamante Benavides 58 y o  male MRN: [id number]  Unit/Bed#: [facility] 596-11 Encounter: [id number]      Attending Physician: Cesar Buitrago MD    24 Hour Events/HPI: POD # 2 s/p CABG x 3  Yesterday primacor weaned to off, delined this AM   Received lasix 40mg BID with appropriate response  ROS: Review of Systems  ---------------------------------------------------------------------------------------------------------------------------------------------------------------------  Impressions:  1  CAD s/p CABG x 3  2  Hx of Vfib arrest s/p ICD  3  Recent ICD shock due to Vfib   4  Systolic heart failure  5  HTN      Plan:    Neuro:   · Pain controlled with: ATC tylenol, PRN oxycodone 5/10  · Regulate sleep/wake cycle  · Delirium precautions  · CAM-ICU daily  · Trend neuro exam      CV:   · Cardiac infusions: None  · MAP goal > 65   · Discontinue PA catheter  · Discontinue arterial line  · Rhythm: NSR  · Follow rhythm on telemetry  · Lopressor 12 5 mg PO BID  · Maintain epicardial pacing wires for PRN pacing  ·  mg QD  · Statin: Lipitor 80mg qHS  · Amiodarone 200 mg PO q8 hours       Lung:   · Acute post-op pulmonary insufficiency; Requiring 3 liters via nasal cannula, secondary to pulmonary vascular congestion and poor inspiratory effort secondary to pain  Continue incentive spirometry/coughing/deep breathing exercises    Wean supplemental oxygen as tolerated for saturation > 90%  · Chest tube output remains persistently high; Continue chest tubes to suction today      GI:   · Continue PPI for stress ulcer prophylaxis  · Continue bowel regimen  · Trend abdominal exam and bowel function    FEN:   · Diuretic plan: Lasix 40 mg IV q QD  · K-dur 20 mEQ PO q QD  · Nutrition/diet plan: Cardiac  · Replenish electrolytes with goals: K >4 0, Mag >2 0, and Phos >3 0    :   · Indwelling Dupree present: yes   · Discontinue Dupree  · Trend UOP and BUN/creat  · Strict I and O    ID:     · Trend temps and WBC count  · Maintain normothermia        Heme:   · Trend hgb and plts    Endo:   · Glycemic control plan: Glucose well-controlled   Discontinue continuous insulin infusion and add Insulin sliding scale coverage    Results from last 6 Months   Lab Units 09/09/22 1922 06/30/22  0432   HEMOGLOBIN A1C % 5 8* 5 9*     MSK/Skin:  · Mobility goal:   · PT consult: yes  · OT consult: yes  · Frequent turning and pressure off-loading  · Local wound care as needed    Disposition:  Transfer to telemetry  ---------------------------------------------------------------------------------------------------------------------------------------------------------------------  Allergies: No Known Allergies    Medications:     VTE Pharmacologic Prophylaxis: Fondaparinux (Arixtra)  VTE Mechanical Prophylaxis: sequential compression device    Scheduled Meds:   acetaminophen, 975 mg, Q8H  amiodarone, 200 mg, Q8H LORENA  aspirin, 325 mg, Daily  atorvastatin, 80 mg, Daily With Dinner  fondaparinux, 2 5 mg, Daily  furosemide, 40 mg, Daily  insulin lispro, 1-6 Units, TID AC  insulin lispro, 1-6 Units, HS  melatonin, 3 mg, HS  metoprolol tartrate, 12 5 mg, Q12H LORENA  mupirocin, 1 application, O34X LORENA  pantoprazole, 40 mg, Daily  polyethylene glycol, 17 g, Daily  sodium chloride, ,        Continuous Infusions:  phenylephine,  mcg/min, Last Rate: Stopped (09/13/22 0639)      PRN Meds:  acetaminophen, 650 mg, Q4H PRN  ALPRAZolam, 0 5 mg, Daily PRN  bisacodyl, 10 mg, Daily PRN  HYDROmorphone, 0 5 mg, Q2H PRN  ondansetron, 4 mg, Q6H PRN  oxyCODONE, 10 mg, Q4H PRN  oxyCODONE, 5 mg, Q4H PRN      ---------------------------------------------------------------------------------------------------------------------------------------------------------------------  Vitals:   Vitals:    09/14/22 0245 09/14/22 0400 09/14/22 0500 09/14/22 0600   BP:  121/73 131/87 128/80   BP Location:       Pulse:  76 80    Resp:  13 21    Temp:  98 3 °F (36 8 °C) TempSrc:       SpO2: 94% 94% 94%    Weight:       Height:         Arterial Line:  Arterial Line BP: 135/121  Arterial Line MAP (mmHg): 128 mmHg    Tele Rhythm: NSR (This was personally reviewed by myself )    Respiratory:  SpO2: SpO2: 94 %  SpO2 Device: O2 Device: Nasal cannula  Nasal Cannula O2 Flow Rate (L/min): 3 L/min    Ventilator:  Respiratory  Report   Lab Data (Last 4 hours)    None         O2/Vent Data (Last 4 hours)    None              Temperature: Temp (24hrs), Av °F (37 2 °C), Min:97 5 °F (36 4 °C), Max:100 2 °F (37 9 °C)  Current: Temperature: 98 3 °F (36 8 °C)    Weights:   Weight (last 2 days)     Date/Time Weight    22 0600 96 1 (211 86)    22 0517 73 8 (162 7)    22 06:18:45 92 9 (204 81)        Body mass index is 27 2 kg/m²  Hemodynamic Monitoring:  PAP: PAP: 27/10  CVP: CVP (mean): 14 mmHg  CO: CO (L/min): 4 3 L/min  CI: CI (L/min/m2): 2 L/min/m2  SVR: SVR (dyne*sec)/cm5: 1212 (dyne*sec)/cm5    Intake and Outputs:    Intake/Output Summary (Last 24 hours) at 2022 0631  Last data filed at 2022 0600  Gross per 24 hour   Intake 1541 53 ml   Output 3225 ml   Net -1683 47 ml     I/O last 24 hours: In: 4088 [P O :2160; I V :828; IV Piggyback:1100]  Out: 2766 [UQLMX:3889;  Chest Tube:725]    UOP: 1cc/kg/hour   BM: 0 in the last 24 hours    Chest tube Output:  Mediastinal tubes: 100 mL/8 hours  330 mL/24 hours   Pleural tubes: 10 mL/8 hours  125 mL/24 hours     Labs:  Results from last 7 days   Lab Units 22  0419 22  0356 22  0017 22  1704 22  1701 22  1245 09/10/22  0524   WBC Thousand/uL 12 95* 16 05*  --   --   --   --  8 62   HEMOGLOBIN g/dL 10 0* 9 7* 10 7*  --  11 5*  --  12 0   I STAT HEMOGLOBIN   --   --   --    < >  --    < >  --    HEMATOCRIT % 31 2* 29 9* 31 8*  --  34 8*  --  36 9   HEMATOCRIT, ISTAT   --   --   --    < >  --    < >  --    PLATELETS Thousands/uL 134* 171  --   --  165   < > 201    < > = values in this interval not displayed  Results from last 7 days   Lab Units 09/14/22  0419 09/13/22  0356 09/13/22  0017 09/12/22 2014 09/12/22  1704 09/12/22  1701 09/12/22  1615 09/12/22  1527   SODIUM mmol/L 136 140  --   --   --  141  --   --    POTASSIUM mmol/L 3 7 3 6 4 2   < >  --  4 2  --   --    CHLORIDE mmol/L 105 115*  --   --   --  111*  --   --    CO2 mmol/L 28 21  --   --   --  23  --   --    CO2, I-STAT mmol/L  --   --   --   --  25  --  19* 28   BUN mg/dL 19 21  --   --   --  20  --   --    CREATININE mg/dL 0 99 0 73  --   --   --  1 08  --   --    CALCIUM mg/dL 9 0 6 7*  --   --   --  8 7  --   --    GLUCOSE, ISTAT mg/dl  --   --   --   --  147*  --  146* 166*    < > = values in this interval not displayed  Baseline Creat: 1    Results from last 7 days   Lab Units 09/14/22  0419 09/13/22  0356 09/10/22  0524   MAGNESIUM mg/dL 2 0 2 2 2 4          Results from last 7 days   Lab Units 09/09/22  0514 09/08/22  1404 09/08/22  0537   PTT seconds 57* 62* 82*       Micro:   Blood Culture:   Lab Results   Component Value Date    BLOODCX No Growth After 5 Days  09/06/2022    BLOODCX No Growth After 5 Days  09/06/2022    BLOODCX No Growth After 5 Days  07/01/2022    BLOODCX No Growth After 5 Days  07/01/2022     Urine Culture: No results found for: URINECX  Sputum Culture: No components found for: SPUTUMCX  Wound Culure: No results found for: WOUNDCULT    Diagnositic Studies:  No new    Nutrition:        Diet Orders   (From admission, onward)             Start     Ordered    09/13/22 0000  Diet Cardiovascular; Cardiac; Fluid Restriction 1800 ML  Diet effective 0500        References:    Nutrtion Support Algorithm Enteral vs  Parenteral   Question Answer Comment   Diet Type Cardiovascular    Cardiac Cardiac    Other Restriction(s): Fluid Restriction 1800 ML    RD to adjust diet per protocol? Yes        09/12/22 1700              Physical Exam  Vitals reviewed     Constitutional:       General: He is not in acute distress  Appearance: He is well-developed  He is not diaphoretic  HENT:      Head: Normocephalic and atraumatic  Eyes:      Pupils: Pupils are equal, round, and reactive to light  Cardiovascular:      Rate and Rhythm: Normal rate and regular rhythm  Heart sounds: No murmur heard  No friction rub  No gallop  Comments: Incision C/D/I  Pulmonary:      Effort: No respiratory distress  Breath sounds: No wheezing or rales  Abdominal:      General: Bowel sounds are normal  There is no distension  Palpations: Abdomen is soft  Tenderness: There is no abdominal tenderness  Skin:     General: Skin is warm and dry  Neurological:      Mental Status: He is alert and oriented to person, place, and time  Comments: Nonfocal        Invasive lines and devices: Invasive Devices  Report    Central Venous Catheter Line  Duration           CVC Central Lines 09/12/22 Triple 1 day    Introducer 09/12/22 1 day          Peripheral Intravenous Line  Duration           Peripheral IV 09/10/22 Left;Proximal;Ventral (anterior) Forearm 4 days          Line  Duration           Pacer Wires 1 day    Pacer Wires 1 day          Drain  Duration           Chest Tube 1 Left Pleural 32 Fr  1 day    Chest Tube 2 Posterior;Mediastinal 32 Fr  1 day    Chest Tube 3 Anterior;Mediastinal 32 Fr  1 day    Urethral Catheter Non-latex; Temperature probe 16 Fr  1 day              ---------------------------------------------------------------------------------------------------------------------------------------------------------------------  Code Status: Level 1 - Full Code    Care Time Delivered:   No Critical Care time spent     Collaborative bedside rounds performed with cardiac surgery attending, critical care attending and bedside RN      SIGNATURE: Carley Banda  DATE: September 14, 2022  TIME: 6:31 AM

## 2022-09-15 LAB
ANION GAP SERPL CALCULATED.3IONS-SCNC: 7 MMOL/L (ref 4–13)
BUN SERPL-MCNC: 20 MG/DL (ref 5–25)
CALCIUM SERPL-MCNC: 9.6 MG/DL (ref 8.3–10.1)
CHLORIDE SERPL-SCNC: 98 MMOL/L (ref 96–108)
CO2 SERPL-SCNC: 27 MMOL/L (ref 21–32)
CREAT SERPL-MCNC: 1.01 MG/DL (ref 0.6–1.3)
ERYTHROCYTE [DISTWIDTH] IN BLOOD BY AUTOMATED COUNT: 13.2 % (ref 11.6–15.1)
GFR SERPL CREATININE-BSD FRML MDRD: 79 ML/MIN/1.73SQ M
GLUCOSE SERPL-MCNC: 119 MG/DL (ref 65–140)
GLUCOSE SERPL-MCNC: 128 MG/DL (ref 65–140)
GLUCOSE SERPL-MCNC: 129 MG/DL (ref 65–140)
GLUCOSE SERPL-MCNC: 136 MG/DL (ref 65–140)
GLUCOSE SERPL-MCNC: 148 MG/DL (ref 65–140)
HCT VFR BLD AUTO: 32.7 % (ref 36.5–49.3)
HGB BLD-MCNC: 10.7 G/DL (ref 12–17)
MCH RBC QN AUTO: 30.3 PG (ref 26.8–34.3)
MCHC RBC AUTO-ENTMCNC: 32.7 G/DL (ref 31.4–37.4)
MCV RBC AUTO: 93 FL (ref 82–98)
PLATELET # BLD AUTO: 171 THOUSANDS/UL (ref 149–390)
PMV BLD AUTO: 9.3 FL (ref 8.9–12.7)
POTASSIUM SERPL-SCNC: 3.7 MMOL/L (ref 3.5–5.3)
RBC # BLD AUTO: 3.53 MILLION/UL (ref 3.88–5.62)
SODIUM SERPL-SCNC: 132 MMOL/L (ref 135–147)
WBC # BLD AUTO: 16.34 THOUSAND/UL (ref 4.31–10.16)

## 2022-09-15 PROCEDURE — 97530 THERAPEUTIC ACTIVITIES: CPT

## 2022-09-15 PROCEDURE — 99024 POSTOP FOLLOW-UP VISIT: CPT | Performed by: PHYSICIAN ASSISTANT

## 2022-09-15 PROCEDURE — 80048 BASIC METABOLIC PNL TOTAL CA: CPT | Performed by: PHYSICIAN ASSISTANT

## 2022-09-15 PROCEDURE — 99232 SBSQ HOSP IP/OBS MODERATE 35: CPT | Performed by: INTERNAL MEDICINE

## 2022-09-15 PROCEDURE — 97116 GAIT TRAINING THERAPY: CPT

## 2022-09-15 PROCEDURE — 85027 COMPLETE CBC AUTOMATED: CPT | Performed by: PHYSICIAN ASSISTANT

## 2022-09-15 PROCEDURE — 82948 REAGENT STRIP/BLOOD GLUCOSE: CPT

## 2022-09-15 RX ORDER — TORSEMIDE 20 MG/1
20 TABLET ORAL 2 TIMES DAILY
Status: DISCONTINUED | OUTPATIENT
Start: 2022-09-15 | End: 2022-09-16

## 2022-09-15 RX ORDER — HYDROMORPHONE HCL/PF 1 MG/ML
0.5 SYRINGE (ML) INJECTION ONCE
Status: COMPLETED | OUTPATIENT
Start: 2022-09-15 | End: 2022-09-15

## 2022-09-15 RX ADMIN — POLYETHYLENE GLYCOL 3350 17 G: 17 POWDER, FOR SOLUTION ORAL at 08:25

## 2022-09-15 RX ADMIN — POTASSIUM CHLORIDE 20 MEQ: 1500 TABLET, EXTENDED RELEASE ORAL at 18:00

## 2022-09-15 RX ADMIN — OXYCODONE HYDROCHLORIDE 10 MG: 10 TABLET ORAL at 16:44

## 2022-09-15 RX ADMIN — ACETAMINOPHEN 975 MG: 325 TABLET ORAL at 11:30

## 2022-09-15 RX ADMIN — FUROSEMIDE 40 MG: 10 INJECTION, SOLUTION INTRAMUSCULAR; INTRAVENOUS at 08:25

## 2022-09-15 RX ADMIN — Medication 3 MG: at 21:05

## 2022-09-15 RX ADMIN — MUPIROCIN 1 APPLICATION: 20 OINTMENT TOPICAL at 21:06

## 2022-09-15 RX ADMIN — Medication 12.5 MG: at 08:24

## 2022-09-15 RX ADMIN — OXYCODONE HYDROCHLORIDE 10 MG: 10 TABLET ORAL at 08:23

## 2022-09-15 RX ADMIN — POTASSIUM CHLORIDE 20 MEQ: 1500 TABLET, EXTENDED RELEASE ORAL at 08:25

## 2022-09-15 RX ADMIN — HYDROMORPHONE HYDROCHLORIDE 0.5 MG: 1 INJECTION, SOLUTION INTRAMUSCULAR; INTRAVENOUS; SUBCUTANEOUS at 03:23

## 2022-09-15 RX ADMIN — AMIODARONE HYDROCHLORIDE 200 MG: 200 TABLET ORAL at 06:07

## 2022-09-15 RX ADMIN — OXYCODONE HYDROCHLORIDE 10 MG: 10 TABLET ORAL at 21:05

## 2022-09-15 RX ADMIN — FONDAPARINUX SODIUM 2.5 MG: 2.5 INJECTION, SOLUTION SUBCUTANEOUS at 08:25

## 2022-09-15 RX ADMIN — TORSEMIDE 20 MG: 20 TABLET ORAL at 18:00

## 2022-09-15 RX ADMIN — ACETAMINOPHEN 975 MG: 325 TABLET ORAL at 18:00

## 2022-09-15 RX ADMIN — AMIODARONE HYDROCHLORIDE 200 MG: 200 TABLET ORAL at 14:25

## 2022-09-15 RX ADMIN — ONDANSETRON 4 MG: 2 INJECTION INTRAMUSCULAR; INTRAVENOUS at 14:25

## 2022-09-15 RX ADMIN — PANTOPRAZOLE SODIUM 40 MG: 40 TABLET, DELAYED RELEASE ORAL at 08:24

## 2022-09-15 RX ADMIN — MUPIROCIN 1 APPLICATION: 20 OINTMENT TOPICAL at 08:25

## 2022-09-15 RX ADMIN — AMIODARONE HYDROCHLORIDE 200 MG: 200 TABLET ORAL at 21:05

## 2022-09-15 RX ADMIN — ACETAMINOPHEN 975 MG: 325 TABLET ORAL at 03:00

## 2022-09-15 RX ADMIN — OXYCODONE HYDROCHLORIDE 10 MG: 10 TABLET ORAL at 04:02

## 2022-09-15 RX ADMIN — ATORVASTATIN CALCIUM 80 MG: 80 TABLET, FILM COATED ORAL at 16:38

## 2022-09-15 RX ADMIN — ASPIRIN 325 MG ORAL TABLET 325 MG: 325 PILL ORAL at 08:24

## 2022-09-15 NOTE — PROGRESS NOTES
Progress Note - Cardiothoracic Surgery   Luis Kang 58 y o  male MRN: [id number]  Unit/Bed#: [facility] 411-01 Encounter: [id number]    Coronary artery disease  S/P coronary artery bypass grafting; POD # 3      24 Hour Events: Having some indigestion/nasuea       Medications:   Scheduled Meds:  Current Facility-Administered Medications   Medication Dose Route Frequency Provider Last Rate    acetaminophen  975 mg Oral [place], [place]      ALPRAZolam  0 5 mg Oral Daily PRN Titi Mcdowell PA-C      amiodarone  200 mg Oral [place]      aspirin  325 mg Oral Daily [place]      atorvastatin  80 mg Oral Daily With [place], [place]      bisacodyl  10 mg Rectal Daily PRN Titi Mcdowell PA-C      fondaparinux  2 5 mg Subcutaneous Daily [place], [place]      insulin lispro  1-6 Units Subcutaneous TID [facility] [place] [place]      insulin lispro  1-6 Units Subcutaneous HS [place], [place]      melatonin  3 mg Oral HS [place], [place]      metoprolol tartrate  12 5 mg Oral Q12H [address]      mupirocin  1 application Nasal T03B [address]      ondansetron  4 mg Intravenous Q6H PRN [place], [place]      oxyCODONE  10 mg Oral Q4H PRN [place], [place]      oxyCODONE  5 mg Oral Q4H PRN [facility] Mcdowell      pantoprazole  40 mg Oral Daily [place], [place]      polyethylene glycol  17 g Oral Daily [place], [place]      potassium chloride  20 mEq Oral BID [place], [place]      torsemide  20 mg Oral BID Jen Blanco PA-C       Continuous Infusions:   PRN Meds:   ALPRAZolam    bisacodyl    ondansetron    oxyCODONE    oxyCODONE    Vitals:   Vitals:    09/15/22 0324 09/15/22 0600 09/15/22 0710 09/15/22 0823   BP: 108/72  122/79 135/77   BP Location: Left arm  Left arm    Pulse: 88  80 98   Resp: 18  17    Temp:   98 4 °F (36 9 °C)    TempSrc:   Oral    SpO2: 93%  (!) 88%    Weight:  94 9 kg (209 lb 4 8 oz)     Height:           Telemetry: NSR; Heart Rate: 79    Respiratory:   SpO2: SpO2: (!) 88 %  ; 2 LPM    Intake/Output:     Intake/Output Summary (Last 24 hours) at 9/15/2022 1056  Last data filed at 9/15/2022 0600  Gross per 24 hour   Intake 600 ml   Output 2255 ml   Net -1655 ml        Chest tube Output:    Mediastinal tubes: 110 mL/8 hours  190 mL/24 hours   Pleural tubes: 30 mL/8 hours  60 mL/24 hours     Weights:   Weight (last 2 days)     Date/Time Weight    09/15/22 0600 94 9 (209 3)    09/14/22 0600 96 5 (212 74)    09/13/22 0600 96 1 (211 86)    09/13/22 0517 73 8 (162 7)            Results:   Results from last 7 days   Lab Units 09/14/22 0419 09/13/22 0356 09/13/22 0017 09/12/22 1704 09/12/22 1701 09/12/22  1245 09/10/22  0524   WBC Thousand/uL 12 95* 16 05*  --   --   --   --  8 62   HEMOGLOBIN g/dL 10 0* 9 7* 10 7*  --  11 5*  --  12 0   I STAT HEMOGLOBIN   --   --   --    < >  --    < >  --    HEMATOCRIT % 31 2* 29 9* 31 8*  --  34 8*  --  36 9   HEMATOCRIT, ISTAT   --   --   --    < >  --    < >  --    PLATELETS Thousands/uL 134* 171  --   --  165   < > 201    < > = values in this interval not displayed  Results from last 7 days   Lab Units 09/14/22 0419 09/13/22 0356 09/13/22  0017 09/12/22 2014 09/12/22  1704 09/12/22  1701   SODIUM mmol/L 136 140  --   --   --  141   POTASSIUM mmol/L 3 7 3 6 4 2   < >  --  4 2   CHLORIDE mmol/L 105 115*  --   --   --  111*   CO2 mmol/L 28 21  --   --   --  23   CO2, I-STAT mmol/L  --   --   --   --  25  --    BUN mg/dL 19 21  --   --   --  20   CREATININE mg/dL 0 99 0 73  --   --   --  1 08   GLUCOSE, ISTAT mg/dl  --   --   --   --  147*  --    CALCIUM mg/dL 9 0 6 7*  --   --   --  8 7    < > = values in this interval not displayed       Results from last 7 days   Lab Units 09/09/22  0514 09/08/22  1404   PTT seconds 57* 62*     Point of care glucose: 116 - 151    Studies:  None today    Invasive Lines/Tubes:  Invasive Devices  Report    Central Venous Catheter Line  Duration           CVC Central Lines 09/12/22 Triple 2 days          Line  Duration           Pacer Wires 2 days    Pacer Wires 2 days          Drain  Duration           Chest Tube 1 Left Pleural 32 Fr  2 days    Chest Tube 2 Posterior;Mediastinal 32 Fr  2 days    Chest Tube 3 Anterior;Mediastinal 32 Fr  2 days                Physical Exam:    HEENT/NECK:  Normocephalic  Atraumatic   no jugular venous distention  Cardiac: Regular rate and rhythm and No murmurs/rubs/gallops  Pulmonary:  Breath sounds diminished in the bases bilaterally   Abdomen:  Non-tender, Non-distended and Normal bowel sounds  Incisions: Sternum is stable  Incision is clean, dry, and intact  and Saphenectomy incison is clean, dry, and intact  Extremities: Extremities warm/dry and Trace edema B/L  Neuro: Alert and oriented X 3, Sensation is grossly intact and No focal deficits  Skin: Warm/Dry, without rashes or lesions  Assessment:  Principal Problem:    S/P CABG (coronary artery bypass graft)  Active Problems:    Cigarette nicotine dependence without complication    Essential hypertension    Cardiac arrest with ventricular fibrillation (HCC)    PERCY (acute kidney injury) ([address] )    Ventricular fibrillation ([address] )    ICD (implantable cardioverter-defibrillator) discharge    Multi-vessel CAD with stable angina (HCC)       Coronary artery disease  S/P coronary artery bypass grafting; POD # 3    Plan:    1  Cardiac:   NSR; HR/BP well-controlled  Continue Lopressor, 12 5mg PO BID  Continue ASA and Statin therapy  Epicardial pacing wires no longer required  Remove today  Maintain central IV access today   Continue DVT prophylaxis  Ischemic CM/H/O prior VF arrest, AICD in place    2  Pulmonary:   Acute post-op pulmonary insufficiency;  Requiring 2 Liters via nasal cannula, secondary to atelectasis, pulmonary vascular congestion and poor inspiratory effort secondary to pain  Continue incentive spirometry/coughing/deep breathing exercises  Wean supplemental oxygen as tolerated for saturation > 90%  Chest tube drainage diminished; D/C today    3  Renal:   Intake/Output net: -1 7 L/24 hours, UO 2 1L, neves out  Diuretic Regimen:  Start Demadex 20 mg PO BID  Continue Potassium Chloride 20 mEq PO BID  Post op Creatinine stable; Follow up labs prn    4  Neuro:  Neurologically intact; No active issues  Incisional pain well-controlled  Continue Tylenol, 975 mg PO q 8, standing dose  Continue Oxycodone, 2 5 to 5 mg PO q 4 hours prn pain    5  GI:  Tolerating TLC 2 3 gm sodium diet  Maintain 1800 mL daily fluid restriction   Continue stool softeners and prn suppository  Continue GI prophylaxis    6  Endo:   Glucose well-controlled with sliding scale coverage    7    Hematology:    Post-operative blood count acceptable; Trend prn    8     Disposition:      Follow daily PT/OT recommendations regarding home vs  rehab when medically cleared for discharge    VTE Pharmacologic Prophylaxis: Fondaparinux (Arixtra)  VTE Mechanical Prophylaxis: sequential compression device    Collaborative rounds completed with supervising physician  Plan of care discussed with bedside nurse    SIGNATURE: Kyree Chou PA-C  DATE: September 15, 2022  TIME: 10:56 AM

## 2022-09-15 NOTE — PLAN OF CARE
Problem: PHYSICAL THERAPY ADULT  Goal: Performs mobility at highest level of function for planned discharge setting  See evaluation for individualized goals  Description: Treatment/Interventions: Functional transfer training, LE strengthening/ROM, Elevations, Therapeutic exercise, Bed mobility, Gait training, Spoke to nursing, OT  Equipment Recommended: Merlin Sovereign (at this time)       See flowsheet documentation for full assessment, interventions and recommendations  Outcome: Progressing  Note: Prognosis: Good  Problem List: Decreased strength, Decreased endurance, Impaired balance, Decreased mobility  Assessment: Pt demonstrated improvement in functional endurance, balance and mobility skills progressing to (S) level w/ transfers and amb w/ rw; pt remains on suppl O2 w/ O2 sat in the 90s % while on 3 L; overall, cont to anticipate pt will return home upon D/C pending additional progress and when medically cleared; will follow  PT Discharge Recommendation: Home with home health rehabilitation    See flowsheet documentation for full assessment

## 2022-09-15 NOTE — PHYSICAL THERAPY NOTE
PHYSICAL THERAPY NOTE          Patient Name: Jamie CHATTERJEE Date: 9/15/2022         09/15/22 1057   PT Last Visit   PT Visit Date 09/15/22   Note Type   Note Type Treatment   Pain Assessment   Pain Assessment Tool 0-10   Pain Score No Pain   Restrictions/Precautions   Other Precautions Cardiac/sternal;Multiple lines;Telemetry;O2;Impulsive  (CT;)   General   Chart Reviewed Yes   Additional Pertinent History Cleared for Tx session (spoke to nsg); pt is initially observed on 4 L of O2; attempted to placed on 4 L of O2 via portable tank but pt c/o too much flow and requests to lower it down; placed on 3 L of O2 and O2 sat at 93 %; post amb on 3 L of O2, O2 sat at 97 %; pt remained on 3 L of O2 at the end of session; RN informed   Response to Previous Treatment Patient with no complaints from previous session  Cognition   Overall Cognitive Status WFL   Arousal/Participation Alert; Cooperative   Attention Within functional limits   Orientation Level Oriented to person;Oriented to place;Oriented to situation   Memory Decreased recall of precautions   Following Commands Follows one step commands without difficulty   Subjective   Subjective Alert; in the chair; agreeable to perform therex and mobilize   Transfers   Sit to Stand 5  Supervision   Stand to Sit 5  Supervision   Ambulation/Elevation   Gait pattern Short stride; Inconsistent [place]   Gait Assistance 5  Supervision   Additional items Verbal cues   Assistive Device Rolling walker   Distance 2 x 150 ft w/ seated rest period in between   Stair Management Assistance Not tested   Balance   Static Sitting Fair +   Static Standing Kimmy Elam [id number] -   Activity Tolerance   Activity Tolerance Patient tolerated treatment well   Nurse Made Aware spoke to Irene, [address] [place] [address]   Exercises   Knee AROM [place] [employer]; Bilateral   Ankle Pumps Sitting;AROM; Bilateral   Marching Sitting;10 reps;AROM; Bilateral   Balance training  Standing balance/tolerance training x 1 min while voiding   Assessment   Prognosis Good   Problem List Decreased strength;Decreased endurance; Impaired balance;Decreased mobility   Assessment Pt demonstrated improvement in functional endurance, balance and mobility skills progressing to (S) level w/ transfers and amb w/ rw; pt remains on suppl O2 w/ O2 sat in the 90s % while on 3 L; overall, cont to anticipate pt will return home upon D/C pending additional progress and when medically cleared; will follow  Goals   Patient Goals to go home   STG Expiration Date 09/23/22   PT Treatment Day 1   Plan   Treatment/Interventions Functional transfer training;LE strengthening/ROM; Elevations; Therapeutic exercise; Endurance training;Bed mobility;Gait training;Spoke to nursing;Spoke to case management   Progress Progressing toward goals   PT Frequency 4-6x/wk   Recommendation   PT Discharge Recommendation Home with home health rehabilitation   Equipment Recommended Walker  (at this time)   [address] Mobility Inpatient   Turning in Bed Without Bedrails 3   Lying on Back to Sitting on Edge of Flat Bed 3   Moving Bed to Chair 3   Standing Up From Chair 3   Walk in Room 3   Climb 3-5 Stairs 3   Basic Mobility Inpatient Raw Score 18   Basic Mobility Standardized Score 41 05   Highest Level Of Mobility   JH-HLM Goal 6: Walk 10 steps or more   JH-HLM Achieved 8: Walk 250 feet ot more   Education   Education Provided Mobility training;Assistive device   Patient Demonstrates verbal understanding   End of Consult   Patient Position at End of Consult Bedside chair; All needs within reach     [facility], PT

## 2022-09-15 NOTE — PROGRESS NOTES
[facility]    Cardiology Progress Note  Parminder Grove 58 y o  male   YOB: 1960 MRN: [id number]  Unit/Bed#: [facility] 411-01 Encounter: [id number]      Subjective:   He was transferred to the floors yesterday and continues to do well  No acute complains    Assessments  77-year-old gentleman had previously presented to [facility] with a VFib cardiac arrest in June 2022, and was found to have multi-vessel CAD, without any clear culprit 100% stenosis  No PCI was felt necessary  He additionally had bradycardia and junctional rhythm, and due to the Vfib arrest without any need for revascularization, an ICD was placed  He subsequently followed up with me outpatient and  was otherwise feeling better  Subsequently, he was feeling well on medical therapy, but again presented with VFib-related ICD shock  Repeat catheterization showed significant multivessel disease and he was recommended CABG evaluation  He was transferred here for the same, and underwent CABGx3 on 9/12/22, and is recovering post-operatively after this  We are currently consulted for postoperative cardiac management  Principal Problem:    S/P CABG (coronary artery bypass graft)  Active Problems:    Cigarette nicotine dependence without complication    Essential hypertension    Cardiac arrest with ventricular fibrillation (HCC)    PERCY (acute kidney injury) ([facility])    Ventricular fibrillation ([facility])    ICD (implantable cardioverter-defibrillator) discharge    Multi-vessel CAD with stable angina ([facility])    Assessment  1  CAD s/p CABGx3  2  Ischemic cardiomyopathy  3  ICD discharge  · For Vfib  4  H/o prior Vfib cardiac arrest, s/p ICD  5  Hyperlipidemia  6  Overweight, Body mass index is 26 87 kg/m²       Plan  · Intraop DILIP - LVEF 40%  · Plan for epicardial pacing wires and chest tube removal today noted    · Diuresing well --> UO 2 2 L, Net -1 7 L  · Approaching euvolemia --> transition to PO torsemide  · Continue aspirin, atorvastatin  · Continue amiodarone 200 mg q 8h        Review of Systems   All other systems reviewed and are negative  Telemetry Review: No significant arrhythmias seen on telemetry review  NSR    Objective:   Vitals: Blood pressure 128/71, pulse 94, temperature 98 °F (36 7 °C), resp  rate 17, height 6' 2" (1 88 m), weight 94 9 kg (209 lb 4 8 oz), SpO2 (!) 88 % , Body mass index is 26 87 kg/m² ,   Orthostatic Blood Pressures    Flowsheet Row Most Recent Value   Blood Pressure 128/71 filed at 09/15/2022 1355   Patient Position - Orthostatic VS Sitting filed at 09/15/2022 8384         Systolic (80DIM), EUF:847 , Min:108 , LTR:337     Diastolic (07OUQ), EJQ:84, Min:71, Max:79    Wt Readings from Last 5 Encounters:   09/15/22 94 9 kg (209 lb 4 8 oz)   09/08/22 97 1 kg (214 lb)   08/25/22 98 6 kg (217 lb 4 8 oz)   08/04/22 101 kg (222 lb)   07/15/22 101 kg (222 lb)     I/O       09/12 0701  09/13 0700 09/13 0701  09/14 0700 09/14 0701  09/15 0700    P  O  1080 1560 120    I V  (mL/kg) 2489 5 (25 9) 161 5 (1 7)     IV Piggyback 1850 300     Total Intake(mL/kg) 5419 5 (56 4) 2021 5 (20 9) 120 (1 2)    Urine (mL/kg/hr) 1300 (0 6) 2770 (1 2) 115 (0 2)    Chest Tube 370 455 70    Total Output 1670 3225 185    Net +3749 5 -1203 5 -65                     Physical Exam  Vitals and nursing note reviewed  Constitutional:       General: He is not in acute distress  Appearance: Normal appearance  He is well-developed  He is not ill-appearing or diaphoretic  HENT:      Head: Normocephalic and atraumatic  Nose: No congestion  Eyes:      General: No scleral icterus  Conjunctiva/sclera: Conjunctivae normal    Neck:      Vascular: No carotid bruit or JVD  Cardiovascular:      Rate and Rhythm: Normal rate and regular rhythm  Heart sounds: Normal heart sounds  No murmur heard  No friction rub  No gallop        Comments: Surgical wound dressing noted in place  Pulmonary:      Effort: Pulmonary effort is normal  No respiratory distress  Breath sounds: Normal breath sounds  No wheezing or rales  Comments: Chest tubes remain in place  Chest:      Chest wall: No tenderness  Abdominal:      General: There is no distension  Palpations: Abdomen is soft  Tenderness: There is no abdominal tenderness  Musculoskeletal:         General: No swelling, tenderness or deformity  Cervical back: Neck supple  No muscular tenderness  Right lower leg: No edema  Left lower leg: No edema  Skin:     General: Skin is warm  Neurological:      General: No focal deficit present  Mental Status: He is alert and oriented to person, place, and time  Mental status is at baseline  Psychiatric:         Mood and Affect: Mood normal          Behavior: Behavior normal          Thought Content: Thought content normal          Laboratory Results: personally reviewed        CBC with diff:   Results from last 7 days   Lab Units 09/15/22  1129 09/14/22  0419 09/13/22  0356 09/13/22  0017 09/12/22  1704 09/12/22  1701 09/12/22  1615 09/12/22  1528 09/12/22  1245 09/10/22  0524   WBC Thousand/uL 16 34* 12 95* 16 05*  --   --   --   --   --   --  8 62   HEMOGLOBIN g/dL 10 7* 10 0* 9 7* 10 7*  --  11 5*  --   --   --  12 0   I STAT HEMOGLOBIN g/dl  --   --   --   --  10 9*  --  7 5*  --    < >  --    HEMATOCRIT % 32 7* 31 2* 29 9* 31 8*  --  34 8*  --   --   --  36 9   HEMATOCRIT, ISTAT %  --   --   --   --  32*  --  22*  --    < >  --    MCV fL 93 95 94  --   --   --   --   --   --  94   PLATELETS Thousands/uL 171 134* 171  --   --  165  --  160  --  201   MCH pg 30 3 30 3 30 4  --   --   --   --   --   --  30 5   MCHC g/dL 32 7 32 1 32 4  --   --   --   --   --   --  32 5   RDW % 13 2 13 3 13 1  --   --   --   --   --   --  12 9   MPV fL 9 3 9 1 9 4  --   --  9 1  --  9 4  --  8 9    < > = values in this interval not displayed           CMP:  Results from last 7 days   Lab Units 09/15/22  1129 09/14/22  8817 09/13/22 0356 09/13/22 0017 09/12/22 2014 09/12/22 1704 09/12/22 1701 09/12/22 1615 09/12/22  1527 09/12/22  1517 09/12/22  1507 09/12/22  1412 09/12/22  1245 09/12/22  1245 09/10/22  0524   POTASSIUM mmol/L 3 7 3 7 3 6 4 2 3 6  --  4 2  --   --   --   --   --   --   --  4 0   CHLORIDE mmol/L 98 105 115*  --   --   --  111*  --   --   --   --   --   --   --  110*   CO2 mmol/L 27 28 21  --   --   --  23  --   --   --   --   --   --   --  25   CO2, I-STAT mmol/L  --   --   --   --   --  25  --  19* 28 27 27 27   < > 30  --    BUN mg/dL 20 19 21  --   --   --  20  --   --   --   --   --   --   --  16   CREATININE mg/dL 1 01 0 99 0 73  --   --   --  1 08  --   --   --   --   --   --   --  1 07   GLUCOSE, ISTAT mg/dl  --   --   --   --   --  147*  --  146* 166* 157* 147* 130  --  98  --    CALCIUM mg/dL 9 6 9 0 6 7*  --   --   --  8 7  --   --   --   --   --   --   --  9 5   EGFR ml/min/1 73sq m 79 81 99  --   --   --  73  --   --   --   --   --   --   --  73    < > = values in this interval not displayed  BMP:  Results from last 7 days   Lab Units 09/15/22  1129 09/14/22  0419 09/13/22 0356 09/13/22 0017 09/12/22 2014 09/12/22 1704 09/12/22 1701 09/12/22 1615 09/12/22  1527 09/12/22  1245 09/10/22  0524   POTASSIUM mmol/L 3 7 3 7 3 6 4 2 3 6  --  4 2  --   --   --  4 0   CHLORIDE mmol/L 98 105 115*  --   --   --  111*  --   --   --  110*   CO2 mmol/L 27 28 21  --   --   --  23  --   --   --  25   CO2, I-STAT mmol/L  --   --   --   --   --  25  --  19* 28   < >  --    BUN mg/dL 20 19 21  --   --   --  20  --   --   --  16   CREATININE mg/dL 1 01 0 99 0 73  --   --   --  1 08  --   --   --  1 07   GLUCOSE, ISTAT mg/dl  --   --   --   --   --  147*  --  146* 166*   < >  --    CALCIUM mg/dL 9 6 9 0 6 7*  --   --   --  8 7  --   --   --  9 5    < > = values in this interval not displayed  BNP: No results for input(s): BNP in the last 72 hours      Magnesium:   Results from last 7 days   Lab Units 09/14/22  0419 09/13/22  0356 09/10/22  0524   MAGNESIUM mg/dL 2 0 2 2 2 4       Coags:   Results from last 7 days   Lab Units 09/09/22  0514   PTT seconds 57*       TSH:        Hemoglobin A1C   Results from last 7 days   Lab Units 09/09/22  1922   HEMOGLOBIN A1C % 5 8*       Lipid Profile:         Meds/Allergies   all current active meds have been reviewed and current meds:   Current Facility-Administered Medications   Medication Dose Route Frequency    acetaminophen (TYLENOL) tablet 975 mg  975 mg Oral Q8H    ALPRAZolam (XANAX) tablet 0 5 mg  0 5 mg Oral Daily PRN    amiodarone tablet 200 mg  200 mg Oral Q8H [facility]    aspirin tablet 325 mg  325 mg Oral Daily    atorvastatin (LIPITOR) tablet 80 mg  80 mg Oral Daily With [place]    bisacodyl (DULCOLAX) rectal suppository 10 mg  10 mg Rectal Daily PRN    fondaparinux (ARIXTRA) subcutaneous injection 2 5 mg  2 5 mg Subcutaneous Daily    insulin lispro (HumaLOG) 100 units/mL subcutaneous injection 1-6 Units  1-6 Units Subcutaneous TID AC    insulin lispro (HumaLOG) 100 units/mL subcutaneous injection 1-6 Units  1-6 Units Subcutaneous HS    melatonin tablet 3 mg  3 mg Oral HS    metoprolol tartrate (LOPRESSOR) partial tablet 12 5 mg  12 5 mg Oral Q12H LORENA    mupirocin (BACTROBAN) 2 % nasal ointment 1 application  1 application Nasal A43J [facility]    ondansetron (ZOFRAN) injection 4 mg  4 mg Intravenous Q6H PRN    oxyCODONE (ROXICODONE) immediate release tablet 10 mg  10 mg Oral Q4H PRN    oxyCODONE (ROXICODONE) IR tablet 5 mg  5 mg Oral Q4H PRN    pantoprazole (PROTONIX) EC tablet 40 mg  40 mg Oral Daily    polyethylene glycol (MIRALAX) packet 17 g  17 g Oral Daily    potassium chloride (K-DUR,KLOR-CON) CR tablet 20 mEq  20 mEq Oral BID    torsemide (DEMADEX) tablet 20 mg  20 mg Oral BID     Medications Prior to Admission   Medication    amLODIPine (NORVASC) 2 5 mg tablet    aspirin 81 mg chewable tablet    atorvastatin (LIPITOR) 40 mg tablet    lisinopril (ZESTRIL) 10 mg tablet    metoprolol succinate (TOPROL-XL) 50 mg 24 hr tablet    ticagrelor (BRILINTA) 90 MG            Cardiac testing: reviewed  No results found for this or any previous visit  No results found for this or any previous visit  No results found for this or any previous visit  No results found for this or any previous visit

## 2022-09-15 NOTE — PROGRESS NOTES
09/15/22    Procedure: Epicardial Pacing Wire removal    Shad Donovan was returned to bed and informed of mandatory one hour post-procedure bed rest   The assigned nurse was notified  Epicardial pacing wires removed in routine fashion, without incident  The patient tolerated the procedure well  Vital signs ordered  q 15 minutes for one hour, as per protocol  SIGNATURE: Carley Iyer  DATE: September 15, 2022  TIME: 1:46 PM      09/15/22    Procedure: Chest tube removal    Chest tubes removed in routine fashion without incident  Insertion site dressed with Acticoat  Shadmagdy Donovan tolerated the procedure well  Nurse notified      SIGNATURE: Carley Iyer  DATE: September 15, 2022  TIME: 1:46 PM

## 2022-09-16 ENCOUNTER — NURSE TRIAGE (OUTPATIENT)
Dept: OTHER | Facility: OTHER | Age: 62
End: 2022-09-16

## 2022-09-16 VITALS
WEIGHT: 204.59 LBS | RESPIRATION RATE: 15 BRPM | HEIGHT: 74 IN | DIASTOLIC BLOOD PRESSURE: 72 MMHG | SYSTOLIC BLOOD PRESSURE: 119 MMHG | BODY MASS INDEX: 26.26 KG/M2 | HEART RATE: 84 BPM | TEMPERATURE: 98.1 F | OXYGEN SATURATION: 94 %

## 2022-09-16 LAB
GLUCOSE SERPL-MCNC: 119 MG/DL (ref 65–140)
GLUCOSE SERPL-MCNC: 126 MG/DL (ref 65–140)

## 2022-09-16 PROCEDURE — 99024 POSTOP FOLLOW-UP VISIT: CPT | Performed by: PHYSICIAN ASSISTANT

## 2022-09-16 PROCEDURE — 82948 REAGENT STRIP/BLOOD GLUCOSE: CPT

## 2022-09-16 PROCEDURE — 99232 SBSQ HOSP IP/OBS MODERATE 35: CPT | Performed by: INTERNAL MEDICINE

## 2022-09-16 RX ORDER — POTASSIUM CHLORIDE 20 MEQ/1
20 TABLET, EXTENDED RELEASE ORAL DAILY
Qty: 7 TABLET | Refills: 0 | Status: SHIPPED | OUTPATIENT
Start: 2022-09-17 | End: 2022-09-26

## 2022-09-16 RX ORDER — ACETAMINOPHEN 325 MG/1
975 TABLET ORAL EVERY 6 HOURS PRN
Qty: 30 TABLET | Refills: 0 | Status: CANCELLED
Start: 2022-09-16

## 2022-09-16 RX ORDER — ASPIRIN 325 MG
325 TABLET ORAL DAILY
Qty: 30 TABLET | Refills: 3 | Status: SHIPPED | OUTPATIENT
Start: 2022-09-17 | End: 2022-10-13 | Stop reason: SDUPTHER

## 2022-09-16 RX ORDER — ATORVASTATIN CALCIUM 80 MG/1
80 TABLET, FILM COATED ORAL
Qty: 30 TABLET | Refills: 2 | Status: SHIPPED | OUTPATIENT
Start: 2022-09-16 | End: 2022-10-13 | Stop reason: SDUPTHER

## 2022-09-16 RX ORDER — TORSEMIDE 20 MG/1
20 TABLET ORAL DAILY
Status: DISCONTINUED | OUTPATIENT
Start: 2022-09-16 | End: 2022-09-16 | Stop reason: HOSPADM

## 2022-09-16 RX ORDER — POLYETHYLENE GLYCOL 3350 17 G/17G
17 POWDER, FOR SOLUTION ORAL DAILY
Qty: 510 G | Refills: 0 | Status: SHIPPED | OUTPATIENT
Start: 2022-09-16 | End: 2022-10-17 | Stop reason: ALTCHOICE

## 2022-09-16 RX ORDER — AMIODARONE HYDROCHLORIDE 200 MG/1
200 TABLET ORAL DAILY
Qty: 90 TABLET | Refills: 0 | Status: SHIPPED | OUTPATIENT
Start: 2022-09-16 | End: 2022-10-13 | Stop reason: SDUPTHER

## 2022-09-16 RX ORDER — POTASSIUM CHLORIDE 20 MEQ/1
20 TABLET, EXTENDED RELEASE ORAL DAILY
Status: DISCONTINUED | OUTPATIENT
Start: 2022-09-16 | End: 2022-09-16 | Stop reason: HOSPADM

## 2022-09-16 RX ORDER — SENNOSIDES 8.6 MG
650 CAPSULE ORAL EVERY 8 HOURS PRN
Qty: 30 TABLET | Refills: 0 | Status: SHIPPED | OUTPATIENT
Start: 2022-09-16 | End: 2022-10-17 | Stop reason: ALTCHOICE

## 2022-09-16 RX ORDER — TORSEMIDE 20 MG/1
20 TABLET ORAL DAILY
Qty: 7 TABLET | Refills: 0 | Status: SHIPPED | OUTPATIENT
Start: 2022-09-17 | End: 2022-09-26

## 2022-09-16 RX ORDER — OXYCODONE HYDROCHLORIDE 5 MG/1
5 TABLET ORAL EVERY 6 HOURS PRN
Qty: 28 TABLET | Refills: 0 | Status: SHIPPED | OUTPATIENT
Start: 2022-09-16 | End: 2022-09-23

## 2022-09-16 RX ORDER — OMEPRAZOLE 20 MG/1
20 CAPSULE, DELAYED RELEASE ORAL DAILY
Qty: 30 CAPSULE | Refills: 0 | Status: SHIPPED | OUTPATIENT
Start: 2022-09-16 | End: 2022-09-26 | Stop reason: SDUPTHER

## 2022-09-16 RX ADMIN — OXYCODONE HYDROCHLORIDE 10 MG: 10 TABLET ORAL at 05:16

## 2022-09-16 RX ADMIN — Medication 12.5 MG: at 09:11

## 2022-09-16 RX ADMIN — ACETAMINOPHEN 975 MG: 325 TABLET ORAL at 09:11

## 2022-09-16 RX ADMIN — POTASSIUM CHLORIDE 20 MEQ: 1500 TABLET, EXTENDED RELEASE ORAL at 09:11

## 2022-09-16 RX ADMIN — TORSEMIDE 20 MG: 20 TABLET ORAL at 09:11

## 2022-09-16 RX ADMIN — PANTOPRAZOLE SODIUM 40 MG: 40 TABLET, DELAYED RELEASE ORAL at 09:11

## 2022-09-16 RX ADMIN — MUPIROCIN 1 APPLICATION: 20 OINTMENT TOPICAL at 09:11

## 2022-09-16 RX ADMIN — AMIODARONE HYDROCHLORIDE 200 MG: 200 TABLET ORAL at 05:16

## 2022-09-16 RX ADMIN — ASPIRIN 325 MG ORAL TABLET 325 MG: 325 PILL ORAL at 09:11

## 2022-09-16 RX ADMIN — FONDAPARINUX SODIUM 2.5 MG: 2.5 INJECTION, SOLUTION SUBCUTANEOUS at 09:11

## 2022-09-16 NOTE — PROGRESS NOTES
Progress Note - Cardiothoracic Surgery   Salome Malik 58 y o  male MRN: [id number]  Unit/Bed#: [facility] 411-01 Encounter: [id number]    Coronary artery disease  S/P coronary artery bypass grafting; POD # 4      24 Hour Events: Doing well  Ambulating  No BM yet      Medications:   Scheduled Meds:  Current Facility-Administered Medications   Medication Dose Route Frequency Provider Last Rate    acetaminophen  975 mg Oral [place]      ALPRAZolam  0 5 mg Oral Daily PRN Patricia Mcdowell PA-C      amiodarone  200 mg Oral [facility] [place]      aspirin  325 mg Oral Daily [place]      atorvastatin  80 mg Oral Daily With [place], [place]      bisacodyl  10 mg Rectal Daily PRN Patricia Mcdowell PA-C      fondaparinux  2 5 mg Subcutaneous Daily [place], [place]      insulin lispro  1-6 Units Subcutaneous TID TRISR [place], [place]      insulin lispro  1-6 Units Subcutaneous HS [place]      melatonin  3 mg Oral HS [place], [place]      metoprolol tartrate  12 5 mg Oral Q12H [address]      mupirocin  1 application Nasal Y33S [address]      ondansetron  4 mg Intravenous Q6H PRN [place], [place]      oxyCODONE  10 mg Oral Q4H PRN [place], [place]      oxyCODONE  5 mg Oral Q4H PRN Patricia Mcdowell PA-C      pantoprazole  40 mg Oral Daily [place], [place]      polyethylene glycol  17 g Oral Daily [place], [place]      potassium chloride  20 mEq Oral BID [place], [place]      torsemide  20 mg Oral BID Russ Blanco PA-C       Continuous Infusions:   PRN Meds:   ALPRAZolam    bisacodyl    ondansetron    oxyCODONE    oxyCODONE    Vitals:   Vitals:    09/15/22 2127 09/16/22 0300 09/16/22 0600 09/16/22 0804   BP: 119/80 114/83  119/72   BP Location: Left arm Right arm  Right arm   Pulse: 87 86  84   Resp: 18 18  15   Temp: 97 8 °F (36 6 °C) 98 9 °F (37 2 °C)  98 1 °F (36 7 °C)   TempSrc: Oral Oral  Oral   SpO2: 90% 90%  94%   Weight:   92 8 kg (204 lb 9 4 oz)    Height:           Telemetry: NSR; Heart Rate: 80    Respiratory:   SpO2: SpO2: 94 %; RA    Intake/Output:     Intake/Output Summary (Last 24 hours) at 9/16/2022 0816  Last data filed at 9/16/2022 0520  Gross per 24 hour   Intake --   Output 675 ml   Net -675 ml        Chest tube Output:  CT removed    Weights:   Weight (last 2 days)     Date/Time Weight    09/16/22 0600 92 8 (204 59)    09/15/22 0600 94 9 (209 3)    09/14/22 0600 96 5 (212 74)            Results:   Results from last 7 days   Lab Units 09/15/22  1129 09/14/22 0419 09/13/22  0356   WBC Thousand/uL 16 34* 12 95* 16 05*   HEMOGLOBIN g/dL 10 7* 10 0* 9 7*   HEMATOCRIT % 32 7* 31 2* 29 9*   PLATELETS Thousands/uL 171 134* 171     Results from last 7 days   Lab Units 09/15/22  1129 09/14/22  0419 09/13/22  0356 09/12/22 2014 09/12/22  1704   SODIUM mmol/L 132* 136 140  --   --    POTASSIUM mmol/L 3 7 3 7 3 6   < >  --    CHLORIDE mmol/L 98 105 115*  --   --    CO2 mmol/L 27 28 21  --   --    CO2, I-STAT mmol/L  --   --   --   --  25   BUN mg/dL 20 19 21  --   --    CREATININE mg/dL 1 01 0 99 0 73  --   --    GLUCOSE, ISTAT mg/dl  --   --   --   --  147*   CALCIUM mg/dL 9 6 9 0 6 7*  --   --     < > = values in this interval not displayed  Point of care glucose: 119 - 148    Studies:  None today    Invasive Lines/Tubes:  Invasive Devices  Report    Central Venous Catheter Line  Duration           CVC Central Lines 09/12/22 Triple 3 days                Physical Exam:    HEENT/NECK:  Normocephalic  Atraumatic   no jugular venous distention  Cardiac: Regular rate and rhythm and No murmurs/rubs/gallops  Pulmonary:  Breath sounds clear bilaterally and No rales/rhonchi/wheezes  Abdomen:  Non-tender, Non-distended and Normal bowel sounds  Incisions: Sternum is stable  Incision is clean, dry, and intact    and Saphenectomy incison is clean, dry, and intact  Extremities: Extremities warm/dry and Trace edema B/L  Neuro: Alert and oriented X 3, Sensation is grossly intact and No focal deficits  Skin: Warm/Dry, without rashes or lesions  Assessment:  Principal Problem:    S/P CABG (coronary artery bypass graft)  Active Problems:    Cigarette nicotine dependence without complication    Essential hypertension    Cardiac arrest with ventricular fibrillation (HCC)    PERCY (acute kidney injury) ([facility] )    Ventricular fibrillation ([facility] )    ICD (implantable cardioverter-defibrillator) discharge    Multi-vessel CAD with stable angina (HCC)       Coronary artery disease  S/P coronary artery bypass grafting; POD # 4    Plan:    1  Cardiac:   NSR; HR/BP well-controlled  Continue Lopressor, 12 5mg PO BID  Continue ASA and Statin therapy  Epicardial pacing wires out  Remove TLC   Continue DVT prophylaxis  Ischemic CM/H/O prior VF arrest, AICD in place    2  Pulmonary:   Good Room air oxygen saturation; Continue incentive spirometry/Coughing/Deep breathing exercises  Chest tubes have been discontinued    3  Renal:   Intake/Output net: -675mL/24 hours, inaccurate I/Os neves out  Diuretic Regimen:  Decrease to Demadex 20 mg PO QD  Decrease to Potassium Chloride 20 mEq PO QD  Post op Creatinine stable; Follow up labs prn    4  Neuro:  Neurologically intact; No active issues  Incisional pain well-controlled  Continue Tylenol, 975 mg PO q 8, standing dose  Continue Oxycodone, 2 5 to 5 mg PO q 4 hours prn pain    5  GI:  Tolerating TLC 2 3 gm sodium diet  Maintain 1800 mL daily fluid restriction   Continue stool softeners and prn suppository  Continue GI prophylaxis    6  Endo:   Glucose well-controlled with sliding scale coverage    7    Hematology:    Post-operative blood count acceptable; Trend prn    8     Disposition:      Follow daily PT/OT recommendations regarding home vs  rehab when medically cleared for discharge    VTE Pharmacologic Prophylaxis: Fondaparinux (Arixtra)  VTE Mechanical Prophylaxis: sequential compression device    Collaborative rounds completed with supervising physician  Plan of care discussed with bedside nurse    SIGNATURE: Yuniel Avelar PA-C  DATE: September 16, 2022  TIME: 8:16 AM

## 2022-09-16 NOTE — DISCHARGE INSTRUCTIONS
Sternal Precautions   AMBULATORY CARE:   Sternal precautions  are used to help protect your sternum (breastbone) after open chest surgery  Wires are placed during surgery to hold the sternum together as it heals  Sternal precautions help prevent the wires from cutting through the sternum  The precautions also help prevent the sternum from coming apart from an injury, and prevent pain and bleeding  You may need to use the precautions for up to 12 weeks after surgery  Your surgeon will give you specific instructions based on the type of surgery you had  It is important to follow the instructions carefully  An injury to the healing sternum can be life-threatening  General sternal precautions:  Start slowly and do more as you get stronger  Pain medicine might make it harder for you to know when to slow down or be careful  Stop immediately if you hear a crunch or pop in your sternum  Protect your sternum  Hug a pillow to your chest or cross your arms over your chest when you laugh, sneeze, or cough  Be careful when you get into or out of a chair or bed  Hug a pillow or cross your arms when you stand or sit  Do not twist as you move  Use only your legs to sit and stand  You may need to use a raised toilet seat if you have trouble standing up without using your arms  Your healthcare provider may teach you to use your elbow for support as you move from lying to sitting  Ask when you may take a bath or shower  You may need to use a bath chair if you have trouble getting into or out of the tub  Do not use a grab bar  Do not lift or carry anything heavier than 5 pounds  For example, a gallon of milk weighs 8 pounds  Keep your arms down as much as possible  Do not put your arms out to the side, behind you, or over your head  Do not let anyone pull your arms to help you move or dress  Do not reach for items  Do not push or pull anything  Examples include a car door or a vacuum        Do not drive while you are healing  Your surgeon will tell you when it is safe for you to start driving again  Call 911 for any of the following: You have sudden pain in your sternum and hear a crunch or pop  You have bleeding that does not stop even after you apply pressure for 5 minutes  Seek care immediately if:   You hear crunching or grinding in your sternum  You have signs of an infection, such as a fever, red or warm skin, or pus in the surgery wound  Contact your healthcare provider if:   You continue to feel pain, even after you take your pain medicine  You have new or worsening pain, or any pain with movement  You have questions or concerns about your condition or care  Care for your surgery wound:  Always wash your hands before you care for your wound  Wash your wound as directed  Do not rub the wound as you wash or dry the area  Pat the area dry with a clean towel  Change the bandages as directed and when they get wet or dirty  Do not smoke:  Nicotine can damage blood vessels and make it more difficult to heal  Do not use e-cigarettes or smokeless tobacco in place of cigarettes or to help you quit  They still contain nicotine  Ask your healthcare provider for information if you currently smoke and need help quitting  Follow up with your doctor as directed:  Write down your questions so you remember to ask them during your visits  © Copyright [employer] 2022 Information is for End User's use only and may not be sold, redistributed or otherwise used for commercial purposes  All illustrations and images included in CareNotes® are the copyrighted property of [employer] D A M , Inc  or Betito Kelly   The above information is an  only  It is not intended as medical advice for individual conditions or treatments  Talk to your doctor, nurse or pharmacist before following any medical regimen to see if it is safe and effective for you    Narcotic Safety   WHAT YOU NEED TO KNOW: What do I need to know about narcotic safety? Safety includes the correct use, storage, and disposal of narcotics  Examples of narcotic pain medicines are oxycodone, morphine, fentanyl, and codeine  How are narcotics given? Narcotics can be given as a pill, patch, or suppository  They can also be given as an injection into a vein, near a nerve, or into a joint  Your prescription may include one or both of the following:  Short-acting narcotics  work fast and relieve pain for about 3 to 6 hours  They are often used for acute or breakthrough pain  Long-acting narcotics  usually last at least 8 hours  You can take them less often and they may be used for chronic pain  How do I use narcotics safely? Take prescribed narcotics exactly as directed  Narcotics come with directions based on the kind and how it is given  Talk to your healthcare provider or a pharmacist if you have any questions  Do not take more than the recommended amount  Too much can cause a life-threatening overdose  Do not continue to take it after your pain stops  You may develop tolerance  This means you keep needing higher doses to get the same effect  You may also develop narcotic use disorder  This means you are not able to control your narcotic use  Do not give narcotics to others or take narcotics that belong to someone else  The kind or amount one person takes may not be right for another  The person you share them with may also be taking medicines that do not mix with narcotics  He or she may drink alcohol or use other drugs that can cause life-threatening problems when mixed with narcotics  Do not mix narcotics with other medicines or alcohol  The combination can cause an overdose, or cause you to stop breathing  Alcohol, sleeping pills, and medicines such as antihistamines can make you sleepy  A combination with narcotics can lead to a coma  Do not drive or operate heavy machinery after you use a narcotic    You may feel drowsy or have trouble concentrating  You can injure yourself or others if you drive or use heavy machinery when you are not alert  Your provider or pharmacist can tell you how long to wait after a dose before you do these activities  Talk to your healthcare provider if you have any side effects  Side effects include nausea, sleepiness, itching, and trouble thinking clearly  Your provider may need to make changes to the kind or amount of narcotic you are taking  He or she can also help you find ways to prevent or relieve side effects  What can I do to manage constipation? Constipation is the most common side effect of narcotic medicine  Constipation is when you have hard, dry bowel movements, or you go longer than usual between bowel movements  Tell your healthcare provider about all changes in your bowel movements while you are taking narcotics  He or she may recommend laxative medicine to help you have a bowel movement  He or she may also change the kind of narcotic you are taking, or change when you take it  The following are more ways you can prevent or relieve constipation:  Drink liquids as directed  You may need to drink extra liquids to help soften and move your bowels  Ask how much liquid to drink each day and which liquids are best for you  Eat high-fiber foods  This may help decrease constipation by adding bulk to your bowel movements  High-fiber foods include fruits, vegetables, whole-grain breads and cereals, and beans  Your healthcare provider or dietitian can help you create a high-fiber meal plan  Your provider may also recommend a fiber supplement if you cannot get enough fiber from food  Exercise regularly  Regular physical activity can help stimulate your intestines  Walking is a good exercise to prevent or relieve constipation  Ask which exercises are best for you  Schedule a time each day to have a bowel movement    This may help train your body to have regular bowel movements  Bend forward while you are on the toilet to help move the bowel movement out  Sit on the toilet for at least 10 minutes, even if you do not have a bowel movement  How do I store narcotics safely? Store narcotics where others cannot easily get them  Keep them in a locked cabinet or secure area  Do not  keep them in a purse or other bag you carry with you  A person may be looking for something else and find the narcotics  Make sure narcotics are stored out of the reach of children  A child can easily overdose on narcotics  Narcotics may look like candy to a small child  What is the best way to dispose of narcotics? The laws vary by country and area  In the [place], the best way is to return the narcotics through a take-back program  This program is offered by the [employer]  The following are options for using the program:  Take the narcotics to a [employer] collection site  The site is often a law enforcement center  Call your local law enforcement center for scheduled take-back days in your area  You will be given information on where to go if the collection site is in a different location  Take the narcotics to an approved pharmacy or hospital   A pharmacy or hospital may be set up as a collection site  You will need to ask if it is a [employer] collection site if you were not directed there  A pharmacy or doctor's office may not be able to take back narcotics unless it is a [facility] site  Use a mail-back system  This means you are given containers to put the narcotics into  You will then mail them in the containers  Use a take-back drop box  This is a place to leave the narcotics at any time  People and animals will not be able to get into the box  Your local law enforcement agency can tell you where to find a drop box in your area  What are some other safe ways to dispose of narcotics? The medicine may come with disposal instructions   The instructions may vary depending on the brand of medicine you are using  Instructions may come in a Medication Guide, but not every medicine has one  You may instead get instructions from your pharmacy or doctor  Follow instructions carefully  The following are general guidelines to follow:  Find out if you can flush the narcotic  Some narcotics can be flushed down the toilet or poured into the sink  You will need to contact authorities in your area to see if this is an option for you  The FDA also offers a list of medicines that are safe to flush down the toilet  You can check the list if you cannot get the information for your local area  Ask your waste management company about rules for putting narcotics in the trash  The company will be able to give you specific directions  Scratch out personal information on the original medicine label so it cannot be read  Then put it in the trash  Do not label the trash or put any information on it about the narcotics  It should look like regular household trash so no one is tempted to look for the narcotics  Keep the trash out of the reach of children and animals  Always make sure trash is secure  Talk to officials if you live in a facility  If you live in a nursing home or assisted living center, talk to an official  The person will know the rules for your area  What are some other ways to manage pain? Ask your healthcare provider about non-narcotic medicines to control pain  Some medicines may even work better than narcotics, depending on the cause of your pain  Nonprescription medicines include NSAIDs (such as ibuprofen) and acetaminophen  Prescription medicines include muscle relaxers, antidepressants, and steroids  Pain may be managed without any medicines  Some ways to relieve pain include massage, aromatherapy, or meditation  Physical or occupational therapy may also help  Where can I find more information?    Drug Enforcement Administration  [address]  Phone: 9- 791 - [phone]  Web Address: [facility] [email or web address]_[email or web address]    [employer] and Drug Administration  Isaak Austin , [address]  Phone: 5- 118 - [phone]  Web Address: http://[employer]/    Call your local emergency number (911 in the [address]), or have someone else call if:   You have a seizure  You cannot be woken  You have trouble staying awake and your breathing is slow or shallow  Your speech is slurred, or you are confused  You are dizzy or stumble when you walk  When should I or someone close to me call my doctor? You are extremely drowsy, or you have trouble staying awake or speaking  You have pale or clammy skin  You have blue fingernails or lips  Your heartbeat is slower than normal     You cannot stop vomiting  You have questions or concerns about your condition or care  CARE AGREEMENT:   You have the right to help plan your care  Learn about your health condition and how it may be treated  Discuss treatment options with your healthcare providers to decide what care you want to receive  You always have the right to refuse treatment  The above information is an  only  It is not intended as medical advice for individual conditions or treatments  Talk to your doctor, nurse or pharmacist before following any medical regimen to see if it is safe and effective for you  © Copyright [employer] 2022 Information is for End User's use only and may not be sold, redistributed or otherwise used for commercial purposes  All illustrations and images included in CareNotes® are the copyrighted property of A D A M , Inc  or [address]  Healthy Diet   WHAT YOU NEED TO KNOW:   A heart healthy diet is an eating plan low in unhealthy fats and sodium (salt)  The plan is high in healthy fats and fiber   A heart healthy diet helps improve your cholesterol levels and lowers your risk for heart disease and stroke  A dietitian will teach you how to read and understand food labels  DISCHARGE INSTRUCTIONS:   Heart healthy diet guidelines to follow:   Choose foods that contain healthy fats  Unsaturated fats  include monounsaturated and polyunsaturated fats  Unsaturated fat is found in foods such as soybean, canola, olive, corn, and safflower oils  It is also found in soft tub margarine that is made with liquid vegetable oil  Omega-3 fat  is found in certain fish, such as salmon, tuna, and trout, and in walnuts and flaxseed  Eat fish high in omega-3 fats at least 2 times a week  Get 20 to 30 grams of fiber each day  Fruits, vegetables, whole-grain foods, and legumes (cooked beans) are good sources of fiber  Limit or do not have unhealthy fats  Cholesterol  is found in animal foods, such as eggs and lobster, and in dairy products made from whole milk  Limit cholesterol to less than 200 mg each day  Saturated fat  is found in meats, such as swenson and hamburger  It is also found in chicken or turkey skin, whole milk, and butter  Limit saturated fat to less than 7% of your total daily calories  Trans fat  is found in packaged foods, such as potato chips and cookies  It is also in hard margarine, some fried foods, and shortening  Do not eat foods that contain trans fats  Limit sodium as directed  You may be told to limit sodium to 2,000 to 2,300 mg each day  Choose low-sodium or no-salt-added foods  Add little or no salt to food you prepare  Use herbs and spices in place of salt         Include the following in your heart healthy plan:  Ask your dietitian or healthcare provider how many servings to have from each of the following food groups:  Grains:      Whole-wheat breads, cereals, and pastas, and brown rice    Low-fat, low-sodium crackers and chips    Vegetables:      Broccoli, green beans, green peas, and spinach    Collards, kale, and lima beans    Carrots, sweet potatoes, tomatoes, and peppers    Canned vegetables with no salt added    Fruits:      Bananas, peaches, pears, and pineapple    Grapes, raisins, and dates    Oranges, tangerines, grapefruit, orange juice, and grapefruit juice    Apricots, mangoes, melons, and papaya    Raspberries and strawberries    Canned fruit with no added sugar    Low-fat dairy:      Nonfat (skim) milk, 1% milk, and low-fat almond, cashew, or soy milks fortified with calcium    Low-fat cheese, regular or frozen yogurt, and cottage cheese    Meats and proteins:      Lean cuts of beef and pork (loin, leg, round), skinless chicken and turkey    Legumes, soy products, egg whites, or nuts    Limit or do not include the following in your heart healthy plan:   Unhealthy fats and oils:      Whole or 2% milk, cream cheese, sour cream, or cheese    High-fat cuts of beef (T-bone steaks, ribs), chicken or turkey with skin, and organ meats such as liver    Butter, stick margarine, shortening, and cooking oils such as coconut or palm oil    Foods and liquids high in sodium:      Packaged foods, such as frozen dinners, cookies, macaroni and cheese, and cereals with more than 300 mg of sodium per serving    Vegetables with added sodium, such as instant potatoes, vegetables with added sauces, or regular canned vegetables    Cured or smoked meats, such as hot dogs, swenson, and sausage    High-sodium ketchup, barbecue sauce, salad dressing, pickles, olives, soy sauce, or miso    Foods and liquids high in sugar:      Candy, cake, cookies, pies, or doughnuts    Soft drinks (soda), sports drinks, or sweetened tea    Canned or dry mixes for cakes, soups, sauces, or gravies    Other healthy heart guidelines:   Do not smoke  Nicotine and other chemicals in cigarettes and cigars can cause lung and heart damage  Ask your healthcare provider for information if you currently smoke and need help to quit  E-cigarettes or smokeless tobacco still contain nicotine   Talk to your healthcare provider before you use these products  Limit or do not drink alcohol as directed  Alcohol can damage your heart and raise your blood pressure  Your healthcare provider may give you specific daily and weekly limits  The general recommended limit is 1 drink a day for women 21 or older and for men 72 or older  Do not have more than 3 drinks in a day or 7 in a week  The recommended limit is 2 drinks a day for men 24to 59years of age  Do not have more than 4 drinks in a day or 14 in a week  A drink of alcohol is 12 ounces of beer, 5 ounces of wine, or 1½ ounces of liquor  Exercise regularly  Exercise can help you maintain a healthy weight and improve your blood pressure and cholesterol levels  Regular exercise can also decrease your risk for heart problems  Ask your healthcare provider about the best exercise plan for you  Do not start an exercise program without asking your healthcare provider  Follow up with your doctor or cardiologist as directed:  Write down your questions so you remember to ask them during your visits  © [employer] 2022 Information is for End User's use only and may not be sold, redistributed or otherwise used for commercial purposes  All illustrations and images included in CareNotes® are the copyrighted property of A D A M , Inc  or [facility] [place] [facility]  The above information is an  only  It is not intended as medical advice for individual conditions or treatments  Talk to your doctor, nurse or pharmacist before following any medical regimen to see if it is safe and effective for you  After CABG (Coronary Artery Bypass Graft)   AMBULATORY CARE:   After coronary artery bypass graft (CABG) surgery,  you may not be able to do your normal activities for a few months  Your sternum (breastbone) will need time to heal  For 6 to 8 weeks after surgery, you will need to go slowly and follow your healthcare provider's activity instructions    Call your local emergency number (911 in the [address]) for any of the following: You have any of the following signs of a heart attack:      Squeezing, pressure, or pain in your chest    You may  also have any of the following:     Discomfort or pain in your back, neck, jaw, stomach, or arm    Shortness of breath    Nausea or vomiting    Lightheadedness or a sudden cold sweat    You feel lightheaded, short of breath, and have chest pain  You cough up blood  You have a fast heartbeat that flutters  You feel like you are going to faint  Seek care immediately if:   Your arm or leg feels warm, tender, and painful  It may look swollen and red  You have numbness or tingling in your arms or legs  You have a severe headache  Call your doctor or cardiologist if:   You have a fever higher than 101°F (38 4°C)  You have gained 2 pounds in 24 hours  Your wound is red, swollen, or draining pus  Your signs and symptoms that you had before surgery return  You feel depressed  You have questions or concerns about your condition or care  Medicines: You may need any of the following:  Prescription pain medicine  may be given  Ask your healthcare provider how to take this medicine safely  Some prescription pain medicines contain acetaminophen  Do not take other medicines that contain acetaminophen without talking to your healthcare provider  Too much acetaminophen may cause liver damage  Prescription pain medicine may cause constipation  Ask your healthcare provider how to prevent or treat constipation  Antiplatelets , such as aspirin, help prevent blood clots  Take your antiplatelet medicine exactly as directed  These medicines make it more likely for you to bleed or bruise  If you are told to take aspirin, do not take acetaminophen or ibuprofen instead  Cholesterol medicine  helps decrease the amount of cholesterol in your blood  Cholesterol can cause plaque buildup that blocks your arteries  Antibiotics  help prevent a bacterial infection  Heart medicine  helps strengthen and regulate your heartbeat  Blood pressure medicine  helps lower or control your blood pressure  Take your medicine as directed  Contact your healthcare provider if you think your medicine is not helping or if you have side effects  Tell him of her if you are allergic to any medicine  Keep a list of the medicines, vitamins, and herbs you take  Include the amounts, and when and why you take them  Bring the list or the pill bottles to follow-up visits  Carry your medicine list with you in case of an emergency  Activity:  Your healthcare provider will give you specific activity instructions  The following are general guidelines to follow for up to 8 weeks after surgery:  Protect your sternum  Hug a pillow to your chest or cross your arms over your chest when you laugh, sneeze, or cough  Be careful when you get into or out of a chair or bed  Hug a pillow or cross your arms when you stand or sit  Do not twist as you move  Use only your legs to sit and stand  You may need to use a raised toilet seat if you have trouble standing up without using your arms  Your healthcare provider may teach you to use your elbow for support as you move from lying to sitting  Do not lift anything heavier than 5 pounds  until your healthcare provider says it is okay  For example, a gallon of milk weighs 8 pounds  Do not play sports that use your shoulder  Examples include tennis and golf  Do not drive  until your healthcare provider says it is okay  Keep your arms down as much as possible  Do not put your arms out to the side, behind you, or over your head  Do not let anyone pull your arms to help you move or dress  Do not reach for items  Do not push or pull anything  Examples include a car door or a vacuum   Care for your surgery area as directed:  Carefully wash around the area with soap and water   Let the soap and water run over the area  Do not scrub the area  If you do not have a bandage, gently pat the area dry with a clean towel  If you have a bandage, dry the area and put on a new, clean bandage  Change your bandage if it gets wet or dirty  Go to cardiac rehabilitation (rehab) as directed:  Cardiac rehab is a program run by specialists who help you safely strengthen your heart and prevent more heart disease  The plan includes exercise, relaxation, stress management, and heart-healthy nutrition  Healthcare providers will also check to make sure any medicines you take are working  The plan may also include instructions for when you can drive, return to work, and do other normal daily activities  Prevent another blocked artery:   Do not smoke  Nicotine and other chemicals in cigarettes and cigars can cause heart and lung damage  Ask your healthcare provider for information if you currently smoke and need help to quit  E-cigarettes or smokeless tobacco still contain nicotine  Talk to your healthcare provider before you use these products  Limit or do not drink alcohol as directed  Alcohol can raise your blood pressure and make your heart work harder  Alcohol can also increase your risk for heart disease or a stroke  Ask your healthcare provider if alcohol is okay for you  He or she can tell you how many drinks are okay to have within 24 hours and within 1 week  A drink is 12 ounces of beer, 5 ounces of wine, or 1½ ounces of liquor  Eat heart-healthy foods  You may need to eat foods that are low in salt, fat, or cholesterol  Healthy foods include fruits, vegetables, whole-grain breads, low-fat dairy products, beans, lean meats, and fish  Ask your healthcare provider for more information about a heart healthy diet  Maintain a healthy weight  Ask your healthcare provider what a healthy weight is for you  Extra weight can increase the stress on your heart   Ask your provider to help you create a safe weight loss plan if needed  Ask about vaccines you may need:  Vaccines help prevent diseases that can be dangerous for a person who has heart disease  Ask your healthcare provider about these and other vaccines you may need:  Get a flu vaccine  as soon as recommended each year, usually starting in September or October  Get a pneumonia vaccine  is recommended for all adults 72 or older  Your provider may also recommend this vaccine if you are younger than 72  COVID-19 vaccines are given as a shot in 1 or 2 doses  Vaccination is recommended for everyone 5 years or older  One 2-dose vaccine is fully approved  for those 16 years or older  This vaccine also has an emergency use authorization (EUA) for children 11to 13years old  No vaccine is currently available for children younger than 5 years  A booster (additional) dose  is given to help the immune system continue to protect against severe COVID-19  A booster is recommended for all adults 18 or older  The booster can be a different brand of the COVID-19 vaccine than you originally received  The timing for the booster depends on the type of vaccine you received:    1-dose vaccine: The booster is given at least 2 months after you received the vaccine  2-dose vaccine: The booster is given at least 5 or 6 months after the second dose  A booster can be given to adolescents 15to 16years old  Only 1 COVID-19 vaccine has this EUA  The booster is given at least 5 months after the second dose of the original vaccine series  A booster is recommended for immunocompromised children 11to 6years old  Only 1 COVID-19 vaccine has this EUA  The booster is given 28 days after the second dose  Follow up with your doctor or cardiologist as directed: You may need to go in regularly for tests to check how your heart is doing  Write down your questions so you remember to ask them during your visits    © Copyright [employer] 2022 Information is for End User's use only and may not be sold, redistributed or otherwise used for commercial purposes  All illustrations and images included in CareNotes® are the copyrighted property of A D A M , Inc  or Betito Browne  The above information is an  only  It is not intended as medical advice for individual conditions or treatments  Talk to your doctor, nurse or pharmacist before following any medical regimen to see if it is safe and effective for you

## 2022-09-16 NOTE — DISCHARGE SUMMARY
Discharge Summary - Cardiothoracic Surgery   Kailey Bhat 58 y o  male MRN: [id number]  Unit/Bed#: [facility] 411-01 Encounter: [id number]    Admission Date: 9/8/2022     Discharge Date: 09/16/22    Admitting Diagnosis: V tach [facility]) [I47 2]    Primary Discharge Diagnosis:   Coronary artery disease  S/P coronary artery bypass grafting;    Secondary Discharge Diagnosis:   CM LVEF 45%, significant tobacco use, s/p ICD ([employer])     Attending: ANISH Fung  Consulting Physician(s):   Cardiology  Medical/Critical Care      Procedures Performed:   Procedure(s):  CORONARY ARTERY BYPASS GRAFT (CABG) x 3, SWIFT TO LAD, L LEG EVH/SVG TO RCA  AND DIAGONAL  HARVEST VEIN ENDOSCOPIC [facility])     Hospital Course:     9/9: 57 y/o M s/p STEMI in June with presentation of VT/VF  IABP for coronary perfusion during cath,  moderate CAD no intervention at that time  He Presented to the hospital following an ICD shock at home  He has complaints of chest pain with exertion, relieved with rest  His troponins were elevated  Repeated cardiac catheterization with MVCAD, including a  LAD  Cardiac surgery consultation  He has been on Brilinta BID, last dose yesterday AM       9/10: Preop studies reviewed  9/11: PFTs remain pending  Preop orders placed  Ready for OR       9/12: Elective admission for CABG X 3  Transferred to ICU supported with Epi 2  No significant postoperative bleeding  Wean towards extubation  Spoke with [employer] no need for interrogation  9/13: Given IVF post-op for high SVR/low CI, started on milrinone 0 25, CI 1 9, SVO2 60, maintain milrinone, maintain swan/cordis  Talon weaned to off, hold beta blocker, maintain a line  Extubated to [facility], wean as tolerated  Start Lasix 40mg IV QDay, maintain neves catheter  Discontinue insulin gtt, transition to [facility]  Keep ICU      9/14: Delined  No drips  Lasix bid  3LNC in NSR  Net negative 2 L/24h  D/C neves  D/C EPW  Transfer to telemetry       9/15: NSR  D/c PWs and CTs  Cont lopressor 12 5 BID  Remains on 2L NC, wean off, IS/pulm toilet  SSI      9/16: NSR  D/c TLC  Cont lopressor 12 5mg BID  Sats good on rA, ambulating  Decrease demadex to 20mg QD  SSI  Home today  Condition at Discharge:   good     Discharge Physical Exam:    Please see the documented physical exam from this morning's progress note for details  Discharge Data:  Results from last 7 days   Lab Units 09/15/22  1129 09/14/22  0419 09/13/22  0356   WBC Thousand/uL 16 34* 12 95* 16 05*   HEMOGLOBIN g/dL 10 7* 10 0* 9 7*   HEMATOCRIT % 32 7* 31 2* 29 9*   PLATELETS Thousands/uL 171 134* 171     Results from last 7 days   Lab Units 09/15/22  1129 09/14/22  0419 09/13/22  0356 09/12/22 2014 09/12/22  1704   POTASSIUM mmol/L 3 7 3 7 3 6   < >  --    CHLORIDE mmol/L 98 105 115*  --   --    CO2 mmol/L 27 28 21  --   --    CO2, I-STAT mmol/L  --   --   --   --  25   BUN mg/dL 20 19 21  --   --    CREATININE mg/dL 1 01 0 99 0 73  --   --    GLUCOSE, ISTAT mg/dl  --   --   --   --  147*   CALCIUM mg/dL 9 6 9 0 6 7*  --   --     < > = values in this interval not displayed  Discharge instructions/Information to patient and family:   See after visit summary for information provided to patient and family  Atul Singer was educated on restrictions regarding driving and lifting, and techniques of proper incisional care  They were specifically counselled on signs and symptoms of an incisional infection, and advised to contact our service immediately should they develop fevers, sweats, chill, redness or drainage at the site of any incisions  Provisions for Follow-Up Care:  See after visit summary for information related to follow-up care and any pertinent home health orders  Disposition:  Home    Planned Readmission:   No    Discharge Medications:  See after visit summary for reconciled discharge medications provided to patient and family        Atul Singer was provided contact information and scheduled a follow up appointment with ANISH Jackson  Additionally, follow up appointments have been scheduled for their primary care physician and primary cardiologist   Contact information was provided  Upon presentation, NSTEMI was identified  Tanvir Thomas was counseled on the importance of avoiding tobacco products  As with all patients whom have undergone open heart surgery, tobacco cessation medication was contraindicated at the time of discharge  ACE/ARB was Contraindicated secondary to hypotension    Beta Blocker was Prescribed at discharge    Aspirin was Prescribed at discharge    Statin was Prescribed at discharge       The patient was discharged on ongoing diuretic therapy with Torsemide 20 mg, PO QD and Potassium Chloride 20 mEq, PO QD  They were advised to continue these medications for 7 days, unless otherwise directed  Narcotic pain medication was prescribed in the form of Oxycodone  Prior to prescribing, their prescription profile was reviewed on the [facility] of health prescription drug monitoring program     The patient was informed that following their postoperative surgical evaluation, they will be referred to outpatient cardiac rehabilitation  They were counseled that this program is run by specialists who will help them safely strengthen their heart and prevent more heart disease  Cardiac rehabilitation will include exercise, relaxation, stress management, and heart-healthy nutrition  Caregivers will also check to make sure their medication regimen is working  During this admission, the patient was questioned on their use of tobacco, alcohol, and illicit/non-prescription drug use in the  previous 24 months  During this time frame they admit to using tobacco  As such they have been counseled on the importance of cessation and abstinence  I spent 30 minutes discharging the patient  This time was spent on the day of discharge   I had direct contact with the patient on the day of discharge  Additional documentation is required if more than 30 minutes were spent on discharge       SIGNATURE: Brandon Savage PA-C  DATE: September 16, 2022  TIME: 11:18 AM

## 2022-09-16 NOTE — PROGRESS NOTES
[facility]    Cardiology Progress Note  Twan Dates 58 y o  male   YOB: 1960 MRN: [id number]  Unit/Bed#: [facility] 411-01 Encounter: [id number]      Subjective:   He was transferred to the floors yesterday and continues to do well  No acute complains    Assessments  70-year-old gentleman had previously presented to [facility] with a VFib cardiac arrest in June 2022, and was found to have multi-vessel CAD, without any clear culprit 100% stenosis  No PCI was felt necessary  He additionally had bradycardia and junctional rhythm, and due to the Vfib arrest without any need for revascularization, an ICD was placed  He subsequently followed up with me outpatient and  was otherwise feeling better  Subsequently, he was feeling well on medical therapy, but again presented with VFib-related ICD shock  Repeat catheterization showed significant multivessel disease and he was recommended CABG evaluation  He was transferred here for the same, and underwent CABGx3 on 9/12/22, and is recovering post-operatively after this  We are currently consulted for postoperative cardiac management  Principal Problem:    S/P CABG (coronary artery bypass graft)  Active Problems:    Cigarette nicotine dependence without complication    Essential hypertension    Cardiac arrest with ventricular fibrillation (HCC)    PERCY (acute kidney injury) ([facility] [address] )    Ventricular fibrillation ([facility] [address] )    ICD (implantable cardioverter-defibrillator) discharge    Multi-vessel CAD with stable angina (HCC)    Assessment  1  CAD s/p CABGx3  2  Ischemic cardiomyopathy  3  ICD discharge  · For Vfib  4  H/o prior Vfib cardiac arrest, s/p ICD  5  Hyperlipidemia  6   Overweight, Body mass index is 26 27 kg/m²       Plan  · Intraop DILIP - LVEF 40%  · S/p epicardial pacing wire, chest tube removal yesterday  · Diuresed well, wt down to 204 lbs --> agree withi decreasing torsemide to 20 mg daily  · Continue aspirin, atorvastatin  · He has short runs of SVT, but currently in NSR --> Continue amiodarone 200 mg daily  Can plan on keeping for 3 months, and then discontinue if no Afib    Stable for discharge from cardiac standpoint    Review of Systems   All other systems reviewed and are negative  Telemetry Review: No significant arrhythmias seen on telemetry review  NSR    Objective:   Vitals: Blood pressure 119/72, pulse 84, temperature 98 1 °F (36 7 °C), temperature source Oral, resp  rate 15, height 6' 2" (1 88 m), weight 92 8 kg (204 lb 9 4 oz), SpO2 94 %  , Body mass index is 26 27 kg/m² ,   Orthostatic Blood Pressures    Flowsheet Row Most Recent Value   Blood Pressure 119/72 filed at 09/16/2022 0804   Patient Position - Orthostatic VS Sitting filed at 09/16/2022 2159         Systolic (93YIM), QZF:119 , Min:108 , IFT:960     Diastolic (76BMR), LSO:80, Min:59, Max:83    Wt Readings from Last 5 Encounters:   09/16/22 92 8 kg (204 lb 9 4 oz)   09/08/22 97 1 kg (214 lb)   08/25/22 98 6 kg (217 lb 4 8 oz)   08/04/22 101 kg (222 lb)   07/15/22 101 kg (222 lb)     I/O       09/12 0701  09/13 0700 09/13 0701  09/14 0700 09/14 0701  09/15 0700    P  O  1080 1560 120    I V  (mL/kg) 2489 5 (25 9) 161 5 (1 7)     IV Piggyback 1850 300     Total Intake(mL/kg) 5419 5 (56 4) 2021 5 (20 9) 120 (1 2)    Urine (mL/kg/hr) 1300 (0 6) 2770 (1 2) 115 (0 2)    Chest Tube 370 455 70    Total Output 1670 3225 185    Net +3749 5 -1203 5 -65                     Physical Exam  Vitals and nursing note reviewed  Constitutional:       General: He is not in acute distress  Appearance: Normal appearance  He is well-developed  He is not ill-appearing or diaphoretic  HENT:      Head: Normocephalic and atraumatic  Nose: No congestion  Eyes:      General: No scleral icterus  Conjunctiva/sclera: Conjunctivae normal    Neck:      Vascular: No carotid bruit or JVD  Cardiovascular:      Rate and Rhythm: Normal rate and regular rhythm  Heart sounds: Normal heart sounds  No murmur heard  No friction rub  No gallop  Comments: Surgical wound dressing noted in place  Pulmonary:      Effort: Pulmonary effort is normal  No respiratory distress  Breath sounds: Normal breath sounds  No wheezing or rales  Comments: Chest tubes remain in place  Chest:      Chest wall: No tenderness  Abdominal:      General: There is no distension  Palpations: Abdomen is soft  Tenderness: There is no abdominal tenderness  Musculoskeletal:         General: No swelling, tenderness or deformity  Cervical back: Neck supple  No muscular tenderness  Right lower leg: No edema  Left lower leg: No edema  Skin:     General: Skin is warm  Neurological:      General: No focal deficit present  Mental Status: He is alert and oriented to person, place, and time  Mental status is at baseline  Psychiatric:         Mood and Affect: Mood normal          Behavior: Behavior normal          Thought Content:  Thought content normal          Laboratory Results: personally reviewed        CBC with diff:   Results from last 7 days   Lab Units 09/15/22  1129 09/14/22  0419 09/13/22  0356 09/13/22  0017 09/12/22  1704 09/12/22  1701 09/12/22  1615 09/12/22  1528 09/12/22  1245 09/10/22  0524   WBC Thousand/uL 16 34* 12 95* 16 05*  --   --   --   --   --   --  8 62   HEMOGLOBIN g/dL 10 7* 10 0* 9 7* 10 7*  --  11 5*  --   --   --  12 0   I STAT HEMOGLOBIN g/dl  --   --   --   --  10 9*  --  7 5*  --    < >  --    HEMATOCRIT % 32 7* 31 2* 29 9* 31 8*  --  34 8*  --   --   --  36 9   HEMATOCRIT, ISTAT %  --   --   --   --  32*  --  22*  --    < >  --    MCV fL 93 95 94  --   --   --   --   --   --  94   PLATELETS Thousands/uL 171 134* 171  --   --  165  --  160  --  201   MCH pg 30 3 30 3 30 4  --   --   --   --   --   --  30 5   MCHC g/dL 32 7 32 1 32 4  --   --   --   --   --   --  32 5   RDW % 13 2 13 3 13 1  --   --   --   --   --   --  12 9   MPV fL 9 3 9 1 9 4  --   --  9 1  -- 9 4  --  8 9    < > = values in this interval not displayed  CMP:  Results from last 7 days   Lab Units 09/15/22  1129 09/14/22  0419 09/13/22  0356 09/13/22  0017 09/12/22  2014 09/12/22  1704 09/12/22  1701 09/12/22  1615 09/12/22  1527 09/12/22  1517 09/12/22  1507 09/12/22  1412 09/12/22  1245 09/12/22  1245 09/10/22  0524   POTASSIUM mmol/L 3 7 3 7 3 6 4 2 3 6  --  4 2  --   --   --   --   --   --   --  4 0   CHLORIDE mmol/L 98 105 115*  --   --   --  111*  --   --   --   --   --   --   --  110*   CO2 mmol/L 27 28 21  --   --   --  23  --   --   --   --   --   --   --  25   CO2, I-STAT mmol/L  --   --   --   --   --  25  --  19* 28 27 27 27   < > 30  --    BUN mg/dL 20 19 21  --   --   --  20  --   --   --   --   --   --   --  16   CREATININE mg/dL 1 01 0 99 0 73  --   --   --  1 08  --   --   --   --   --   --   --  1 07   GLUCOSE, ISTAT mg/dl  --   --   --   --   --  147*  --  146* 166* 157* 147* 130  --  98  --    CALCIUM mg/dL 9 6 9 0 6 7*  --   --   --  8 7  --   --   --   --   --   --   --  9 5   EGFR ml/min/1 73sq m 79 81 99  --   --   --  73  --   --   --   --   --   --   --  73    < > = values in this interval not displayed           BMP:  Results from last 7 days   Lab Units 09/15/22  1129 09/14/22  0419 09/13/22  0356 09/13/22  0017 09/12/22  2014 09/12/22  1704 09/12/22  1701 09/12/22  1615 09/12/22  1527 09/12/22  1245 09/10/22  0524   POTASSIUM mmol/L 3 7 3 7 3 6 4 2 3 6  --  4 2  --   --   --  4 0   CHLORIDE mmol/L 98 105 115*  --   --   --  111*  --   --   --  110*   CO2 mmol/L 27 28 21  --   --   --  23  --   --   --  25   CO2, I-STAT mmol/L  --   --   --   --   --  25  --  19* 28   < >  --    BUN mg/dL 20 19 21  --   --   --  20  --   --   --  16   CREATININE mg/dL 1 01 0 99 0 73  --   --   --  1 08  --   --   --  1 07   GLUCOSE, ISTAT mg/dl  --   --   --   --   --  147*  --  146* 166*   < >  --    CALCIUM mg/dL 9 6 9 0 6 7*  --   --   --  8 7  --   --   --  9 5    < > = values in this interval not displayed  BNP: No results for input(s): BNP in the last 72 hours      Magnesium:   Results from last 7 days   Lab Units 09/14/22  0419 09/13/22  0356 09/10/22  0524   MAGNESIUM mg/dL 2 0 2 2 2 4       Coags:         TSH:        Hemoglobin A1C   Results from last 7 days   Lab Units 09/09/22  1922   HEMOGLOBIN A1C % 5 8*       Lipid Profile:         Meds/Allergies   all current active meds have been reviewed and current meds:   Current Facility-Administered Medications   Medication Dose Route Frequency    acetaminophen (TYLENOL) tablet 975 mg  975 mg Oral Q8H    ALPRAZolam (XANAX) tablet 0 5 mg  0 5 mg Oral Daily PRN    amiodarone tablet 200 mg  200 mg Oral Q8H [address]    aspirin tablet 325 mg  325 mg Oral Daily    atorvastatin (LIPITOR) tablet 80 mg  80 mg Oral Daily With Dinner    bisacodyl (DULCOLAX) rectal suppository 10 mg  10 mg Rectal Daily PRN    fondaparinux (ARIXTRA) subcutaneous injection 2 5 mg  2 5 mg Subcutaneous Daily    insulin lispro (HumaLOG) 100 units/mL subcutaneous injection 1-6 Units  1-6 Units Subcutaneous TID AC    insulin lispro (HumaLOG) 100 units/mL subcutaneous injection 1-6 Units  1-6 Units Subcutaneous HS    melatonin tablet 3 mg  3 mg Oral HS    metoprolol tartrate (LOPRESSOR) partial tablet 12 5 mg  12 5 mg Oral Q12H LORENA    mupirocin (BACTROBAN) 2 % nasal ointment 1 application  1 application Nasal V60B [address]    ondansetron (ZOFRAN) injection 4 mg  4 mg Intravenous Q6H PRN    oxyCODONE (ROXICODONE) immediate release tablet 10 mg  10 mg Oral Q4H PRN    oxyCODONE (ROXICODONE) IR tablet 5 mg  5 mg Oral Q4H PRN    pantoprazole (PROTONIX) EC tablet 40 mg  40 mg Oral Daily    polyethylene glycol (MIRALAX) packet 17 g  17 g Oral Daily    potassium chloride (K-DUR,KLOR-CON) CR tablet 20 mEq  20 mEq Oral Daily    torsemide (DEMADEX) tablet 20 mg  20 mg Oral Daily     Medications Prior to Admission   Medication    amLODIPine (NORVASC) 2 5 mg tablet    aspirin 81 mg chewable tablet    atorvastatin (LIPITOR) 40 mg tablet    lisinopril (ZESTRIL) 10 mg tablet    metoprolol succinate (TOPROL-XL) 50 mg 24 hr tablet    ticagrelor (BRILINTA) 90 MG            Cardiac testing: reviewed  No results found for this or any previous visit  No results found for this or any previous visit  No results found for this or any previous visit  No results found for this or any previous visit

## 2022-09-17 NOTE — TELEPHONE ENCOUNTER
Reason for Disposition   [1] DOUBLE DOSE (an extra dose or lesser amount) of prescription drug AND [2] NO symptoms (Exception: a double dose of antibiotics)    Answer Assessment - Initial Assessment Questions  1  NAME of MEDICATION: "What medicine are you calling about?"      metoprolol tartrate (LOPRESSOR) 25 mg tablet  2  QUESTION: "What is your question?" (e g , medication refill, side effect)   Discharge paper says take 0 5 tab, and bottle says takes half tablet  Informed both instructions are the same  Informed patient took 25 mg tab instead of 12 5 tonight   3  PRESCRIBING HCP: "Who prescribed it?" Reason: if prescribed by specialist, call should be referred to that group  Cardiology   4  SYMPTOMS: "Do you have any symptoms?"     Denies   5   SEVERITY: If symptoms are present, ask "Are they mild, moderate or severe?"      n/a    Protocols used: MEDICATION QUESTION CALL-ADULT-

## 2022-09-17 NOTE — TELEPHONE ENCOUNTER
s/p: CORONARY ARTERY BYPASS GRAFT (CABG) x 3, SWIFT TO LAD, L LEG EV[facility]/SVG TO RCA  AND DIAGONAL (N/A Chest)       HARVEST VEIN ENDOSCOPIC ([facility]) (Left Leg)  On 9/12/22 with Melissa Morales MD  Patient discharged today  Reporting taking 25 mg instead of 12 5 MG of Lopressor tonight  Denies any symptoms at this time  Current order is 12 5 BID  On call provider contacted and informed of patients concerns and status  Provider advises as follow:   Continue 12 5 BID tomorrow morning     Patient informed of providers recommendation, along with care advice  Verbalized understanding and agreeable with disposition  No further questions

## 2022-09-17 NOTE — TELEPHONE ENCOUNTER
Regarding: medication dosage questions   ----- Message from [facility] sent at 9/16/2022  8:28 PM EDT -----  " My  was just released from the hospital and on his bottle for metoprolol it says one every twelve hours, and discharge papers say half a tablet every twelve hours  "

## 2022-09-18 ENCOUNTER — HOME CARE VISIT (OUTPATIENT)
Dept: HOME HEALTH SERVICES | Facility: HOME HEALTHCARE | Age: 62
End: 2022-09-18
Payer: COMMERCIAL

## 2022-09-18 VITALS
TEMPERATURE: 97.9 F | OXYGEN SATURATION: 97 % | RESPIRATION RATE: 20 BRPM | SYSTOLIC BLOOD PRESSURE: 98 MMHG | HEART RATE: 80 BPM | DIASTOLIC BLOOD PRESSURE: 60 MMHG

## 2022-09-18 PROCEDURE — G0299 HHS/HOSPICE OF RN EA 15 MIN: HCPCS

## 2022-09-18 PROCEDURE — 400013 VN SOC

## 2022-09-18 NOTE — CASE COMMUNICATION
[facility] has admitted your patient to [address] [place] [address] service with the following disciplines:      SN  This report is informational only, no responses is needed  Primary focus of home health care cardiac assess  Patient stated goals of care heal from surgery   Anticipated visit pattern and next visit date  3w1 2w5 Next visit planned for Wed 9 21 22  See medication list - meds in home differ from AVS NA all in home   Significant clinic al findings NA   Potential barriers to goal achievement current smoker  Other pertinent information NA    Thank you for allowing us to participate in the care of your patient

## 2022-09-19 ENCOUNTER — TRANSITIONAL CARE MANAGEMENT (OUTPATIENT)
Dept: FAMILY MEDICINE CLINIC | Facility: CLINIC | Age: 62
End: 2022-09-19

## 2022-09-19 NOTE — UTILIZATION REVIEW
Notification of Discharge   This is a Notification of Discharge from our facility [address]  Please be advised that this patient has been discharge from our facility  Below you will find the admission and discharge date and time including the patients disposition  UTILIZATION REVIEW CONTACT:  Milton Harrison  Utilization   Network Utilization Review Department  Phone: [phone] x carefully listen to the prompts  All voicemails are confidential   Email: [email or web address]     PHYSICIAN ADVISORY SERVICES:  FOR [id number] REVIEW - MEDICAL NECESSITY DENIAL  Phone: [phone]  Fax: [phone]  Email: [email or web address]     PRESENTATION DATE: 9/8/2022  7:32 PM  OBERVATION ADMISSION DATE:   INPATIENT ADMISSION DATE: 9/8/22  7:32 PM   DISCHARGE DATE: 9/16/2022 12:45 PM  DIS[facility]ITION[facility] [address] INFORMATION:  Send all requests for admission clinical reviews, approved or denied determinations and any other requests to dedicated fax number below belonging to the campus where the patient is receiving treatment   List of dedicated fax numbers:  [address] DENIALS (Administrative/Medical Necessity) [phone]   [address]  (Maternity/NICU/Pediatrics) [phone]   Nat Carrion [phone]   [address] [facility] [address] [phone]   [address] [place] [address] [phone]   [address] [facility] [address] [place] [address],[address] [phone]   [facility]  [phone]   [address] [facility], [facility]  [address] [facility] [address] [facility] [phone]

## 2022-09-21 ENCOUNTER — TELEPHONE (OUTPATIENT)
Dept: CARDIAC SURGERY | Facility: CLINIC | Age: 62
End: 2022-09-21

## 2022-09-21 ENCOUNTER — HOME CARE VISIT (OUTPATIENT)
Dept: HOME HEALTH SERVICES | Facility: HOME HEALTHCARE | Age: 62
End: 2022-09-21
Payer: COMMERCIAL

## 2022-09-21 VITALS
OXYGEN SATURATION: 96 % | HEART RATE: 92 BPM | TEMPERATURE: 97.6 F | WEIGHT: 204 LBS | SYSTOLIC BLOOD PRESSURE: 110 MMHG | BODY MASS INDEX: 26.19 KG/M2 | RESPIRATION RATE: 20 BRPM | DIASTOLIC BLOOD PRESSURE: 80 MMHG

## 2022-09-21 PROCEDURE — G0299 HHS/HOSPICE OF RN EA 15 MIN: HCPCS

## 2022-09-21 NOTE — PROGRESS NOTES
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   MITRAL VALVE: Unchanged   TRICUSPID VALVE: Unchanged   PULMONIC VALVE: Unchanged   ATRIA: Unchanged   AORTA: Unchanged   REMOVAL: Probe Removal: atraumatic  VAS carotid complete study    Result Date: 9/10/2022  Narrative:  [facility] REPORT [facility]INICAL: Indications: Patient presents for a general health evaluation secondary to future open heart surgery  Patient is asymptomatic at this time  Operative History: 2022-09-09 Cardiac catheterization Risk Factors The patient has history of HTN and smoking (current) 0 5 ppd  Clinical Right Pressure: IV, Left Pressure:  105/75 mm Hg  FINDINGS:  Right        Impression  PSV  EDV (cm/s)  Direction of Flow  Ratio  Dist  ICA                 67          25                      1 03  Mid  ICA                  58          27                      0 90  Prox   ICA    1 - 49%      37          13                      0 58  Dist CCA                  47          13                            Mid CCA                   65          14                      0 68  Prox CCA                  95 20                            Ext Carotid              108           9                      1 66  Prox Vert                 90          37  Antegrade                 Subclavian               109           0                             Left         Impression  PSV  EDV (cm/s)  Direction of Flow  Ratio  Dist  ICA                 57          29                      0 72  Mid  ICA                  56          27                      0 70  Prox  ICA    1 - 49%      56          22                      0 70  Dist CCA                  48          16                            Mid CCA                   79          24                      0 92  Prox CCA                  86          26                            Ext Carotid               87          14                      1 10  Prox Vert                 88          32  Antegrade                 Subclavian               110           0                               CONCLUSION:  Impression: RIGHT: There is <50% stenosis noted in the internal carotid artery  Plaque is homogenous and smooth  Vertebral artery flow is antegrade  There is no significant subclavian artery disease  LEFT: There is <50% stenosis noted in the internal carotid artery  Plaque is homogenous and smooth  Vertebral artery flow is antegrade  There is no significant subclavian artery disease  No previous study for comparison  Internal carotid artery stenosis determination by consensus criteria from: Ashley Vanegas et al  Carotid Artery Stenosis: Gray-Scale and Doppler US Diagnosis - Society of Radiologists in [address] [place] [address] [facility], Radiology 2003; [phone]  SIGNATURE: Electronically Signed by: Misha Gonzalez MD on 2022-09-10 10:36:46 PM    VAS lower limb vein mapping bypass graft    Result Date: 9/10/2022  Narrative:  [facility] REPORT CLINICAL: Indications: Pre-op Vein Mapping for Future Open Heart Surgery   Operative History: 2022-09-09 Cardiac catheterization Risk Factors The patient has history of HTN and smoking (current) 0 5 ppd  FINDINGS:  Segment         Right                    Left                            Impression  Diameter AP  Impression  Diameter AP  GSV Inguinal                       10 0                      6 6  GSV Prox Thigh  Branch              3 2  Branch              2 8  GSV Mid Thigh   Branch              2 8                      3 0  GSV Dist Thigh  Branch              3 3  Branch              4 0  GSV Knee                            2 9                      2 9  GSV Prox Calf                       2 5                      3 1  GSV Mid Calf                        1 4                      2 8  GSV Dist Calf   Branch              1 4                      2 0  GSV Ankle                           2 4                      2 1     CONCLUSION:  Impression: RIGHT LOWER LIMB: The great saphenous vein is patent  from the groin to the ankle  The vein measures >2mm in caliber from the groin to the proximal calf  LEFT LOWER LIMB: The great saphenous vein is patent  from the groin to the ankle  The vein measures >2mm in caliber from the groin to the ankle  Tech note: Branches noted bilaterally off the great saphenous vein  SIGNATURE: Electronically Signed by: Jennifer Ramos MD on 2022-09-10 10:36:22 PM    XR chest portable ICU    Result Date: 9/13/2022  Narrative: CHEST INDICATION:   S/P open heart  COMPARISON:  9/6/2022 EXAM PERFORMED/VIEWS:  XR CHEST PORTABLE ICU FINDINGS:  Endotracheal tube is present with its tip in satisfactory position above the level of the sondra  An enteric tube is present with its tip in satisfactory position below the left hemidiaphragm  Right internal jugular central venous catheter is noted  Also noted is a Roach-Charlette catheter from a right internal jugular approach with its tip overlying the pulmonary outflow tract  Mediastinal and pleural drains are present  Cardiomediastinal silhouette appears unremarkable  The lungs are clear    No pneumothorax or pleural effusion  Osseous structures appear within normal limits for patient age  Impression: No pneumothorax  Lines and tubes as described above  Workstation performed: [id number]     DILIP Intraop Interventional w/ realtime guidance of cardiac procedures    Result Date: 9/13/2022  Narrative: This order contains the linked images for the DILIP that was performed by the Anesthesiologist   Please see the  CARDIAC DILIP ANESTHESIA procedure for results  Cardiac EP device report    Result Date: 9/17/2022  Narrative: MDT DUAL ICD/ ACTIVE SYSTEM IS MRI CONDITIONAL CARELINK TRANSMISSION: --- ALERT-- PATIENT BEEPING DUE TO UNSUCCESSFUL CARELINK TRANSMISSION  NORMAL DEVICE FUNCTION  ---MCCLURE     Cardiac EP device report    Result Date: 9/3/2022  Narrative: MDT DUAL ICD/ ACTIVE SYSTEM IS MRI CONDITIONAL NB/SHOCK-CARELINK TRANSMISSION: 1 VF TREATED W/SHOCK  PT STATES HE ACCIDENTLY TOOK THE RX'D ("DILATOR PILL-PER PT)  CLAIMS AFTER SHOCK HE STOPPED TAKING THE MED THAT ABIMAEL F, RX'D  PT ON METO SUCC & EF 44% (2022)  1 DEVICE CLASSIFIED VHR NOTED; NO THERAPY GIVEN  DR JOSEPH & DT MADE AWARE  US bedside procedure    Result Date: 9/6/2022  Narrative: 2 [id number]  0 [id number]  3 [id number]    Echo follow up/limited w/ contrast if indicated    Result Date: 9/8/2022  Narrative: Maria C Pall  Left Ventricle: Left ventricular cavity size is dilated  Wall thickness is normal  There is eccentric hypertrophy  The left ventricular ejection fraction is 40%  Systolic function is moderately reduced    The following segments are dyskinetic: mid anteroseptal, mid inferoseptal, apical septal and apex    All other segments are normal        Review of Systems:  Review of Systems   All other systems reviewed and are negative  Physical Exam:  Physical Exam  Vitals reviewed  Constitutional:       Appearance: Normal appearance  HENT:      Head: Normocephalic  Cardiovascular:      Rate and Rhythm: Normal rate and regular rhythm  Pulses: Normal pulses  Heart sounds: Normal heart sounds  Pulmonary:      Effort: Pulmonary effort is normal       Breath sounds: Normal breath sounds  Abdominal:      General: Bowel sounds are normal       Palpations: Abdomen is soft  Musculoskeletal:         General: Normal range of motion  Cervical back: Normal range of motion and neck supple  Right lower leg: No edema  Left lower leg: No edema  Skin:     General: Skin is warm and dry  Capillary Refill: Capillary refill takes less than 2 seconds  Neurological:      General: No focal deficit present  Mental Status: He is alert and oriented to person, place, and time  Psychiatric:         Mood and Affect: Mood normal          Behavior: Behavior normal          Discussion/Summary:  1  9/12/22 Mr Angelic Sena underwent CABG x 3 LIMA to LAD, SVG to RCA and SVG to 1st diag by Dr Hunter Appl  Continue on ASA 325mg daily, Lipitor 80mg daily,   Metoprolol tartrate 12 5 mg q 12 hours,  Start lisinopril 2 5 mg daily at bedtime  Heart healthy diet  Cardiac rehabilitation in the near future,  Encourage smoking cessation,  CBC BMP to be done in the near future  2  ICM LVEF 40%  Continue metoprolol tartrate 12 5 mg q 12 hours, start lisinopril 2 5 mg daily at bedtime  Follow-up TTE in 2 months to evaluate LVEF  3  VF arrest 7/5/22 sp MDT dual ICD implant,  9/17/22 MDT dual ICD interrogation showed normal device function,  Continue on amiodarone 200 mg daily, metoprolol tartrate 12 5 mg q 12 hours  4  Dyslipidemia  9/08/22 , , HDL 35, LDL 81  Continue Lipitor 80 mg daily  5  Hypertension RUE sitting 140/80, continue on Metoprolol tartrate 12 5mg Q12 hours, start Lisinopril 2 5mg daily at bedtime  2gm sodium diet  6  Tobacco abuse continues to smoke one pack of cigarettes a day, refusing Rx to assist with smoking cessation    I have encouraged smoking cessation

## 2022-09-21 NOTE — TELEPHONE ENCOUNTER
Spoke with Maurisio Mclaughlin today as routine postop check in  Preop weight: 213 lbs   Discharge weight: 204 lbs   Weight 9/21: 204 lbs    Patient states he is feeling well  He has been walking, deep breathing and abiding by sternal precautions  He has some sternal incisional pain, but is managed with Tylenol  No complaints of fever/chills, constipation, nausea or vomiting  His appetite has come back since discharge home  He is very happy with how his surgery and hospital stay went  Reviewed upcoming appointments  Cardiology on 9/26 and postop visit with Dr David Kerr on 10/14

## 2022-09-23 ENCOUNTER — HOME CARE VISIT (OUTPATIENT)
Dept: HOME HEALTH SERVICES | Facility: HOME HEALTHCARE | Age: 62
End: 2022-09-23
Payer: COMMERCIAL

## 2022-09-23 ENCOUNTER — TELEPHONE (OUTPATIENT)
Dept: HOME HEALTH SERVICES | Facility: HOME HEALTHCARE | Age: 62
End: 2022-09-23

## 2022-09-26 ENCOUNTER — OFFICE VISIT (OUTPATIENT)
Dept: CARDIOLOGY CLINIC | Facility: CLINIC | Age: 62
End: 2022-09-26
Payer: COMMERCIAL

## 2022-09-26 VITALS
SYSTOLIC BLOOD PRESSURE: 142 MMHG | WEIGHT: 211.4 LBS | OXYGEN SATURATION: 96 % | HEART RATE: 96 BPM | HEIGHT: 73 IN | BODY MASS INDEX: 28.02 KG/M2 | DIASTOLIC BLOOD PRESSURE: 82 MMHG

## 2022-09-26 DIAGNOSIS — E78.5 DYSLIPIDEMIA: ICD-10-CM

## 2022-09-26 DIAGNOSIS — I10 HYPERTENSION, UNSPECIFIED TYPE: ICD-10-CM

## 2022-09-26 DIAGNOSIS — Z72.0 TOBACCO ABUSE: ICD-10-CM

## 2022-09-26 DIAGNOSIS — I25.5 ISCHEMIC CARDIOMYOPATHY: ICD-10-CM

## 2022-09-26 DIAGNOSIS — Z95.1 S/P CABG X 3: Primary | ICD-10-CM

## 2022-09-26 DIAGNOSIS — I49.01 VENTRICULAR FIBRILLATION (HCC): ICD-10-CM

## 2022-09-26 PROCEDURE — G0180 MD CERTIFICATION HHA PATIENT: HCPCS | Performed by: THORACIC SURGERY (CARDIOTHORACIC VASCULAR SURGERY)

## 2022-09-26 PROCEDURE — 99024 POSTOP FOLLOW-UP VISIT: CPT | Performed by: NURSE PRACTITIONER

## 2022-09-26 PROCEDURE — 93000 ELECTROCARDIOGRAM COMPLETE: CPT | Performed by: NURSE PRACTITIONER

## 2022-09-26 RX ORDER — OMEPRAZOLE 20 MG/1
20 CAPSULE, DELAYED RELEASE ORAL DAILY
Qty: 30 CAPSULE | Refills: 0
Start: 2022-09-26 | End: 2022-10-14 | Stop reason: ALTCHOICE

## 2022-09-26 RX ORDER — LISINOPRIL 2.5 MG/1
TABLET ORAL
Qty: 30 TABLET | Refills: 3 | Status: SHIPPED | OUTPATIENT
Start: 2022-09-26 | End: 2022-10-17 | Stop reason: SDUPTHER

## 2022-09-26 NOTE — PATIENT INSTRUCTIONS
Start Lisinopril 2 5mg daily at bedtime  Lab studies  Follow up limited TTE in 2 months  Maintain a 2 gram daily sodium diet and 2 liters per day fluid restriction  Check daily weights  If you gained 3 pounds in one day, 5 pounds in one week, or experience worsening shortness of breath or increasing lower leg swelling  Please call the heart failure office at [phone]    Please bring a  list of your current medications and daily weights to the office visit

## 2022-09-27 ENCOUNTER — HOME CARE VISIT (OUTPATIENT)
Dept: HOME HEALTH SERVICES | Facility: HOME HEALTHCARE | Age: 62
End: 2022-09-27
Payer: COMMERCIAL

## 2022-09-27 ENCOUNTER — APPOINTMENT (OUTPATIENT)
Dept: LAB | Facility: CLINIC | Age: 62
End: 2022-09-27
Payer: COMMERCIAL

## 2022-09-27 VITALS
BODY MASS INDEX: 26.91 KG/M2 | HEART RATE: 81 BPM | SYSTOLIC BLOOD PRESSURE: 132 MMHG | TEMPERATURE: 98.1 F | RESPIRATION RATE: 20 BRPM | WEIGHT: 204 LBS | DIASTOLIC BLOOD PRESSURE: 88 MMHG | OXYGEN SATURATION: 96 %

## 2022-09-27 DIAGNOSIS — Z95.1 S/P CABG X 3: ICD-10-CM

## 2022-09-27 DIAGNOSIS — I25.5 ISCHEMIC CARDIOMYOPATHY: ICD-10-CM

## 2022-09-27 LAB
ANION GAP SERPL CALCULATED.3IONS-SCNC: 9 MMOL/L (ref 4–13)
BASOPHILS # BLD AUTO: 0.11 THOUSANDS/ΜL (ref 0–0.1)
BASOPHILS NFR BLD AUTO: 1 % (ref 0–1)
BUN SERPL-MCNC: 21 MG/DL (ref 5–25)
CALCIUM SERPL-MCNC: 9.5 MG/DL (ref 8.3–10.1)
CHLORIDE SERPL-SCNC: 110 MMOL/L (ref 96–108)
CO2 SERPL-SCNC: 18 MMOL/L (ref 21–32)
CREAT SERPL-MCNC: 0.83 MG/DL (ref 0.6–1.3)
EOSINOPHIL # BLD AUTO: 0.26 THOUSAND/ΜL (ref 0–0.61)
EOSINOPHIL NFR BLD AUTO: 2 % (ref 0–6)
ERYTHROCYTE [DISTWIDTH] IN BLOOD BY AUTOMATED COUNT: 13.9 % (ref 11.6–15.1)
GFR SERPL CREATININE-BSD FRML MDRD: 94 ML/MIN/1.73SQ M
GLUCOSE P FAST SERPL-MCNC: 96 MG/DL (ref 65–99)
HCT VFR BLD AUTO: 39.6 % (ref 36.5–49.3)
HGB BLD-MCNC: 11.7 G/DL (ref 12–17)
IMM GRANULOCYTES # BLD AUTO: 0.06 THOUSAND/UL (ref 0–0.2)
IMM GRANULOCYTES NFR BLD AUTO: 0 % (ref 0–2)
LYMPHOCYTES # BLD AUTO: 1.76 THOUSANDS/ΜL (ref 0.6–4.47)
LYMPHOCYTES NFR BLD AUTO: 13 % (ref 14–44)
MCH RBC QN AUTO: 29.8 PG (ref 26.8–34.3)
MCHC RBC AUTO-ENTMCNC: 29.5 G/DL (ref 31.4–37.4)
MCV RBC AUTO: 101 FL (ref 82–98)
MONOCYTES # BLD AUTO: 0.89 THOUSAND/ΜL (ref 0.17–1.22)
MONOCYTES NFR BLD AUTO: 6 % (ref 4–12)
NEUTROPHILS # BLD AUTO: 10.86 THOUSANDS/ΜL (ref 1.85–7.62)
NEUTS SEG NFR BLD AUTO: 78 % (ref 43–75)
NRBC BLD AUTO-RTO: 0 /100 WBCS
PLATELET # BLD AUTO: 481 THOUSANDS/UL (ref 149–390)
PMV BLD AUTO: 8.7 FL (ref 8.9–12.7)
POTASSIUM SERPL-SCNC: 4.7 MMOL/L (ref 3.5–5.3)
RBC # BLD AUTO: 3.92 MILLION/UL (ref 3.88–5.62)
SODIUM SERPL-SCNC: 137 MMOL/L (ref 135–147)
WBC # BLD AUTO: 13.94 THOUSAND/UL (ref 4.31–10.16)

## 2022-09-27 PROCEDURE — 36415 COLL VENOUS BLD VENIPUNCTURE: CPT

## 2022-09-27 PROCEDURE — 80048 BASIC METABOLIC PNL TOTAL CA: CPT

## 2022-09-27 PROCEDURE — G0299 HHS/HOSPICE OF RN EA 15 MIN: HCPCS

## 2022-09-27 PROCEDURE — 85025 COMPLETE CBC W/AUTO DIFF WBC: CPT

## 2022-09-28 ENCOUNTER — TELEPHONE (OUTPATIENT)
Dept: CARDIOLOGY CLINIC | Facility: CLINIC | Age: 62
End: 2022-09-28

## 2022-09-28 DIAGNOSIS — Z72.0 TOBACCO ABUSE: Primary | ICD-10-CM

## 2022-09-28 DIAGNOSIS — Z72.0 TOBACCO ABUSE: ICD-10-CM

## 2022-09-28 RX ORDER — NICOTINE 21 MG/24HR
PATCH, TRANSDERMAL 24 HOURS TRANSDERMAL
Qty: 56 PATCH | Refills: 0 | Status: SHIPPED | OUTPATIENT
Start: 2022-09-28 | End: 2022-09-29 | Stop reason: SDUPTHER

## 2022-09-28 RX ORDER — NICOTINE 21 MG/24HR
PATCH, TRANSDERMAL 24 HOURS TRANSDERMAL
Qty: 45 PATCH | Refills: 0 | Status: SHIPPED | OUTPATIENT
Start: 2022-09-28 | End: 2022-09-28

## 2022-09-28 RX ORDER — NICOTINE 21 MG/24HR
PATCH, TRANSDERMAL 24 HOURS TRANSDERMAL
Qty: 14 PATCH | Refills: 0 | Status: SHIPPED | OUTPATIENT
Start: 2022-09-28

## 2022-09-28 NOTE — TELEPHONE ENCOUNTER
Mr Marilia Kim was seen for an ov on 9/26  He said smoking cessation was discussed  At the time, he declined any intervention  Today, pt said he has changed his mind about quitting smoking, and would like a script for a nicotine patch which he said was mentioned at the ov  Please advise

## 2022-09-28 NOTE — TELEPHONE ENCOUNTER
Yes a Nicotine Patch 21mg daily for 6 wks, then decrease to 14mg daily for 2 wks, an be ordered at this time with decreasing cigarette use    Thank you

## 2022-09-29 DIAGNOSIS — Z72.0 TOBACCO ABUSE: ICD-10-CM

## 2022-09-29 RX ORDER — NICOTINE 21 MG/24HR
PATCH, TRANSDERMAL 24 HOURS TRANSDERMAL
Qty: 56 PATCH | Refills: 0 | Status: SHIPPED | OUTPATIENT
Start: 2022-09-29

## 2022-09-29 NOTE — TELEPHONE ENCOUNTER
Patient called to see if we can do a prescription for the nicotine patch to [facility] in [place], since [place] does not have it in stock  It was originally ordered by [employer]

## 2022-09-30 ENCOUNTER — HOME CARE VISIT (OUTPATIENT)
Dept: HOME HEALTH SERVICES | Facility: HOME HEALTHCARE | Age: 62
End: 2022-09-30
Payer: COMMERCIAL

## 2022-09-30 PROCEDURE — G0299 HHS/HOSPICE OF RN EA 15 MIN: HCPCS

## 2022-10-02 VITALS
RESPIRATION RATE: 18 BRPM | SYSTOLIC BLOOD PRESSURE: 126 MMHG | HEART RATE: 70 BPM | TEMPERATURE: 97.4 F | WEIGHT: 204 LBS | OXYGEN SATURATION: 96 % | DIASTOLIC BLOOD PRESSURE: 80 MMHG | BODY MASS INDEX: 26.91 KG/M2

## 2022-10-06 ENCOUNTER — HOME CARE VISIT (OUTPATIENT)
Dept: HOME HEALTH SERVICES | Facility: HOME HEALTHCARE | Age: 62
End: 2022-10-06
Payer: COMMERCIAL

## 2022-10-06 VITALS
DIASTOLIC BLOOD PRESSURE: 68 MMHG | WEIGHT: 204 LBS | BODY MASS INDEX: 26.91 KG/M2 | TEMPERATURE: 98.1 F | HEART RATE: 88 BPM | SYSTOLIC BLOOD PRESSURE: 118 MMHG | RESPIRATION RATE: 18 BRPM | OXYGEN SATURATION: 94 %

## 2022-10-06 PROCEDURE — G0299 HHS/HOSPICE OF RN EA 15 MIN: HCPCS

## 2022-10-11 PROBLEM — J18.9 PNEUMONIA: Status: RESOLVED | Noted: 2022-07-03 | Resolved: 2022-10-11

## 2022-10-13 DIAGNOSIS — Z95.1 S/P CABG (CORONARY ARTERY BYPASS GRAFT): ICD-10-CM

## 2022-10-13 RX ORDER — ASPIRIN 325 MG
325 TABLET ORAL DAILY
Qty: 30 TABLET | Refills: 2 | Status: SHIPPED | OUTPATIENT
Start: 2022-10-13

## 2022-10-13 RX ORDER — AMIODARONE HYDROCHLORIDE 200 MG/1
200 TABLET ORAL DAILY
Qty: 30 TABLET | Refills: 2 | Status: SHIPPED | OUTPATIENT
Start: 2022-10-13

## 2022-10-13 RX ORDER — ATORVASTATIN CALCIUM 80 MG/1
80 TABLET, FILM COATED ORAL
Qty: 30 TABLET | Refills: 2 | Status: SHIPPED | OUTPATIENT
Start: 2022-10-13

## 2022-10-13 NOTE — TELEPHONE ENCOUNTER
Patient called to have all his prescriptions sent to [facility] in [address] [facility] [place] [address] [place]  He only wants a month at a time and will eventually switch to Pill Pack with [facility]  He is scheduled to see Dr Silva Metzger in November

## 2022-10-14 ENCOUNTER — OFFICE VISIT (OUTPATIENT)
Dept: CARDIAC SURGERY | Facility: CLINIC | Age: 62
End: 2022-10-14

## 2022-10-14 ENCOUNTER — NURSE TRIAGE (OUTPATIENT)
Dept: OTHER | Facility: OTHER | Age: 62
End: 2022-10-14

## 2022-10-14 VITALS
OXYGEN SATURATION: 97 % | DIASTOLIC BLOOD PRESSURE: 83 MMHG | TEMPERATURE: 98.4 F | HEART RATE: 84 BPM | HEIGHT: 73 IN | WEIGHT: 210 LBS | SYSTOLIC BLOOD PRESSURE: 140 MMHG | BODY MASS INDEX: 27.83 KG/M2

## 2022-10-14 DIAGNOSIS — Z95.1 S/P CABG (CORONARY ARTERY BYPASS GRAFT): Primary | ICD-10-CM

## 2022-10-14 DIAGNOSIS — Z48.89 ENCOUNTER FOR POSTOPERATIVE CARE: ICD-10-CM

## 2022-10-14 PROCEDURE — 99024 POSTOP FOLLOW-UP VISIT: CPT | Performed by: NURSE PRACTITIONER

## 2022-10-14 NOTE — TELEPHONE ENCOUNTER
ASHLEY Sorto  Omeprazole is only one month post CABG  Lydia Has you Minerva Bonds                  ----- Message -----   From: Jocelyn Harrington   Sent: 10/13/2022  12:15 PM EDT   To: ASHLEY Sorto   Subject: medication question                               Hi I received a call from patient  [facility] wants meds refilled to a different pharmacy   The one that I have a question about is Omeprazole   Is that something short term after CABG or should I go ahead and refill it?  I saw you had prescribed it on 9/26/22 with no refills  [facility] will be following with Dr Tommas Kehr in the near future

## 2022-10-14 NOTE — PROGRESS NOTES
POST OP FOLLOW UP VISIT    Procedure: S/P  Coronary artery bypass grafting x 3 with left internal mammary artery to left anterior descending, saphenous vein graft to right coronary artery, saphenous vein graft to first diagonal, performed on 9/9/22  History: Sudarshan Diallo is a 58y o  year old male who presents to our office today for routine follow up care from coronary artery bypass grafting  Patient originally presented in June with NSTEMI w/ VT/VF  Cardiac cath demonstrated multivessel CAD  He underwent ICD implantation in July  He presented to the hospital in Sept after his ICD fired  Repeat cath demonstrated progression of his CAD  He underwent CABG x 3 on 9/12/22  His post op course was uneventful and he was discharged to home on POD #4  Patient has had routine cardiology follow up  Today patient reports he is doing well and feels good  He unfortunately has resumed smoking 1ppd , He states he lit up a cigarette in the car right after discharge form the hospital   He states he tried using Nicoderm patches but it made him sick (headache, poor appetite, altered taste)  He stopped using the patches after a few days and resumed smoking  Patient states he has been tolerating activity without angina, lightheadedness, palpitations, or SOB  He has not received any further shocks from the ICD  He denies fever, chills or incisional issues  Vital Signs:   Vitals:    10/14/22 1539 10/14/22 1545   BP: 138/79 140/83   BP Location: Left arm Right arm   Patient Position: Sitting Sitting   Cuff Size: Large Large   Pulse: 84    Temp: 98 4 °F (36 9 °C)    TempSrc: Tympanic    SpO2: 97%    Weight: 95 3 kg (210 lb)    Height: 6' 1" (1 854 m)        Home Medications:   Prior to Admission medications    Medication Sig Start Date End Date Taking?  Authorizing Provider   acetaminophen (TYLENOL) 650 mg CR tablet Take 1 tablet (650 mg total) by mouth every 8 (eight) hours as needed for mild pain 9/16/22 10/16/22 Yes Gil Or ROSS Olivo   amiodarone 200 mg tablet Take 1 tablet (200 mg total) by mouth daily 10/13/22  Yes Aníbal Rodriguez MD   aspirin 325 mg tablet Take 1 tablet (325 mg total) by mouth daily 10/13/22  Yes Aníbal Rodriguez MD   atorvastatin (LIPITOR) 80 mg tablet Take 1 tablet (80 mg total) by mouth daily with dinner 10/13/22  Yes Aníbal Rodriguez MD   lisinopril (ZESTRIL) 2 5 mg tablet Daily at bedtime 9/26/22  Yes ASHLEY Patterson   metoprolol tartrate (LOPRESSOR) 25 mg tablet Take 0 5 tablets (12 5 mg total) by mouth every 12 (twelve) hours 10/13/22  Yes Aníbal Rodriguez MD   omeprazole (PriLOSEC) 20 mg delayed release capsule Take 1 capsule (20 mg total) by mouth daily 9/26/22 10/14/22 Yes ASHLEY Rain   nicotine (NICODERM CQ) 14 mg/24hr TD 24 hr patch Apply one patch daily (14 mg) for 14 days  Begin 14 mg patch after completing 21 mg patch to be worn first for 6 weeks  Patient not taking: Reported on 10/14/2022 9/28/22   ASHLEY Patterson   nicotine (NICODERM CQ) 21 mg/24 hr TD 24 hr patch APPLY 1 PATCH DAILY FOR 6 WEEKS  Patient not taking: Reported on 10/14/2022 9/29/22   ASHLEY Patterson   polyethylene glycol [facility]) 17 GM/SCOOP powder Take 17 g by mouth daily  Patient not taking: No sig reported 9/16/22 10/16/22  Magali Carrillo PA-C   sacubitril-valsartan [facility]) 24-26 MG TABS Take 1 tablet by mouth 2 (two) times a day 7/5/22 7/5/22  Asmita Wolf MD       Physical Exam:  General: Alert, oriented, well developed, no acute distress  HEENT/NECK:  PERRLA  No jugular venous distention  Cardiac:Regular rate and rhythm  Pulmonary:Breath sounds clear bilaterally  Abdomen:  Non-tender, Non-distended  Positive bowel sounds  Upper extremities: 2+ radial pulses; brisk capillary refill  Lower extremities: Extremities warm/dry  PT/DP pules 2+ bilaterally  No edema B/L  Incisions: Sternum is stable  Incision is clean, dry, and intact  Saphenectomy incision is clean, dry, and intact  Musculoskeletal: MAEE, stable gait  Neuro: Alert and oriented X 3  Sensation is grossly intact  No focal deficits  Skin: Warm/Dry, without rashes or lesions  Lab Results:   Lab Results   Component Value Date    WBC 13 94 (H) 09/27/2022    HGB 11 7 (L) 09/27/2022    HCT 39 6 09/27/2022     (H) 09/27/2022     (H) 09/27/2022     Lab Results   Component Value Date    SODIUM 137 09/27/2022    K 4 7 09/27/2022     (H) 09/27/2022    CO2 18 (L) 09/27/2022    BUN 21 09/27/2022    CREATININE 0 83 09/27/2022    GLUC 128 09/15/2022    CALCIUM 9 5 09/27/2022     Lab Results   Component Value Date    HGBA1C 5 8 (H) 09/09/2022     Lab Results   Component Value Date    TROPONINI <0 03 02/02/2021         Assessment:   Coronary artery disease  S/P coronary artery bypass grafting    Akshat Bahena is 4 weeks post-op, making good progress in their recovery  Incisions are well-healed and the sternum is stable  Weight and VS are stable  Plan:   Medications reviewed with patient, associated questions answered and no changes made  Continue Aspirin 325 mg daily for at least 2 more months then can reduce to 81 mg daily, lifelong  Continue high dose statin therapy and Beta Blocker, managed by Cardiology  EMPHASIZED SMOKING CESSATION NOW     Benefits of participating in cardiac rehab have been discussed and they are cleared to begin the outpatient  program      They may resume driving  They have a lifting restriction of no more than 25 lbs until 12/12/22 which is 12 weeks from their surgical date  No further follow up in our office is needed; call with questions or concerns  They should maintain routine follow-up with Cardiology and Primary Care for ongoing medical care  Patient verbalizes understanding of recommendations and all questions were answered to their satisfaction  The patient recently had a screening colonoscopy in 3/9/22  Therefore GI referral is not indicated at this time       Pelon Zaidi  10/14/22  3:45pm

## 2022-10-15 NOTE — TELEPHONE ENCOUNTER
Regarding: medication questions   ----- Message from Lelo Romero sent at 10/14/2022  8:42 PM EDT -----  'My  had his first check today he had a Triple By pass and this was his 1 reject after the surgery  I have a question about his medications"

## 2022-10-15 NOTE — TELEPHONE ENCOUNTER
Reason for Disposition  • [1] Caller has URGENT medicine question about med that PCP or specialist prescribed AND [2] triager unable to answer question    Answer Assessment - Initial Assessment Questions  1  NAME of MEDICATION: "What medicine are you calling about?"      Amiodarone 200 mg PO bid     2  QUESTION: "What is your question?" (e g , medication refill, side effect)     "My  was told to stop, should we be stopping, the paperwork from the office still has the medication listed"    3  PRESCRIBING HCP: "Who prescribed it?" Reason: if prescribed by specialist, call should be referred to that group  Cardiology    4   SYMPTOMS: "Do you have any symptoms?"      Denies    Protocols used: MEDICATION QUESTION CALL-ADULT-

## 2022-10-17 ENCOUNTER — TELEPHONE (OUTPATIENT)
Dept: CARDIAC SURGERY | Facility: CLINIC | Age: 62
End: 2022-10-17

## 2022-10-17 ENCOUNTER — TELEPHONE (OUTPATIENT)
Dept: CARDIOLOGY CLINIC | Facility: CLINIC | Age: 62
End: 2022-10-17

## 2022-10-17 DIAGNOSIS — Z95.1 S/P CABG X 3: ICD-10-CM

## 2022-10-17 DIAGNOSIS — I25.5 ISCHEMIC CARDIOMYOPATHY: ICD-10-CM

## 2022-10-17 RX ORDER — LISINOPRIL 2.5 MG/1
TABLET ORAL
Qty: 30 TABLET | Refills: 3 | Status: SHIPPED | OUTPATIENT
Start: 2022-10-17

## 2022-10-17 NOTE — LETTER
October 17, 2022     Patient: Shannan Iraheta  YOB: 1960  Date of Visit: 10/17/2022      To Whom it May Concern:    Michell Gardner is under my professional care  Zoila Nazario was seen in my office on 10/14/22 for his post op follow up appointment  Zoila Nazario may return to work on 10/24/22 provided he follow a lifting restriction of no more than 25 lb until 12/2/22  If you have any questions or concerns, please don't hesitate to call           Sincerely,          Garry Mccormick MD        CC: No Recipients

## 2022-10-17 NOTE — TELEPHONE ENCOUNTER
Patient was asking about Amiodarone  He is to continue on Amiodarone, until seen by Dr Fernando Ortiz  He also is inquiring about a note to return to work  Per patient, Dr Maria C Sarkar stated that the patient may return on 10/24/22  He is a superintendent and oversees  His job is not strenuous in nature  Elinor Kelsey will check with Dr Maria C Sarkar for approval and letter to be done for patient to return to work, if approved by CT surgery

## 2022-10-24 ENCOUNTER — IN-CLINIC DEVICE VISIT (OUTPATIENT)
Dept: CARDIOLOGY CLINIC | Facility: CLINIC | Age: 62
End: 2022-10-24

## 2022-10-24 DIAGNOSIS — Z45.02 ENCOUNTER FOR IMPLANTABLE DEFIBRILLATOR REPROGRAMMING OR CHECK: ICD-10-CM

## 2022-10-24 DIAGNOSIS — I49.01 VENTRICULAR FIBRILLATION (HCC): Primary | ICD-10-CM

## 2022-11-09 NOTE — PROGRESS NOTES
Device check in person icd  No events  Normal battery function  No file available  Current Outpatient Medications:   •  amiodarone 200 mg tablet, Take 1 tablet (200 mg total) by mouth daily, Disp: 30 tablet, Rfl: 2  •  aspirin 325 mg tablet, Take 1 tablet (325 mg total) by mouth daily, Disp: 30 tablet, Rfl: 2  •  atorvastatin (LIPITOR) 80 mg tablet, Take 1 tablet (80 mg total) by mouth daily with dinner, Disp: 30 tablet, Rfl: 2  •  lisinopril (ZESTRIL) 2 5 mg tablet, Daily at bedtime, Disp: 30 tablet, Rfl: 3  •  metoprolol tartrate (LOPRESSOR) 25 mg tablet, Take 0 5 tablets (12 5 mg total) by mouth every 12 (twelve) hours, Disp: 30 tablet, Rfl: 2  •  nicotine (NICODERM CQ) 14 mg/24hr TD 24 hr patch, Apply one patch daily (14 mg) for 14 days  Begin 14 mg patch after completing 21 mg patch to be worn first for 6 weeks   (Patient not taking: Reported on 10/14/2022), Disp: 14 patch, Rfl: 0  •  nicotine (NICODERM CQ) 21 mg/24 hr TD 24 hr patch, APPLY 1 PATCH DAILY FOR 6 WEEKS (Patient not taking: Reported on 10/14/2022), Disp: 56 patch, Rfl: 0

## 2022-11-21 ENCOUNTER — OFFICE VISIT (OUTPATIENT)
Dept: CARDIOLOGY CLINIC | Facility: CLINIC | Age: 62
End: 2022-11-21

## 2022-11-21 VITALS
HEIGHT: 73 IN | WEIGHT: 204 LBS | SYSTOLIC BLOOD PRESSURE: 140 MMHG | HEART RATE: 80 BPM | BODY MASS INDEX: 27.04 KG/M2 | DIASTOLIC BLOOD PRESSURE: 90 MMHG

## 2022-11-21 DIAGNOSIS — Z95.810 IMPLANTABLE CARDIOVERTER-DEFIBRILLATOR (ICD) IN SITU: ICD-10-CM

## 2022-11-21 DIAGNOSIS — E78.5 DYSLIPIDEMIA: ICD-10-CM

## 2022-11-21 DIAGNOSIS — I10 PRIMARY HYPERTENSION: ICD-10-CM

## 2022-11-21 DIAGNOSIS — I25.5 ISCHEMIC CARDIOMYOPATHY: ICD-10-CM

## 2022-11-21 DIAGNOSIS — Z95.1 S/P CABG X 3: Primary | ICD-10-CM

## 2022-11-21 RX ORDER — METOPROLOL SUCCINATE 50 MG/1
50 TABLET, EXTENDED RELEASE ORAL DAILY
Qty: 90 TABLET | Refills: 2 | Status: SHIPPED | OUTPATIENT
Start: 2022-11-21

## 2022-11-21 NOTE — PROGRESS NOTES
[facility], [facility] Juli Huber  [facility] [address]  Phone#  [phone]  Fax#  [phone]                                               Cardiology Office Follow up  Bia Cadet, 58 y o  male  YOB: 1960  MRN: [id number] Encounter: [id number]      PCP - Smith Toure PA-C  Referring Provider - No ref  provider found    Chief Complaint   Patient presents with   • Coronary Artery Disease       Assessment  Multivessel coronary artery disease  LHC - 6/29/22 - mLAD 50%, dLAD 70%, D1 60%, D2 60%, OM1 50%, OM3 99%, mRCA 60%, RPL3 60%  Ischemic cardiomyopathy, EF 44%  Echo - 6/29/22 - LVEF 44%, GLS -13%, Grade 1 DD, akinesis of apical segments  Vfib cardiac arrest, status post ICD placement  In setting of STEMI, but 100% lesion was found  Hyperlipidemia  Current smoker  Overweight, Body mass index is 26 91 kg/m²       Plan  CAD s/p CABG x3  Doing well after CABG and has not had any further ischemic signs or symptoms  Feels lot more energy and more active  He states he does not have the time to go to cardiac rehab  Continue aspirin, Atorvastatin 80  Change metoprolol tartrate to metoprolol succinate 50 mg daily    Ischemic cardiomyopathy, EF 40%  Echo - 9/8/22 - LVEF 40%, dyskinetic mid-apical anteroseptal/apical walls  Euvolemic on exam and overall doing well   Medical therapy  Continue metoprolol will transition back to metoprolol succinate 50 mg daily   BP now higher --> metoprolol being uptitrated  Continue lisinopril 2 5 mg daily  Device therapy  ICD in place for secondary prevention    S/p ICD, for VFib cardiac arrest  Initially in the setting of inferior STEMI - severe distal lad/om 3 disease, which could not be revascularized  S/p ICD placement as a result  Subsequently had another VF episode treated with shock on 09/03/2022 and he went to the hospital had repeat [facility] - multi-vessel CAD --> underwent CABG  He has been maintained on amiodarone 20 mg daily since then --> continue same  Continue metoprolol succinate 50 mg daily  With him now having been revascularized, can possibly try to wean him off amiodarone over the next few months --> follow up device check    Hyperlipidemia    Cholesterol levels much improved, LDL direct 81 & borderline  Continue atorvastatin 80  If LDL remains not at goal, then can plan on switching to rosuvastatin 40 mg daily      No results found for this visit on 11/21/22  No orders of the defined types were placed in this encounter  Return in about 3 months (around 2/21/2023), or if symptoms worsen or fail to improve  History of Present Illness   58 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 , asphalt , comes in for establishment of care after recent hospitalization involving a cardiac arrest     On 06/29/2022, patient was at work at [employer], and he was feeling unwell  He himself does not have full recollection of the events that led up to his hospitalization, but per available chart/EMS documentation he was apparently having chest pain for 3 days, which was recurring on the day of presentation  He apparently went upstairs in to another office and EMS was called  On arrival of EMS, he reported numbness in his left arm and was otherwise feeling well  Initial ECG did not reveal a STEMI and he did not have chest pain at that time  He was subsequently taken to [facility] ED, but on arrival to the ED, he went in to [facility] cardiac arrest and received several shocks with the assistance of the ED physician  He received CPR/ACLS and was initiated on vasopressor support with return of ROSC  Post this, he was found to have junctional rhythm with inferior STEMI and an MI alert was initiated  He was found to have multivessel coronary artery disease, but no 100% stenosis could be found  He did have severe lesions in distal LAD and OM3, which were felt to be small blood vessels - not amenable to PCI    A balloon pump was placed and he was transferred to [facility] ICU for further care  He was subsequently evaluated by the heart failure service and vasopressors/IABP were weaned off and he did well on medical therapy  He underwent a secondary prevention ICD placement and was eventually discharged with medical therapy  Since discharge from the hospital he has not had any further recurrences of chest pain, although he does report continued mild symptoms of heartburn at the end of the day, for which he takes Tums with resolution of symptoms  He followed up with his PCP and is now here to see me for establishment of care  I am meeting him today for the 1st time  He remains compliant with medical therapy, and has been working on cutting down on his smoking  No further complains of palpitations, dizziness/light-headedness, near syncope or syncope  Interval history - 11/21/2022  He comes back for follow-up after about 3 months  In the interim, he followed up initially with Naoma Shown friend and was reporting increased complaints of chest pain on 8/25/22  He was started on amlodipine for anti-anginal effect  But subsequently he had worsening symptoms and eventually had another ICD shock, and presented back to hospital  He was found to have Vfib on device check and again underwent cardiac catheterization  This revealed multivessel coronary artery disease and he was recommended CABG evaluation  He underwent a successful CABGx3, and has recovered very well over the last few months  No current complains of chest pain, shortness of breath, palpitations or dizziness          Historical Information   Past Medical History:   Diagnosis Date   • PERCY (acute kidney injury) ([facility] [address] )    • Bilateral inguinal hernia    • CAD (coronary artery disease)     s/p CABG x3 LIMA-LAD, SVG-RCA, SVG-D1 9/12/2022   • Cardiac arrest with ventricular fibrillation ([facility] [address] )    • Dizziness 02/02/2021    isolated incident of dizziness, sweating & disorientation   • Elevated LFTs    • History of left heart catheterization     and IABP, ICD PLACEMENT, DUAL CHAMBER, [employer]   • Hyperglycemia    • Hypertension    • Pneumonia    • STEMI (ST elevation myocardial infarction) ([facility] [address] )    • VT (ventricular tachycardia)      Past Surgical History:   Procedure Laterality Date   • CARDIAC CATHETERIZATION N/A 6/29/2022    Procedure: Cardiac pci;  Surgeon: Jarrell Rabago MD;  Location: [facility] CARDIAC CATH LAB; Service: Cardiology   • CARDIAC CATHETERIZATION N/A 6/29/2022    Procedure: Cardiac iabp; Surgeon: Jarrell Rabago MD;  [facility] CARDIAC CATH [facility]; Service: Cardiology   • CARDIAC CATHETERIZATION N/A 9/9/2022    Procedure: CARDIAC CATHETERIZATION;  Surgeon: Ana María Arauz DO;  Location: [facility] CARDIAC C[facility]; Service: Cardiology   • CARDIAC ELECTROPHYSIOLOGY PROCEDURE N/A 7/5/2022    Procedure: Cardiac icd implant;  Surgeon: Maikel Berger MD;  Location: [facility] CARDIAC CATH LAB; Service: Cardiology   • CORONARY ARTERY BYPASS GRAFT N/A 9/12/2022    Procedure: CORONARY ARTERY BYPASS GRAFT (CABG) x 3, SWIFT TO LAD, L LEG EVH/SVG TO RCA  AND DIAGONAL;  Surgeon: Shaq Palafox MD;  Location: [facility] MAIN OR;  Service: Cardiac Surgery   • HARVEST VEIN Left 9/12/2022    Procedure: HARVEST VEIN ENDOSCOPIC (EVH);   Surgeon: Shaq Palafox MD;  Location: [facility] MAIN OR;  Service: Cardiac Surgery   • MO REPAIR ING HERNIA,5+Y/O,REDUCIBL Bilateral 2/9/2021    Procedure: INGUINAL HERNIA REPAIR with mesh;  Surgeon: Karina Teixeira MD;  Location: [address] [place] [address] MAIN OR;  Service: General   • TONSILLECTOMY       Family History   Problem Relation Age of Onset   • Diabetes Maternal Grandfather      Current Outpatient Medications on File Prior to Visit   Medication Sig Dispense Refill   • amiodarone 200 mg tablet Take 1 tablet (200 mg total) by mouth daily 30 tablet 2   • aspirin 325 mg tablet Take 1 tablet (325 mg total) by mouth daily 30 tablet 2   • atorvastatin (LIPITOR) 80 mg tablet Take 1 tablet (80 mg total) by mouth daily with dinner 30 tablet 2   • lisinopril (ZESTRIL) 2 5 mg tablet Daily at bedtime 30 tablet 3   • nicotine (NICODERM CQ) 14 mg/24hr TD 24 hr patch Apply one patch daily (14 mg) for 14 days  Begin 14 mg patch after completing 21 mg patch to be worn first for 6 weeks  (Patient not taking: Reported on 10/14/2022) 14 patch 0   • nicotine (NICODERM CQ) 21 mg/24 hr TD 24 hr patch APPLY 1 PATCH DAILY FOR 6 WEEKS (Patient not taking: Reported on 10/14/2022) 56 patch 0   • [DISCONTINUED] sacubitril-valsartan (Entresto) 24-26 MG TABS Take 1 tablet by mouth 2 (two) times a day 60 tablet 2     No current facility-administered medications on file prior to visit  No Known Allergies  Social History     Socioeconomic History   • Marital status: /Civil Union     Spouse name: None   • Number of children: None   • Years of education: None   • Highest education level: None   Occupational History   • None   Tobacco Use   • Smoking status: Every Day     Packs/day: 0 50     Types: Cigarettes   • Smokeless tobacco: Never   Vaping Use   • Vaping Use: Never used   Substance and Sexual Activity   • Alcohol use: Not Currently   • Drug use: Not Currently   • Sexual activity: Not Currently     Partners: Female   Other Topics Concern   • None   Social History Narrative   • None     Social Determinants of Health     Financial Resource Strain: Not on file   Food Insecurity: No Food Insecurity   • Worried About Running Out of Food in the Last Year: Never true   • Ran Out of Food in the Last Year: Never true   Transportation Needs: No Transportation Needs   • Lack of Transportation (Medical): No   • Lack of Transportation (Non-Medical):  No   Physical Activity: Not on file   Stress: Not on file   Social Connections: Not on file   Intimate Partner Violence: Not on file   Housing Stability: Low Risk    • Unable to Pay for Housing in the Last Year: No   • Number of Places Lived in the Last Year: 1   • Unstable Housing in the Last Year: No        Review of Systems   All other systems reviewed and are negative  Vitals:  Vitals:    11/21/22 1332   BP: 140/90   Pulse: 80   Weight: 92 5 kg (204 lb)   Height: 6' 1" (1 854 m)     BMI - Body mass index is 26 91 kg/m²  Wt Readings from Last 7 Encounters:   11/21/22 92 5 kg (204 lb)   10/14/22 95 3 kg (210 lb)   10/06/22 92 5 kg (204 lb)   09/30/22 92 5 kg (204 lb)   09/27/22 92 5 kg (204 lb)   09/26/22 95 9 kg (211 lb 6 4 oz)   09/21/22 92 5 kg (204 lb)       Physical Exam  Vitals and nursing note reviewed  Constitutional:       General: He is not in acute distress  Appearance: Normal appearance  He is well-developed  He is not ill-appearing or diaphoretic  HENT:      Head: Normocephalic and atraumatic  Nose: No congestion  Eyes:      General: No scleral icterus  Conjunctiva/sclera: Conjunctivae normal    Neck:      Vascular: No carotid bruit or JVD  Cardiovascular:      Rate and Rhythm: Normal rate and regular rhythm  Heart sounds: Normal heart sounds  No murmur heard  No friction rub  No gallop  Comments: Well-healed central sternal surgical scar noted  Left upper chest wall cardiac device noted in situ  Pulmonary:      Effort: Pulmonary effort is normal  No respiratory distress  Breath sounds: Normal breath sounds  No wheezing or rales  Chest:      Chest wall: No tenderness  Abdominal:      General: There is no distension  Palpations: Abdomen is soft  Tenderness: There is no abdominal tenderness  Musculoskeletal:         General: No swelling, tenderness or deformity  Cervical back: Neck supple  No muscular tenderness  Right lower leg: No edema  Left lower leg: No edema  Skin:     General: Skin is warm  Neurological:      General: No focal deficit present  Mental Status: He is alert and oriented to person, place, and time  Mental status is at baseline     Psychiatric:         Mood and Affect: Mood normal          Behavior: Behavior normal          Thought Content: Thought content normal            Labs:  CBC:   Lab Results   Component Value Date    WBC 13 94 (H) 09/27/2022    RBC 3 92 09/27/2022    HGB 11 7 (L) 09/27/2022    HCT 39 6 09/27/2022     (H) 09/27/2022     (H) 09/27/2022    RDW 13 9 09/27/2022       CMP:   Lab Results   Component Value Date    K 4 7 09/27/2022     (H) 09/27/2022    CO2 18 (L) 09/27/2022    BUN 21 09/27/2022    CREATININE 0 83 09/27/2022    EGFR 94 09/27/2022    GLUCOSE 147 (H) 09/12/2022    CALCIUM 9 5 09/27/2022    AST 59 (H) 07/04/2022    ALT 70 07/04/2022    ALKPHOS 90 07/04/2022       Magnesium:  Lab Results   Component Value Date    MG 2 0 09/14/2022       Lipid Profile:   Lab Results   Component Value Date    HDL 35 (L) 09/08/2022    TRIG 173 (H) 09/08/2022    LDLCALC 62 09/08/2022       Thyroid Studies: No results found for: SAE3XXDEEIPT, T3FREE, FREET4, C0VMUTO, A2SOHZX    A1c:  No components found for: HGA1C    INR:  Lab Results   Component Value Date    INR 0 95 09/06/2022    INR 0 91 09/06/2022    INR 0 95 07/05/2022   5    Imaging: Cardiac EP device report    Result Date: 7/20/2022  Narrative: MDT DUAL ICD/ ACTIVE SYSTEM IS MRI CONDITIONAL DEVICE INTERROGATED IN THE [place] OFFICE  BATTERY VOLTAGE ADEQUATE (13 YRS)  AP-5%, -0 5%  ALL LEAD PARAMETERS WITHIN NORMAL LIMITS  58  BRIEF NSVT & 15 HIGH RATE-NS EPISODES >200 BPM, MOST RECENT FOR 5 BEATS, AVG CL~210MS  PT ASYMPTOMATIC W/ EPISODES  PT WAS TAKING BOTH METOPROLOL TARTRATE & SUCCINATE IN ERROR  PT INFORMED NOT TO TAKE TARTRATE, ONLY SUCCINATE  NEW RX GIVEN  OPTIVOL INITIALIZING  NORMAL DEVICE FUNCTION  WOUND CHECK: INCISION CLEAN AND DRY WITH EDGES APPROXIMATED; WOUND CARE AND RESTRICTIONS REVIEWED WITH PATIENT  GV       Cardiac testing:   No results found for this or any previous visit  No results found for this or any previous visit      No results found for this or any previous visit  No results found for this or any previous visit  Cardiac EP device report  MDT DUAL ICD/ ACTIVE SYSTEM IS MRI CONDITIONAL  CARELINK TRANSMISSION: --- ALERT-- PATIENT BEEPING DUE TO UNSUCCESSFUL CARELINK TRANSMISSION  NORMAL DEVICE FUNCTION  ---MCCLRUE

## 2022-12-14 ENCOUNTER — TELEPHONE (OUTPATIENT)
Dept: CARDIOLOGY CLINIC | Facility: CLINIC | Age: 62
End: 2022-12-14

## 2022-12-14 NOTE — TELEPHONE ENCOUNTER
Patient called  His employer is asking if the patient needs to continue with any work restrictions  They did receive the note from 10/17/22 about weight lifting restrictions until 12/2/22, however, would like update  Please advise

## 2022-12-15 NOTE — TELEPHONE ENCOUNTER
I called patient to make him aware  Patient stated that he needs another letter stating that his lifting restrictions end after 12/2/22 because the original letter was "unclear"  I created another letter for patient stating this

## 2023-02-13 ENCOUNTER — OFFICE VISIT (OUTPATIENT)
Dept: CARDIOLOGY CLINIC | Facility: CLINIC | Age: 63
End: 2023-02-13

## 2023-02-13 VITALS
SYSTOLIC BLOOD PRESSURE: 108 MMHG | BODY MASS INDEX: 27.43 KG/M2 | HEIGHT: 73 IN | DIASTOLIC BLOOD PRESSURE: 74 MMHG | WEIGHT: 207 LBS

## 2023-02-13 DIAGNOSIS — Z95.1 S/P CABG (CORONARY ARTERY BYPASS GRAFT): ICD-10-CM

## 2023-02-13 DIAGNOSIS — E78.5 DYSLIPIDEMIA: ICD-10-CM

## 2023-02-13 DIAGNOSIS — I25.10 CORONARY ARTERY DISEASE INVOLVING NATIVE CORONARY ARTERY OF NATIVE HEART WITHOUT ANGINA PECTORIS: Primary | ICD-10-CM

## 2023-02-13 DIAGNOSIS — I25.5 ISCHEMIC CARDIOMYOPATHY: ICD-10-CM

## 2023-02-13 RX ORDER — AMIODARONE HYDROCHLORIDE 200 MG/1
100 TABLET ORAL DAILY
Start: 2023-02-13

## 2023-02-13 NOTE — PROGRESS NOTES
[facility], [facility] John Schulte  [facility] [address]  Phone#  [phone]  Fax#  [phone]                                               Cardiology Office Follow up  Carina Jones, 58 y o  male  YOB: 1960  MRN: [id number] Encounter: [id number]      PCP - Fletcher Wilcox PA-C  Referring Provider - No ref  provider found    Chief Complaint   Patient presents with   • Coronary Artery Disease     History of CABG       Assessment  Multivessel coronary artery disease  [facility] - 6/29/22 - mLAD 50%, dLAD 70%, D1 60%, D2 60%, OM1 50%, OM3 99%, mRCA 60%, RPL3 60%  Ischemic cardiomyopathy, EF 44%  Echo - 6/29/22 - LVEF 44%, GLS -13%, Grade 1 DD, akinesis of apical segments  Vfib cardiac arrest, status post ICD placement  In setting of STEMI, but 100% lesion was found  Hyperlipidemia  Current smoker  Overweight, Body mass index is 27 31 kg/m²       Plan  CAD s/p CABG x3  He continues to do very well after CABG, and reports feeling the best he has in the last 10 years   No further ischemic symptoms  Continue aspirin, metoprolol XL 50 mg daily, Atorvastatin 80    Ischemic cardiomyopathy, EF 40%  Echo - 9/8/22 - LVEF 40%, dyskinetic mid-apical anteroseptal/apical walls  Euvolemic on exam and overall doing well   Medical therapy  BP on lower side at 108/74, and not much room for uptitration / addition of medications  Continue metoprolol succinate 50 mg daily  Continue lisinopril 2 5 mg daily  Device therapy  ICD in place for secondary prevention --> no recent events    S/p ICD, for VFib cardiac arrest  Overview  Initially in the setting of inferior STEMI - severe distal lad/om 3 disease, which could not be revascularized --> S/p ICD placement  Subsequently had another VF episode treated with shock on 09/03/2022 and he went to the hospital had repeat [facility] - multi-vessel CAD --> underwent CABG  Has been on amiodarone 200 mg daily since then, but has not had any events on cardiac device checks  Impression  Suspect his V-fib episodes were related to his unrevascularized CAD, and have now possibly resolved  He does have an ICD in place to protect him from any recurrences, and we would like to get him off amiodarone if possible  Decrease amiodarone to 100 mg daily & follow up device checks  Continue metoprolol succinate 50 mg daily    Hyperlipidemia    Cholesterol levels much improved, LDL direct 81 & borderline  Tolerating statins  Continue atorvastatin 80  Follow up lipid panel  If LDL remains not at goal, then can plan on switching to rosuvastatin 40 mg daily      Results for orders placed or performed in visit on 02/13/23   POCT ECG    Impression    Normal sinus rhythm  Rightward axis   Possible anterior infarct, old       Orders Placed This Encounter   Procedures   • Lipid Panel with Direct LDL reflex   • Basic metabolic panel   • POCT ECG   • Echo complete w/ contrast if indicated     Return in about 6 months (around 8/13/2023), or if symptoms worsen or fail to improve  History of Present Illness   58 y o  male , [place] , comes in for establishment of care after recent hospitalization involving a cardiac arrest  He previously worked in the [employer]  On 06/29/2022, patient was at work at [employer], and he was feeling unwell  He himself does not have full recollection of the events that led up to his hospitalization, but per available chart/EMS documentation he was apparently having chest pain for 3 days, which was recurring on the day of presentation  He apparently went upstairs in to another office and EMS was called  On arrival of EMS, he reported numbness in his left arm and was otherwise feeling well  Initial ECG did not reveal a STEMI and he did not have chest pain at that time    He was subsequently taken to [facility] ED, but on arrival to the ED, he went in to [facility] cardiac arrest and received several shocks with the assistance of the ED physician  He received CPR/ACLS and was initiated on vasopressor support with return of ROSC  Post this, he was found to have junctional rhythm with inferior STEMI and an MI alert was initiated  He was found to have multivessel coronary artery disease, but no 100% stenosis could be found  He did have severe lesions in distal LAD and OM3, which were felt to be small blood vessels - not amenable to PCI  A balloon pump was placed and he was transferred to [facility] ICU for further care  He was subsequently evaluated by the heart failure service and vasopressors/IABP were weaned off and he did well on medical therapy  He underwent a secondary prevention ICD placement and was eventually discharged with medical therapy  Since discharge from the hospital he has not had any further recurrences of chest pain, although he does report continued mild symptoms of heartburn at the end of the day, for which he takes Tums with resolution of symptoms  He followed up with his PCP and is now here to see me for establishment of care  I am meeting him today for the 1st time  He remains compliant with medical therapy, and has been working on cutting down on his smoking  No further complains of palpitations, dizziness/light-headedness, near syncope or syncope  Interval history - 11/21/2022  He comes back for follow-up after about 3 months  In the interim, he followed up initially with Betzaida Dean friend and was reporting increased complaints of chest pain on 8/25/22  He was started on amlodipine for anti-anginal effect  But subsequently he had worsening symptoms and eventually had another ICD shock, and presented back to hospital  He was found to have Vfib on device check and again underwent cardiac catheterization  This revealed multivessel coronary artery disease and he was recommended CABG evaluation    He underwent a successful CABGx3, and has recovered very well over the last few months  No current complains of chest pain, shortness of breath, palpitations or dizziness  Interval history - 2/13/2023  He comes back for follow-up after about 3 months  He has been doing very well and has not had any further issues with chest pain or shortness of breath  He reports feeling the best he has in the last 10 years and is very active at work  He remains compliant with medical therapy  Historical Information   Past Medical History:   Diagnosis Date   • PERCY (acute kidney injury) ([facility] [address] )    • Bilateral inguinal hernia    • CAD (coronary artery disease)     s/p CABG x3 LIMA-LAD, SVG-RCA, SVG-D1 9/12/2022   • Cardiac arrest with ventricular fibrillation (HCC)    • Dizziness 02/02/2021    isolated incident of dizziness, sweating & disorientation   • Elevated LFTs    • History of left heart catheterization     and IABP, ICD PLACEMENT, DUAL CHAMBER, [employer]   • Hyperglycemia    • Hypertension    • Pneumonia    • [facility]    • VT (ventricular tachycardia)      Past Surgical History:   Procedure Laterality Date   • CARDIAC CATHETERIZATION N/A 6/29/2022    Procedure: Cardiac pci;  Surgeon: Derick Ontiveros MD;  [facility]: [facility] CARDIAC C[facility] LAB; Service: Cardiology   • CARDIAC CATHETERIZATION N/A 6/29/2022    Procedure: Cardiac iabp; Surgeon: Derick Ontiveros MD;  [facility] CARDIAC C[facility]; Service: Cardiology   • CARDIAC CATHETERIZATION N/A 9/9/2022    Procedure: CARDIAC CATHETERIZATION;  Surgeon: Ace Lynn DO;  Location: [facility] CARDIAC CATH [facility]; Service: Cardiology   • CARDIAC ELECTROPHYSIOLOGY PROCEDURE N/A 7/5/2022    Procedure: Cardiac icd implant;  Surgeon: Nicole Rincon MD;  Location: [facility] CARDIAC CATH LAB;   Service: Cardiology   • CORONARY ARTERY BYPASS GRAFT N/A 9/12/2022    Procedure: CORONARY ARTERY BYPASS GRAFT (CABG) x 3, SWIFT TO LAD, L LEG EVH/SVG TO RCA  AND DIAGONAL;  Surgeon: Power Campoverde MD;  Location: [facility] MAIN OR;  Service: Cardiac Surgery   • HARVEST VEIN Left 9/12/2022    Procedure: HARVEST VEIN ENDOSCOPIC ([address]); Surgeon: Ne Cummings MD;  Location:  MAIN OR;  Service: Cardiac Surgery   • OH RPR 1ST INGUN HRNA AGE 5 YRS/> REDUCIBLE Bilateral 2/9/2021    Procedure: INGUINAL HERNIA REPAIR with mesh;  Surgeon: Troy Cárdenas MD;  Location: [address] [place] [address] MAIN OR;  Service: General   • TONSILLECTOMY       Family History   Problem Relation Age of Onset   • Diabetes Maternal Grandfather      Current Outpatient Medications on File Prior to Visit   Medication Sig Dispense Refill   • aspirin 81 mg chewable tablet chew and swallow 1 tablet by mouth once daily 30 tablet 5   • atorvastatin (LIPITOR) 80 mg tablet take 1 tablet by mouth once daily with dinner 30 tablet 5   • lisinopril (ZESTRIL) 2 5 mg tablet TAKE 1 TABLET BY MOUTH AT BEDTIME 30 tablet 5   • metoprolol succinate (TOPROL-XL) 50 mg 24 hr tablet Take 1 tablet (50 mg total) by mouth daily 90 tablet 2   • [DISCONTINUED] amiodarone 200 mg tablet take 1 tablet by mouth once daily 30 tablet 5   • nicotine (NICODERM CQ) 14 mg/24hr TD 24 hr patch Apply one patch daily (14 mg) for 14 days  Begin 14 mg patch after completing 21 mg patch to be worn first for 6 weeks  14 patch 0   • nicotine (NICODERM CQ) 21 mg/24 hr TD 24 hr patch APPLY 1 PATCH DAILY FOR 6 WEEKS 56 patch 0   • [DISCONTINUED] aspirin 325 mg tablet Take 1 tablet (325 mg total) by mouth daily (Patient not taking: Reported on 2/13/2023) 30 tablet 2   • [DISCONTINUED] sacubitril-valsartan (Entresto) 24-26 MG TABS Take 1 tablet by mouth 2 (two) times a day 60 tablet 2     No current facility-administered medications on file prior to visit       No Known Allergies  Social History     Socioeconomic History   • Marital status: /Civil Union     Spouse name: None   • Number of children: None   • Years of education: None   • Highest education level: None   Occupational History   • None   Tobacco Use   • Smoking status: Every Day     Packs/day: 0 25     Types: Cigarettes   • Smokeless tobacco: Never   Vaping Use   • Vaping Use: Never used   Substance and Sexual Activity   • Alcohol use: Not Currently   • Drug use: Not Currently   • Sexual activity: Not Currently     Partners: Female   Other Topics Concern   • None   Social History Narrative   • None     Social Determinants of Health     Financial Resource Strain: Not on file   Food Insecurity: No Food Insecurity   • Worried About Running Out of Food in the Last Year: Never true   • Ran Out of Food in the Last Year: Never true   Transportation Needs: No Transportation Needs   • Lack of Transportation (Medical): No   • Lack of Transportation (Non-Medical): No   Physical Activity: Not on file   Stress: Not on file   Social Connections: Not on file   Intimate Partner Violence: Not on file   Housing Stability: Low Risk    • Unable to Pay for Housing in the Last Year: No   • Number of Places Lived in the Last Year: 1   • Unstable Housing in the Last Year: No        Review of Systems   All other systems reviewed and are negative  Vitals:  Vitals:    02/13/23 1122   BP: 108/74   Weight: 93 9 kg (207 lb)   Height: 6' 1" (1 854 m)     BMI - Body mass index is 27 31 kg/m²  Wt Readings from Last 7 Encounters:   02/13/23 93 9 kg (207 lb)   11/21/22 92 5 kg (204 lb)   10/14/22 95 3 kg (210 lb)   10/06/22 92 5 kg (204 lb)   09/30/22 92 5 kg (204 lb)   09/27/22 92 5 kg (204 lb)   09/26/22 95 9 kg (211 lb 6 4 oz)       Physical Exam  Vitals and nursing note reviewed  Constitutional:       General: He is not in acute distress  Appearance: Normal appearance  He is well-developed  He is not ill-appearing or diaphoretic  HENT:      Head: Normocephalic and atraumatic  Nose: No congestion  Eyes:      General: No scleral icterus  Conjunctiva/sclera: Conjunctivae normal    Neck:      Vascular: No carotid bruit or JVD     Cardiovascular:      Rate and Rhythm: Normal rate and regular rhythm  Heart sounds: Normal heart sounds  No murmur heard  No friction rub  No gallop  Comments: Well-healed central sternal surgical scar noted  Left upper chest wall cardiac device noted in situ  Pulmonary:      Effort: Pulmonary effort is normal  No respiratory distress  Breath sounds: Normal breath sounds  No wheezing or rales  Chest:      Chest wall: No tenderness  Abdominal:      General: There is no distension  Palpations: Abdomen is soft  Tenderness: There is no abdominal tenderness  Musculoskeletal:         General: No swelling, tenderness or deformity  Cervical back: Neck supple  No muscular tenderness  Right lower leg: No edema  Left lower leg: No edema  Skin:     General: Skin is warm  Neurological:      General: No focal deficit present  Mental Status: He is alert and oriented to person, place, and time  Mental status is at baseline  Psychiatric:         Mood and Affect: Mood normal          Behavior: Behavior normal          Thought Content:  Thought content normal            Labs:  CBC:   Lab Results   Component Value Date    WBC 13 94 (H) 09/27/2022    RBC 3 92 09/27/2022    HGB 11 7 (L) 09/27/2022    HCT 39 6 09/27/2022     (H) 09/27/2022     (H) 09/27/2022    RDW 13 9 09/27/2022       CMP:   Lab Results   Component Value Date    K 4 7 09/27/2022     (H) 09/27/2022    CO2 18 (L) 09/27/2022    BUN 21 09/27/2022    CREATININE 0 83 09/27/2022    EGFR 94 09/27/2022    GLUCOSE 147 (H) 09/12/2022    CALCIUM 9 5 09/27/2022    AST 59 (H) 07/04/2022    ALT 70 07/04/2022    ALKPHOS 90 07/04/2022       Magnesium:  Lab Results   Component Value Date    MG 2 0 09/14/2022       Lipid Profile:   Lab Results   Component Value Date    HDL 35 (L) 09/08/2022    TRIG 173 (H) 09/08/2022    LDLCALC 62 09/08/2022       Thyroid Studies: No results found for: PDE6WMMEBONZ, T3FREE, FREET4, Q2SBYRU, H7DHYHC    A1c:  No components found for: HGA1C    INR:  Lab Results   Component Value Date    INR 0 95 09/06/2022    INR 0 91 09/06/2022    INR 0 95 07/05/2022   5    Imaging: Cardiac EP device report    Result Date: 7/20/2022  Narrative: MDT DUAL ICD/ ACTIVE SYSTEM IS MRI CONDITIONAL DEVICE INTERROGATED IN THE [place] OFFICE  BATTERY VOLTAGE ADEQUATE (13 YRS)  AP-5%, -0 5%  ALL LEAD PARAMETERS WITHIN NORMAL LIMITS  58  BRIEF NSVT & 15 HIGH RATE-NS EPISODES >200 BPM, MOST RECENT FOR 5 BEATS, AVG CL~210MS  PT ASYMPTOMATIC W/ EPISODES  PT WAS TAKING BOTH METOPROLOL TARTRATE & SUCCINATE IN ERROR  PT INFORMED NOT TO TAKE TARTRATE, ONLY SUCCINATE  NEW RX GIVEN  OPTIVOL INITIALIZING  NORMAL DEVICE FUNCTION  WOUND CHECK: INCISION CLEAN AND DRY WITH EDGES APPROXIMATED; WOUND CARE AND RESTRICTIONS REVIEWED WITH PATIENT  GV       Cardiac testing:   No results found for this or any previous visit  No results found for this or any previous visit  No results found for this or any previous visit  No results found for this or any previous visit  Cardiac EP device report  MDT DUAL ICD/ ACTIVE SYSTEM IS MRI CONDITIONAL  CARELINK TRANSMISSION: --- ALERT-- PATIENT BEEPING DUE TO UNSUCCESSFUL CARELINK TRANSMISSION  NORMAL DEVICE FUNCTION  ---MCCLURE

## 2023-03-22 ENCOUNTER — HOSPITAL ENCOUNTER (EMERGENCY)
Facility: HOSPITAL | Age: 63
Discharge: HOME | End: 2023-03-22
Attending: FAMILY MEDICINE

## 2023-03-22 VITALS
TEMPERATURE: 97.3 F | DIASTOLIC BLOOD PRESSURE: 83 MMHG | RESPIRATION RATE: 17 BRPM | SYSTOLIC BLOOD PRESSURE: 147 MMHG | HEART RATE: 77 BPM | HEIGHT: 73 IN | BODY MASS INDEX: 27.43 KG/M2 | WEIGHT: 207 LBS | OXYGEN SATURATION: 96 %

## 2023-03-22 DIAGNOSIS — S39.012A STRAIN OF LUMBAR REGION, INITIAL ENCOUNTER: Primary | ICD-10-CM

## 2023-03-22 RX ORDER — LIDOCAINE 50 MG/G
1 PATCH TOPICAL ONCE
Status: DISCONTINUED | OUTPATIENT
Start: 2023-03-22 | End: 2023-03-22 | Stop reason: HOSPADM

## 2023-03-22 RX ORDER — METHOCARBAMOL 500 MG/1
500 TABLET, FILM COATED ORAL 2 TIMES DAILY
Qty: 20 TABLET | Refills: 0 | Status: SHIPPED | OUTPATIENT
Start: 2023-03-22

## 2023-03-22 RX ADMIN — LIDOCAINE 5% 1 PATCH: 700 PATCH TOPICAL at 09:49

## 2023-03-22 NOTE — ED TRIAGE NOTES
[address] w/complaint of right lower back pain x1 week; states was utilizing a torque wrench when pain started; has been taking tylenol w/no relief; denies previous back injury; denies loss of bowel and/or bladder function;

## 2023-03-22 NOTE — ED PROVIDER NOTES
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; has been taking tylenol w/no relief; denies previous back injury; denies loss of bowel and/or bladder function;      80-year-old male with history of coronary artery disease status post CABG, hypertension presents to the emergency department for evaluation of new onset back pain that began last week after an associated injury at work  He is a  for his occupation and regularly uses a large torque and is on his feet all day  He states that he " tweaked" something in his low back last week while torquing  Localizes his pain to the right low back that does not radiate  He has had muscle strains in the past and feels that his symptoms are consistent with a muscle strain  He has been using Tylenol and IcyHot for relief of his pain that has provided some relief however the pain has persisted  He has been using 3 to 4 tablets of Tylenol at time up to 4 times a day  He states that typically his pain will subside after approximately 2 days, but he is concerned that the pain is still there  He states that the pain worsens when he walks or continues to use the muscles of his back while working  The pain improves at rest when he lies down or sits down  Denies any urinary symptoms, urinary incontinence, bowel incontinence, chest pain, shortness of breath, abdominal pain, pelvic pain, headache, dizziness  History provided by:  Patient   used: No    Back Pain  Associated symptoms: no abdominal pain, no chest pain, no dysuria, no fever and no headaches        Prior to Admission Medications   Prescriptions Last Dose Informant Patient Reported?  Taking?   amiodarone 200 mg tablet   No No   Sig: Take 0 5 tablets (100 mg total) by mouth daily   aspirin 81 mg chewable tablet   No No   Sig: chew and swallow 1 tablet by mouth once daily   atorvastatin (LIPITOR) 80 mg tablet   No No   Sig: take 1 tablet by mouth once daily with dinner   lisinopril (ZESTRIL) 2 5 mg tablet   No No   Sig: TAKE 1 TABLET BY MOUTH AT BEDTIME   metoprolol succinate (TOPROL-XL) 50 mg 24 hr tablet   No No   Sig: Take 1 tablet (50 mg total) by mouth daily   nicotine (NICODERM CQ) 14 mg/24hr TD 24 hr patch   No No   Sig: Apply one patch daily (14 mg) for 14 days  Begin 14 mg patch after completing 21 mg patch to be worn first for 6 weeks  nicotine (NICODERM CQ) 21 mg/24 hr TD 24 hr patch   No No   Sig: APPLY 1 PATCH DAILY FOR 6 WEEKS      Facility-Administered Medications: None       Past Medical History:   Diagnosis Date   • PERCY (acute kidney injury) ([facility] [address] )    • Bilateral inguinal hernia    • CAD (coronary artery disease)     s/p CABG x3 LIMA-LAD, SVG-RCA, SVG-D1 9/12/2022   • Cardiac arrest with ventricular fibrillation (HCC)    • Dizziness 02/02/2021    isolated incident of dizziness, sweating & disorientation   • Elevated LFTs    • History of left heart catheterization     and IABP, ICD PLACEMENT, DUAL CHAMBER, MEDTRONIC   • Hyperglycemia    • Hypertension    • Pneumonia    • STEMI (ST elevation myocardial infarction) ([facility] [address] )    • VT (ventricular tachycardia)        Past Surgical History:   Procedure Laterality Date   • CARDIAC CATHETERIZATION N/A 6/29/2022    Procedure: Cardiac pci;  Surgeon: Antione Saleh MD;  [facility]; Service: Cardiology   • CARDIAC CATHETERIZATION N/A 6/29/2022    Procedure: Cardiac iabp; Surgeon: Antione Saleh MD;  [facility]; Service: Cardiology   • CARDIAC CATHETERIZATION N/A 9/9/2022    Procedure: CARDIAC CATHETERIZATION;  Surgeon: Cheyenne Branham DO;  [facility]; Service: Cardiology   • CARDIAC ELECTROPHYSIOLOGY PROCEDURE N/A 7/5/2022    Procedure: Cardiac icd implant;  Surgeon: Tej Rios MD;  [facility];   Service: Cardiology   • CORONARY ARTERY BYPASS GRAFT N/A 9/12/2022    Procedure: CORONARY ARTERY BYPASS GRAFT (CABG) x 3, SWIFT TO LAD, L LEG EVH/SVG TO RCA  AND DIAGONAL;  Surgeon: Georgi Iverson MD;  Location: [facility] MA[facility];  Service: Cardiac Surgery   • HARVEST VEIN Left 9/12/2022    Procedure: HARVEST VEIN ENDOSCOPIC (EVH); Surgeon: Georgi Iverson MD;  Location: [facility];  Service: Cardiac Surgery   • TN RPR 1ST INGUN HRNA AGE 5 YRS/> REDUCIBLE Bilateral 2/9/2021    Procedure: INGUINAL HERNIA REPAIR with mesh;  Surgeon: Yuli Craft MD;  Location: [facility] MAIN OR;  Service: General   • TONSILLECTOMY         Family History   Problem Relation Age of Onset   • Diabetes Maternal Grandfather      I have reviewed and agree with the history as documented  E-Cigarette/Vaping   • E-Cigarette Use Never User      E-Cigarette/Vaping Substances   • Nicotine No    • THC No    • CBD No    • Flavoring No    • Other No    • Unknown No      Social History     Tobacco Use   • Smoking status: Every Day     Packs/day: 0 25     Types: Cigarettes   • Smokeless tobacco: Never   Vaping Use   • Vaping Use: Never used   Substance Use Topics   • Alcohol use: Not Currently   • Drug use: Not Currently       Review of Systems   Constitutional: Negative for chills and fever  HENT: Negative for ear pain and sore throat  Eyes: Negative for pain and visual disturbance  Respiratory: Negative for cough and shortness of breath  Cardiovascular: Negative for chest pain and palpitations  Gastrointestinal: Negative for abdominal pain and vomiting  Genitourinary: Negative for dysuria and hematuria  Musculoskeletal: Positive for back pain  Negative for arthralgias  Skin: Negative for color change and rash  Neurological: Negative for seizures, syncope and headaches  All other systems reviewed and are negative  Physical Exam  Physical Exam  Vitals and nursing note reviewed  Constitutional:       General: He is not in acute distress  Appearance: Normal appearance  He is well-developed and normal weight  HENT:      Head: Normocephalic and atraumatic  Right Ear: External ear normal       Left Ear: External ear normal       Nose: Nose normal       Mouth/Throat:      Mouth: Mucous membranes are moist       Pharynx: Oropharynx is clear  Eyes:      General: No scleral icterus  Right eye: No discharge  Left eye: No discharge  Conjunctiva/sclera: Conjunctivae normal       Pupils: Pupils are equal, round, and reactive to light  Cardiovascular:      Rate and Rhythm: Normal rate  Pulses: Normal pulses  Heart sounds: Normal heart sounds  Pulmonary:      Effort: Pulmonary effort is normal  No respiratory distress  Breath sounds: Normal breath sounds  Chest:      Chest wall: No tenderness  Abdominal:      General: Abdomen is flat  There is no distension  Palpations: Abdomen is soft  Tenderness: There is no abdominal tenderness  There is no right CVA tenderness or left CVA tenderness  Musculoskeletal:         General: No swelling, tenderness or signs of injury  Normal range of motion  Cervical back: Normal range of motion and neck supple  No rigidity  Lumbar back: Spasms present  Back:    Skin:     General: Skin is warm and dry  Capillary Refill: Capillary refill takes less than 2 seconds  Findings: No bruising, erythema or rash  Neurological:      General: No focal deficit present  Mental Status: He is alert and oriented to person, place, and time  Mental status is at baseline  Psychiatric:         Mood and Affect: Mood normal          Behavior: Behavior normal          Thought Content:  Thought content normal          Vital Signs  ED Triage Vitals [03/22/23 0840]   Temperature Pulse Respirations Blood Pressure SpO2   (!) 97 3 °F (36 3 °C) 77 17 147/83 96 %      Temp Source Heart Rate Source Patient Position - Orthostatic VS BP Location FiO2 (%)   Oral Monitor Sitting Left arm --      Pain Score       10 - Worst Possible Pain           Vitals:    03/22/23 0840   BP: 147/83   Pulse: 77   Patient Position - Orthostatic VS: Sitting         Visual Acuity      ED Medications  Medications - No data to display    Diagnostic Studies  Results Reviewed     None                 No orders to display              Procedures  Procedures         ED Course                                             Medical Decision Making  59-year-old male presents to the emergency department for management of his new onset low back pain that started last week after an associated injury at work  Patient is clinically well-appearing, no acute distress  Vital signs are stable  Afebrile  Localized to the paraspinal muscles of the lumbar region on the right side  Differential: Exam and symptoms consistent with lumbar muscle strain  Other possibilities include sciatica  Symptoms appear entirely musculoskeletal   Do not suspect urinary etiology or abdominal etiology  Little suspicion for bony etiology with no midline tenderness, step-offs or deformities  Plan & Dispo: Offered x-ray of lumbar spine to evaluate for possible bony abnormality however patient politely declines as he feels that his symptoms are primarily from a muscle spasm  No indications for blood work  Will address symptoms with lidocaine patch  Symptoms are clearly nondisabling and patient is well-appearing  Plan for discharge with outpatient management  Patient drove here so I am unable to give him Robaxin here in the emergency department but will provide a prescription for outpatient management  Also discussed proper use of Tylenol to prevent toxicities  Referral provided for pain management and physical therapy for management of his symptoms  Patient verbalizes understanding and is amenable to discharge at this time  He has been from prior care  Prescription sent to pharmacy for Robaxin    Strict return precautions discussed  Patient stable at time of discharge  Strain of lumbar region, initial encounter: acute illness or injury  Risk  Prescription drug management  Disposition  Final diagnoses:   Strain of lumbar region, initial encounter     Time reflects when diagnosis was documented in both MDM as applicable and the Disposition within this note     Time User Action Codes Description Comment    3/22/2023  9:33 AM Carla Ceballos Add [S39 012A] Strain of lumbar region, initial encounter       ED Disposition     ED Disposition   Discharge    Condition   Stable    Date/Time   Wed Mar 22, 2023  9:33 AM    Comment   Jamie Flank discharge to home/self care  Follow-up Information     Follow up With Specialties Details Why Contact Laron Lopez MD Pain Medicine Schedule an appointment as soon as possible for a visit  follow up for further evaluation of symptoms [address] [facility] [address]  [address] [place] [address]  [phone]            Discharge Medication List as of 3/22/2023  9:37 AM      START taking these medications    Details   methocarbamol (ROBAXIN) 500 mg tablet Take 1 tablet (500 mg total) by mouth 2 (two) times a day, Starting Wed 3/22/2023, Normal         CONTINUE these medications which have NOT CHANGED    Details   amiodarone 200 mg tablet Take 0 5 tablets (100 mg total) by mouth daily, Starting Mon 2/13/2023, No Print      aspirin 81 mg chewable tablet chew and swallow 1 tablet by mouth once daily, Normal      atorvastatin (LIPITOR) 80 mg tablet take 1 tablet by mouth once daily with dinner, Normal      lisinopril (ZESTRIL) 2 5 mg tablet TAKE 1 TABLET BY MOUTH AT BEDTIME, Normal      metoprolol succinate (TOPROL-XL) 50 mg 24 hr tablet Take 1 tablet (50 mg total) by mouth daily, Starting Mon 11/21/2022, Normal      nicotine (NICODERM CQ) 14 mg/24hr TD 24 hr patch Apply one patch daily (14 mg) for 14 days    Begin 14 mg patch after completing 21 mg patch to be worn first for 6 weeks  , Normal      nicotine (NICODERM CQ) 21 mg/24 hr TD 24 hr patch APPLY 1 PATCH DAILY FOR 6 WEEKS, Normal                 PDMP Review       Value Time User    PDMP Reviewed  Yes 9/16/2022 11:10 AM Jagjit Caicedo PA-C          ED Provider  Electronically Signed by           Kimberly Del Real PA-C  03/22/23 7082

## 2023-03-22 NOTE — DISCHARGE INSTRUCTIONS
Please continue with Tylenol up to 3 capsules twice daily, and try Robaxin at night for your back pain  Continue with IcyHot or lidocaine patches over the site as well  Do not drive or operate heavy machinery on Robaxin as this can cause drowsiness  Follow-up with your PCP or pain medicine if your symptoms persist   You can try physical therapy to help with your symptoms as well  If you develop any new, concerning, worsening symptoms please return to the emergency department for reevaluation

## 2023-03-22 NOTE — Clinical Note
Summer Astudillo was seen and treated in our emergency department on 3/22/2023  Diagnosis:     Patel Davison  may return to work on return date  He may return on this date: 03/27/2023         If you have any questions or concerns, please don't hesitate to call        Arley Cano PA-C    ______________________________           _______________          _______________  Hospital Representative                              Date                                Time

## 2023-03-23 ENCOUNTER — TELEPHONE (OUTPATIENT)
Dept: CARDIOLOGY CLINIC | Facility: CLINIC | Age: 63
End: 2023-03-23

## 2023-03-23 NOTE — TELEPHONE ENCOUNTER
Wife called today  Berta Lane pulled a muscle in his back and she wants to know if he can use her Tens Unit since he has a defibrilator  Also wants to know if he can take ibuprofen for the pain    [phone]

## 2023-03-26 ENCOUNTER — HOSPITAL ENCOUNTER (EMERGENCY)
Facility: HOSPITAL | Age: 63
Discharge: HOME | End: 2023-03-26
Attending: FAMILY MEDICINE

## 2023-03-26 ENCOUNTER — APPOINTMENT (EMERGENCY)
Dept: RADIOLOGY | Facility: HOSPITAL | Age: 63
End: 2023-03-26

## 2023-03-26 VITALS
OXYGEN SATURATION: 97 % | SYSTOLIC BLOOD PRESSURE: 145 MMHG | RESPIRATION RATE: 18 BRPM | HEIGHT: 73 IN | DIASTOLIC BLOOD PRESSURE: 78 MMHG | WEIGHT: 210 LBS | BODY MASS INDEX: 27.83 KG/M2 | HEART RATE: 64 BPM | TEMPERATURE: 98.4 F

## 2023-03-26 DIAGNOSIS — M54.16 LUMBAR RADICULAR PAIN: ICD-10-CM

## 2023-03-26 DIAGNOSIS — M25.559 HIP PAIN: Primary | ICD-10-CM

## 2023-03-26 RX ORDER — METHYLPREDNISOLONE 4 MG/1
TABLET ORAL
Qty: 21 TABLET | Refills: 0 | Status: SHIPPED | OUTPATIENT
Start: 2023-03-26

## 2023-03-26 RX ADMIN — PREDNISONE 50 MG: 10 TABLET ORAL at 10:50

## 2023-03-26 NOTE — DISCHARGE INSTRUCTIONS
Up with your PCP  Continue to take your Robaxin as directed  Continue take Tylenol as well  A prescription for Medrol Dosepak was sent into your pharmacy  Please take as directed as well  Return for worsening pain

## 2023-03-26 NOTE — ED PROVIDER NOTES
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  Patient states pain is in his righthip and radiates down to his calf  Patient has no midline tenderness  Patient states was evaluated here in the beginning of the week, and only has mild pain relief from robaxin  Patient reports that he needs to return to work and asking for additional pain relief and re-evaluation  Reviewed with patient at previous visit he declined imaging, however today, patient is asking for imaging as well  Patient denies urinary continence or retention  Patient denies any saddle anesthesias, or perineal paresthesias  Patient denies any leg weakness  Patient is able to ambulate with a strong steady gait  Patient has no reproduction of pain with straight leg raise  Patient reports that his pain is brought on by turning from side to side, or states that when he is standing if he bounces on his left leg and lifts his right leg dangle it will reproduce the pain  Patient denies any lower abdominal pain  Patient denies trauma to area  Prior to Admission Medications   Prescriptions Last Dose Informant Patient Reported?  Taking?   amiodarone 200 mg tablet   No No   Sig: Take 0 5 tablets (100 mg total) by mouth daily   aspirin 81 mg chewable tablet   No No   Sig: chew and swallow 1 tablet by mouth once daily   atorvastatin (LIPITOR) 80 mg tablet   No No   Sig: take 1 tablet by mouth once daily with dinner   lisinopril (ZESTRIL) 2 5 mg tablet   No No   Sig: TAKE 1 TABLET BY MOUTH AT BEDTIME   methocarbamol (ROBAXIN) 500 mg tablet   No No   Sig: Take 1 tablet (500 mg total) by mouth 2 (two) times a day   metoprolol succinate (TOPROL-XL) 50 mg 24 hr tablet   No No   Sig: Take 1 tablet (50 mg total) by mouth daily   nicotine (NICODERM CQ) 14 mg/24hr TD 24 hr patch   No No   Sig: Apply one patch daily (14 mg) for 14 days  Begin 14 mg patch after completing 21 mg patch to be worn first for 6 weeks  nicotine (NICODERM CQ) 21 mg/24 hr TD 24 hr patch   No No   Sig: APPLY 1 PATCH DAILY FOR 6 WEEKS      Facility-Administered Medications: None       Past Medical History:   Diagnosis Date   • PERCY (acute kidney injury) ([facility] [address] )    • Bilateral inguinal hernia    • CAD (coronary artery disease)     s/p CABG x3 LIMA-LAD, SVG-RCA, SVG-D1 9/12/2022   • Cardiac arrest with ventricular fibrillation (HCC)    • Dizziness 02/02/2021    isolated incident of dizziness, sweating & disorientation   • Elevated LFTs    • History of left heart catheterization     and IABP, ICD PLACEMENT, DUAL CHAMBER, [employer]   • Hyperglycemia    • Hypertension    • Pneumonia    • [facility]    • VT (ventricular tachycardia)        Past Surgical History:   Procedure Laterality Date   • CARDIAC CATHETERIZATION N/A 6/29/2022    Procedure: Cardiac pci;  Surgeon: Antonio Simms MD;  [facility]; Service: Cardiology   • CARDIAC CATHETERIZATION N/A 6/29/2022    Procedure: Cardiac iabp; Surgeon: Antonio Simms MD;  [facility]; Service: Cardiology   • CARDIAC CATHETERIZATION N/A 9/9/2022    Procedure: CARDIAC CATHETERIZATION;  Surgeon: Aleksandra Dotson DO;  [facility]: [facility]; Service: Cardiology   • CARDIAC ELECTROPHYSIOLOGY PROCEDURE N/A 7/5/2022    Procedure: Cardiac icd implant;  Surgeon: Jennifer Brantley MD;  [facility]: [facility]ARD[facility];   Service: Cardiology   • CORONARY ARTERY BYPASS GRAFT N/A 9/12/2022    Procedure: CORONARY ARTERY BYPASS GRAFT (CABG) x 3, SWIFT TO LAD, L LEG EVH/SVG TO RCA  AND DIAGONAL;  Surgeon: Nancy Forde MD;  Location: [facility] MAIN OR;  Service: Cardiac Surgery   • HARVEST VEIN Left 9/12/2022    Procedure: HARVEST VEIN ENDOSCOPIC ([address]); Surgeon: Rigoberto Marion MD;  Location:  MAIN OR;  Service: Cardiac Surgery   • KS RPR 1ST INGUN HRNA AGE 5 YRS/> REDUCIBLE Bilateral 2/9/2021    Procedure: INGUINAL HERNIA REPAIR with mesh;  Surgeon: Gerard Mtz MD;  Location: [facility] MAIN OR;  Service: General   • TONSILLECTOMY         Family History   Problem Relation Age of Onset   • Diabetes Maternal Grandfather      I have reviewed and agree with the history as documented  E-Cigarette/Vaping   • E-Cigarette Use Never User      E-Cigarette/Vaping Substances   • Nicotine No    • THC No    • CBD No    • Flavoring No    • Other No    • Unknown No      Social History     Tobacco Use   • Smoking status: Every Day     Packs/day: 0 25     Types: Cigarettes   • Smokeless tobacco: Never   Vaping Use   • Vaping Use: Never used   Substance Use Topics   • Alcohol use: Not Currently   • Drug use: Not Currently       Review of Systems   Constitutional: Negative  HENT: Negative  Eyes: Negative  Respiratory: Negative  Cardiovascular: Negative  Gastrointestinal: Negative  Endocrine: Negative  Genitourinary: Negative  Musculoskeletal: Negative  Skin: Negative  Allergic/Immunologic: Negative  Neurological: Negative  Hematological: Negative  Psychiatric/Behavioral: Negative  All other systems reviewed and are negative  Physical Exam  Physical Exam  Vitals and nursing note reviewed  Constitutional:       General: He is not in acute distress  Appearance: Normal appearance  He is normal weight  He is not ill-appearing or toxic-appearing  HENT:      Head: Normocephalic  Right Ear: External ear normal       Left Ear: External ear normal       Nose: Nose normal       Mouth/Throat:      Mouth: Mucous membranes are moist    Eyes:      Extraocular Movements: Extraocular movements intact        Conjunctiva/sclera: Conjunctivae normal       Pupils: Pupils are equal, round, and reactive to light  Cardiovascular:      Rate and Rhythm: Normal rate and regular rhythm  Pulses: Normal pulses  Heart sounds: Normal heart sounds  Pulmonary:      Effort: Pulmonary effort is normal       Breath sounds: Normal breath sounds  Abdominal:      General: Abdomen is flat  Bowel sounds are normal  There is no distension  Palpations: Abdomen is soft  Tenderness: There is no abdominal tenderness  There is no guarding  Musculoskeletal:         General: Tenderness and signs of injury present  Cervical back: Normal range of motion and neck supple  Lumbar back: No swelling, edema, deformity, signs of trauma, lacerations, spasms, tenderness or bony tenderness  Normal range of motion  Negative right straight leg raise test and negative left straight leg raise test  No scoliosis  Right hip: Tenderness present  Left hip: Normal       Right upper leg: Normal       Left upper leg: Normal       Right knee: Normal       Left knee: Normal       Right lower leg: Normal       Left lower leg: Normal       Right ankle: Normal       Left ankle: Normal       Right foot: Normal       Left foot: Normal         Legs:    Skin:     Capillary Refill: Capillary refill takes less than 2 seconds  Neurological:      General: No focal deficit present  Mental Status: He is alert and oriented to person, place, and time  Mental status is at baseline     Psychiatric:         Mood and Affect: Mood normal          Vital Signs  ED Triage Vitals [03/26/23 0933]   Temperature Pulse Respirations Blood Pressure SpO2   98 4 °F (36 9 °C) 65 18 152/98 98 %      Temp Source Heart Rate Source Patient Position - Orthostatic VS BP Location FiO2 (%)   Tympanic Monitor Sitting Right arm --      Pain Score       7           Vitals:    03/26/23 0933 03/26/23 1045   BP: 152/98 145/78   Pulse: 65 64   Patient Position - Orthostatic VS: Sitting Sitting Visual Acuity      ED Medications  Medications   predniSONE tablet 50 mg (50 mg Oral Given 3/26/23 1050)       Diagnostic Studies  Results Reviewed     None                 XR lumbar spine 2 or 3 views   Final Result by Chad Nunez MD (03/26 1516)      No acute osseous abnormality  Degenerative changes as described  Workstation performed: [id number]         XR hip/pelv 2-3 vws right   Final Result by Aiyana Cormier MD (03/26 2115)      No acute osseous abnormality  Workstation performed: [id number]                    Procedures  Procedures         ED Course                               SBIRT 20yo+    Flowsheet Row Most Recent Value   SBIRT (25 yo +)    In order to provide better care to our patients, we are screening all of our patients for alcohol and drug use  Would it be okay to ask you these screening questions? Yes Filed at: 03/26/2023 7422   Initial Alcohol Screen: US AUDIT-C     1  How often do you have a drink containing alcohol? 0 Filed at: 03/26/2023 0935   2  How many drinks containing alcohol do you have on a typical day you are drinking? 0 Filed at: 03/26/2023 0935   3a  Male UNDER 65: How often do you have five or more drinks on one occasion? 0 Filed at: 03/26/2023 0935   3b  FEMALE Any Age, or MALE 65+: How often do you have 4 or more drinks on one occassion? 0 Filed at: 03/26/2023 0935   Audit-C Score 0 Filed at: 03/26/2023 6093   BAILEE: How many times in the past year have you    Used an illegal drug or used a prescription medication for non-medical reasons? Never Filed at: 03/26/2023 0935                    Medical Decision Making  DDx: radicular pain, muscle skeletal strain, hip strain   Patient returning today for reevaluation of right hip pain  Patient reports that he was using a screw gun at work and after had pain in his right hip  Patient states that he does not have back pain   Patient has pain in his right hip at the area of greater "trochanter that radiates down his leg  Patient denies red flag symptoms of   Patient denies urinary continence or retention  Patient denies any saddle anesthesias, or perineal paresthesias  Patient denies any leg weakness  Pain follows the L5, S1 distribution  Patient has no midline tenderness  Patient reports pain worse when he lets his right leg \"hang  \" Patient reports pain does radiate down his leg to his calf when this occurs  Patient has no fever, or chills, no tachycardia, no erythema or signs of infectious etiology  Review of records shows patient not a diabetic  Patient on [facility] and cannot take consistent NSAIDs  Will provide medrol dose pack with first dose in department  Xrays ordered as this is patient's repeat visit  No acute findings  Patient aware will call if any new findings are read by radiologist   Patient was offered comprehensive spine center referral and declined stating he feels this pain will improve as he is active  Patient aware to follow up with PCP  Patient aware of strict return precautions to the ED  Patient reporting pain has improved to the point he feels he can now work  Patient stable for discharge  Reviewed reasons to return to ed  Patient verbalized understanding of diagnosis and agreement with discharge plan of care as well as understanding of reasons to return to ed  Amount and/or Complexity of Data Reviewed  Radiology: ordered  Risk  Prescription drug management            Disposition  Final diagnoses:   Hip pain   Lumbar radicular pain     Time reflects when diagnosis was documented in both MDM as applicable and the Disposition within this note     Time User Action Codes Description Comment    3/26/2023 11:50 AM Carlie Villaseñor [M25 [id number]] Hip pain     3/26/2023 11:51 AM Carlie Villaseñor [M54 [id number]] Lumbar radicular pain       ED Disposition     ED Disposition   Discharge    Condition   Stable    Date/Time   Sun Mar 26, 2023 11:51 AM    Comment   Man Roberts " discharge to home/self care  Follow-up Information     Follow up With Specialties Details Why [address] [facility] Emergency Department Emergency Medicine Go to  If symptoms worsen [address]  [place] [id number]  [facility] Emergency Department, [address] [facility] [address], [address] [facility],  Family Medicine Schedule an appointment as soon as possible for a visit in 2 days For further evaluation of symptoms [address] [facility]             Discharge Medication List as of 3/26/2023 11:51 AM      START taking these medications    Details   methylPREDNISolone 4 MG tablet therapy pack Use as directed on package, Normal         CONTINUE these medications which have NOT CHANGED    Details   amiodarone 200 mg tablet Take 0 5 tablets (100 mg total) by mouth daily, Starting Mon 2/13/2023, No Print      aspirin 81 mg chewable tablet chew and swallow 1 tablet by mouth once daily, Normal      atorvastatin (LIPITOR) 80 mg tablet take 1 tablet by mouth once daily with dinner, Normal      lisinopril (ZESTRIL) 2 5 mg tablet TAKE 1 TABLET BY MOUTH AT BEDTIME, Normal      methocarbamol (ROBAXIN) 500 mg tablet Take 1 tablet (500 mg total) by mouth 2 (two) times a day, Starting Wed 3/22/2023, Normal      metoprolol succinate (TOPROL-XL) 50 mg 24 hr tablet Take 1 tablet (50 mg total) by mouth daily, Starting Mon 11/21/2022, Normal      nicotine (NICODERM CQ) 14 mg/24hr TD 24 hr patch Apply one patch daily (14 mg) for 14 days  Begin 14 mg patch after completing 21 mg patch to be worn first for 6 weeks  , Normal      nicotine (NICODERM CQ) 21 mg/24 hr TD 24 hr patch APPLY 1 PATCH DAILY FOR 6 WEEKS, Normal             No discharge procedures on file      PDMP Review       Value Time User    PDMP Reviewed  Yes 9/16/2022 11:10 AM Deven Coronado PA-C          ED Provider  Electronically Signed by           ASHLEY Diaz  03/27/23 8329

## 2023-03-26 NOTE — ED NOTES
Patient returned from Xray at this time     Ezequiel Lilly, [address] [place] [address]  03/26/23 3095

## 2023-03-31 ENCOUNTER — APPOINTMENT (EMERGENCY)
Dept: RADIOLOGY | Facility: HOSPITAL | Age: 63
End: 2023-03-31

## 2023-03-31 ENCOUNTER — HOSPITAL ENCOUNTER (EMERGENCY)
Facility: HOSPITAL | Age: 63
Discharge: HOME | End: 2023-03-31
Attending: EMERGENCY MEDICINE

## 2023-03-31 VITALS
RESPIRATION RATE: 20 BRPM | HEART RATE: 80 BPM | WEIGHT: 210 LBS | DIASTOLIC BLOOD PRESSURE: 87 MMHG | SYSTOLIC BLOOD PRESSURE: 136 MMHG | HEIGHT: 73 IN | OXYGEN SATURATION: 95 % | BODY MASS INDEX: 27.83 KG/M2 | TEMPERATURE: 97.5 F

## 2023-03-31 DIAGNOSIS — M25.551 RIGHT HIP PAIN: Primary | ICD-10-CM

## 2023-03-31 RX ORDER — CELECOXIB 200 MG/1
200 CAPSULE ORAL 2 TIMES DAILY
Qty: 30 CAPSULE | Refills: 0 | Status: SHIPPED | OUTPATIENT
Start: 2023-03-31

## 2023-03-31 RX ORDER — KETOROLAC TROMETHAMINE 30 MG/ML
15 INJECTION, SOLUTION INTRAMUSCULAR; INTRAVENOUS ONCE
Status: COMPLETED | OUTPATIENT
Start: 2023-03-31 | End: 2023-03-31

## 2023-03-31 RX ORDER — LIDOCAINE 50 MG/G
1 PATCH TOPICAL ONCE
Status: DISCONTINUED | OUTPATIENT
Start: 2023-03-31 | End: 2023-03-31 | Stop reason: HOSPADM

## 2023-03-31 RX ORDER — GABAPENTIN 300 MG/1
300 CAPSULE ORAL 2 TIMES DAILY
Qty: 12 CAPSULE | Refills: 0 | Status: SHIPPED | OUTPATIENT
Start: 2023-03-31

## 2023-03-31 RX ADMIN — KETOROLAC TROMETHAMINE 15 MG: 30 INJECTION, SOLUTION INTRAMUSCULAR at 12:15

## 2023-03-31 RX ADMIN — LIDOCAINE 5% 1 PATCH: 700 PATCH TOPICAL at 12:12

## 2023-03-31 NOTE — DISCHARGE INSTRUCTIONS
Follow up with orthopedic surgery  You can try alternative agents for pain control  Continue with 650mg tylenol every 6 hours  Avoid NSAIDs (Motrin, Aleve, Advil) while on this regimen  Follow up with orthopedics  Return if symptoms

## 2023-03-31 NOTE — ED PROVIDER NOTES
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 Taking?   amiodarone 200 mg tablet   No No   Sig: Take 0 5 tablets (100 mg total) by mouth daily   aspirin 81 mg chewable tablet   No No   Sig: chew and swallow 1 tablet by mouth once daily   atorvastatin (LIPITOR) 80 mg tablet   No No   Sig: take 1 tablet by mouth once daily with dinner   lisinopril (ZESTRIL) 2 5 mg tablet   No No   Sig: TAKE 1 TABLET BY MOUTH AT BEDTIME   methocarbamol (ROBAXIN) 500 mg tablet   No No   Sig: Take 1 tablet (500 mg total) by mouth 2 (two) times a day   methylPREDNISolone 4 MG tablet therapy pack   No No   Sig: Use as directed on package   metoprolol succinate (TOPROL-XL) 50 mg 24 hr tablet   No No   Sig: Take 1 tablet (50 mg total) by mouth daily   nicotine (NICODERM CQ) 14 mg/24hr TD 24 hr patch   No No   Sig: Apply one patch daily (14 mg) for 14 days  Begin 14 mg patch after completing 21 mg patch to be worn first for 6 weeks     nicotine (NICODERM CQ) 21 mg/24 hr TD 24 hr patch   No No   Sig: APPLY 1 PATCH DAILY FOR 6 WEEKS      Facility-Administered Medications: None       Past Medical History:   Diagnosis Date   • PERCY (acute kidney injury) ([facility] [address] )    • Bilateral inguinal hernia    • CAD (coronary artery disease)     s/p CABG x3 LIMA-LAD, SVG-RCA, SVG-D1 9/12/2022   • Cardiac arrest with ventricular fibrillation ([facility] [address] )    • Dizziness 02/02/2021    isolated incident of dizziness, sweating & disorientation   • Elevated LFTs    • History of left heart catheterization     and IABP, ICD PLACEMENT, DUAL CHAMBER, 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   • Hyperglycemia    • Hypertension    • Pneumonia    • STEMI (ST elevation myocardial infarction) ([facility] [address] )    • VT (ventricular tachycardia)        Past Surgical History:   Procedure Laterality Date   • CARDIAC CATHETERIZATION N/A 6/29/2022    Procedure: Cardiac pci;  Surgeon: Toshia Geiger MD;  [facility]ARD[facility]; Service: Cardiology   • CARDIAC CATHETERIZATION N/A 6/29/2022    Procedure: Cardiac iabp; Surgeon: Toshia Geiger MD;  [facility]ARD[facility]; Service: Cardiology   • CARDIAC CATHETERIZATION N/A 9/9/2022    Procedure: CARDIAC CATHETERIZATION;  Surgeon: Arianna Delaney DO;  [facility] CARD[facility]AC [facility]; Service: Cardiology   • CARDIAC ELECTROPHYSIOLOGY PROCEDURE N/A 7/5/2022    Procedure: Cardiac icd implant;  Surgeon: Chetna Hill MD;  [facility]: [facility] CARDIAC CATH LAB; Service: Cardiology   • CORONARY ARTERY BYPASS GRAFT N/A 9/12/2022    Procedure: CORONARY ARTERY BYPASS GRAFT (CABG) x 3, SWIFT TO LAD, L LEG EVH/SVG TO RCA  AND DIAGONAL;  Surgeon: Liliya Calderon MD;  Location: [facility] MAIN OR;  Service: Cardiac Surgery   • HARVEST VEIN Left 9/12/2022    Procedure: HARVEST VEIN ENDOSCOPIC (EVH); Surgeon: Liliya Calderon MD;  Location: [facility] MAIN OR;  Service: Cardiac Surgery   • NE RPR 1ST INGUN HRNA AGE 5 YRS/> REDUCIBLE Bilateral 2/9/2021    Procedure: INGUINAL HERNIA REPAIR with mesh;  Surgeon: Britton Gonzalez MD;  Location: [facility] MAIN OR;  Service: General   • TONSILLECTOMY         Family History   Problem Relation Age of Onset   • Diabetes Maternal Grandfather      I have reviewed and agree with the history as documented      E-Cigarette/Vaping   • E-Cigarette Use Never User      E-Cigarette/Vaping Substances   • Nicotine No    • THC No    • CBD No    • Flavoring No    • Other No    • Unknown No      Social History Tobacco Use   • Smoking status: Every Day     Packs/day: 0 25     Types: Cigarettes   • Smokeless tobacco: Never   Vaping Use   • Vaping Use: Never used   Substance Use Topics   • Alcohol use: Not Currently   • Drug use: Not Currently       Review of Systems    Physical Exam  Physical Exam  Vitals and nursing note reviewed  Constitutional:       General: He is not in acute distress  Appearance: Normal appearance  HENT:      Head: Normocephalic and atraumatic  Nose: Nose normal    Eyes:      General:         Right eye: No discharge  Left eye: No discharge  Cardiovascular:      Rate and Rhythm: Normal rate and regular rhythm  Pulmonary:      Effort: Pulmonary effort is normal  No respiratory distress  Abdominal:      General: Abdomen is flat  There is no distension  Musculoskeletal:         General: No deformity  Normal range of motion  Comments: Tenderness with internal and external rotation of the hip, normal neurovascular exam, no effusion, erythema, pain out of proportion  Tenderness to the upper portion of the hip joint   Skin:     General: Skin is warm  Findings: No rash  Neurological:      Mental Status: He is alert and oriented to person, place, and time  Motor: No weakness     Psychiatric:         Mood and Affect: Mood normal          Behavior: Behavior normal          Vital Signs  ED Triage Vitals   Temperature Pulse Respirations Blood Pressure SpO2   03/31/23 1036 03/31/23 1036 03/31/23 1036 03/31/23 1037 03/31/23 1036   97 5 °F (36 4 °C) 80 20 136/87 95 %      Temp Source Heart Rate Source Patient Position - Orthostatic VS BP Location FiO2 (%)   03/31/23 1036 03/31/23 1036 03/31/23 1037 03/31/23 1037 --   Temporal Monitor Sitting Left arm       Pain Score       03/31/23 1036       10 - Worst Possible Pain           Vitals:    03/31/23 1036 03/31/23 1037   BP:  136/87   Pulse: 80    Patient Position - Orthostatic VS:  Sitting         Visual Acuity      ED Medications  Medications   lidocaine (LIDODERM) 5 % patch 1 patch (1 patch Topical Medication Applied 3/31/23 1212)   ketorolac (TORADOL) injection 15 mg (15 mg Intramuscular Given 3/31/23 1215)       Diagnostic Studies  Results Reviewed     None                 XR hip/pelv 2-3 vws right if performed   Final Result by Ruby Abbott MD (03/31 1242)      No acute osseous abnormality  Workstation performed: [id number]                    Procedures  Procedures         ED Course                                             Medical Decision Making  72-year-old male with with reevaluation for right-sided hip pain  He has no back pain, or tenderness, no neurologic signs  He has no vascular deficits, pain is located on the upper outer portion of the hip    Considered occult fracture in this patient however the mechanism does not suggest this, he is also ambulatory  Repeat x-ray also reassuring when compared to old  X-ray was viewed and interpreted dependently by myself with no acute fracture or dislocation identified  Pain is very clearly musculoskeletal in origin  Considered septic arthritis in the patient however he has no warmth, erythema, swelling, fever, chills, and also has a mechanism to indicate why he has this pain  Considered abdominal source of pain in this patient however his pain is located on the lateral portion of his hip and reproducible with manipulation of the hip joint  Considered particular pain lateral cutaneous nerve pain however he has no back pain, tenderness  He has no neurologic deficits, saddle anesthesia, etc     I recommended to patient he follow-up with outpatient orthopedics, Celebrex, and gabapentin for pain  Cautioned against additional NSAID use  Patient can continue Tylenol as well  Patient reported significant improvement after IM Toradol administered      Right hip pain: acute illness or injury  Amount and/or Complexity of Data Reviewed  Radiology: ordered and independent interpretation performed  Decision-making details documented in ED Course  Risk  Prescription drug management  Disposition  Final diagnoses:   Right hip pain     Time reflects when diagnosis was documented in both MDM as applicable and the Disposition within this note     Time User Action Codes Description Comment    3/31/2023 12:06 PM Karen Villaseñor [U59 [id number]] Right hip pain       ED Disposition     ED Disposition   Discharge    Condition   Stable    Date/Time   Fri Mar 31, 2023 12:06 PM    Comment   Maribethsina Richie discharge to home/self care                 Follow-up Information     Follow up With Specialties Details Why Contact Info    Yasmin Maurer PA-C Family Medicine, Physician Assistant Schedule an appointment as soon as possible for a visit   [address] [facility] [address]  [phone]            Discharge Medication List as of 3/31/2023 12:09 PM      START taking these medications    Details   celecoxib (CeleBREX) 200 mg capsule Take 1 capsule (200 mg total) by mouth 2 (two) times a day, Starting Fri 3/31/2023, Normal      gabapentin (Neurontin) 300 mg capsule Take 1 capsule (300 mg total) by mouth 2 (two) times a day, Starting Fri 3/31/2023, Normal         CONTINUE these medications which have NOT CHANGED    Details   amiodarone 200 mg tablet Take 0 5 tablets (100 mg total) by mouth daily, Starting Mon 2/13/2023, No Print      aspirin 81 mg chewable tablet chew and swallow 1 tablet by mouth once daily, Normal      atorvastatin (LIPITOR) 80 mg tablet take 1 tablet by mouth once daily with dinner, Normal      lisinopril (ZESTRIL) 2 5 mg tablet TAKE 1 TABLET BY MOUTH AT BEDTIME, Normal      methocarbamol (ROBAXIN) 500 mg tablet Take 1 tablet (500 mg total) by mouth 2 (two) times a day, Starting Wed 3/22/2023, Normal      methylPREDNISolone 4 MG tablet therapy pack Use as directed on package, Normal      metoprolol succinate (TOPROL-XL) 50 mg 24 hr tablet Take 1 tablet (50 mg total) by mouth daily, Starting Mon 11/21/2022, Normal      nicotine (NICODERM CQ) 14 mg/24hr TD 24 hr patch Apply one patch daily (14 mg) for 14 days  Begin 14 mg patch after completing 21 mg patch to be worn first for 6 weeks  , Normal      nicotine (NICODERM CQ) 21 mg/24 hr TD 24 hr patch APPLY 1 PATCH DAILY FOR 6 WEEKS, Normal                 PDMP Review       Value Time User    PDMP Reviewed  Yes 9/16/2022 11:10 AM Pio Eller PA-C          ED Provider  Electronically Signed by           Antione Lloyd DO  03/31/23 0974

## 2023-04-05 ENCOUNTER — HOSPITAL ENCOUNTER (OUTPATIENT)
Dept: NON INVASIVE DIAGNOSTICS | Facility: CLINIC | Age: 63
Discharge: HOME | End: 2023-04-05

## 2023-04-05 VITALS
HEART RATE: 70 BPM | DIASTOLIC BLOOD PRESSURE: 76 MMHG | BODY MASS INDEX: 28.44 KG/M2 | SYSTOLIC BLOOD PRESSURE: 130 MMHG | WEIGHT: 210 LBS | HEIGHT: 72 IN

## 2023-04-05 DIAGNOSIS — I25.10 CORONARY ARTERY DISEASE INVOLVING NATIVE CORONARY ARTERY OF NATIVE HEART WITHOUT ANGINA PECTORIS: ICD-10-CM

## 2023-04-05 DIAGNOSIS — I25.5 ISCHEMIC CARDIOMYOPATHY: ICD-10-CM

## 2023-04-05 LAB
AORTIC ROOT: 3.8 CM
APICAL FOUR CHAMBER EJECTION FRACTION: 37 %
AV LVOT MEAN GRADIENT: 2 MMHG
AV LVOT PEAK GRADIENT: 3 MMHG
DOP CALC LVOT PEAK VEL VTI: 21.28 CM
DOP CALC LVOT PEAK VEL: 0.92 M/S
E WAVE DECELERATION TIME: 221 MS
FRACTIONAL SHORTENING: 15 % (ref 28–44)
INTERVENTRICULAR SEPTUM IN DIASTOLE (PARASTERNAL SHORT AXIS VIEW): 0.7 CM
INTERVENTRICULAR SEPTUM: 0.7 CM (ref 0.6–1.1)
LAAS-AP2: 22.8 CM2
LAAS-AP4: 16.7 CM2
LEFT ATRIUM SIZE: 4.8 CM
LEFT INTERNAL DIMENSION IN SYSTOLE: 5.1 CM (ref 2.1–4)
LEFT VENTRICLE DIASTOLIC VOLUME (MOD BIPLANE): 152 ML
LEFT VENTRICLE SYSTOLIC VOLUME (MOD BIPLANE): 93 ML
LEFT VENTRICULAR INTERNAL DIMENSION IN DIASTOLE: 6 CM (ref 3.5–6)
LEFT VENTRICULAR POSTERIOR WALL IN END DIASTOLE: 0.9 CM
LEFT VENTRICULAR STROKE VOLUME: 61 ML
LV EF: 39 %
LVSV (TEICH): 61 ML
MV E'TISSUE VEL-SEP: 10 CM/S
MV PEAK A VEL: 1.15 M/S
MV PEAK E VEL: 66 CM/S
MV STENOSIS PRESSURE HALF TIME: 64 MS
MV VALVE AREA P 1/2 METHOD: 3.44 CM2
RIGHT ATRIUM AREA SYSTOLE A4C: 13 CM2
RIGHT VENTRICLE ID DIMENSION: 2.7 CM
SL CV LEFT ATRIUM LENGTH A2C: 5.6 CM
SL CV LV EF: 35
SL CV PED ECHO LEFT VENTRICLE DIASTOLIC VOLUME (MOD BIPLANE) 2D: 183 ML
SL CV PED ECHO LEFT VENTRICLE SYSTOLIC VOLUME (MOD BIPLANE) 2D: 123 ML
TRICUSPID ANNULAR PLANE SYSTOLIC EXCURSION: 1.7 CM

## 2023-04-28 ENCOUNTER — REMOTE DEVICE CLINIC VISIT (OUTPATIENT)
Dept: CARDIOLOGY CLINIC | Facility: CLINIC | Age: 63
End: 2023-04-28

## 2023-04-28 DIAGNOSIS — Z95.810 AICD (AUTOMATIC CARDIOVERTER/DEFIBRILLATOR) PRESENT: Primary | ICD-10-CM

## 2023-04-28 NOTE — PROGRESS NOTES
"Results for orders placed or performed in visit on 04/28/23   Cardiac EP device report    Narrative    MDT DUAL ICD/ ACTIVE SYSTEM IS MRI CONDITIONAL  CARELINK TRANSMISSION: BATTERY STATUS \"12 YRS  \" AP 5%  0%  ALL AVAILABLE LEAD PARAMETERS WITHIN NORMAL LIMITS  NO SIGNIFICANT HIGH RATE EPISODES  OPTI-VOL WITHIN NORMAL LIMITS  NORMAL DEVICE FUNCTION   NC         "

## 2023-06-01 ENCOUNTER — OFFICE VISIT (OUTPATIENT)
Dept: URGENT CARE | Facility: CLINIC | Age: 63
End: 2023-06-01

## 2023-06-01 VITALS
RESPIRATION RATE: 18 BRPM | WEIGHT: 215 LBS | HEIGHT: 72 IN | SYSTOLIC BLOOD PRESSURE: 139 MMHG | HEART RATE: 72 BPM | OXYGEN SATURATION: 97 % | BODY MASS INDEX: 29.12 KG/M2 | DIASTOLIC BLOOD PRESSURE: 82 MMHG | TEMPERATURE: 98 F

## 2023-06-01 DIAGNOSIS — U07.1 COVID-19: Primary | ICD-10-CM

## 2023-06-01 NOTE — PATIENT INSTRUCTIONS
Patient instructed to self quarantine  Advised to avoid nsaids  If you develop prolonged high fever, worsening or productive cough, shortness of breath, difficulty breathing, chest pain, or any new or concerning symptoms please proceed ER  Instructed patient to call ER prior to arrival make them aware she is being test for covid  Patient verbalizes understanding  Start vitamin c 1g twice daily,vitamin d3 2000IU daily, and multivitamin  monitor pulse ox  Call PCP in 24 hours for f/u

## 2023-06-01 NOTE — PROGRESS NOTES
[facility]        NAME: Zac Boyle is a 61 y o  male  : 1960    MRN: [id number]  DATE: 2023  TIME: 11:34 AM    Assessment and Plan   COVID-19 [U07 1]  1  COVID-19              Patient Instructions     Patient Instructions   Patient instructed to self quarantine  Advised to avoid nsaids  If you develop prolonged high fever, worsening or productive cough, shortness of breath, difficulty breathing, chest pain, or any new or concerning symptoms please proceed ER  Instructed patient to call ER prior to arrival make them aware she is being test for covid  Patient verbalizes understanding  Start vitamin c 1g twice daily,vitamin d3 2000IU daily, and multivitamin  monitor pulse ox  Call PCP in 24 hours for f/u  Chief Complaint     Chief Complaint   Patient presents with   • COVID-19     Patient reports positive Covid test at home on 23  Patient needs note to return to work  Patient reports symptoms improving, c/o fatigue and loss of taste and smell  History of Present Illness       URI   This is a new problem  The current episode started in the past 7 days  The problem has been rapidly improving  There has been no fever  Associated symptoms include congestion, coughing, diarrhea, headaches and a sore throat  Pertinent negatives include no abdominal pain, chest pain, ear pain, joint pain, joint swelling, nausea, rash, rhinorrhea, sinus pain, swollen glands, vomiting or wheezing  He has tried nothing for the symptoms  Tested positive for covid 5 days when symptoms began, notes resolution of symptoms at this time, loss of taste and smell persist   Review of Systems   Review of Systems   Constitutional: Positive for fatigue  Negative for chills, diaphoresis and fever  HENT: Positive for congestion and sore throat  Negative for ear pain, facial swelling, postnasal drip, rhinorrhea, sinus pressure and sinus pain      Eyes: Negative for photophobia and visual disturbance  Respiratory: Positive for cough  Negative for chest tightness, shortness of breath, wheezing and stridor  Cardiovascular: Negative for chest pain and palpitations  Gastrointestinal: Positive for diarrhea  Negative for abdominal pain, nausea and vomiting  Musculoskeletal: Positive for myalgias  Negative for arthralgias, back pain, joint pain and joint swelling  Skin: Negative for rash  Neurological: Positive for headaches  Negative for dizziness, tremors, syncope, weakness, light-headedness and numbness  Current Medications       Current Outpatient Medications:   •  amiodarone 200 mg tablet, Take 0 5 tablets (100 mg total) by mouth daily, Disp: , Rfl:   •  aspirin 81 mg chewable tablet, chew and swallow 1 tablet by mouth once daily, Disp: 30 tablet, Rfl: 5  •  atorvastatin (LIPITOR) 80 mg tablet, take 1 tablet by mouth once daily with dinner, Disp: 30 tablet, Rfl: 5  •  celecoxib (CeleBREX) 200 mg capsule, Take 1 capsule (200 mg total) by mouth 2 (two) times a day, Disp: 30 capsule, Rfl: 0  •  gabapentin (Neurontin) 300 mg capsule, Take 1 capsule (300 mg total) by mouth 2 (two) times a day, Disp: 12 capsule, Rfl: 0  •  lisinopril (ZESTRIL) 2 5 mg tablet, TAKE 1 TABLET BY MOUTH AT BEDTIME, Disp: 30 tablet, Rfl: 5  •  metoprolol succinate (TOPROL-XL) 50 mg 24 hr tablet, Take 1 tablet (50 mg total) by mouth daily, Disp: 90 tablet, Rfl: 2  •  methocarbamol (ROBAXIN) 500 mg tablet, Take 1 tablet (500 mg total) by mouth 2 (two) times a day (Patient not taking: Reported on 4/10/2023), Disp: 20 tablet, Rfl: 0  •  methylPREDNISolone 4 MG tablet therapy pack, Use as directed on package (Patient not taking: Reported on 4/10/2023), Disp: 21 tablet, Rfl: 0  •  nicotine (NICODERM CQ) 14 mg/24hr TD 24 hr patch, Apply one patch daily (14 mg) for 14 days  Begin 14 mg patch after completing 21 mg patch to be worn first for 6 weeks   (Patient not taking: Reported on 4/10/2023), Disp: 14 patch, Rfl: 0  •  nicotine (NICODERM CQ) 21 mg/24 hr TD 24 hr patch, APPLY 1 PATCH DAILY FOR 6 WEEKS (Patient not taking: Reported on 4/10/2023), Disp: 64 patch, Rfl: 0    Current Allergies     Allergies as of 06/01/2023   • (No Known Allergies)            The following portions of the patient's history were reviewed and updated as appropriate: allergies, current medications, past family history, past medical history, past social history, past surgical history and problem list      Past Medical History:   Diagnosis Date   • PERCY (acute kidney injury) ([facility] [address] )    • Bilateral inguinal hernia    • CAD (coronary artery disease)     s/p CABG x3 LIMA-LAD, SVG-RCA, SVG-D1 9/12/2022   • Cardiac arrest with ventricular fibrillation ([facility] )    • Dizziness 02/02/2021    isolated incident of dizziness, sweating & disorientation   • Elevated LFTs    • History of left heart catheterization     and IABP, ICD PLACEMENT, DUAL CHAMBER, MEDTRONIC   • Hyperglycemia    • Hypertension    • Pneumonia    • STEMI (ST elevation myocardial infarction) ([facility] )    • VT (ventricular tachycardia) ([facility] )        Past Surgical History:   Procedure Laterality Date   • CARDIAC CATHETERIZATION N/A 6/29/2022    Procedure: Cardiac pci;  Surgeon: Rica Keenan MD;  [facility] CARDIAC [facility]; Service: Cardiology   • CARDIAC CATHETERIZATION N/A 6/29/2022    Procedure: Cardiac iabp; Surgeon: Rica Keenan MD;  [facility]ARDI[facility]; Service: Cardiology   • CARDIAC CATHETERIZATION N/A 9/9/2022    Procedure: CARDIAC CATHETERIZATION;  Surgeon: Andrew Arredondo DO;  [facility] CARD[facility]; Service: Cardiology   • CARDIAC ELECTROPHYSIOLOGY PROCEDURE N/A 7/5/2022    Procedure: Cardiac icd implant;  Surgeon: Ilir Vernon MD;  [facility]: [facility] CARDIAC CATH [facility];   Service: Cardiology   • CORONARY ARTERY BYPASS GRAFT N/A 9/12/2022    Procedure: CORONARY ARTERY BYPASS GRAFT (CABG) x 3, SWIFT TO LAD, L LEG EVH/SVG TO RCA  AND DIAGONAL;  Surgeon: Meseret Wilson Lenny Gomes MD;  Location: [facility] MAIN OR;  Service: Cardiac Surgery   • HARVEST VEIN Left 9/12/2022    Procedure: HARVEST VEIN ENDOSCOPIC ([address]); Surgeon: Moses Hudson MD;  Location: [facility] MAIN OR;  Service: Cardiac Surgery   • [place] RPR 1ST INGUN HRNA AGE 5 YRS/> REDUCIBLE Bilateral 2/9/2021    Procedure: INGUINAL HERNIA REPAIR with mesh;  Surgeon: Kellen Jackson MD;  Location: [facility] MAIN OR;  Service: General   • TONSILLECTOMY         Family History   Problem Relation Age of Onset   • Diabetes Maternal Grandfather          Medications have been verified  Objective   /82   Pulse 72   Temp 98 °F (36 7 °C) (Temporal)   Resp 18   Ht 6' (1 829 m)   Wt 97 5 kg (215 lb)   SpO2 97%   BMI 29 16 kg/m²   No LMP for male patient  Physical Exam     Physical Exam  Constitutional:       General: He is not in acute distress  Appearance: He is well-developed  HENT:      Head: Normocephalic and atraumatic  Right Ear: Hearing, tympanic membrane, ear canal and external ear normal       Left Ear: Hearing, tympanic membrane, ear canal and external ear normal       Nose:      Right Sinus: No maxillary sinus tenderness or frontal sinus tenderness  Left Sinus: No maxillary sinus tenderness or frontal sinus tenderness  Mouth/Throat:      Mouth: Mucous membranes are moist       Pharynx: Oropharynx is clear  Uvula midline  No oropharyngeal exudate or posterior oropharyngeal erythema  Tonsils: 0 on the right  0 on the left  Cardiovascular:      Rate and Rhythm: Normal rate and regular rhythm  Heart sounds: Normal heart sounds, S1 normal and S2 normal    Pulmonary:      Effort: Pulmonary effort is normal       Breath sounds: Normal breath sounds and air entry  Lymphadenopathy:      Cervical: No cervical adenopathy  Skin:     General: Skin is warm and dry  Capillary Refill: Capillary refill takes less than 2 seconds     Neurological:      Mental Status: He is alert and oriented to person, place, and time

## 2023-06-01 NOTE — LETTER
[place] [address] [address]  Dept: [phone]    June 1, 2023    Patient: Raz Acosta  YOB: 1960    aRz Acosta was [facility] and evaluated at our [facility]  Please note if Covid and Flu tests are negative, they may return to work when fever free for 24 hours without the use of a fever reducing agent  If Covid or Flu test is positive, they may return to work on 6/2/2023, as this is 5 days from the onset of symptoms  Upon return, they must then adhere to strict masking for an additional 5 days      Sincerely,    Kylah Zaidi

## 2023-06-13 NOTE — Clinical Note
Generator pocket made  Xelphyliciaz Pregnancy And Lactation Text: This medication is Pregnancy Category D and is not considered safe during pregnancy.  The risk during breast feeding is also uncertain.

## 2023-07-01 ENCOUNTER — APPOINTMENT (EMERGENCY)
Dept: RADIOLOGY | Facility: HOSPITAL | Age: 63
End: 2023-07-01
Payer: COMMERCIAL

## 2023-07-01 ENCOUNTER — HOSPITAL ENCOUNTER (EMERGENCY)
Facility: HOSPITAL | Age: 63
Discharge: HOME | End: 2023-07-01
Attending: EMERGENCY MEDICINE
Payer: COMMERCIAL

## 2023-07-01 VITALS
TEMPERATURE: 97 F | HEIGHT: 73 IN | HEART RATE: 78 BPM | DIASTOLIC BLOOD PRESSURE: 89 MMHG | BODY MASS INDEX: 28.49 KG/M2 | OXYGEN SATURATION: 96 % | SYSTOLIC BLOOD PRESSURE: 152 MMHG | RESPIRATION RATE: 18 BRPM | WEIGHT: 215 LBS

## 2023-07-01 DIAGNOSIS — T82.9XXA AUTOMATIC IMPLANTABLE CARDIOVERTER-DEFIBRILLATOR PROBLEM: Primary | ICD-10-CM

## 2023-07-01 DIAGNOSIS — Z95.810 AUTOMATIC IMPLANTABLE CARDIOVERTER-DEFIBRILLATOR PROBLEM: Primary | ICD-10-CM

## 2023-07-01 LAB
ATRIAL RATE: 73 BPM
P AXIS: 80 DEGREES
PR INTERVAL: 180 MS
QRS AXIS: -50 DEGREES
QRSD INTERVAL: 98 MS
QT INTERVAL: 426 MS
QTC INTERVAL: 469 MS
T WAVE AXIS: 92 DEGREES
VENTRICULAR RATE: 73 BPM

## 2023-07-01 PROCEDURE — 99285 EMERGENCY DEPT VISIT HI MDM: CPT

## 2023-07-01 PROCEDURE — 71045 X-RAY EXAM CHEST 1 VIEW: CPT

## 2023-07-01 PROCEDURE — 93010 ELECTROCARDIOGRAM REPORT: CPT | Performed by: INTERNAL MEDICINE

## 2023-07-01 PROCEDURE — 93005 ELECTROCARDIOGRAM TRACING: CPT

## 2023-07-01 NOTE — ED PROVIDER NOTES
History  Chief Complaint   Patient presents with   • Pacemaker Problem     Pt reports that the pacemaker starting beeping today, no shock delivered. 24-year-old male presenting the ED for reports that his pacemaker has been beeping intermittently for about a week. He denies any symptoms and states he feels otherwise in his normal state of health and the only reason he is here is because the beeping is annoying. Prior to Admission Medications   Prescriptions Last Dose Informant Patient Reported? Taking?   amiodarone 200 mg tablet 7/1/2023  No Yes   Sig: Take 0.5 tablets (100 mg total) by mouth daily   aspirin 81 mg chewable tablet 7/1/2023  No Yes   Sig: chew and swallow 1 tablet by mouth once daily   atorvastatin (LIPITOR) 80 mg tablet 7/1/2023  No Yes   Sig: take 1 tablet by mouth once daily with dinner   celecoxib (CeleBREX) 200 mg capsule Not Taking  No No   Sig: Take 1 capsule (200 mg total) by mouth 2 (two) times a day   Patient not taking: Reported on 7/1/2023   gabapentin (Neurontin) 300 mg capsule Not Taking  No No   Sig: Take 1 capsule (300 mg total) by mouth 2 (two) times a day   Patient not taking: Reported on 7/1/2023   lisinopril (ZESTRIL) 2.5 mg tablet 6/30/2023  No Yes   Sig: TAKE 1 TABLET BY MOUTH AT BEDTIME   methocarbamol (ROBAXIN) 500 mg tablet   No No   Sig: Take 1 tablet (500 mg total) by mouth 2 (two) times a day   Patient not taking: Reported on 4/10/2023   methylPREDNISolone 4 MG tablet therapy pack   No No   Sig: Use as directed on package   Patient not taking: Reported on 4/10/2023   metoprolol succinate (TOPROL-XL) 50 mg 24 hr tablet   No No   Sig: Take 1 tablet (50 mg total) by mouth daily   nicotine (NICODERM CQ) 14 mg/24hr TD 24 hr patch  Self No No   Sig: Apply one patch daily (14 mg) for 14 days. Begin 14 mg patch after completing 21 mg patch to be worn first for 6 weeks.    Patient not taking: Reported on 4/10/2023   nicotine (NICODERM CQ) 21 mg/24 hr TD 24 hr patch Self No No   Sig: APPLY 1 PATCH DAILY FOR 6 WEEKS   Patient not taking: Reported on 4/10/2023      Facility-Administered Medications: None       Past Medical History:   Diagnosis Date   • PERCY (acute kidney injury) ([address])    • Bilateral inguinal hernia    • CAD (coronary artery disease)     s/p CABG x3 LIMA-LAD, SVG-RCA, SVG-D1 9/12/2022   • Cardiac arrest with ventricular fibrillation (HCC)    • Dizziness 02/02/2021    isolated incident of dizziness, sweating & disorientation   • Elevated LFTs    • History of left heart catheterization     and IABP, ICD PLACEMENT, DUAL CHAMBER, [employer]   • Hyperglycemia    • Hypertension    • Pneumonia    • STEMI (ST elevation myocardial infarction) ([address])    • VT (ventricular tachycardia) ([address])        Past Surgical History:   Procedure Laterality Date   • CARDIAC CATHETERIZATION N/A 6/29/2022    Procedure: Cardiac pci;  Surgeon: You Deal MD;  [facility]; Service: Cardiology   • CARDIAC CATHETERIZATION N/A 6/29/2022    Procedure: Cardiac iabp; Surgeon: You Deal MD;  [facility]; Service: Cardiology   • CARDIAC CATHETERIZATION N/A 9/9/2022    Procedure: CARDIAC CATHETERIZATION;  Surgeon: Emigdio Loyd DO;  [facility]; Service: Cardiology   • CARDIAC ELECTROPHYSIOLOGY PROCEDURE N/A 7/5/2022    Procedure: Cardiac icd implant;  Surgeon: Charlette Joseph MD;  [facility]; Service: Cardiology   • CORONARY ARTERY BYPASS GRAFT N/A 9/12/2022    Procedure: CORONARY ARTERY BYPASS GRAFT (CABG) x 3, SWIFT TO LAD, L LEG EVH/SVG TO RCA  AND DIAGONAL;  Surgeon: Manjinder Haskins MD;  Location: [facility];  Service: Cardiac Surgery   • HARVEST VEIN Left 9/12/2022    Procedure: HARVEST VEIN ENDOSCOPIC (EVH);   Surgeon: Manjinder Haskins MD;  Location: [facility];  Service: Cardiac Surgery   • MO RPR 1ST INGUN HRNA AGE 5 YRS/> REDUCIBLE Bilateral 2/9/2021    Procedure: INGUINAL HERNIA REPAIR with mesh;  Surgeon: Destiney Carlisle MD;  Location: [facility] MAIN OR;  Service: General   • TONSILLECTOMY         Family History   Problem Relation Age of Onset   • Diabetes Maternal Grandfather      I have reviewed and agree with the history as documented. E-Cigarette/Vaping   • E-Cigarette Use Never User      E-Cigarette/Vaping Substances   • Nicotine No    • THC No    • CBD No    • Flavoring No    • Other No    • Unknown No      Social History     Tobacco Use   • Smoking status: Every Day     Packs/day: 0.25     Types: Cigarettes   • Smokeless tobacco: Never   Vaping Use   • Vaping Use: Never used   Substance Use Topics   • Alcohol use: Not Currently   • Drug use: Not Currently       Review of Systems   All other systems reviewed and are negative. Physical Exam  Physical Exam  General: VS reviewed  Appears in NAD  awake, alert. Well-nourished, well-developed. Appears stated age. Speaking normally in full sentences. Head: Normocephalic, atraumatic  Eyes: EOM-I. No diplopia. No hyphema. No subconjunctival hemorrhages. Symmetrical lids. ENT: Atraumatic external nose and ears. MMM  No malocclusion. No stridor. Normal phonation. No drooling. Normal swallowing. Neck: No JVD. CV: No pallor noted  Lungs:   No tachypnea  No respiratory distress  MSK:   FROM spontaneously  Skin: Dry, intact. Neuro: Awake, alert, GCS15, CN II-XII grossly intact. Motor grossly intact.   Psychiatric/Behavioral: Appropriate mood and affect   Exam: deferred      Vital Signs  ED Triage Vitals [07/01/23 1111]   Temperature Pulse Respirations Blood Pressure SpO2   (!) 97 °F (36.1 °C) 78 18 152/89 96 %      Temp Source Heart Rate Source Patient Position - Orthostatic VS BP Location FiO2 (%)   Tympanic -- Lying Left arm --      Pain Score       --           Vitals:    07/01/23 1111   BP: 152/89   Pulse: 78   Patient Position - Orthostatic VS: Lying         Visual Acuity      ED Medications  Medications - No data to display    Diagnostic Studies  Results Reviewed     None                 XR chest 1 view portable   Final Result by Lorena Sandoval MD (07/01 1454)      Left AICD with suggestion of right ventricular lead displacement      The study was marked in EPIC for significant notification. Workstation performed: [id number]                    Procedures  Procedures         ED Course                               SBIRT 22yo+    Flowsheet Row Most Recent Value   Initial Alcohol Screen: US AUDIT-C     1. How often do you have a drink containing alcohol? 0 Filed at: 07/01/2023 1114   2. How many drinks containing alcohol do you have on a typical day you are drinking? 0 Filed at: 07/01/2023 1114   3a. Male UNDER 65: How often do you have five or more drinks on one occasion? 0 Filed at: 07/01/2023 1114   Audit-C Score 0 Filed at: 07/01/2023 1114   BAILEE: How many times in the past year have you. .. Used an illegal drug or used a prescription medication for non-medical reasons? Never Filed at: 07/01/2023 1114                    Medical Decision Making  In discussion with [employer] the beeping is likely secondary to AICD not connecting with home device well. Also some increased fluid. Patient is asymptomatic and recommend follow-up with cardiology as soon as possible. Chest x-ray showing possible lead displacement. Discussed with [employer] representative Bandar at phone number [phone] to review possible lead displacement. He states that on their end the device is functioning well and patient does not need anything done at this time. Amount and/or Complexity of Data Reviewed  Radiology: ordered. Disposition  Final diagnoses:   Automatic implantable cardioverter-defibrillator problem     Time reflects when diagnosis was documented in both MDM as applicable and the Disposition within this note     Time User Action Codes Description Comment    7/1/2023 12:20 PM Kait Palumbo Add [T82. 9XXA, Z95.810] Automatic implantable cardioverter-defibrillator problem       ED Disposition     ED Disposition   Discharge    Condition   Stable    Date/Time   Sat Jul 1, 2023 12:20 PM    Comment   Nicolas Song discharge to home/self care. Follow-up Information     Follow up With Specialties Details Why Contact Info Additional [address] [facility], [facility]   [address] Emergency Department Emergency Medicine Go to  As needed, If symptoms worsen [address] [place] [address] [phone] [address] [facility] Emergency Department, [address] [facility], [address]          Discharge Medication List as of 7/1/2023 12:21 PM      CONTINUE these medications which have NOT CHANGED    Details   amiodarone 200 mg tablet Take 0.5 tablets (100 mg total) by mouth daily, Starting Mon 2/13/2023, No Print      aspirin 81 mg chewable tablet chew and swallow 1 tablet by mouth once daily, Normal      atorvastatin (LIPITOR) 80 mg tablet take 1 tablet by mouth once daily with dinner, Normal      lisinopril (ZESTRIL) 2.5 mg tablet TAKE 1 TABLET BY MOUTH AT BEDTIME, Normal      celecoxib (CeleBREX) 200 mg capsule Take 1 capsule (200 mg total) by mouth 2 (two) times a day, Starting Fri 3/31/2023, Normal      gabapentin (Neurontin) 300 mg capsule Take 1 capsule (300 mg total) by mouth 2 (two) times a day, Starting Fri 3/31/2023, Normal      methocarbamol (ROBAXIN) 500 mg tablet Take 1 tablet (500 mg total) by mouth 2 (two) times a day, Starting Wed 3/22/2023, Normal      methylPREDNISolone 4 MG tablet therapy pack Use as directed on package, Normal      metoprolol succinate (TOPROL-XL) 50 mg 24 hr tablet Take 1 tablet (50 mg total) by mouth daily, Starting Mon 11/21/2022, Normal      nicotine (NICODERM CQ) 14 mg/24hr TD 24 hr patch Apply one patch daily (14 mg) for 14 days. Begin 14 mg patch after completing 21 mg patch to be worn first for 6 weeks. , Normal      nicotine (NICODERM CQ) 21 mg/24 hr TD 24 hr patch APPLY 1 PATCH DAILY FOR 6 WEEKS, Normal             No discharge procedures on file.     PDMP Review       Value Time User    PDMP Reviewed  Yes 9/16/2022 11:10 AM Janell Rosas PA-C          ED Provider  Electronically Signed by           Daniel Castillo DO  07/01/23 3706

## 2023-07-01 NOTE — DISCHARGE INSTRUCTIONS
Please call your cardiologist for further evaluation of your defibrillator and the possibility that you may be accumulating fluid.

## 2023-07-03 ENCOUNTER — DOCUMENTATION (OUTPATIENT)
Dept: CARDIOLOGY CLINIC | Facility: CLINIC | Age: 63
End: 2023-07-03

## 2023-07-03 ENCOUNTER — TELEPHONE (OUTPATIENT)
Dept: FAMILY MEDICINE CLINIC | Facility: CLINIC | Age: 63
End: 2023-07-03

## 2023-07-03 NOTE — PROGRESS NOTES
[facility] remote check ICD. Normal function. No evidence of lead dislodgement. Current Outpatient Medications:   •  amiodarone 200 mg tablet, Take 0.5 tablets (100 mg total) by mouth daily, Disp: , Rfl:   •  aspirin 81 mg chewable tablet, chew and swallow 1 tablet by mouth once daily, Disp: 30 tablet, Rfl: 5  •  atorvastatin (LIPITOR) 80 mg tablet, take 1 tablet by mouth once daily with dinner, Disp: 30 tablet, Rfl: 5  •  celecoxib (CeleBREX) 200 mg capsule, Take 1 capsule (200 mg total) by mouth 2 (two) times a day (Patient not taking: Reported on 7/1/2023), Disp: 30 capsule, Rfl: 0  •  gabapentin (Neurontin) 300 mg capsule, Take 1 capsule (300 mg total) by mouth 2 (two) times a day (Patient not taking: Reported on 7/1/2023), Disp: 12 capsule, Rfl: 0  •  lisinopril (ZESTRIL) 2.5 mg tablet, TAKE 1 TABLET BY MOUTH AT BEDTIME, Disp: 30 tablet, Rfl: 5  •  methocarbamol (ROBAXIN) 500 mg tablet, Take 1 tablet (500 mg total) by mouth 2 (two) times a day (Patient not taking: Reported on 4/10/2023), Disp: 20 tablet, Rfl: 0  •  methylPREDNISolone 4 MG tablet therapy pack, Use as directed on package (Patient not taking: Reported on 4/10/2023), Disp: 21 tablet, Rfl: 0  •  metoprolol succinate (TOPROL-XL) 50 mg 24 hr tablet, Take 1 tablet (50 mg total) by mouth daily, Disp: 90 tablet, Rfl: 2  •  nicotine (NICODERM CQ) 14 mg/24hr TD 24 hr patch, Apply one patch daily (14 mg) for 14 days. Begin 14 mg patch after completing 21 mg patch to be worn first for 6 weeks.  (Patient not taking: Reported on 4/10/2023), Disp: 14 patch, Rfl: 0  •  nicotine (NICODERM CQ) 21 mg/24 hr TD 24 hr patch, APPLY 1 PATCH DAILY FOR 6 WEEKS (Patient not taking: Reported on 4/10/2023), Disp: 56 patch, Rfl: 0

## 2023-07-03 NOTE — TELEPHONE ENCOUNTER
Call placed to patient to schedule a f/u appointment from the ED on July 1st. Patient declined an appt at this time.

## 2023-07-26 ENCOUNTER — REMOTE DEVICE CLINIC VISIT (OUTPATIENT)
Dept: CARDIOLOGY CLINIC | Facility: CLINIC | Age: 63
End: 2023-07-26

## 2023-07-26 DIAGNOSIS — Z95.810 AICD (AUTOMATIC CARDIOVERTER/DEFIBRILLATOR) PRESENT: Primary | ICD-10-CM

## 2023-07-26 PROCEDURE — 93295 DEV INTERROG REMOTE 1/2/MLT: CPT | Performed by: INTERNAL MEDICINE

## 2023-07-26 PROCEDURE — 93296 REM INTERROG EVL PM/IDS: CPT | Performed by: INTERNAL MEDICINE

## 2023-07-26 NOTE — PROGRESS NOTES
Results for orders placed or performed in visit on 07/26/23   Cardiac EP device report    Narrative    MDT DUAL ICD/ ACTIVE SYSTEM IS MRI CONDITIONAL  CARELINK TRANSMISSION: BATTERY VOLTAGE ADEQUATE (11.9 YRS). AP 5.6%  0.1% (AAI-DDD 60PPM); ALL AVAILABLE LEAD PARAMETERS WITHIN NORMAL LIMITS. NO SIGNIFICANT HIGH RATE EPISODES. OPTI-VOL WITHIN NORMAL LIMITS. NORMAL DEVICE FUNCTION.   ES

## 2023-07-31 DIAGNOSIS — I25.5 ISCHEMIC CARDIOMYOPATHY: ICD-10-CM

## 2023-07-31 DIAGNOSIS — Z95.1 S/P CABG X 3: ICD-10-CM

## 2023-07-31 RX ORDER — METOPROLOL SUCCINATE 50 MG/1
50 TABLET, EXTENDED RELEASE ORAL DAILY
Qty: 90 TABLET | Refills: 2 | Status: SHIPPED | OUTPATIENT
Start: 2023-07-31

## 2023-07-31 RX ORDER — LISINOPRIL 2.5 MG/1
TABLET ORAL
Qty: 30 TABLET | Refills: 5 | Status: SHIPPED | OUTPATIENT
Start: 2023-07-31

## 2023-08-02 ENCOUNTER — OFFICE VISIT (OUTPATIENT)
Dept: CARDIOLOGY CLINIC | Facility: CLINIC | Age: 63
End: 2023-08-02
Payer: COMMERCIAL

## 2023-08-02 VITALS
HEIGHT: 73 IN | BODY MASS INDEX: 28.1 KG/M2 | DIASTOLIC BLOOD PRESSURE: 78 MMHG | SYSTOLIC BLOOD PRESSURE: 130 MMHG | HEART RATE: 76 BPM | WEIGHT: 212 LBS

## 2023-08-02 DIAGNOSIS — I25.5 ISCHEMIC CARDIOMYOPATHY: ICD-10-CM

## 2023-08-02 DIAGNOSIS — Z95.810 AICD (AUTOMATIC CARDIOVERTER/DEFIBRILLATOR) PRESENT: ICD-10-CM

## 2023-08-02 DIAGNOSIS — Z95.1 S/P CABG X 3: Primary | ICD-10-CM

## 2023-08-02 DIAGNOSIS — I10 PRIMARY HYPERTENSION: ICD-10-CM

## 2023-08-02 DIAGNOSIS — Z79.899 ENCOUNTER FOR MONITORING AMIODARONE THERAPY: ICD-10-CM

## 2023-08-02 DIAGNOSIS — Z51.81 ENCOUNTER FOR MONITORING AMIODARONE THERAPY: ICD-10-CM

## 2023-08-02 DIAGNOSIS — E78.5 DYSLIPIDEMIA: ICD-10-CM

## 2023-08-02 PROCEDURE — 99214 OFFICE O/P EST MOD 30 MIN: CPT | Performed by: INTERNAL MEDICINE

## 2023-08-02 NOTE — PROGRESS NOTES
[facility], [facility] [place] [address]  Phone#  [phone]  Fax#  [phone]                                               Cardiology Office Follow up  Igor Lopez, 61 y.o. male  YOB: 1960  MRN: [id number] Encounter: [id number]      PCP - Vivien Lea DO  Referring Provider - No ref. provider found    Chief Complaint   Patient presents with   • Coronary Artery Disease       Assessment  Multivessel coronary artery disease  [facility] - 6/29/22 - mLAD 50%, dLAD 70%, D1 60%, D2 60%, OM1 50%, OM3 99%, mRCA 60%, RPL3 60%  Ischemic cardiomyopathy, EF 44%  Echo - 6/29/22 - LVEF 44%, GLS -13%, Grade 1 DD, akinesis of apical segments  Echo - 9/8/22 - LVEF 40%, dyskinetic mid-apical anteroseptal/apical walls  Vfib cardiac arrest, status post ICD placement  In setting of STEMI, but 100% lesion was found  Hyperlipidemia  Current smoker  Overweight, Body mass index is 27.97 kg/m².      Plan  CAD s/p CABG x3  No recent ischemic symptoms, continues to do well since CABG  Continue current therapy with aspirin, metoprolol XL 50 mg daily, Atorvastatin 80    Ischemic cardiomyopathy, EF 40%  Remains euvolemic and asymptomatic  Medical therapy  Entresto LVEF little expensive currently remains on lisinopril  Continue metoprolol succinate 50 mg daily, lisinopril 2.5 mg daily  Consider uptitration of metoprolol to 50 mg twice daily of continues to have runs of NSVT  Device therapy  ICD in place for secondary prevention -->   Has some NSVTs, but no further VT    NSVT, S/p ICD, for VFib cardiac arrest  Overview  Initially VFib in the setting of inferior STEMI - severe distal lad/om 3 disease, which could not be revascularized --> S/p ICD placement  9/3/22: had another VF episode treated with shock --> went to the hospital had repeat [facility] - multi-vessel CAD --> underwent CABG  7/2023 - had frequent NSVT episodes, asymptomatic --> amiodarone decrease to 100 mg daily  8/2023: He has some NSVT runs in July on device check, but asymptomatic  Impression  Continued on 100 mg daily for now due to multiple NSVT runs  Monitor on device checks, if improving or resolved then can plan to discontinue   continue metoprolol succinate 50 mg daily  Check CMP, TSH    Hyperlipidemia    Tolerating atorvastatin  No recent lipid panel  Check lipid panel      No results found for this visit on 08/02/23. Orders Placed This Encounter   Procedures   • Comprehensive metabolic panel   • TSH, 3rd generation with Free T4 reflex   • Lipid Panel with Direct LDL reflex     Return in about 6 months (around 2/2/2024), or if symptoms worsen or fail to improve. History of Present Illness   61 y.o. male , previously worked as a truck  and now works with thermoplastic, comes in for establishment of care after recent hospitalization involving a cardiac arrest. He previously worked in the [employer]. On 06/29/2022, patient was at work at [employer], and he was feeling unwell. He himself does not have full recollection of the events that led up to his hospitalization, but per available chart/EMS documentation he was apparently having chest pain for 3 days, which was recurring on the day of presentation. He apparently went upstairs in to another office and EMS was called. On arrival of EMS, he reported numbness in his left arm and was otherwise feeling well. Initial ECG did not reveal a STEMI and he did not have chest pain at that time. He was subsequently taken to [facility] ED, but on arrival to the ED, he went in to [facility] cardiac arrest and received several shocks with the assistance of the ED physician. He received CPR/ACLS and was initiated on vasopressor support with return of ROSC. Post this, he was found to have junctional rhythm with inferior STEMI and an MI alert was initiated.  He was found to have multivessel coronary artery disease, but no 100% stenosis could be found. He did have severe lesions in distal LAD and OM3, which were felt to be small blood vessels - not amenable to PCI. A balloon pump was placed and he was transferred to [facility] ICU for further care. He was subsequently evaluated by the heart failure service and vasopressors/IABP were weaned off and he did well on medical therapy. He underwent a secondary prevention ICD placement and was eventually discharged with medical therapy. Since discharge from the hospital he has not had any further recurrences of chest pain, although he does report continued mild symptoms of heartburn at the end of the day, for which he takes Tums with resolution of symptoms. He followed up with his PCP and is now here to see me for establishment of care. I am meeting him today for the 1st time. He remains compliant with medical therapy, and has been working on cutting down on his smoking. No further complains of palpitations, dizziness/light-headedness, near syncope or syncope. Interval history - 11/21/2022  He comes back for follow-up after about 3 months. In the interim, he followed up initially with Zoila Galdamez friend and was reporting increased complaints of chest pain on 8/25/22. He was started on amlodipine for anti-anginal effect. But subsequently he had worsening symptoms and eventually had another ICD shock, and presented back to hospital. He was found to have Vfib on device check and again underwent cardiac catheterization. This revealed multivessel coronary artery disease and he was recommended CABG evaluation. He underwent a successful CABGx3, and has recovered very well over the last few months. No current complains of chest pain, shortness of breath, palpitations or dizziness. Interval history - 2/13/2023  He comes back for follow-up after about 3 months. He has been doing very well and has not had any further issues with chest pain or shortness of breath.   He reports feeling the best he has in the last 10 years and is very active at work. He remains compliant with medical therapy. Interval history - 8/2/2023  He comes back for follow-up after about 6 months. In the interim he has been doing very well and has not had any issues with chest pain or shortness of breath. 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 He did have some issues with the remote monitor for his ICD, but this was being sorted out since getting a new bedside monitor at home. His recent device check did show multiple runs of NSVT in July, but it has been better since then. No further VT/VF recently        Historical Information   Past Medical History:   Diagnosis Date   • PERCY (acute kidney injury) ([address])    • Bilateral inguinal hernia    • CAD (coronary artery disease)     s/p CABG x3 LIMA-LAD, SVG-RCA, SVG-D1 9/12/2022   • Cardiac arrest with ventricular fibrillation (HCC)    • Dizziness 02/02/2021    isolated incident of dizziness, sweating & disorientation   • Elevated LFTs    • History of left heart catheterization     and IABP, ICD PLACEMENT, DUAL CHAMBER, [employer]   • Hyperglycemia    • Hypertension    • Pneumonia    • STEMI (ST elevation myocardial infarction) ([address])    • VT (ventricular tachycardia) ([address])      Past Surgical History:   Procedure Laterality Date   • CARDIAC CATHETERIZATION N/A 6/29/2022    Procedure: Cardiac pci;  Surgeon: Belen Orozco MD;  [facility] C[facility]; Service: Cardiology   • CARDIAC CATHETERIZATION N/A 6/29/2022    Procedure: Cardiac iabp; Surgeon: Belen Orozco MD;  [facility]; Service: Cardiology   • CARDIAC CATHETERIZATION N/A 9/9/2022    Procedure: CARDIAC CATHETERIZATION;  Surgeon: Laz Arevalo DO;  Location: [facility] CARD[facility]AC [facility];   Service: Cardiology   • CARDIAC ELECTROPHYSIOLOGY PROCEDURE N/A 7/5/2022 Procedure: Cardiac icd implant;  Surgeon: Arvind Colvin MD;  [facility]: [facility] CARD[facility]AC [facility]; Service: Cardiology   • CORONARY ARTERY BYPASS GRAFT N/A 9/12/2022    Procedure: CORONARY ARTERY BYPASS GRAFT (CABG) x 3, SWIFT TO LAD, L LEG EVH/SVG TO RCA  AND DIAGONAL;  Surgeon: Gretchen Ott MD;  Location: [facility] MAIN OR;  Service: Cardiac Surgery   • HARVEST VEIN Left 9/12/2022    Procedure: HARVEST VEIN ENDOSCOPIC (EVH);   Surgeon: Gretchen Ott MD;  Location: [facility] MAIN OR;  Service: Cardiac Surgery   • SD RPR 1ST INGUN HRNA AGE 5 YRS/> REDUCIBLE Bilateral 2/9/2021    Procedure: INGUINAL HERNIA REPAIR with mesh;  Surgeon: Jamil Wong MD;  Location: [address] [place] [address] MAIN OR;  Service: General   • TONSILLECTOMY       Family History   Problem Relation Age of Onset   • Diabetes Maternal Grandfather      Current Outpatient Medications on File Prior to Visit   Medication Sig Dispense Refill   • amiodarone 200 mg tablet take 1 tablet by mouth once daily (Patient taking differently: Take 100 mg by mouth daily) 30 tablet 5   • aspirin 81 mg chewable tablet chew and swallow 1 tablet by mouth once daily 30 tablet 5   • atorvastatin (LIPITOR) 80 mg tablet take 1 tablet by mouth once daily with dinner 30 tablet 5   • lisinopril (ZESTRIL) 2.5 mg tablet TAKE 1 TABLET BY MOUTH AT BEDTIME 30 tablet 5   • metoprolol succinate (TOPROL-XL) 50 mg 24 hr tablet take 1 tablet by mouth once daily 90 tablet 2   • [DISCONTINUED] celecoxib (CeleBREX) 200 mg capsule Take 1 capsule (200 mg total) by mouth 2 (two) times a day (Patient not taking: Reported on 7/1/2023) 30 capsule 0   • [DISCONTINUED] gabapentin (Neurontin) 300 mg capsule Take 1 capsule (300 mg total) by mouth 2 (two) times a day (Patient not taking: Reported on 7/1/2023) 12 capsule 0   • [DISCONTINUED] methocarbamol (ROBAXIN) 500 mg tablet Take 1 tablet (500 mg total) by mouth 2 (two) times a day (Patient not taking: Reported on 4/10/2023) 20 tablet 0   • [DISCONTINUED] methylPREDNISolone 4 MG tablet therapy pack Use as directed on package (Patient not taking: Reported on 4/10/2023) 21 tablet 0   • [DISCONTINUED] nicotine (NICODERM CQ) 14 mg/24hr TD 24 hr patch Apply one patch daily (14 mg) for 14 days. Begin 14 mg patch after completing 21 mg patch to be worn first for 6 weeks. (Patient not taking: Reported on 4/10/2023) 14 patch 0   • [DISCONTINUED] nicotine (NICODERM CQ) 21 mg/24 hr TD 24 hr patch APPLY 1 PATCH DAILY FOR 6 WEEKS (Patient not taking: Reported on 4/10/2023) 56 patch 0   • [DISCONTINUED] sacubitril-valsartan (Entresto) 24-26 MG TABS Take 1 tablet by mouth 2 (two) times a day 60 tablet 2     No current facility-administered medications on file prior to visit. No Known Allergies  Social History     Socioeconomic History   • Marital status: /Civil Union     Spouse name: None   • Number of children: None   • Years of education: None   • Highest education level: None   Occupational History   • None   Tobacco Use   • Smoking status: Every Day     Packs/day: 0.25     Types: Cigarettes   • Smokeless tobacco: Never   Vaping Use   • Vaping Use: Never used   Substance and Sexual Activity   • Alcohol use: Not Currently   • Drug use: Not Currently   • Sexual activity: Not Currently     Partners: Female   Other Topics Concern   • None   Social History Narrative   • None     Social Determinants of Health     Financial Resource Strain: Not on file   Food Insecurity: No Food Insecurity (9/11/2022)    Hunger Vital Sign    • Worried About Running Out of Food in the Last Year: Never true    • Ran Out of Food in the Last Year: Never true   Transportation Needs: No Transportation Needs (9/11/2022)    PRAPARE - Transportation    • Lack of Transportation (Medical): No    • Lack of Transportation (Non-Medical):  No   Physical Activity: Not on file   Stress: Not on file   Social Connections: Not on file   Intimate Partner Violence: Not on file   Housing Stability: Low Risk (9/11/2022)    Housing Stability Vital Sign    • Unable to Pay for Housing in the Last Year: No    • Number of Places Lived in the Last Year: 1    • Unstable Housing in the Last Year: No        Review of Systems   All other systems reviewed and are negative. Vitals:  Vitals:    08/02/23 1449   BP: 130/78   Pulse: 76   Weight: 96.2 kg (212 lb)   Height: 6' 1" (1.854 m)     BMI - Body mass index is 27.97 kg/m². Wt Readings from Last 7 Encounters:   08/02/23 96.2 kg (212 lb)   07/01/23 97.5 kg (215 lb)   06/01/23 97.5 kg (215 lb)   04/10/23 97.5 kg (215 lb)   04/05/23 95.3 kg (210 lb)   03/31/23 95.3 kg (210 lb)   03/26/23 95.3 kg (210 lb)       Physical Exam  Vitals and nursing note reviewed. Constitutional:       General: He is not in acute distress. Appearance: Normal appearance. He is well-developed. He is not ill-appearing or diaphoretic. HENT:      Head: Normocephalic and atraumatic. Nose: No congestion. Eyes:      General: No scleral icterus. Conjunctiva/sclera: Conjunctivae normal.   Neck:      Vascular: No carotid bruit or JVD. Cardiovascular:      Rate and Rhythm: Normal rate and regular rhythm. Heart sounds: Normal heart sounds. No murmur heard. No friction rub. No gallop. Comments: Well-healed central sternal surgical scar noted. Left upper chest wall cardiac device noted in situ  Pulmonary:      Effort: Pulmonary effort is normal. No respiratory distress. Breath sounds: Normal breath sounds. No wheezing or rales. Chest:      Chest wall: No tenderness. Abdominal:      General: There is no distension. Palpations: Abdomen is soft. Tenderness: There is no abdominal tenderness. Musculoskeletal:         General: No swelling, tenderness or deformity. Cervical back: Neck supple. No muscular tenderness. Right lower leg: No edema. Left lower leg: No edema. Skin:     General: Skin is warm.    Neurological:      General: No focal deficit present. Mental Status: He is alert and oriented to person, place, and time. Mental status is at baseline. Psychiatric:         Mood and Affect: Mood normal.         Behavior: Behavior normal.         Thought Content: Thought content normal.           Labs:  CBC:   Lab Results   Component Value Date    WBC 13.94 (H) 09/27/2022    RBC 3.92 09/27/2022    HGB 11.7 (L) 09/27/2022    HCT 39.6 09/27/2022     (H) 09/27/2022     (H) 09/27/2022    RDW 13.9 09/27/2022       CMP:   Lab Results   Component Value Date    K 4.7 09/27/2022     (H) 09/27/2022    CO2 18 (L) 09/27/2022    BUN 21 09/27/2022    CREATININE 0.83 09/27/2022    EGFR 94 09/27/2022    GLUCOSE 147 (H) 09/12/2022    CALCIUM 9.5 09/27/2022    AST 59 (H) 07/04/2022    ALT 70 07/04/2022    ALKPHOS 90 07/04/2022       Magnesium:  Lab Results   Component Value Date    MG 2.0 09/14/2022       Lipid Profile:   Lab Results   Component Value Date    HDL 35 (L) 09/08/2022    TRIG 173 (H) 09/08/2022    LDLCALC 62 09/08/2022       Thyroid Studies: No results found for: "[id number]", "T3FREE", "Marifer Winnie", "Lexine Miesha", "[id number]"    A1c:  No components found for: "HGA1C"    INR:  Lab Results   Component Value Date    INR 0.95 09/06/2022    INR 0.91 09/06/2022    INR 0.95 07/05/2022   5    Imaging: Cardiac EP device report    Result Date: 7/20/2022  Narrative: MDT DUAL ICD/ ACTIVE SYSTEM IS MRI CONDITIONAL DEVICE INTERROGATED IN THE [place] OFFICE. BATTERY VOLTAGE ADEQUATE (13 YRS). AP-5%, -0.5%. ALL LEAD PARAMETERS WITHIN NORMAL LIMITS. 58  BRIEF NSVT & 15 HIGH RATE-NS EPISODES >200 BPM, MOST RECENT FOR 5 BEATS, AVG CL~210MS. PT ASYMPTOMATIC W/ EPISODES. PT WAS TAKING BOTH METOPROLOL TARTRATE & SUCCINATE IN ERROR. PT INFORMED NOT TO TAKE TARTRATE, ONLY SUCCINATE. NEW RX GIVEN. OPTIVOL INITIALIZING. NORMAL DEVICE FUNCTION. WOUND CHECK: INCISION CLEAN AND DRY WITH EDGES APPROXIMATED; WOUND CARE AND RESTRICTIONS REVIEWED WITH PATIENT.  GV Cardiac testing:   No results found for this or any previous visit. No results found for this or any previous visit. No results found for this or any previous visit. No results found for this or any previous visit. Cardiac EP device report  MDT DUAL ICD/ ACTIVE SYSTEM IS MRI CONDITIONAL  CARELINK TRANSMISSION: BATTERY VOLTAGE ADEQUATE (11.9 YRS). AP 5.6%  0.1% (AAI-DDD 60PPM); ALL AVAILABLE LEAD PARAMETERS WITHIN NORMAL LIMITS. NO SIGNIFICANT HIGH RATE EPISODES. OPTI-VOL WITHIN NORMAL LIMITS. NORMAL DEVICE FUNCTION.   ES

## 2023-08-08 ENCOUNTER — OCCMED (OUTPATIENT)
Dept: URGENT CARE | Facility: CLINIC | Age: 63
End: 2023-08-08
Payer: OTHER MISCELLANEOUS

## 2023-08-08 ENCOUNTER — APPOINTMENT (OUTPATIENT)
Dept: RADIOLOGY | Facility: CLINIC | Age: 63
End: 2023-08-08
Payer: OTHER MISCELLANEOUS

## 2023-08-08 DIAGNOSIS — S97.82XA CRUSHING INJURY OF LEFT FOOT, INITIAL ENCOUNTER: ICD-10-CM

## 2023-08-08 DIAGNOSIS — S90.32XA CONTUSION OF LEFT FOOT, INITIAL ENCOUNTER: Primary | ICD-10-CM

## 2023-08-08 PROCEDURE — 73630 X-RAY EXAM OF FOOT: CPT

## 2023-08-08 PROCEDURE — 99283 EMERGENCY DEPT VISIT LOW MDM: CPT | Performed by: NURSE PRACTITIONER

## 2023-08-08 PROCEDURE — G0382 LEV 3 HOSP TYPE B ED VISIT: HCPCS | Performed by: NURSE PRACTITIONER

## 2023-08-11 ENCOUNTER — APPOINTMENT (OUTPATIENT)
Dept: URGENT CARE | Facility: CLINIC | Age: 63
End: 2023-08-11
Payer: OTHER MISCELLANEOUS

## 2023-08-11 PROCEDURE — 99213 OFFICE O/P EST LOW 20 MIN: CPT

## 2023-08-12 DIAGNOSIS — I25.10 CORONARY ARTERY DISEASE INVOLVING NATIVE CORONARY ARTERY OF NATIVE HEART WITHOUT ANGINA PECTORIS: ICD-10-CM

## 2023-08-14 RX ORDER — ASPIRIN 81 MG/1
TABLET, CHEWABLE ORAL
Qty: 30 TABLET | Refills: 5 | Status: SHIPPED | OUTPATIENT
Start: 2023-08-14

## 2023-10-09 DIAGNOSIS — Z95.1 S/P CABG (CORONARY ARTERY BYPASS GRAFT): ICD-10-CM

## 2023-10-09 RX ORDER — AMIODARONE HYDROCHLORIDE 100 MG/1
100 TABLET ORAL DAILY
Qty: 90 TABLET | Refills: 3
Start: 2023-10-09

## 2023-11-02 ENCOUNTER — HOSPITAL ENCOUNTER (EMERGENCY)
Facility: HOSPITAL | Age: 63
Discharge: HOME | End: 2023-11-02
Attending: EMERGENCY MEDICINE
Payer: COMMERCIAL

## 2023-11-02 ENCOUNTER — TELEPHONE (OUTPATIENT)
Dept: FAMILY MEDICINE CLINIC | Facility: CLINIC | Age: 63
End: 2023-11-02

## 2023-11-02 VITALS
SYSTOLIC BLOOD PRESSURE: 123 MMHG | OXYGEN SATURATION: 98 % | TEMPERATURE: 97 F | DIASTOLIC BLOOD PRESSURE: 70 MMHG | RESPIRATION RATE: 24 BRPM | HEART RATE: 66 BPM

## 2023-11-02 DIAGNOSIS — R42 DIZZINESS: ICD-10-CM

## 2023-11-02 DIAGNOSIS — R42 VERTIGO: Primary | ICD-10-CM

## 2023-11-02 LAB
ALBUMIN SERPL BCP-MCNC: 4.8 G/DL (ref 3.5–5)
ALP SERPL-CCNC: 110 U/L (ref 34–104)
ALT SERPL W P-5'-P-CCNC: 14 U/L (ref 7–52)
ANION GAP SERPL CALCULATED.3IONS-SCNC: 8 MMOL/L
APTT PPP: 28 SECONDS (ref 23–37)
AST SERPL W P-5'-P-CCNC: 16 U/L (ref 13–39)
BASOPHILS # BLD AUTO: 0.04 THOUSANDS/ÂΜL (ref 0–0.1)
BASOPHILS NFR BLD AUTO: 0 % (ref 0–1)
BILIRUB SERPL-MCNC: 0.71 MG/DL (ref 0.2–1)
BUN SERPL-MCNC: 22 MG/DL (ref 5–25)
CALCIUM SERPL-MCNC: 10.1 MG/DL (ref 8.4–10.2)
CHLORIDE SERPL-SCNC: 104 MMOL/L (ref 96–108)
CO2 SERPL-SCNC: 25 MMOL/L (ref 21–32)
CREAT SERPL-MCNC: 1.01 MG/DL (ref 0.6–1.3)
EOSINOPHIL # BLD AUTO: 0.19 THOUSAND/ÂΜL (ref 0–0.61)
EOSINOPHIL NFR BLD AUTO: 2 % (ref 0–6)
ERYTHROCYTE [DISTWIDTH] IN BLOOD BY AUTOMATED COUNT: 12.7 % (ref 11.6–15.1)
GFR SERPL CREATININE-BSD FRML MDRD: 78 ML/MIN/1.73SQ M
GLUCOSE SERPL-MCNC: 104 MG/DL (ref 65–140)
HCT VFR BLD AUTO: 45.7 % (ref 36.5–49.3)
HGB BLD-MCNC: 15.1 G/DL (ref 12–17)
IMM GRANULOCYTES # BLD AUTO: 0.04 THOUSAND/UL (ref 0–0.2)
IMM GRANULOCYTES NFR BLD AUTO: 0 % (ref 0–2)
INR PPP: 0.91 (ref 0.84–1.19)
LYMPHOCYTES # BLD AUTO: 1.72 THOUSANDS/ÂΜL (ref 0.6–4.47)
LYMPHOCYTES NFR BLD AUTO: 15 % (ref 14–44)
MCH RBC QN AUTO: 31.3 PG (ref 26.8–34.3)
MCHC RBC AUTO-ENTMCNC: 33 G/DL (ref 31.4–37.4)
MCV RBC AUTO: 95 FL (ref 82–98)
MONOCYTES # BLD AUTO: 0.82 THOUSAND/ÂΜL (ref 0.17–1.22)
MONOCYTES NFR BLD AUTO: 7 % (ref 4–12)
NEUTROPHILS # BLD AUTO: 8.75 THOUSANDS/ÂΜL (ref 1.85–7.62)
NEUTS SEG NFR BLD AUTO: 76 % (ref 43–75)
NRBC BLD AUTO-RTO: 0 /100 WBCS
PLATELET # BLD AUTO: 197 THOUSANDS/UL (ref 149–390)
PMV BLD AUTO: 8.9 FL (ref 8.9–12.7)
POTASSIUM SERPL-SCNC: 3.9 MMOL/L (ref 3.5–5.3)
PROT SERPL-MCNC: 7.4 G/DL (ref 6.4–8.4)
PROTHROMBIN TIME: 12.4 SECONDS (ref 11.6–14.5)
RBC # BLD AUTO: 4.82 MILLION/UL (ref 3.88–5.62)
SODIUM SERPL-SCNC: 137 MMOL/L (ref 135–147)
WBC # BLD AUTO: 11.56 THOUSAND/UL (ref 4.31–10.16)

## 2023-11-02 PROCEDURE — 99285 EMERGENCY DEPT VISIT HI MDM: CPT

## 2023-11-02 PROCEDURE — 80053 COMPREHEN METABOLIC PANEL: CPT | Performed by: EMERGENCY MEDICINE

## 2023-11-02 PROCEDURE — 85025 COMPLETE CBC W/AUTO DIFF WBC: CPT | Performed by: EMERGENCY MEDICINE

## 2023-11-02 PROCEDURE — 36415 COLL VENOUS BLD VENIPUNCTURE: CPT | Performed by: EMERGENCY MEDICINE

## 2023-11-02 PROCEDURE — 96360 HYDRATION IV INFUSION INIT: CPT

## 2023-11-02 PROCEDURE — 85730 THROMBOPLASTIN TIME PARTIAL: CPT | Performed by: EMERGENCY MEDICINE

## 2023-11-02 PROCEDURE — 93005 ELECTROCARDIOGRAM TRACING: CPT

## 2023-11-02 PROCEDURE — 99285 EMERGENCY DEPT VISIT HI MDM: CPT | Performed by: EMERGENCY MEDICINE

## 2023-11-02 PROCEDURE — 85610 PROTHROMBIN TIME: CPT | Performed by: EMERGENCY MEDICINE

## 2023-11-02 RX ORDER — MECLIZINE HYDROCHLORIDE 25 MG/1
25 TABLET ORAL 3 TIMES DAILY PRN
Qty: 30 TABLET | Refills: 0 | Status: SHIPPED | OUTPATIENT
Start: 2023-11-02

## 2023-11-02 RX ORDER — MECLIZINE HCL 12.5 MG/1
25 TABLET ORAL ONCE
Status: COMPLETED | OUTPATIENT
Start: 2023-11-02 | End: 2023-11-02

## 2023-11-02 RX ADMIN — MECLIZINE 25 MG: 12.5 TABLET ORAL at 06:40

## 2023-11-02 RX ADMIN — SODIUM CHLORIDE 1000 ML: 0.9 INJECTION, SOLUTION INTRAVENOUS at 06:37

## 2023-11-02 NOTE — ED NOTES
Pt reports feeling much better. Pt standing and moving around at bedside. Reports symptoms resolved.        Octavia Her  11/02/23 [id number]

## 2023-11-02 NOTE — ED PROVIDER NOTES
History  Chief Complaint   Patient presents with    Dizziness     Pt reports dizziness worsened with changes in position      Patient is a 58-year-old male who presents for evaluation of dizziness. Patient says that the symptoms started while he was in the shower this morning and put his head back to wash his hair. He says that when he moves his head in certain directions he feels an "off balance" feeling. He says that he has had vertigo about 3 years ago. He says that this feels similar, however, it was a lot more intense back then. He denies any dizziness or lightheadedness when he is not moving his head. He denies chest pain, shortness of breath, abdominal pain, headache, visual changes, numbness or tingling or weakness in his extremities. Prior to Admission Medications   Prescriptions Last Dose Informant Patient Reported?  Taking?   amiodarone 100 mg tablet   No No   Sig: Take 1 tablet (100 mg total) by mouth daily   aspirin 81 mg chewable tablet   No No   Sig: chew and swallow 1 tablet by mouth once daily   atorvastatin (LIPITOR) 80 mg tablet   No No   Sig: take 1 tablet by mouth once daily with dinner   lisinopril (ZESTRIL) 2.5 mg tablet   No No   Sig: TAKE 1 TABLET BY MOUTH AT BEDTIME   metoprolol succinate (TOPROL-XL) 50 mg 24 hr tablet   No No   Sig: take 1 tablet by mouth once daily      Facility-Administered Medications: None       Past Medical History:   Diagnosis Date    PERCY (acute kidney injury) ([address])     Bilateral inguinal hernia     CAD (coronary artery disease)     s/p CABG x3 LIMA-LAD, SVG-RCA, SVG-D1 9/12/2022    Cardiac arrest with ventricular fibrillation ([address])     Dizziness 02/02/2021    isolated incident of dizziness, sweating & disorientation    Elevated LFTs     History of left heart catheterization     and IABP, ICD PLACEMENT, DUAL CHAMBER, [employer]    Hyperglycemia     Hypertension     Pneumonia     STEMI (ST elevation myocardial infarction) ([address])     VT (ventricular tachycardia) [facility])        Past Surgical History:   Procedure Laterality Date    CARDIAC CATHETERIZATION N/A 6/29/2022    Procedure: Cardiac pci;  Surgeon: Jacquie Harmon MD;  [facility]; Service: Cardiology    CARDIAC CATHETERIZATION N/A 6/29/2022    Procedure: Cardiac iabp; Surgeon: Jacquie Harmon MD;  [facility]; Service: Cardiology    CARDIAC CATHETERIZATION N/A 9/9/2022    Procedure: CARDIAC CATHETERIZATION;  Surgeon: Etta Michele DO;  [facility]; Service: Cardiology    CARDIAC ELECTROPHYSIOLOGY PROCEDURE N/A 7/5/2022    Procedure: Cardiac icd implant;  Surgeon: Deyanira Gauthier MD;  [facility]; Service: Cardiology    CORONARY ARTERY BYPASS GRAFT N/A 9/12/2022    Procedure: CORONARY ARTERY BYPASS GRAFT (CABG) x 3, SWIFT TO LAD, L LEG EVH/SVG TO RCA  AND DIAGONAL;  Surgeon: Acosta Jewell MD;  Location: [facility] MAIN OR;  Service: Cardiac Surgery    HARVEST VEIN Left 9/12/2022    Procedure: HARVEST VEIN ENDOSCOPIC ([address]); Surgeon: Acosta Jewell MD;  Location: [facility] MAIN [facility];  Service: Cardiac Surgery    FL RPR 1ST INGUN HRNA AGE 5 YRS/> REDUCIBLE Bilateral 2/9/2021    Procedure: INGUINAL HERNIA REPAIR with mesh;  Surgeon: Tod Medeiros MD;  Location: [facility] MAIN OR;  Service: General    TONSILLECTOMY         Family History   Problem Relation Age of Onset    Diabetes Maternal Grandfather      I have reviewed and agree with the history as documented.     E-Cigarette/Vaping    E-Cigarette Use Never User      E-Cigarette/Vaping Substances    Nicotine No     THC No     CBD No     Flavoring No     Other No     Unknown No      Social History     Tobacco Use    Smoking status: Every Day     Packs/day: 0.25     Types: Cigarettes    Smokeless tobacco: Never   Vaping Use    Vaping Use: Never used   Substance Use Topics    Alcohol use: Not Currently    Drug use: Not Currently       Review of Systems   Constitutional:  Negative for chills, fever and unexpected weight change. HENT:  Negative for congestion, sore throat and trouble swallowing. Eyes:  Negative for pain, discharge and itching. Respiratory:  Negative for cough, chest tightness, shortness of breath and wheezing. Cardiovascular:  Negative for chest pain, palpitations and leg swelling. Gastrointestinal:  Negative for abdominal pain, blood in stool, diarrhea, nausea and vomiting. Endocrine: Negative for polyuria. Genitourinary:  Negative for difficulty urinating, dysuria, frequency and hematuria. Musculoskeletal:  Negative for arthralgias and back pain. Neurological:  Positive for dizziness. Negative for syncope, weakness, light-headedness and headaches. Physical Exam  Physical Exam  Vitals and nursing note reviewed. Constitutional:       General: He is not in acute distress. Appearance: He is well-developed. HENT:      Head: Normocephalic and atraumatic. Right Ear: External ear normal.      Left Ear: External ear normal.   Eyes:      Conjunctiva/sclera: Conjunctivae normal.      Pupils: Pupils are equal, round, and reactive to light. Cardiovascular:      Rate and Rhythm: Normal rate and regular rhythm. Heart sounds: Normal heart sounds. No murmur heard. No friction rub. No gallop. Pulmonary:      Effort: Pulmonary effort is normal. No respiratory distress. Breath sounds: Normal breath sounds. No wheezing or rales. Abdominal:      General: Bowel sounds are normal. There is no distension. Palpations: Abdomen is soft. Tenderness: There is no abdominal tenderness. There is no guarding. Musculoskeletal:         General: No swelling, tenderness or deformity. Normal range of motion. Cervical back: Normal range of motion. Lymphadenopathy:      Cervical: No cervical adenopathy. Skin:     General: Skin is warm and dry. Neurological:      General: No focal deficit present.       Mental Status: He is alert and oriented to person, place, and time. Mental status is at baseline. Cranial Nerves: No cranial nerve deficit. Sensory: No sensory deficit. Motor: No weakness or abnormal muscle tone.    Psychiatric:         Behavior: Behavior normal.         Vital Signs  ED Triage Vitals [11/02/23 0616]   Temperature Pulse Respirations Blood Pressure SpO2   (!) 97 °F (36.1 °C) 72 18 151/89 98 %      Temp Source Heart Rate Source Patient Position - Orthostatic VS BP Location FiO2 (%)   Temporal Monitor -- Left arm --      Pain Score       No Pain           Vitals:    11/02/23 0616 11/02/23 0700   BP: 151/89 123/70   Pulse: 72 66         Visual Acuity      ED Medications  Medications   meclizine (ANTIVERT) tablet 25 mg (25 mg Oral Given 11/2/23 0640)   sodium chloride 0.9 % bolus 1,000 mL (0 mL Intravenous Stopped 11/2/23 0715)       Diagnostic Studies  Results Reviewed       Procedure Component Value Units Date/Time    Comprehensive metabolic panel [[id number]]  (Abnormal) Collected: 11/02/23 0628    Lab Status: Final result Specimen: Blood from Arm, Right Updated: 11/02/23 0650     Sodium 137 mmol/L      Potassium 3.9 mmol/L      Chloride 104 mmol/L      CO2 25 mmol/L      ANION GAP 8 mmol/L      BUN 22 mg/dL      Creatinine 1.01 mg/dL      Glucose 104 mg/dL      Calcium 10.1 mg/dL      AST 16 U/L      ALT 14 U/L      Alkaline Phosphatase 110 U/L      Total Protein 7.4 g/dL      Albumin 4.8 g/dL      Total Bilirubin 0.71 mg/dL      eGFR 78 ml/min/1.73sq m     Narrative:      [place] guidelines for Chronic Kidney Disease (CKD):     Stage 1 with normal or high GFR (GFR > 90 mL/min/1.73 square meters)    Stage 2 Mild CKD (GFR = 60-89 mL/min/1.73 square meters)    Stage 3A Moderate CKD (GFR = 45-59 mL/min/1.73 square meters)    Stage 3B Moderate CKD (GFR = 30-44 mL/min/1.73 square meters)    Stage 4 Severe CKD (GFR = 15-29 mL/min/1.73 square meters)    Stage 5 End Stage CKD (GFR <15 mL/min/1.73 square meters)  Note: GFR calculation is accurate only with a steady state creatinine    Protime-INR [[id number]]  (Normal) Collected: 11/02/23 0628    Lab Status: Final result Specimen: Blood from Arm, Right Updated: 11/02/23 0646     Protime 12.4 seconds      INR 0.91    APTT [[id number]]  (Normal) Collected: 11/02/23 0628    Lab Status: Final result Specimen: Blood from Arm, Right Updated: 11/02/23 0646     PTT 28 seconds     CBC and differential [[id number]]  (Abnormal) Collected: 11/02/23 0628    Lab Status: Final result Specimen: Blood from Arm, Right Updated: 11/02/23 0634     WBC 11.56 Thousand/uL      RBC 4.82 Million/uL      Hemoglobin 15.1 g/dL      Hematocrit 45.7 %      MCV 95 fL      MCH 31.3 pg      MCHC 33.0 g/dL      RDW 12.7 %      MPV 8.9 fL      Platelets 335 Thousands/uL      nRBC 0 /100 WBCs      Neutrophils Relative 76 %      Immat GRANS % 0 %      Lymphocytes Relative 15 %      Monocytes Relative 7 %      Eosinophils Relative 2 %      Basophils Relative 0 %      Neutrophils Absolute 8.75 Thousands/µL      Immature Grans Absolute 0.04 Thousand/uL      Lymphocytes Absolute 1.72 Thousands/µL      Monocytes Absolute 0.82 Thousand/µL      Eosinophils Absolute 0.19 Thousand/µL      Basophils Absolute 0.04 Thousands/µL                    No orders to display              Procedures  ECG 12 Lead Documentation Only    Date/Time: 11/2/2023 6:22 AM    Performed by: Aron Rose DO  Authorized by: Aron Rose DO    Indications / Diagnosis:  Dizziness  ECG reviewed by me, the ED Provider: yes    Patient location:  ED  Previous ECG:     Previous ECG:  Compared to current    Similarity:  No change  Interpretation:     Interpretation: normal    Rate:     ECG rate:  70    ECG rate assessment: normal    Rhythm:     Rhythm: sinus rhythm    Ectopy:     Ectopy: none    QRS:     QRS axis:  Left  Conduction:     Conduction: normal    ST segments:     ST segments:  Normal  T waves:     T waves: normal ED Course                               SBIRT 20yo+      Flowsheet Row Most Recent Value   Initial Alcohol Screen: US AUDIT-C     1. How often do you have a drink containing alcohol? 0 Filed at: 11/02/2023 0616   2. How many drinks containing alcohol do you have on a typical day you are drinking? 0 Filed at: 11/02/2023 0616   3a. Male UNDER 65: How often do you have five or more drinks on one occasion? 0 Filed at: 11/02/2023 0616   3b. FEMALE Any Age, or MALE 65+: How often do you have 4 or more drinks on one occassion? 0 Filed at: 11/02/2023 0616   Audit-C Score 0 Filed at: 11/02/2023 3319   BAILEE: How many times in the past year have you. .. Used an illegal drug or used a prescription medication for non-medical reasons? Never Filed at: 11/02/2023 5016                      Medical Decision Making  77-year-old male presenting for evaluation of intermittent dizziness. Develops "off-balance feeling" when moving his head in certain directions. No dizziness when head is still. No associated chest pain, shortness of breath, nausea, vomiting, visual changes or other neurologic symptoms. Has had similar symptoms in the past  Believe symptoms are likely secondary to peripheral vertigo. Will obtain CBC, CMP. Will treat with meclizine. Will obtain EKG. Will give IV fluids. Will reevaluate  Labs within normal limits. Patient feeling better after the meclizine. Has no symptoms at this time. Problems Addressed:  Dizziness: acute illness or injury  Vertigo: acute illness or injury    Amount and/or Complexity of Data Reviewed  Labs: ordered.              Disposition  Final diagnoses:   Dizziness   Vertigo     Time reflects when diagnosis was documented in both MDM as applicable and the Disposition within this note       Time User Action Codes Description Comment    11/2/2023  7:00 AM Nuvia Brooks Add [R42] Dizziness     11/2/2023 10:01 PM Lisa Adams Add [R42] Vertigo     11/2/2023 10:01 PM Elena Martinez Yana Roque Modify [R42] Dizziness     11/2/2023 10:01 PM Otonielmirtha Stairs Modify [R42] Vertigo           ED Disposition       ED Disposition   Discharge    Condition   Stable    Date/Time   Thu Nov 2, 2023 0700    Comment   Fede Song discharge to home/self care. Follow-up Information       Follow up With Specialties Details Why Contact Info Additional [address] [facility], [address]   [address] [facility],[address] Emergency Department Emergency Medicine Go to  As needed, If symptoms worsen [address] [place] [address]  [address] Emergency Department, [address] [facility], [address]            Discharge Medication List as of 11/2/2023  7:05 AM        START taking these medications    Details   meclizine (ANTIVERT) 25 mg tablet Take 1 tablet (25 mg total) by mouth 3 (three) times a day as needed for dizziness, Starting Thu 11/2/2023, Normal           CONTINUE these medications which have NOT CHANGED    Details   amiodarone 100 mg tablet Take 1 tablet (100 mg total) by mouth daily, Starting Mon 10/9/2023, No Print      aspirin 81 mg chewable tablet chew and swallow 1 tablet by mouth once daily, Normal      atorvastatin (LIPITOR) 80 mg tablet take 1 tablet by mouth once daily with dinner, Normal      lisinopril (ZESTRIL) 2.5 mg tablet TAKE 1 TABLET BY MOUTH AT BEDTIME, Normal      metoprolol succinate (TOPROL-XL) 50 mg 24 hr tablet take 1 tablet by mouth once daily, Starting Mon 7/31/2023, Normal             No discharge procedures on file.     PDMP Review         Value Time User    PDMP Reviewed  Yes 9/16/2022 11:10 AM Sharonda Comer PA-C            ED Provider  Electronically Signed by             Evaristo Alvares DO  11/02/23 6454

## 2023-11-02 NOTE — ED CARE HANDOFF
Emergency Department Sign Out Note        Sign out and transfer of care from Dr. Martine Valenzuela. See Separate Emergency Department note. The patient, Matt Delaney, was evaluated by the previous provider for Vertigo. Workup Completed:  Bloodwork, symptomatic treatment    ED Course / Workup Pending (followup): Re-evaluation                                  ED Course as of 11/02/23 0702   Th Nov 02, 2023   0852 Cardiac hx, off balance feeling with moving head has had in the past, more intense in the past than now. No headache/visual changes. chest pain. Meclizine received at 0640, plus CTA head and neck     Procedures  Medical Decision Making  Received in signout patient initially had CTA head and neck ordered discussed with patient and patient stating he has complete resolution of symptoms and would like to go home at this time. As patient with no continuation of symptoms and resolution with symptomatic treatment with meclizine and patient describing this is similar episodes of vertigo will DC at this time and forego CTA as patient with no symptoms currently. Amount and/or Complexity of Data Reviewed  Labs: ordered. Disposition  Final diagnoses:   Dizziness     Time reflects when diagnosis was documented in both MDM as applicable and the Disposition within this note       Time User Action Codes Description Comment    11/2/2023  7:00 AM Hernan Dukes Add [R42] Dizziness           ED Disposition       ED Disposition   Discharge    Condition   Stable    Date/Time   u Nov 2, 2023  7:00 AM    Comment   Matt Delaney discharge to home/self care.                    Follow-up Information       Follow up With Specialties Details Why Contact Info Additional [address]   [address]  [address] Emergency Department Emergency Medicine Go to  As needed, If symptoms worsen [address] [facility] [address] [place] [address]  [address] [place] [address] Emergency Department, [address] [address], [address], [address]          Patient's Medications   Discharge Prescriptions    MECLIZINE (ANTIVERT) 25 MG TABLET    Take 1 tablet (25 mg total) by mouth 3 (three) times a day as needed for dizziness       Start Date: 11/2/2023 End Date: --       Order Dose: 25 mg       Quantity: 30 tablet    Refills: 0     No discharge procedures on file.        ED Provider  Electronically Signed by     Edis Shah DO  11/02/23 2147

## 2023-11-02 NOTE — TELEPHONE ENCOUNTER
Please call pt to schedule ER f/u appt for tomorrow, or Monday 11/6, or Tuesday 11/7/23    Pt is long overdue for f/u - he was Franny's pt, now attributed to me. I have never seen pt, and his last visit here was 07/2022

## 2023-11-03 ENCOUNTER — OFFICE VISIT (OUTPATIENT)
Dept: OBGYN CLINIC | Facility: CLINIC | Age: 63
End: 2023-11-03
Payer: COMMERCIAL

## 2023-11-03 VITALS
WEIGHT: 212 LBS | HEART RATE: 71 BPM | SYSTOLIC BLOOD PRESSURE: 148 MMHG | HEIGHT: 73 IN | DIASTOLIC BLOOD PRESSURE: 96 MMHG | BODY MASS INDEX: 28.1 KG/M2

## 2023-11-03 DIAGNOSIS — M70.61 TROCHANTERIC BURSITIS OF RIGHT HIP: Primary | ICD-10-CM

## 2023-11-03 LAB
ATRIAL RATE: 70 BPM
P AXIS: 37 DEGREES
PR INTERVAL: 206 MS
QRS AXIS: -40 DEGREES
QRSD INTERVAL: 98 MS
QT INTERVAL: 382 MS
QTC INTERVAL: 412 MS
T WAVE AXIS: 94 DEGREES
VENTRICULAR RATE: 70 BPM

## 2023-11-03 PROCEDURE — 20610 DRAIN/INJ JOINT/BURSA W/O US: CPT | Performed by: ORTHOPAEDIC SURGERY

## 2023-11-03 PROCEDURE — 93010 ELECTROCARDIOGRAM REPORT: CPT | Performed by: INTERNAL MEDICINE

## 2023-11-03 PROCEDURE — 99213 OFFICE O/P EST LOW 20 MIN: CPT | Performed by: ORTHOPAEDIC SURGERY

## 2023-11-03 RX ORDER — TRIAMCINOLONE ACETONIDE 40 MG/ML
80 INJECTION, SUSPENSION INTRA-ARTICULAR; INTRAMUSCULAR
Status: COMPLETED | OUTPATIENT
Start: 2023-11-03 | End: 2023-11-03

## 2023-11-03 RX ORDER — BUPIVACAINE HYDROCHLORIDE 2.5 MG/ML
4 INJECTION, SOLUTION INFILTRATION; PERINEURAL
Status: COMPLETED | OUTPATIENT
Start: 2023-11-03 | End: 2023-11-03

## 2023-11-03 RX ADMIN — BUPIVACAINE HYDROCHLORIDE 4 ML: 2.5 INJECTION, SOLUTION INFILTRATION; PERINEURAL at 11:30

## 2023-11-03 RX ADMIN — TRIAMCINOLONE ACETONIDE 80 MG: 40 INJECTION, SUSPENSION INTRA-ARTICULAR; INTRAMUSCULAR at 11:30

## 2023-11-03 NOTE — PROGRESS NOTES
Assessment/Plan:    No problem-specific Assessment & Plan notes found for this encounter. Diagnoses and all orders for this visit:    Trochanteric bursitis of right hip          The right hip was reinjected with Celestone and Marcaine. He tolerated procedure quite well. Return back at his request on an as-needed basis. If his condition changes, he would not hesitate to let us know    Subjective:      Patient ID: Fede Song is a 61 y.o. male. HPI    The patient has a history of trochanteric bursitis of his right hip. He presents for another set of corticosteroid injections. His last injection was 7 months ago. The injection did help him significantly. The following portions of the patient's history were reviewed and updated as appropriate: allergies, current medications, past family history, past medical history, past social history, past surgical history, and problem list.    Review of Systems   Constitutional:  Negative for chills, fever and unexpected weight change. HENT:  Negative for hearing loss, nosebleeds and sore throat. Eyes:  Negative for pain, redness and visual disturbance. Respiratory:  Negative for cough, shortness of breath and wheezing. Cardiovascular:  Negative for chest pain, palpitations and leg swelling. Gastrointestinal:  Negative for abdominal pain, nausea and vomiting. Endocrine: Negative for polydipsia and polyuria. Genitourinary:  Negative for dysuria and hematuria. Musculoskeletal:  Positive for arthralgias and myalgias. Negative for back pain, gait problem, joint swelling, neck pain and neck stiffness. As noted in HPI   Skin:  Negative for rash and wound. Neurological:  Negative for dizziness, numbness and headaches. Psychiatric/Behavioral:  Negative for decreased concentration and suicidal ideas. The patient is not nervous/anxious.           Objective:      /96 (BP Location: Left arm, Patient Position: Sitting, Cuff Size: Large) Pulse 71   Ht 6' 1" (1.854 m)   Wt 96.2 kg (212 lb)   BMI 27.97 kg/m²          Physical Exam      Right lower extremity is neurovascularly intact. Toes are pink and mobile. Compartments are soft. Range of motion of his hip is fairly intact. There is point tenderness along the trochanteric bursa. A positive Bry's test is present there is decreased range of motion secondary pain. No groin pain. Neurologically intact distally    Large joint arthrocentesis: L greater trochanteric bursa  Universal Protocol:  Consent: Verbal consent obtained. Written consent not obtained. Risks and benefits: risks, benefits and alternatives were discussed  Consent given by: patient  Time out: Immediately prior to procedure a "time out" was called to verify the correct patient, procedure, equipment, support staff and site/side marked as required. Patient understanding: patient states understanding of the procedure being performed  Test results: test results available and properly labeled  Site marked: the operative site was marked  Radiology Images displayed and confirmed.  If images not available, report reviewed: imaging studies available  Patient identity confirmed: verbally with patient  Supporting Documentation  Indications: pain   Procedure Details  Location: hip - L greater trochanteric bursa  Preparation: Patient was prepped and draped in the usual sterile fashion  Needle size: 22 G  Approach: lateral  Medications administered: 4 mL bupivacaine 0.25 %; 80 mg triamcinolone acetonide 40 mg/mL    Patient tolerance: patient tolerated the procedure well with no immediate complications  Dressing:  Sterile dressing applied

## 2023-12-18 ENCOUNTER — IN-CLINIC DEVICE VISIT (OUTPATIENT)
Dept: CARDIOLOGY CLINIC | Facility: CLINIC | Age: 63
End: 2023-12-18
Payer: COMMERCIAL

## 2023-12-18 DIAGNOSIS — I47.20 VENTRICULAR TACHYCARDIA (HCC): Primary | ICD-10-CM

## 2023-12-18 DIAGNOSIS — Z45.02 ENCOUNTER FOR IMPLANTABLE DEFIBRILLATOR REPROGRAMMING OR CHECK: ICD-10-CM

## 2023-12-18 PROCEDURE — 93283 PRGRMG EVAL IMPLANTABLE DFB: CPT | Performed by: INTERNAL MEDICINE

## 2023-12-27 ENCOUNTER — OFFICE VISIT (OUTPATIENT)
Dept: URGENT CARE | Facility: CLINIC | Age: 63
End: 2023-12-27
Payer: COMMERCIAL

## 2023-12-27 VITALS
HEART RATE: 71 BPM | DIASTOLIC BLOOD PRESSURE: 80 MMHG | RESPIRATION RATE: 18 BRPM | BODY MASS INDEX: 28.1 KG/M2 | HEIGHT: 73 IN | WEIGHT: 212 LBS | SYSTOLIC BLOOD PRESSURE: 136 MMHG | OXYGEN SATURATION: 98 %

## 2023-12-27 DIAGNOSIS — Z71.1 PHYSICALLY WELL BUT WORRIED: Primary | ICD-10-CM

## 2023-12-27 PROCEDURE — G0382 LEV 3 HOSP TYPE B ED VISIT: HCPCS | Performed by: PHYSICIAN ASSISTANT

## 2023-12-27 NOTE — LETTER
December 27, 2023     Patient: Patel Mandel  YOB: 1960  Date of Visit: 12/27/2023      To Whom it May Concern:    Patel Mandel is under my professional care. Patel was seen in my office on 12/27/2023.     If you have any questions or concerns, please don't hesitate to call.         Sincerely,          Ella Mistry PA-C        CC: No Recipients

## 2023-12-27 NOTE — PROGRESS NOTES
[facility]      NAME: Patel Mandel is a 63 y.o. male  : 1960    MRN: [id number]  DATE: 2023  TIME: 8:48 AM    Assessment and Plan   Physically well but worried [Z71.1]  1. Physically well but worried            Patient Instructions   PE WNL. Note today provided.   To present to the ER if symptoms worsen.  Chief Complaint     Chief Complaint   Patient presents with   • Work Note     Patient needs to a work for Tuesday to return to work.         History of Present Illness   Patel Mandel presents to the clinic c/o    Vomiting   This is a new problem. Episode onset: . The problem occurs 2 to 4 times per day. The problem has been resolved (on ). The emesis has an appearance of stomach contents. There has been no fever. Associated symptoms include diarrhea. Pertinent negatives include no abdominal pain, arthralgias, chest pain, chills, coughing or fever. He has tried nothing for the symptoms. The treatment provided no relief.   Diarrhea   This is a new problem. Episode onset: . The problem occurs 5 to 10 times per day. The problem has been resolved (). Associated symptoms include vomiting. Pertinent negatives include no abdominal pain, arthralgias, chills, coughing or fever. Risk factors include ill contacts. He has tried nothing for the symptoms. The treatment provided no relief.       Review of Systems   Review of Systems   Constitutional:  Negative for chills and fever.   HENT:  Negative for ear pain and sore throat.    Eyes:  Negative for pain and visual disturbance.   Respiratory:  Negative for cough and shortness of breath.    Cardiovascular:  Negative for chest pain and palpitations.   Gastrointestinal:  Positive for diarrhea and vomiting. Negative for abdominal pain.   Genitourinary:  Negative for dysuria and hematuria.   Musculoskeletal:  Negative for arthralgias and back pain.   Skin:  Negative for color change and rash.   Neurological:  Negative for seizures and  syncope.   All other systems reviewed and are negative.        Current Medications     Long-Term Medications   Medication Sig Dispense Refill   • amiodarone 100 mg tablet Take 1 tablet (100 mg total) by mouth daily 90 tablet 3   • aspirin 81 mg chewable tablet chew and swallow 1 tablet by mouth once daily 30 tablet 5   • atorvastatin (LIPITOR) 80 mg tablet take 1 tablet by mouth once daily with dinner 30 tablet 5   • lisinopril (ZESTRIL) 2.5 mg tablet TAKE 1 TABLET BY MOUTH AT BEDTIME 30 tablet 5   • meclizine (ANTIVERT) 25 mg tablet Take 1 tablet (25 mg total) by mouth 3 (three) times a day as needed for dizziness 30 tablet 0   • metoprolol succinate (TOPROL-XL) 50 mg 24 hr tablet take 1 tablet by mouth once daily 90 tablet 2   • [DISCONTINUED] sacubitril-valsartan (Entresto) 24-26 MG TABS Take 1 tablet by mouth 2 (two) times a day 60 tablet 2       Current Allergies     Allergies as of 12/27/2023   • (No Known Allergies)            The following portions of the patient's history were reviewed and updated as appropriate: allergies, current medications, past family history, past medical history, past social history, past surgical history and problem list.  Past Medical History:   Diagnosis Date   • PERCY (acute kidney injury) ([facility])    • Bilateral inguinal hernia    • CAD (coronary artery disease)     s/p CABG x3 LIMA-LAD, SVG-RCA, SVG-D1 9/12/2022   • Cardiac arrest with ventricular fibrillation ([facility])    • Dizziness 02/02/2021    isolated incident of dizziness, sweating & disorientation   • Elevated LFTs    • History of left heart catheterization     and IABP, ICD PLACEMENT, DUAL CHAMBER, [employer]TR[employer]IC   • Hyperglycemia    • Hypertension    • Pneumonia    • STEMI (ST elevation myocardial infarction) ([facility])    • VT (ventricular tachycardia) ([facility])      Past Surgical History:   Procedure Laterality Date   • CARDIAC CATHETERIZATION N/A 6/29/2022    Procedure: Cardiac pci;  Surgeon: Tristian Edwards MD;  [facility];  Service: Cardiology   • CARDIAC CATHETERIZATION N/A 6/29/2022    Procedure: Cardiac iabp;  Surgeon: Tristian Edwards MD;  [facility];  Service: Cardiology   • CARDIAC CATHETERIZATION N/A 9/9/2022    Procedure: CARDIAC CATHETERIZATION;  Surgeon: Eduard Charles DO;  [facility]: [facility];  Service: Cardiology   • CARDIAC ELECTROPHYSIOLOGY PROCEDURE N/A 7/5/2022    Procedure: Cardiac icd implant;  Surgeon: Mike Ya MD;  [facility]: [facility];  Service: Cardiology   • CORONARY ARTERY BYPASS GRAFT N/A 9/12/2022    Procedure: CORONARY ARTERY BYPASS GRAFT (CABG) x 3, SWIFT TO LAD, L LEG EVH/SVG TO RCA  AND DIAGONAL;  Surgeon: GABRIEL Steel MD;  Location:  MAIN OR;  Service: Cardiac Surgery   • HARVEST VEIN Left 9/12/2022    Procedure: HARVEST VEIN ENDOSCOPIC (EVH);  Surgeon: GABRIEL Steel MD;  Location:  MAIN OR;  Service: Cardiac Surgery   • ND RPR 1ST INGUN HRNA AGE 5 YRS/> REDUCIBLE Bilateral 2/9/2021    Procedure: INGUINAL HERNIA REPAIR with mesh;  Surgeon: Alejo Coyne MD;  Location:  MAIN OR;  Service: General   • TONSILLECTOMY       Social History     Socioeconomic History   • Marital status: /Civil Union     Spouse name: Not on file   • Number of children: Not on file   • Years of education: Not on file   • Highest education level: Not on file   Occupational History   • Not on file   Tobacco Use   • Smoking status: Every Day     Current packs/day: 0.25     Types: Cigarettes   • Smokeless tobacco: Never   Vaping Use   • Vaping status: Never Used   Substance and Sexual Activity   • Alcohol use: Not Currently   • Drug use: Not Currently   • Sexual activity: Not Currently     Partners: Female   Other Topics Concern   • Not on file   Social History Narrative   • Not on file     Social Determinants of Health     Financial Resource Strain: Not on file   Food Insecurity: No Food Insecurity (9/11/2022)    Hunger Vital Sign    • Worried  "About Running Out of Food in the Last Year: Never true    • Ran Out of Food in the Last Year: Never true   Transportation Needs: No Transportation Needs (9/11/2022)    PRAPARE - Transportation    • Lack of Transportation (Medical): No    • Lack of Transportation (Non-Medical): No   Physical Activity: Not on file   Stress: Not on file   Social Connections: Not on file   Intimate Partner Violence: Not on file   Housing Stability: Low Risk  (9/11/2022)    Housing Stability Vital Sign    • Unable to Pay for Housing in the Last Year: No    • Number of Places Lived in the Last Year: 1    • Unstable Housing in the Last Year: No       Objective   /80   Pulse 71   Resp 18   Ht 6' 1\" (1.854 m)   Wt 96.2 kg (212 lb)   SpO2 98%   BMI 27.97 kg/m²      Physical Exam     Physical Exam  Vitals and nursing note reviewed.   Constitutional:       General: He is not in acute distress.     Appearance: He is well-developed. He is not diaphoretic.   HENT:      Head: Normocephalic and atraumatic.      Right Ear: External ear normal.      Left Ear: External ear normal.      Nose: Nose normal.      Mouth/Throat:      Mouth: Mucous membranes are moist.      Pharynx: No oropharyngeal exudate or posterior oropharyngeal erythema.   Eyes:      General: No scleral icterus.        Right eye: No discharge.         Left eye: No discharge.      Conjunctiva/sclera: Conjunctivae normal.   Cardiovascular:      Rate and Rhythm: Normal rate and regular rhythm.      Heart sounds: Normal heart sounds. No murmur heard.     No friction rub. No gallop.   Pulmonary:      Effort: Pulmonary effort is normal. No respiratory distress.      Breath sounds: Normal breath sounds. No decreased breath sounds, wheezing, rhonchi or rales.   Abdominal:      General: Bowel sounds are normal. There is no distension.      Palpations: Abdomen is soft.      Tenderness: There is no abdominal tenderness. There is no right CVA tenderness, left CVA tenderness or guarding. "   Skin:     General: Skin is warm and dry.      Coloration: Skin is not pale.      Findings: No erythema or rash.   Neurological:      Mental Status: He is alert and oriented to person, place, and time.   Psychiatric:         Behavior: Behavior normal.         Thought Content: Thought content normal.         Judgment: Judgment normal.       Ella Mistry PA-C

## 2023-12-28 NOTE — PROGRESS NOTES
Device check in person ICD.  No events. Normal battery function.      Current Outpatient Medications:   •  amiodarone 100 mg tablet, Take 1 tablet (100 mg total) by mouth daily, Disp: 90 tablet, Rfl: 3  •  aspirin 81 mg chewable tablet, chew and swallow 1 tablet by mouth once daily, Disp: 30 tablet, Rfl: 5  •  atorvastatin (LIPITOR) 80 mg tablet, take 1 tablet by mouth once daily with dinner, Disp: 30 tablet, Rfl: 5  •  lisinopril (ZESTRIL) 2.5 mg tablet, TAKE 1 TABLET BY MOUTH AT BEDTIME, Disp: 30 tablet, Rfl: 5  •  meclizine (ANTIVERT) 25 mg tablet, Take 1 tablet (25 mg total) by mouth 3 (three) times a day as needed for dizziness, Disp: 30 tablet, Rfl: 0  •  metoprolol succinate (TOPROL-XL) 50 mg 24 hr tablet, take 1 tablet by mouth once daily, Disp: 90 tablet, Rfl: 2

## 2023-12-31 DIAGNOSIS — Z95.1 S/P CABG (CORONARY ARTERY BYPASS GRAFT): ICD-10-CM

## 2024-01-02 RX ORDER — AMIODARONE HYDROCHLORIDE 200 MG/1
TABLET ORAL
Qty: 30 TABLET | Refills: 5 | Status: SHIPPED | OUTPATIENT
Start: 2024-01-02

## 2024-01-02 RX ORDER — ATORVASTATIN CALCIUM 80 MG/1
TABLET, FILM COATED ORAL
Qty: 30 TABLET | Refills: 5 | Status: SHIPPED | OUTPATIENT
Start: 2024-01-02 | End: 2024-01-05 | Stop reason: SDUPTHER

## 2024-01-05 DIAGNOSIS — I25.10 CORONARY ARTERY DISEASE INVOLVING NATIVE CORONARY ARTERY OF NATIVE HEART WITHOUT ANGINA PECTORIS: ICD-10-CM

## 2024-01-05 DIAGNOSIS — I25.5 ISCHEMIC CARDIOMYOPATHY: ICD-10-CM

## 2024-01-05 DIAGNOSIS — Z95.1 S/P CABG X 3: ICD-10-CM

## 2024-01-05 DIAGNOSIS — Z95.1 S/P CABG (CORONARY ARTERY BYPASS GRAFT): ICD-10-CM

## 2024-01-05 RX ORDER — ATORVASTATIN CALCIUM 80 MG/1
80 TABLET, FILM COATED ORAL
Qty: 30 TABLET | Refills: 5 | Status: SHIPPED | OUTPATIENT
Start: 2024-01-05

## 2024-01-05 RX ORDER — METOPROLOL SUCCINATE 50 MG/1
50 TABLET, EXTENDED RELEASE ORAL DAILY
Qty: 30 TABLET | Refills: 5 | Status: SHIPPED | OUTPATIENT
Start: 2024-01-05

## 2024-01-05 RX ORDER — ASPIRIN 81 MG/1
81 TABLET, CHEWABLE ORAL DAILY
Qty: 30 TABLET | Refills: 5 | Status: SHIPPED | OUTPATIENT
Start: 2024-01-05

## 2024-01-05 RX ORDER — LISINOPRIL 2.5 MG/1
TABLET ORAL
Qty: 30 TABLET | Refills: 5 | Status: SHIPPED | OUTPATIENT
Start: 2024-01-05

## 2024-01-05 NOTE — TELEPHONE ENCOUNTER
Patient changed pharmacy to [facility].   Requested 30- day supply of aspirin, atorvastatin, lisinopril, and metoprolol.   Will call when prescription for amiodarone is needed.

## 2024-01-08 ENCOUNTER — OFFICE VISIT (OUTPATIENT)
Dept: URGENT CARE | Facility: CLINIC | Age: 64
End: 2024-01-08
Payer: COMMERCIAL

## 2024-01-08 VITALS
HEART RATE: 77 BPM | SYSTOLIC BLOOD PRESSURE: 124 MMHG | WEIGHT: 212 LBS | RESPIRATION RATE: 18 BRPM | DIASTOLIC BLOOD PRESSURE: 86 MMHG | BODY MASS INDEX: 28.1 KG/M2 | HEIGHT: 73 IN | OXYGEN SATURATION: 97 %

## 2024-01-08 DIAGNOSIS — M54.9 ACUTE BILATERAL BACK PAIN, UNSPECIFIED BACK LOCATION: Primary | ICD-10-CM

## 2024-01-08 PROCEDURE — G0382 LEV 3 HOSP TYPE B ED VISIT: HCPCS | Performed by: PHYSICIAN ASSISTANT

## 2024-01-08 NOTE — LETTER
January 8, 2024     Patient: Patel Mandel  YOB: 1960  Date of Visit: 1/8/2024      To Whom it May Concern:    Patel Mandel is under my professional care. Patel was seen in my office on 1/8/2024. Patel may return to work on 1/10 .    If you have any questions or concerns, please don't hesitate to call.         Sincerely,          Ella Mistry PA-C        CC: No Recipients

## 2024-01-08 NOTE — PROGRESS NOTES
[facility]      NAME: Patel Mandel is a 63 y.o. male  : 1960    MRN: [id number]  DATE: 2024  TIME: 10:55 AM    Assessment and Plan   Acute bilateral back pain, unspecified back location [M54.9]  1. Acute bilateral back pain, unspecified back location            Patient Instructions   OTC Tylenol for pain, no more than 3g daily. Ice/heat as able. Rest as able. Note provided. Follow up with PCP in 3-5 days.   To present to the ER if symptoms worsen.  Chief Complaint     Chief Complaint   Patient presents with    Back Pain     Patient c/o mid/lower back pain that started yesterday.          History of Present Illness   Patel Mandel presents to the clinic c/o    Back Pain  This is a new problem. The current episode started yesterday (snow shoveling yesterday). The problem occurs constantly. The problem has been gradually improving since onset. The pain is present in the lumbar spine (bilateral lower back). The quality of the pain is described as aching. The pain does not radiate. The pain is at a severity of 6/10. The pain is moderate. The symptoms are aggravated by bending and position. Pertinent negatives include no abdominal pain, bladder incontinence, bowel incontinence, chest pain, dysuria, fever, headaches, leg pain, numbness, paresis, paresthesias, tingling or weakness. Risk factors: was snow shoveling yesterday, no falls or direct trauma to the back. He has tried analgesics for the symptoms. The treatment provided moderate relief.       Review of Systems   Review of Systems   Constitutional:  Negative for chills, diaphoresis, fatigue and fever.   HENT:  Negative for congestion, ear discharge, ear pain and facial swelling.    Eyes:  Negative for photophobia, pain, discharge, redness, itching and visual disturbance.   Respiratory:  Negative for apnea, cough, chest tightness, shortness of breath and wheezing.    Cardiovascular:  Negative for chest pain and palpitations.    Gastrointestinal:  Negative for abdominal pain and bowel incontinence.   Genitourinary:  Negative for bladder incontinence and dysuria.   Musculoskeletal:  Positive for back pain.   Skin:  Negative for color change, rash and wound.   Neurological:  Negative for dizziness, tingling, weakness, numbness, headaches and paresthesias.   Hematological:  Negative for adenopathy.         Current Medications     Long-Term Medications   Medication Sig Dispense Refill    amiodarone 100 mg tablet Take 1 tablet (100 mg total) by mouth daily 90 tablet 3    amiodarone 200 mg tablet take 1 tablet by mouth once daily 30 tablet 5    aspirin 81 mg chewable tablet Chew 1 tablet (81 mg total) daily Chew and swallow 30 tablet 5    atorvastatin (LIPITOR) 80 mg tablet Take 1 tablet (80 mg total) by mouth daily with dinner 30 tablet 5    lisinopril (ZESTRIL) 2.5 mg tablet TAKE 1 TABLET BY MOUTH AT BEDTIME 30 tablet 5    meclizine (ANTIVERT) 25 mg tablet Take 1 tablet (25 mg total) by mouth 3 (three) times a day as needed for dizziness 30 tablet 0    metoprolol succinate (TOPROL-XL) 50 mg 24 hr tablet Take 1 tablet (50 mg total) by mouth daily 30 tablet 5    [DISCONTINUED] sacubitril-valsartan (Entresto) 24-26 MG TABS Take 1 tablet by mouth 2 (two) times a day 60 tablet 2       Current Allergies     Allergies as of 01/08/2024    (No Known Allergies)            The following portions of the patient's history were reviewed and updated as appropriate: allergies, current medications, past family history, past medical history, past social history, past surgical history and problem list.  Past Medical History:   Diagnosis Date    PERCY (acute kidney injury) ([facility])     Bilateral inguinal hernia     CAD (coronary artery disease)     s/p CABG x3 LIMA-LAD, SVG-RCA, SVG-D1 9/12/2022    Cardiac arrest with ventricular fibrillation ([facility])     Dizziness 02/02/2021    isolated incident of dizziness, sweating & disorientation    Elevated LFTs     History of  left heart catheterization     and IABP, ICD PLACEMENT, DUAL CHAMBER, [employer]TR[employer]    Hyperglycemia     Hypertension     Pneumonia     STEMI (ST elevation myocardial infarction) ([facility])     VT (ventricular tachycardia) ([facility])      Past Surgical History:   Procedure Laterality Date    CARDIAC CATHETERIZATION N/A 6/29/2022    Procedure: Cardiac pci;  Surgeon: Tristian Edwards MD;  [facility];  Service: Cardiology    CARDIAC CATHETERIZATION N/A 6/29/2022    Procedure: Cardiac iabp;  Surgeon: Tristian Edwards MD;  [facility];  Service: Cardiology    CARDIAC CATHETERIZATION N/A 9/9/2022    Procedure: CARDIAC CATHETERIZATION;  Surgeon: Eduard Charles DO;  [facility]: [facility];  Service: Cardiology    CARDIAC ELECTROPHYSIOLOGY PROCEDURE N/A 7/5/2022    Procedure: Cardiac icd implant;  Surgeon: Mike Ya MD;  [facility]: [facility];  Service: Cardiology    CORONARY ARTERY BYPASS GRAFT N/A 9/12/2022    Procedure: CORONARY ARTERY BYPASS GRAFT (CABG) x 3, SWIFT TO LAD, L LEG EVH/SVG TO RCA  AND DIAGONAL;  Surgeon: GABRIEL Steel MD;  Location:  MAIN OR;  Service: Cardiac Surgery    HARVEST VEIN Left 9/12/2022    Procedure: HARVEST VEIN ENDOSCOPIC (EVH);  Surgeon: GABRIEL Steel MD;  Location:  MAIN OR;  Service: Cardiac Surgery    CA RPR 1ST INGUN HRNA AGE 5 YRS/> REDUCIBLE Bilateral 2/9/2021    Procedure: INGUINAL HERNIA REPAIR with mesh;  Surgeon: Alejo Coyne MD;  Location:  MAIN OR;  Service: General    TONSILLECTOMY       Social History     Socioeconomic History    Marital status: /Civil Union     Spouse name: Not on file    Number of children: Not on file    Years of education: Not on file    Highest education level: Not on file   Occupational History    Not on file   Tobacco Use    Smoking status: Every Day     Current packs/day: 0.25     Types: Cigarettes    Smokeless tobacco: Never   Vaping Use    Vaping status: Never Used  "  Substance and Sexual Activity    Alcohol use: Not Currently    Drug use: Not Currently    Sexual activity: Not Currently     Partners: Female   Other Topics Concern    Not on file   Social History Narrative    Not on file     Social Determinants of Health     Financial Resource Strain: Not on file   Food Insecurity: No Food Insecurity (9/11/2022)    Hunger Vital Sign     Worried About Running Out of Food in the Last Year: Never true     Ran Out of Food in the Last Year: Never true   Transportation Needs: No Transportation Needs (9/11/2022)    PRAPARE - Transportation     Lack of Transportation (Medical): No     Lack of Transportation (Non-Medical): No   Physical Activity: Not on file   Stress: Not on file   Social Connections: Not on file   Intimate Partner Violence: Not on file   Housing Stability: Low Risk  (9/11/2022)    Housing Stability Vital Sign     Unable to Pay for Housing in the Last Year: No     Number of Places Lived in the Last Year: 1     Unstable Housing in the Last Year: No       Objective   /86   Pulse 77   Resp 18   Ht 6' 1\" (1.854 m)   Wt 96.2 kg (212 lb)   SpO2 97%   BMI 27.97 kg/m²      Physical Exam     Physical Exam  Vitals and nursing note reviewed.   Constitutional:       General: He is not in acute distress.     Appearance: He is well-developed. He is not diaphoretic.   HENT:      Head: Normocephalic and atraumatic.      Right Ear: Tympanic membrane and external ear normal.      Left Ear: Tympanic membrane and external ear normal.      Nose: Nose normal.      Mouth/Throat:      Mouth: Mucous membranes are moist.      Pharynx: No oropharyngeal exudate or posterior oropharyngeal erythema.   Eyes:      General: No scleral icterus.        Right eye: No discharge.         Left eye: No discharge.      Conjunctiva/sclera: Conjunctivae normal.   Cardiovascular:      Rate and Rhythm: Normal rate and regular rhythm.      Heart sounds: Normal heart sounds. No murmur heard.     No " friction rub. No gallop.   Pulmonary:      Effort: Pulmonary effort is normal. No respiratory distress.      Breath sounds: Normal breath sounds. No decreased breath sounds, wheezing, rhonchi or rales.   Abdominal:      General: Bowel sounds are normal.      Palpations: Abdomen is soft.      Tenderness: There is no abdominal tenderness. There is no right CVA tenderness, left CVA tenderness, guarding or rebound.   Musculoskeletal:      Lumbar back: Tenderness (bilateral lower lumbar muscles) present. No swelling, deformity, lacerations, spasms or bony tenderness. Normal range of motion. Negative right straight leg raise test and negative left straight leg raise test.   Skin:     General: Skin is warm and dry.      Coloration: Skin is not pale.      Findings: No erythema or rash.   Neurological:      Mental Status: He is alert and oriented to person, place, and time.   Psychiatric:         Behavior: Behavior normal.         Thought Content: Thought content normal.         Judgment: Judgment normal.         Ella Mistry PA-C

## 2024-01-11 ENCOUNTER — OFFICE VISIT (OUTPATIENT)
Dept: URGENT CARE | Facility: CLINIC | Age: 64
End: 2024-01-11
Payer: COMMERCIAL

## 2024-01-11 VITALS
SYSTOLIC BLOOD PRESSURE: 136 MMHG | DIASTOLIC BLOOD PRESSURE: 76 MMHG | RESPIRATION RATE: 18 BRPM | HEIGHT: 73 IN | HEART RATE: 74 BPM | OXYGEN SATURATION: 98 % | WEIGHT: 212 LBS | BODY MASS INDEX: 28.1 KG/M2

## 2024-01-11 DIAGNOSIS — M54.50 ACUTE BILATERAL LOW BACK PAIN WITHOUT SCIATICA: Primary | ICD-10-CM

## 2024-01-11 PROCEDURE — 99213 OFFICE O/P EST LOW 20 MIN: CPT | Performed by: NURSE PRACTITIONER

## 2024-01-11 RX ORDER — METHOCARBAMOL 500 MG/1
500 TABLET, FILM COATED ORAL 3 TIMES DAILY PRN
Qty: 15 TABLET | Refills: 0 | Status: SHIPPED | OUTPATIENT
Start: 2024-01-11

## 2024-01-11 RX ORDER — LIDOCAINE 50 MG/G
1 PATCH TOPICAL DAILY
Qty: 30 PATCH | Refills: 0 | Status: SHIPPED | OUTPATIENT
Start: 2024-01-11

## 2024-01-11 NOTE — PROGRESS NOTES
[facility]        NAME: Patel Mandel is a 63 y.o. male  : 1960    MRN: [id number]  DATE: 2024  TIME: 9:06 AM    Assessment and Plan   Acute bilateral low back pain without sciatica [M54.50]  1. Acute bilateral low back pain without sciatica  methocarbamol (ROBAXIN) 500 mg tablet    lidocaine (Lidoderm) 5 %            Patient Instructions     Patient Instructions   Take medication as directed. Do not take muscle relaxers before driving or with alcohol as they can make you drowsy. If you develop any increased pain, numbness, tingling, urinary symptoms or any new or concerning symptoms please return or proceed to ER. Follow up with pcp in 3-5 days      Chief Complaint     Chief Complaint   Patient presents with    Back Pain     Patient c/o bad lower back pain that started over the weekend.         History of Present Illness       Back Pain  This is a new problem. The current episode started in the past 7 days. The problem occurs constantly. The problem is unchanged. The pain is present in the lumbar spine. The quality of the pain is described as aching. The pain does not radiate. The pain is at a severity of 10/10. The pain is The same all the time. The symptoms are aggravated by position. Stiffness is present All day. Pertinent negatives include no bladder incontinence, bowel incontinence, chest pain, dysuria, fever, headaches, leg pain, numbness, paresthesias, perianal numbness, tingling, weakness or weight loss. Risk factors: began after shoveling snow. Treatments tried: tylenol. The treatment provided mild relief.       Review of Systems   Review of Systems   Constitutional:  Negative for chills, diaphoresis, fatigue, fever and weight loss.   Respiratory:  Negative for cough, chest tightness and shortness of breath.    Cardiovascular:  Negative for chest pain and palpitations.   Gastrointestinal: Negative.  Negative for bowel incontinence.   Genitourinary:  Negative for bladder  incontinence and dysuria.   Musculoskeletal:  Positive for back pain. Negative for arthralgias, gait problem, joint swelling, myalgias and neck pain.   Neurological:  Negative for dizziness, tingling, syncope, weakness, light-headedness, numbness, headaches and paresthesias.         Current Medications       Current Outpatient Medications:     amiodarone 100 mg tablet, Take 1 tablet (100 mg total) by mouth daily, Disp: 90 tablet, Rfl: 3    amiodarone 200 mg tablet, take 1 tablet by mouth once daily, Disp: 30 tablet, Rfl: 5    aspirin 81 mg chewable tablet, Chew 1 tablet (81 mg total) daily Chew and swallow, Disp: 30 tablet, Rfl: 5    atorvastatin (LIPITOR) 80 mg tablet, Take 1 tablet (80 mg total) by mouth daily with dinner, Disp: 30 tablet, Rfl: 5    lidocaine (Lidoderm) 5 %, Apply 1 patch topically over 12 hours daily Remove & Discard patch within 12 hours or as directed by MD, Disp: 30 patch, Rfl: 0    lisinopril (ZESTRIL) 2.5 mg tablet, TAKE 1 TABLET BY MOUTH AT BEDTIME, Disp: 30 tablet, Rfl: 5    meclizine (ANTIVERT) 25 mg tablet, Take 1 tablet (25 mg total) by mouth 3 (three) times a day as needed for dizziness, Disp: 30 tablet, Rfl: 0    methocarbamol (ROBAXIN) 500 mg tablet, Take 1 tablet (500 mg total) by mouth 3 (three) times a day as needed for muscle spasms, Disp: 15 tablet, Rfl: 0    metoprolol succinate (TOPROL-XL) 50 mg 24 hr tablet, Take 1 tablet (50 mg total) by mouth daily, Disp: 30 tablet, Rfl: 5    Current Allergies     Allergies as of 01/11/2024    (No Known Allergies)            The following portions of the patient's history were reviewed and updated as appropriate: allergies, current medications, past family history, past medical history, past social history, past surgical history and problem list.     Past Medical History:   Diagnosis Date    PERCY (acute kidney injury) (HCC)     Bilateral inguinal hernia     CAD (coronary artery disease)     s/p CABG x3 LIMA-LAD, SVG-RCA, SVG-D1 9/12/2022     "Cardiac arrest with ventricular fibrillation (HCC)     Dizziness 02/02/2021    isolated incident of dizziness, sweating & disorientation    Elevated LFTs     History of left heart catheterization     and IABP, ICD PLACEMENT, DUAL CHAMBER, [employer]    Hyperglycemia     Hypertension     Pneumonia     STEMI (ST elevation myocardial infarction) (HCC)     VT (ventricular tachycardia) (HCC)        Past Surgical History:   Procedure Laterality Date    CARDIAC CATHETERIZATION N/A 6/29/2022    Procedure: Cardiac pci;  Surgeon: Tristian Edwards MD;  [facility];  Service: Cardiology    CARDIAC CATHETERIZATION N/A 6/29/2022    Procedure: Cardiac iabp;  Surgeon: Tristian Edwards MD;  [facility];  Service: Cardiology    CARDIAC CATHETERIZATION N/A 9/9/2022    Procedure: CARDIAC CATHETERIZATION;  Surgeon: Eduard Charles DO;  [facility];  Service: Cardiology    CARDIAC ELECTROPHYSIOLOGY PROCEDURE N/A 7/5/2022    Procedure: Cardiac icd implant;  Surgeon: Mike Ya MD;  [facility];  Service: Cardiology    CORONARY ARTERY BYPASS GRAFT N/A 9/12/2022    Procedure: CORONARY ARTERY BYPASS GRAFT (CABG) x 3, SWIFT TO LAD, L LEG EVH/SVG TO RCA  AND DIAGONAL;  Surgeon: GABRIEL Steel MD;  Location:  MAIN OR;  Service: Cardiac Surgery    HARVEST VEIN Left 9/12/2022    Procedure: HARVEST VEIN ENDOSCOPIC (EVH);  Surgeon: GABRIEL Steel MD;  Location:  MAIN OR;  Service: Cardiac Surgery    ND RPR 1ST INGUN HRNA AGE 5 YRS/> REDUCIBLE Bilateral 2/9/2021    Procedure: INGUINAL HERNIA REPAIR with mesh;  Surgeon: Alejo Coyne MD;  Location:  MAIN OR;  Service: General    TONSILLECTOMY         Family History   Problem Relation Age of Onset    Diabetes Maternal Grandfather          Medications have been verified.        Objective   /76   Pulse 74   Resp 18   Ht 6' 1\" (1.854 m)   Wt 96.2 kg (212 lb)   SpO2 98%   BMI 27.97 kg/m²   No " LMP for male patient.       Physical Exam     Physical Exam  Constitutional:       General: He is not in acute distress.     Appearance: He is well-developed. He is not diaphoretic.   Cardiovascular:      Rate and Rhythm: Normal rate and regular rhythm.      Pulses: Normal pulses.      Heart sounds: Normal heart sounds, S1 normal and S2 normal.   Pulmonary:      Effort: Pulmonary effort is normal.      Breath sounds: Normal breath sounds.   Abdominal:      General: Bowel sounds are normal. There is no distension.      Palpations: Abdomen is soft. Abdomen is not rigid. There is no shifting dullness or mass.      Tenderness: There is no abdominal tenderness. There is no right CVA tenderness, left CVA tenderness, guarding or rebound. Negative signs include Lynch's sign and McBurney's sign.   Musculoskeletal:      Cervical back: Normal.      Thoracic back: Normal.      Lumbar back: Tenderness (TTP over bilateral lumbar paraspinal muscles.) present. No swelling, deformity, spasms or bony tenderness. Normal range of motion. Negative right straight leg raise test and negative left straight leg raise test.      Comments: Full ROM, +5 strength of BLE   Skin:     General: Skin is warm and dry.      Capillary Refill: Capillary refill takes less than 2 seconds.   Neurological:      Mental Status: He is alert and oriented to person, place, and time.      GCS: GCS eye subscore is 4. GCS verbal subscore is 5. GCS motor subscore is 6.

## 2024-01-11 NOTE — PATIENT INSTRUCTIONS
Take medication as directed. Do not take muscle relaxers before driving or with alcohol as they can make you drowsy. If you develop any increased pain, numbness, tingling, urinary symptoms or any new or concerning symptoms please return or proceed to ER. Follow up with pcp in 3-5 days

## 2024-01-11 NOTE — LETTER
January 11, 2024     Patient: Patel Mandel   YOB: 1960   Date of Visit: 1/11/2024       To Whom it May Concern:    Patel Mandel was seen in my clinic on 1/11/2024. He may return to work on 1/12/2024 .    If you have any questions or concerns, please don't hesitate to call.         Sincerely,          ASHLEY Lee        CC: No Recipients

## 2024-02-14 ENCOUNTER — OFFICE VISIT (OUTPATIENT)
Dept: CARDIOLOGY CLINIC | Facility: CLINIC | Age: 64
End: 2024-02-14
Payer: COMMERCIAL

## 2024-02-14 VITALS
HEIGHT: 73 IN | BODY MASS INDEX: 27.83 KG/M2 | HEART RATE: 80 BPM | WEIGHT: 210 LBS | SYSTOLIC BLOOD PRESSURE: 140 MMHG | DIASTOLIC BLOOD PRESSURE: 84 MMHG

## 2024-02-14 DIAGNOSIS — I10 PRIMARY HYPERTENSION: ICD-10-CM

## 2024-02-14 DIAGNOSIS — E78.5 DYSLIPIDEMIA: ICD-10-CM

## 2024-02-14 DIAGNOSIS — Z95.810 AICD (AUTOMATIC CARDIOVERTER/DEFIBRILLATOR) PRESENT: ICD-10-CM

## 2024-02-14 DIAGNOSIS — Z95.1 S/P CABG X 3: Primary | ICD-10-CM

## 2024-02-14 DIAGNOSIS — I47.29 NSVT (NONSUSTAINED VENTRICULAR TACHYCARDIA) (HCC): ICD-10-CM

## 2024-02-14 DIAGNOSIS — I25.5 ISCHEMIC CARDIOMYOPATHY: ICD-10-CM

## 2024-02-14 DIAGNOSIS — E66.3 OVERWEIGHT (BMI 25.0-29.9): ICD-10-CM

## 2024-02-14 DIAGNOSIS — F17.200 CURRENT SMOKER: ICD-10-CM

## 2024-02-14 PROCEDURE — 99214 OFFICE O/P EST MOD 30 MIN: CPT | Performed by: INTERNAL MEDICINE

## 2024-02-14 NOTE — PROGRESS NOTES
[facility] [place]  [address] [facility] [address]  Phone#  [phone]  Fax#  [phone]                                               Cardiology Office Follow up  Patel Mandel, 63 y.o. male  YOB: 1960  MRN: [id number] Encounter: [id number]      PCP - No primary care provider on file.  Referring Provider - No ref. provider found    Chief Complaint   Patient presents with   • Coronary Artery Disease       Assessment  S/p CABGx3  CABGx3 (9/12/2022) - LIMA-LAD,SVG to RCA, SVG-D1  LHC - 6/29/22 - mLAD 50%, dLAD 70%, D1 60%, D2 60%, OM1 50%, OM3 99%, mRCA 60%, RPL3 60%  Ischemic cardiomyopathy, EF 44%  Echo - 6/29/22 - LVEF 44%, GLS -13%, Grade 1 DD, akinesis of apical segments  Echo - 9/8/22 - LVEF 40%, dyskinetic mid-apical anteroseptal/apical walls  S/p ICD placement  For Vfib cardiac arrest, in setting of STEMI, but 100% lesion was found  Hyperlipidemia  Current smoker  Overweight, Body mass index is 27.71 kg/m².     Plan  CAD s/p CABG x3  Continues to be free of ischemic symptoms  Remains compliant with medical therapy  Continue aspirin, metoprolol succinate 50 mg daily, atorvastatin 80 mg daily  Again emphasized need to quit smoking     Ischemic cardiomyopathy, EF 40%  Remains euvolemic and asymptomatic  Medical therapy  Entresto was too expensive in past, and as a result --> has remained on lisinopril --> he is switching insurance plans and his prescriptions are going to be much cheaper for him --> he will let me know once he is done switching and I can prescribe him Entresto at that time, in place of lisinopril  Continue metoprolol succinate 50 mg daily  Device therapy  ICD in place for secondary prevention -->   Has some NSVTs, but no further VT    NSVT, S/p ICD, for VFib cardiac arrest  Overview  6/2022: VFib in setting of inferior STEMI - severe distal lad/om 3 disease, which could not be revascularized --> S/p ICD placement  9/3/22: had  another VF episode treated with shock --> went to the hospital had repeat [facility] - multi-vessel CAD --> underwent CABG  7/2023 - had frequent NSVT episodes, asymptomatic --> amiodarone decrease to 100 mg daily  8/2023: asymptomatic  12/2023: no events. Normal device funciton  Impression  No further NSVT, despite decrease in amiodarone to 100 mg daily  Plan  With his young age, risk of long-term amiodarone toxicity, and no recent VT since CABG, we will discontinue amiodarone at this point   Continue metoprolol succinate 50 mg daily and uptitrate if necessary for NSVT's    Hyperlipidemia    Tolerating atorvastatin 80  If still not completed repeat lipid panel  Check repeat lipid panel  If very elevated than 70, then consider addition of ezetimibe      No results found for this visit on 02/14/24.    Orders Placed This Encounter   Procedures   • Lipid Panel with Direct LDL reflex       Return in about 6 months (around 8/14/2024), or if symptoms worsen or fail to improve.      History of Present Illness   63 y.o. male , previously worked as a truck  and now works with thermoplastic, comes in for establishment of care after recent hospitalization involving a cardiac arrest. He previously worked in the [employer].    On 06/29/2022, patient was at work at the [employer], and he was feeling unwell.  He himself does not have full recollection of the events that led up to his hospitalization, but per available chart/EMS documentation he was apparently having chest pain for 3 days, which was recurring on the day of presentation.  He apparently went upstairs in to another office and EMS was called.  On arrival of EMS, he reported numbness in his left arm and was otherwise feeling well.  Initial ECG did not reveal a STEMI and he did not have chest pain at that time.  He was subsequently taken to [facility] ED, but on arrival to the ED, he went in to VFib cardiac arrest and received  several shocks with the assistance of the ED physician.  He received CPR/ACLS and was initiated on vasopressor support with return of ROSC.  Post this, he was found to have junctional rhythm with inferior STEMI and an MI alert was initiated. He was found to have multivessel coronary artery disease, but no 100% stenosis could be found. He did have severe lesions in distal LAD and OM3, which were felt to be small blood vessels - not amenable to PCI.  A balloon pump was placed and he was transferred to [facility] ICU for further care.  He was subsequently evaluated by the heart failure service and vasopressors/IABP were weaned off and he did well on medical therapy.  He underwent a secondary prevention ICD placement and was eventually discharged with medical therapy.    Since discharge from the hospital he has not had any further recurrences of chest pain, although he does report continued mild symptoms of heartburn at the end of the day, for which he takes Tums with resolution of symptoms.  He followed up with his PCP and is now here to see me for establishment of care.  I am meeting him today for the 1st time.  He remains compliant with medical therapy, and has been working on cutting down on his smoking.  No further complains of palpitations, dizziness/light-headedness, near syncope or syncope.    Interval history - 11/21/2022  He comes back for follow-up after about 3 months.  In the interim, he followed up initially with [facility] and was reporting increased complaints of chest pain on 8/25/22.  He was started on amlodipine for anti-anginal effect.  But subsequently he had worsening symptoms and eventually had another ICD shock, and presented back to hospital. He was found to have Vfib on device check and again underwent cardiac catheterization.  This revealed multivessel coronary artery disease and he was recommended CABG evaluation.  He underwent a successful CABGx3, and has recovered very well over  the last few months.  No current complains of chest pain, shortness of breath, palpitations or dizziness.    Interval history - 2/13/2023  He comes back for follow-up after about 3 months.  He has been doing very well and has not had any further issues with chest pain or shortness of breath.  He reports feeling the best he has in the last 10 years and is very active at work.  He remains compliant with medical therapy.    Interval history - 8/2/2023  He comes back for follow-up after about 6 months.  In the interim he has been doing very well and has not had any issues with chest pain or shortness of breath.  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  He did have some issues with the remote monitor for his ICD, but this was being sorted out since getting a new bedside monitor at home.  His recent device check did show multiple runs of NSVT in July, but it has been better since then. No further VT/VF recently    Interval history - 2/14/2024  He returns for follow-up after 6 months.  He remains very active, working physical labor-intensive jobs.  He has not had any chest pain or shortness of breath with these and remains compliant with medical therapy.  He is switching jobs again and notes to me that his insurance plan will be changing to a point where his prescriptions will be a lot cheaper.  He unfortunately continues to smoke about a pack a day.      Historical Information   Past Medical History:   Diagnosis Date   • PERCY (acute kidney injury) (HCC)    • Bilateral inguinal hernia    • CAD (coronary artery disease)     s/p CABG x3 LIMA-LAD, SVG-RCA, SVG-D1 9/12/2022   • Cardiac arrest with ventricular fibrillation (HCC)    • Dizziness 02/02/2021    isolated incident of dizziness, sweating & disorientation   • Elevated LFTs    • History of left heart catheterization     and IABP, ICD PLACEMENT, DUAL CHAMBER,  [employer]IC   • Hyperglycemia    • Hypertension    • Pneumonia    • STEMI (ST elevation myocardial infarction) ([facility])    • VT (ventricular tachycardia) ([facility])      Past Surgical History:   Procedure Laterality Date   • CARDIAC CATHETERIZATION N/A 6/29/2022    Procedure: Cardiac pci;  Surgeon: Tristian Edwards MD;  [facility];  Service: Cardiology   • CARDIAC CATHETERIZATION N/A 6/29/2022    Procedure: Cardiac iabp;  Surgeon: Tristian Edwards MD;  [facility];  Service: Cardiology   • CARDIAC CATHETERIZATION N/A 9/9/2022    Procedure: CARDIAC CATHETERIZATION;  Surgeon: Eduard Charles DO;  Location: [facility];  Service: Cardiology   • CARDIAC ELECTROPHYSIOLOGY PROCEDURE N/A 7/5/2022    Procedure: Cardiac icd implant;  Surgeon: Mike Ya MD;  Location: [facility];  Service: Cardiology   • CORONARY ARTERY BYPASS GRAFT N/A 9/12/2022    Procedure: CORONARY ARTERY BYPASS GRAFT (CABG) x 3, SWIFT TO LAD, L LEG EVH/SVG TO RCA  AND DIAGONAL;  Surgeon: GABRIEL Steel MD;  Location:  MAIN OR;  Service: Cardiac Surgery   • HARVEST VEIN Left 9/12/2022    Procedure: HARVEST VEIN ENDOSCOPIC (EVH);  Surgeon: GABRIEL Steel MD;  Location:  MAIN OR;  Service: Cardiac Surgery   • WY RPR 1ST INGUN HRNA AGE 5 YRS/> REDUCIBLE Bilateral 2/9/2021    Procedure: INGUINAL HERNIA REPAIR with mesh;  Surgeon: Alejo Coyne MD;  Location:  MAIN OR;  Service: General   • TONSILLECTOMY       Family History   Problem Relation Age of Onset   • Diabetes Maternal Grandfather      Current Outpatient Medications on File Prior to Visit   Medication Sig Dispense Refill   • aspirin 81 mg chewable tablet Chew 1 tablet (81 mg total) daily Chew and swallow 30 tablet 5   • atorvastatin (LIPITOR) 80 mg tablet Take 1 tablet (80 mg total) by mouth daily with dinner 30 tablet 5   • lidocaine (Lidoderm) 5 % Apply 1 patch topically over 12 hours daily Remove & Discard patch within 12  hours or as directed by MD 30 patch 0   • lisinopril (ZESTRIL) 2.5 mg tablet TAKE 1 TABLET BY MOUTH AT BEDTIME 30 tablet 5   • meclizine (ANTIVERT) 25 mg tablet Take 1 tablet (25 mg total) by mouth 3 (three) times a day as needed for dizziness 30 tablet 0   • methocarbamol (ROBAXIN) 500 mg tablet Take 1 tablet (500 mg total) by mouth 3 (three) times a day as needed for muscle spasms 15 tablet 0   • metoprolol succinate (TOPROL-XL) 50 mg 24 hr tablet Take 1 tablet (50 mg total) by mouth daily 30 tablet 5   • [DISCONTINUED] amiodarone 100 mg tablet Take 1 tablet (100 mg total) by mouth daily 90 tablet 3   • [DISCONTINUED] amiodarone 200 mg tablet take 1 tablet by mouth once daily (Patient not taking: Reported on 2/14/2024) 30 tablet 5   • [DISCONTINUED] sacubitril-valsartan (Entresto) 24-26 MG TABS Take 1 tablet by mouth 2 (two) times a day 60 tablet 2     No current facility-administered medications on file prior to visit.     No Known Allergies  Social History     Socioeconomic History   • Marital status: /Civil Union     Spouse name: None   • Number of children: None   • Years of education: None   • Highest education level: None   Occupational History   • None   Tobacco Use   • Smoking status: Every Day     Current packs/day: 0.25     Types: Cigarettes   • Smokeless tobacco: Never   Vaping Use   • Vaping status: Never Used   Substance and Sexual Activity   • Alcohol use: Not Currently   • Drug use: Not Currently   • Sexual activity: Not Currently     Partners: Female   Other Topics Concern   • None   Social History Narrative   • None     Social Determinants of Health     Financial Resource Strain: Not on file   Food Insecurity: No Food Insecurity (9/11/2022)    Hunger Vital Sign    • Worried About Running Out of Food in the Last Year: Never true    • Ran Out of Food in the Last Year: Never true   Transportation Needs: No Transportation Needs (9/11/2022)    PRAPARE - Transportation    • Lack of Transportation  "(Medical): No    • Lack of Transportation (Non-Medical): No   Physical Activity: Not on file   Stress: Not on file   Social Connections: Not on file   Intimate Partner Violence: Not on file   Housing Stability: Low Risk  (9/11/2022)    Housing Stability Vital Sign    • Unable to Pay for Housing in the Last Year: No    • Number of Places Lived in the Last Year: 1    • Unstable Housing in the Last Year: No        Review of Systems   All other systems reviewed and are negative.        Vitals:  Vitals:    02/14/24 1450   BP: 140/84   Pulse: 80   Weight: 95.3 kg (210 lb)   Height: 6' 1\" (1.854 m)     BMI - Body mass index is 27.71 kg/m².  Wt Readings from Last 7 Encounters:   02/14/24 95.3 kg (210 lb)   01/11/24 96.2 kg (212 lb)   01/08/24 96.2 kg (212 lb)   12/27/23 96.2 kg (212 lb)   11/03/23 96.2 kg (212 lb)   08/02/23 96.2 kg (212 lb)   07/01/23 97.5 kg (215 lb)       Physical Exam  Vitals and nursing note reviewed.   Constitutional:       General: He is not in acute distress.     Appearance: Normal appearance. He is well-developed. He is not ill-appearing or diaphoretic.   HENT:      Head: Normocephalic and atraumatic.      Nose: No congestion.   Eyes:      General: No scleral icterus.     Conjunctiva/sclera: Conjunctivae normal.   Neck:      Vascular: No carotid bruit or JVD.   Cardiovascular:      Rate and Rhythm: Normal rate and regular rhythm.      Heart sounds: Normal heart sounds. No murmur heard.     No friction rub. No gallop.      Comments: Well-healed central sternal surgical scar noted.   Left upper chest wall cardiac device noted in situ  Pulmonary:      Effort: Pulmonary effort is normal. No respiratory distress.      Breath sounds: Normal breath sounds. No wheezing or rales.   Chest:      Chest wall: No tenderness.   Abdominal:      General: There is no distension.      Palpations: Abdomen is soft.      Tenderness: There is no abdominal tenderness.   Musculoskeletal:         General: No swelling, " "tenderness or deformity.      Cervical back: Neck supple. No muscular tenderness.      Right lower leg: No edema.      Left lower leg: No edema.   Skin:     General: Skin is warm.   Neurological:      General: No focal deficit present.      Mental Status: He is alert and oriented to person, place, and time. Mental status is at baseline.   Psychiatric:         Mood and Affect: Mood normal.         Behavior: Behavior normal.         Thought Content: Thought content normal.           Labs:  CBC:   Lab Results   Component Value Date    WBC 11.56 (H) 11/02/2023    RBC 4.82 11/02/2023    HGB 15.1 11/02/2023    HCT 45.7 11/02/2023    MCV 95 11/02/2023     11/02/2023    RDW 12.7 11/02/2023       CMP:   Lab Results   Component Value Date    K 3.9 11/02/2023     11/02/2023    CO2 25 11/02/2023    BUN 22 11/02/2023    CREATININE 1.01 11/02/2023    EGFR 78 11/02/2023    GLUCOSE 147 (H) 09/12/2022    CALCIUM 10.1 11/02/2023    AST 16 11/02/2023    ALT 14 11/02/2023    ALKPHOS 110 (H) 11/02/2023       Magnesium:  Lab Results   Component Value Date    MG 2.0 09/14/2022       Lipid Profile:   Lab Results   Component Value Date    HDL 35 (L) 09/08/2022    TRIG 173 (H) 09/08/2022    LDLCALC 62 09/08/2022       Thyroid Studies: No results found for: \"[id number]\", \"T3FREE\", \"FREET4\", \"W0OSNDM\", \"G0AYLJM\"    A1c:  No components found for: \"HGA1C\"    INR:  Lab Results   Component Value Date    INR 0.91 11/02/2023    INR 0.95 09/06/2022    INR 0.91 09/06/2022   5    Imaging: Cardiac EP device report    Result Date: 7/20/2022  Narrative: MDT DUAL ICD/ ACTIVE SYSTEM IS MRI CONDITIONAL DEVICE INTERROGATED IN THE [place] OFFICE. BATTERY VOLTAGE ADEQUATE (13 YRS). AP-5%, -0.5%. ALL LEAD PARAMETERS WITHIN NORMAL LIMITS. 58  BRIEF NSVT & 15 HIGH RATE-NS EPISODES >200 BPM, MOST RECENT FOR 5 BEATS, AVG CL~210MS. PT ASYMPTOMATIC W/ EPISODES. PT WAS TAKING BOTH METOPROLOL TARTRATE & SUCCINATE IN ERROR. PT INFORMED NOT TO TAKE " TARTRATE, ONLY SUCCINATE. NEW RX GIVEN. OPTIVOL INITIALIZING. NORMAL DEVICE FUNCTION. WOUND CHECK: INCISION CLEAN AND DRY WITH EDGES APPROXIMATED; WOUND CARE AND RESTRICTIONS REVIEWED WITH PATIENT. GV       Cardiac testing:   No results found for this or any previous visit.    No results found for this or any previous visit.    No results found for this or any previous visit.    No results found for this or any previous visit.      XR foot 3+ vw left  Narrative: LEFT FOOT    INDICATION:   S97.82XA: Crushing injury of left foot, initial encounter.    COMPARISON:  None    VIEWS:  XR FOOT 3+ VW LEFT    FINDINGS:    There is no acute fracture or dislocation.    Calcaneal spur(s) noted.    No lytic or blastic osseous lesion.    Soft tissues are unremarkable.  Impression: No acute osseous abnormality.    Workstation performed: [id number]

## 2024-03-18 ENCOUNTER — APPOINTMENT (OUTPATIENT)
Dept: RADIOLOGY | Facility: CLINIC | Age: 64
End: 2024-03-18
Payer: OTHER MISCELLANEOUS

## 2024-03-18 ENCOUNTER — OCCMED (OUTPATIENT)
Dept: URGENT CARE | Facility: CLINIC | Age: 64
End: 2024-03-18
Payer: OTHER MISCELLANEOUS

## 2024-03-18 DIAGNOSIS — S49.92XA INJURY OF LEFT SHOULDER, INITIAL ENCOUNTER: Primary | ICD-10-CM

## 2024-03-18 DIAGNOSIS — S49.92XA INJURY OF LEFT SHOULDER, INITIAL ENCOUNTER: ICD-10-CM

## 2024-03-18 PROCEDURE — 73030 X-RAY EXAM OF SHOULDER: CPT

## 2024-03-26 ENCOUNTER — APPOINTMENT (OUTPATIENT)
Dept: URGENT CARE | Facility: CLINIC | Age: 64
End: 2024-03-26
Payer: OTHER MISCELLANEOUS

## 2024-03-26 PROCEDURE — 99213 OFFICE O/P EST LOW 20 MIN: CPT

## 2024-04-29 ENCOUNTER — OFFICE VISIT (OUTPATIENT)
Dept: OBGYN CLINIC | Facility: CLINIC | Age: 64
End: 2024-04-29
Payer: COMMERCIAL

## 2024-04-29 VITALS
DIASTOLIC BLOOD PRESSURE: 90 MMHG | HEIGHT: 73 IN | HEART RATE: 90 BPM | BODY MASS INDEX: 27.83 KG/M2 | WEIGHT: 210 LBS | SYSTOLIC BLOOD PRESSURE: 146 MMHG

## 2024-04-29 DIAGNOSIS — M75.22 BICEPS TENDINITIS OF LEFT UPPER EXTREMITY: Primary | ICD-10-CM

## 2024-04-29 PROCEDURE — 3080F DIAST BP >= 90 MM HG: CPT | Performed by: ORTHOPAEDIC SURGERY

## 2024-04-29 PROCEDURE — 20610 DRAIN/INJ JOINT/BURSA W/O US: CPT | Performed by: ORTHOPAEDIC SURGERY

## 2024-04-29 PROCEDURE — 3077F SYST BP >= 140 MM HG: CPT | Performed by: ORTHOPAEDIC SURGERY

## 2024-04-29 PROCEDURE — 99213 OFFICE O/P EST LOW 20 MIN: CPT | Performed by: ORTHOPAEDIC SURGERY

## 2024-04-29 RX ORDER — TRIAMCINOLONE ACETONIDE 40 MG/ML
80 INJECTION, SUSPENSION INTRA-ARTICULAR; INTRAMUSCULAR
Status: COMPLETED | OUTPATIENT
Start: 2024-04-29 | End: 2024-04-29

## 2024-04-29 RX ORDER — BUPIVACAINE HYDROCHLORIDE 2.5 MG/ML
4 INJECTION, SOLUTION INFILTRATION; PERINEURAL
Status: COMPLETED | OUTPATIENT
Start: 2024-04-29 | End: 2024-04-29

## 2024-04-29 RX ADMIN — TRIAMCINOLONE ACETONIDE 80 MG: 40 INJECTION, SUSPENSION INTRA-ARTICULAR; INTRAMUSCULAR at 14:45

## 2024-04-29 RX ADMIN — BUPIVACAINE HYDROCHLORIDE 4 ML: 2.5 INJECTION, SOLUTION INFILTRATION; PERINEURAL at 14:45

## 2024-04-29 NOTE — PROGRESS NOTES
Assessment/Plan:   Diagnoses and all orders for this visit:    Biceps tendinitis of left upper extremity  -     Large joint arthrocentesis         Discussed with patient that his exam is consistent with biceps tendinitis of his left shoulder. He was offered and accepted an injection(s) of Kenalog and Marcaine to his Left biceps(s) for symptomatic relief of pain and inflammation. Patient tolerated the treatment(s) well. Ice and post injection protocol advised. Weightbearing activities as tolerated. He will be seen for follow-up 8 weeks for re-evaluation and consideration for repeat injections as necessary. Patient expresses understanding and is in agreement with this treatment plan. The patient was given the opportunity to ask questions or present concerns.    The patient has biceps tendinitis of his left shoulder.  The biceps tendon sheath was injected with Kenalog and Marcaine.  He tolerated procedure quite well.  Return back in 2 months for reevaluation      Subjective:   Patient ID: Patel Mandel  1960     HPI  Patient is a 64 y.o. male who presents for initial evaluation of his left shoulder. The patient reports pain in his shoulder for several weeks. He cannot recall any mechanism of injury or trauma. He reports pain in the anterior shoulder that extends into her upper arm. He reports occasional tingling in his left hand, however he states that it is not related to the upper arm pain. He denies issues with mobility, he denies weakness. He states that he has seen a     The following portions of the patient's history were reviewed and updated as appropriate:  Past medical history, past surgical history, Family history, social history, current medications and allergies    Past Medical History:   Diagnosis Date    PERCY (acute kidney injury) (HCC)     Bilateral inguinal hernia     CAD (coronary artery disease)     s/p CABG x3 LIMA-LAD, SVG-RCA, SVG-D1 9/12/2022    Cardiac arrest with ventricular fibrillation  ([facility])     Dizziness 02/02/2021    isolated incident of dizziness, sweating & disorientation    Elevated LFTs     History of left heart catheterization     and IABP, ICD PLACEMENT, DUAL CHAMBER, [employer]    Hyperglycemia     Hypertension     Pneumonia     STEMI (ST elevation myocardial infarction) ([facility])     VT (ventricular tachycardia) ([facility])        Past Surgical History:   Procedure Laterality Date    CARDIAC CATHETERIZATION N/A 6/29/2022    Procedure: Cardiac pci;  Surgeon: Tristian Edwards MD;  [facility];  Service: Cardiology    CARDIAC CATHETERIZATION N/A 6/29/2022    Procedure: Cardiac iabp;  Surgeon: Tristian Edwards MD;  [facility];  Service: Cardiology    CARDIAC CATHETERIZATION N/A 9/9/2022    Procedure: CARDIAC CATHETERIZATION;  Surgeon: Eduard Charles DO;  [facility];  Service: Cardiology    CARDIAC ELECTROPHYSIOLOGY PROCEDURE N/A 7/5/2022    Procedure: Cardiac icd implant;  Surgeon: Mike Ya MD;  [facility];  Service: Cardiology    CORONARY ARTERY BYPASS GRAFT N/A 9/12/2022    Procedure: CORONARY ARTERY BYPASS GRAFT (CABG) x 3, SWIFT TO LAD, L LEG EVH/SVG TO RCA  AND DIAGONAL;  Surgeon: GABRIEL Steel MD;  Location: [facility];  Service: Cardiac Surgery    HARVEST VEIN Left 9/12/2022    Procedure: HARVEST VEIN ENDOSCOPIC (EVH);  Surgeon: GABRIEL Steel MD;  [facility]: [facility];  Service: Cardiac Surgery    FL RPR 1ST INGUN HRNA AGE 5 YRS/> REDUCIBLE Bilateral 2/9/2021    Procedure: INGUINAL HERNIA REPAIR with mesh;  Surgeon: Alejo Coyne MD;  Location:  MAIN OR;  Service: General    TONSILLECTOMY         Family History   Problem Relation Age of Onset    Diabetes Maternal Grandfather        Social History     Socioeconomic History    Marital status: /Civil Union     Spouse name: None    Number of children: None    Years of education: None    Highest education level: None   Occupational History     None   Tobacco Use    Smoking status: Every Day     Current packs/day: 0.25     Types: Cigarettes    Smokeless tobacco: Never   Vaping Use    Vaping status: Never Used   Substance and Sexual Activity    Alcohol use: Not Currently    Drug use: Not Currently    Sexual activity: Not Currently     Partners: Female   Other Topics Concern    None   Social History Narrative    None     Social Determinants of Health     Financial Resource Strain: Not on file   Food Insecurity: No Food Insecurity (9/11/2022)    Hunger Vital Sign     Worried About Running Out of Food in the Last Year: Never true     Ran Out of Food in the Last Year: Never true   Transportation Needs: No Transportation Needs (9/11/2022)    PRAPARE - Transportation     Lack of Transportation (Medical): No     Lack of Transportation (Non-Medical): No   Physical Activity: Not on file   Stress: Not on file   Social Connections: Not on file   Intimate Partner Violence: Not on file   Housing Stability: Low Risk  (9/11/2022)    Housing Stability Vital Sign     Unable to Pay for Housing in the Last Year: No     Number of Places Lived in the Last Year: 1     Unstable Housing in the Last Year: No         Current Outpatient Medications:     aspirin 81 mg chewable tablet, Chew 1 tablet (81 mg total) daily Chew and swallow, Disp: 30 tablet, Rfl: 5    atorvastatin (LIPITOR) 80 mg tablet, Take 1 tablet (80 mg total) by mouth daily with dinner, Disp: 30 tablet, Rfl: 5    lisinopril (ZESTRIL) 2.5 mg tablet, TAKE 1 TABLET BY MOUTH AT BEDTIME, Disp: 30 tablet, Rfl: 5    lidocaine (Lidoderm) 5 %, Apply 1 patch topically over 12 hours daily Remove & Discard patch within 12 hours or as directed by MD (Patient not taking: Reported on 4/29/2024), Disp: 30 patch, Rfl: 0    meclizine (ANTIVERT) 25 mg tablet, Take 1 tablet (25 mg total) by mouth 3 (three) times a day as needed for dizziness (Patient not taking: Reported on 4/29/2024), Disp: 30 tablet, Rfl: 0    methocarbamol  "(ROBAXIN) 500 mg tablet, Take 1 tablet (500 mg total) by mouth 3 (three) times a day as needed for muscle spasms (Patient not taking: Reported on 4/29/2024), Disp: 15 tablet, Rfl: 0    metoprolol succinate (TOPROL-XL) 50 mg 24 hr tablet, Take 1 tablet (50 mg total) by mouth daily (Patient not taking: Reported on 4/29/2024), Disp: 30 tablet, Rfl: 5    No Known Allergies    Review of Systems   Constitutional:  Negative for chills and fever.   HENT:  Negative for ear pain and sore throat.    Eyes:  Negative for pain and visual disturbance.   Respiratory:  Negative for cough and shortness of breath.    Cardiovascular:  Negative for chest pain and palpitations.   Gastrointestinal:  Negative for abdominal pain and vomiting.   Genitourinary:  Negative for dysuria and hematuria.   Musculoskeletal:  Negative for arthralgias and back pain.   Skin:  Negative for color change and rash.   Neurological:  Negative for seizures and syncope.   All other systems reviewed and are negative.       Objective:  /90 (BP Location: Left arm, Patient Position: Sitting, Cuff Size: Large)   Pulse 90   Ht 6' 1\" (1.854 m)   Wt 95.3 kg (210 lb)   BMI 27.71 kg/m²     Ortho Exam  left Shoulder -   No anatomical deformity  Skin is warm and dry to touch with no signs of erythema, ecchymosis, or infection  No soft tissue swelling or effusion noted  No Palpable crepitus with passive motion  TTP over trapezius with hypertonicity   Mildly TTP over AC joint  TTP over biceps tendon   ROM °, °, ER 90°, IR 90°  Strength: 5/5 throughout  No glenohumeral instability appreciated on exam  - Neer's, - Marinelli  - empty can, - drop-arm, - resisted external rotation, - belly press, - lift-off  Demonstrates normal elbow, wrist, and finger motion  2+  distal radial pulse with brisk capillary refill to the fingers  Radial, median, ulnar and motor  and sensory distribution intact  Sensation to light touch intact distally      Physical Exam  HENT: "      Head: Normocephalic and atraumatic.      Nose: Nose normal.   Eyes:      Conjunctiva/sclera: Conjunctivae normal.   Cardiovascular:      Rate and Rhythm: Normal rate.   Pulmonary:      Effort: Pulmonary effort is normal.   Musculoskeletal:      Cervical back: Neck supple.   Skin:     General: Skin is warm and dry.      Capillary Refill: Capillary refill takes less than 2 seconds.   Neurological:      Mental Status: He is alert and oriented to person, place, and time.   Psychiatric:         Mood and Affect: Mood normal.         Behavior: Behavior normal.          Diagnostic Test Review:    X-Ray of left shoulder obtained on 3/18/2024 were reviewed and demonstrate moderate acromioclavicular osteoarthritis. No acute osseous abnormality.       Large joint arthrocentesis  Universal Protocol:  Consent: Verbal consent obtained.  Risks and benefits: risks, benefits and alternatives were discussed  Consent given by: patient  Timeout called at: 4/29/2024 3:18 PM.  Patient understanding: patient states understanding of the procedure being performed  Site marked: the operative site was marked  Patient identity confirmed: verbally with patient  Supporting Documentation  Indications: pain and joint swelling   Procedure Details  Location: shoulder (L Biceps tendon sheath) -   Needle gauge: 21 G.  Ultrasound guidance: no  Approach: anterior  Medications administered: 4 mL bupivacaine 0.25 %; 80 mg triamcinolone acetonide 40 mg/mL    Patient tolerance: patient tolerated the procedure well with no immediate complications  Dressing:  Sterile dressing applied                Scribe Attestation      I,:  Hannah Mistry am acting as a scribe while in the presence of the attending physician.:       I,:  James Harp DO personally performed the services described in this documentation    as scribed in my presence.:

## 2024-05-28 DIAGNOSIS — I25.5 ISCHEMIC CARDIOMYOPATHY: ICD-10-CM

## 2024-05-28 DIAGNOSIS — Z95.1 S/P CABG X 3: ICD-10-CM

## 2024-05-28 DIAGNOSIS — Z95.1 S/P CABG (CORONARY ARTERY BYPASS GRAFT): ICD-10-CM

## 2024-05-29 RX ORDER — LISINOPRIL 2.5 MG/1
TABLET ORAL
Qty: 30 TABLET | Refills: 5 | Status: SHIPPED | OUTPATIENT
Start: 2024-05-29

## 2024-05-29 RX ORDER — ATORVASTATIN CALCIUM 80 MG/1
TABLET, FILM COATED ORAL
Qty: 30 TABLET | Refills: 0 | Status: SHIPPED | OUTPATIENT
Start: 2024-05-29

## 2024-05-29 NOTE — TELEPHONE ENCOUNTER
Patient needs updated blood work and has previously placed orders (lipid panel). Please contact patient to go for labs.   30D courtesy refill provided - atorvastatin

## 2024-06-04 DIAGNOSIS — I25.5 ISCHEMIC CARDIOMYOPATHY: ICD-10-CM

## 2024-06-04 DIAGNOSIS — Z95.1 S/P CABG X 3: ICD-10-CM

## 2024-06-04 RX ORDER — METOPROLOL SUCCINATE 50 MG/1
50 TABLET, EXTENDED RELEASE ORAL DAILY
Qty: 30 TABLET | Refills: 5 | Status: SHIPPED | OUTPATIENT
Start: 2024-06-04

## 2024-06-19 ENCOUNTER — REMOTE DEVICE CLINIC VISIT (OUTPATIENT)
Dept: CARDIOLOGY CLINIC | Facility: CLINIC | Age: 64
End: 2024-06-19
Payer: COMMERCIAL

## 2024-06-19 DIAGNOSIS — Z95.810 AICD (AUTOMATIC CARDIOVERTER/DEFIBRILLATOR) PRESENT: Primary | ICD-10-CM

## 2024-06-19 PROCEDURE — 93295 DEV INTERROG REMOTE 1/2/MLT: CPT | Performed by: INTERNAL MEDICINE

## 2024-06-19 PROCEDURE — 93296 REM INTERROG EVL PM/IDS: CPT | Performed by: INTERNAL MEDICINE

## 2024-06-19 NOTE — PROGRESS NOTES
MDT DUAL ICD/ ACTIVE SYSTEM IS MRI CONDITIONAL   CARELINK TRANSMISSION: BATTERY VOLTAGE ADEQUATE (11.1 YRS). SINCE 12/18/2023: AP 4.5%  <0.1% (AAI-DDD 60PPM); ALL AVAILABLE LEAD PARAMETERS WITHIN NORMAL LIMITS. 8 VT-NS EPISODES WITH AVAILABLE EGM'S SHOWING NSVT, SVT - MOST RECENT EPISODE NSVT 10 BEATS @  BPM; NO THERAPY; PATIENT TAKING ASA 81, METOPROLOL SUCC; EF 35% (4/2023 ECHO); OPTI-VOL WITHIN NORMAL LIMITS. NORMAL DEVICE FUNCTION.  ES

## 2024-06-25 DIAGNOSIS — I25.10 CORONARY ARTERY DISEASE INVOLVING NATIVE CORONARY ARTERY OF NATIVE HEART WITHOUT ANGINA PECTORIS: ICD-10-CM

## 2024-06-25 RX ORDER — ASPIRIN 81 MG
TABLET,CHEWABLE ORAL
Qty: 30 TABLET | Refills: 5 | Status: SHIPPED | OUTPATIENT
Start: 2024-06-25

## 2024-08-12 ENCOUNTER — APPOINTMENT (OUTPATIENT)
Dept: LAB | Facility: CLINIC | Age: 64
End: 2024-08-12
Payer: COMMERCIAL

## 2024-08-12 DIAGNOSIS — I25.5 ISCHEMIC CARDIOMYOPATHY: ICD-10-CM

## 2024-08-12 DIAGNOSIS — Z95.1 S/P CABG X 3: ICD-10-CM

## 2024-08-12 LAB
CHOLEST SERPL-MCNC: 114 MG/DL
HDLC SERPL-MCNC: 37 MG/DL
LDLC SERPL CALC-MCNC: 39 MG/DL (ref 0–100)
TRIGL SERPL-MCNC: 190 MG/DL

## 2024-08-12 PROCEDURE — 36415 COLL VENOUS BLD VENIPUNCTURE: CPT

## 2024-08-12 PROCEDURE — 80061 LIPID PANEL: CPT

## 2024-08-19 DIAGNOSIS — Z95.1 S/P CABG (CORONARY ARTERY BYPASS GRAFT): ICD-10-CM

## 2024-08-19 RX ORDER — ATORVASTATIN CALCIUM 80 MG/1
TABLET, FILM COATED ORAL
Qty: 30 TABLET | Refills: 5 | Status: SHIPPED | OUTPATIENT
Start: 2024-08-19 | End: 2024-08-28

## 2024-08-19 NOTE — TELEPHONE ENCOUNTER
Reason for call:   [x] Refill   [] Prior Auth  [] Other:     Office:   [] PCP/Provider -   [x] Specialty/Provider - CARDIO ASSCRISTI MUNSON     Medication:     atorvastatin (LIPITOR) 80 mg 1 tablet daily          Pharmacy: [facility]     Does the patient have enough for 3 days?   [x] Yes   [] No - Send as HP to POD

## 2024-08-28 ENCOUNTER — OFFICE VISIT (OUTPATIENT)
Dept: CARDIOLOGY CLINIC | Facility: CLINIC | Age: 64
End: 2024-08-28
Payer: COMMERCIAL

## 2024-08-28 VITALS
SYSTOLIC BLOOD PRESSURE: 126 MMHG | WEIGHT: 211 LBS | HEIGHT: 73 IN | BODY MASS INDEX: 27.96 KG/M2 | DIASTOLIC BLOOD PRESSURE: 86 MMHG | HEART RATE: 90 BPM

## 2024-08-28 DIAGNOSIS — I47.29 NSVT (NONSUSTAINED VENTRICULAR TACHYCARDIA) (HCC): ICD-10-CM

## 2024-08-28 DIAGNOSIS — E66.3 OVERWEIGHT (BMI 25.0-29.9): ICD-10-CM

## 2024-08-28 DIAGNOSIS — I25.5 ISCHEMIC CARDIOMYOPATHY: ICD-10-CM

## 2024-08-28 DIAGNOSIS — Z95.810 AICD (AUTOMATIC CARDIOVERTER/DEFIBRILLATOR) PRESENT: ICD-10-CM

## 2024-08-28 DIAGNOSIS — Z95.1 S/P CABG X 3: Primary | ICD-10-CM

## 2024-08-28 DIAGNOSIS — E78.2 MIXED HYPERLIPIDEMIA: ICD-10-CM

## 2024-08-28 PROCEDURE — 99214 OFFICE O/P EST MOD 30 MIN: CPT | Performed by: INTERNAL MEDICINE

## 2024-08-28 RX ORDER — ATORVASTATIN CALCIUM 80 MG/1
80 TABLET, FILM COATED ORAL DAILY
Qty: 90 TABLET | Refills: 3 | Status: SHIPPED | OUTPATIENT
Start: 2024-08-28

## 2024-08-28 RX ORDER — LISINOPRIL 2.5 MG/1
TABLET ORAL
Qty: 90 TABLET | Refills: 3 | Status: SHIPPED | OUTPATIENT
Start: 2024-08-28

## 2024-08-28 RX ORDER — METOPROLOL SUCCINATE 50 MG/1
50 TABLET, EXTENDED RELEASE ORAL DAILY
Qty: 90 TABLET | Refills: 3 | Status: SHIPPED | OUTPATIENT
Start: 2024-08-28

## 2024-08-28 NOTE — PROGRESS NOTES
[facility] [place]  [address] [facility] [address] [phone]                                                Cardiology Office Follow up  Patel Mandel, 64 y.o. male  YOB: 1960  MRN: [id number] Encounter: [id number]      PCP - No primary care provider on file.  Referring Provider - No ref. provider found    Chief Complaint   Patient presents with   • routine       Assessment  S/p CABGx3  CABGx3 (9/12/2022) - LIMA-LAD,SVG to RCA, SVG-D1  LHC - 6/29/22 - mLAD 50%, dLAD 70%, D1 60%, D2 60%, OM1 50%, OM3 99%, mRCA 60%, RPL3 60%  Ischemic cardiomyopathy, EF 44%  Echo - 6/29/22 - LVEF 44%, GLS -13%, Grade 1 DD, akinesis of apical segments  Echo - 9/8/22 - LVEF 40%, dyskinetic mid-apical anteroseptal/apical walls  S/p ICD placement  For Vfib cardiac arrest, in setting of STEMI, but 100% lesion was found  Hyperlipidemia  Current smoker  Overweight, Body mass index is 27.84 kg/m².     Plan  CAD s/p CABG x3 (9/2022)  Continues to do well post CABG and remains free of chest pain or shortness of breath  Compliant with medical therapy  Continue aspirin, metoprolol succinate 50 mg daily, atorvastatin 80 mg daily   Again emphasized need to quit smoking , but insists that that is what keeps him from going crazy    Ischemic cardiomyopathy, EF 40%, h/o VT/VF cardiac arrest  Remains clinically euvolemic and asymptomatic  Medical therapy  ACE/ARNI - Entresto was previously too expensive --> on lisinopril 2.5 mg daily  BB: Continue metoprolol succinate 50 mg daily  Not on diuretic therapy  Device therapy  ICD in place for secondary prevention -->   Continues to have few short runs of NSVT, asymptomatic --> monitor, continue BB    NSVT, S/p ICD, for VFib cardiac arrest  Overview  6/2022: VFib in setting of inferior STEMI - severe distal lad/om 3 disease, which could not be revascularized --> S/p ICD placement  9/3/22: had another VF episode treated with shock -->  went to the hospital had repeat C - multi-vessel CAD --> underwent CABG  7/2023 - had frequent NSVT episodes, asymptomatic --> amiodarone decrease to 100 mg daily  8/2023: asymptomatic  12/2023: no events. Normal device function  2/2024: amiodarone discontinued  8/2024: had 8 short runs of NSVT on last device check in June, asymptomatic  Impression  No sustained VT, or any symptomatic NSVT  Plan  He remains off amiodarone and discontinued be free of any sustained VT  He does have a few short runs of NSVT, which we will continue to monitor on the device checks  Continue metoprolol succinate 50 mg daily  Follow up CMP    Hyperlipidemia    He finally completed a follow-up lipid panel a couple of weeks ago and cholesterol levels are overall much improved  Triglycerides remain mildly elevated, but overall much improved from prior  Counseled regarding diet modifications to help with same  He drinks a daily snapple apple, which contains 21 g of added sugar in 8oz bottle itself --> counseled regarding need to cut down or stop  Continue atorvastatin 80 mg daily      No results found for this visit on 08/28/24.    Orders Placed This Encounter   Procedures   • Lipid Panel with Direct LDL reflex   • Comprehensive metabolic panel         Return in about 1 year (around 8/28/2025), or if symptoms worsen or fail to improve.      History of Present Illness   64 y.o. male , previously worked as a truck  and now works with thermoplastic, comes in for establishment of care after recent hospitalization involving a cardiac arrest. He previously worked in the [employer].    On 06/29/2022, patient was at work at the [employer], and he was feeling unwell.  He himself does not have full recollection of the events that led up to his hospitalization, but per available chart/EMS documentation he was apparently having chest pain for 3 days, which was recurring on the day of presentation.  He apparently went upstairs  in to another office and EMS was called.  On arrival of EMS, he reported numbness in his left arm and was otherwise feeling well.  Initial ECG did not reveal a STEMI and he did not have chest pain at that time.  He was subsequently taken to [facility] ED, but on arrival to the ED, he went in to VFib cardiac arrest and received several shocks with the assistance of the ED physician.  He received CPR/ACLS and was initiated on vasopressor support with return of ROSC.  Post this, he was found to have junctional rhythm with inferior STEMI and an MI alert was initiated. He was found to have multivessel coronary artery disease, but no 100% stenosis could be found. He did have severe lesions in distal LAD and OM3, which were felt to be small blood vessels - not amenable to PCI.  A balloon pump was placed and he was transferred to [facility] ICU for further care.  He was subsequently evaluated by the heart failure service and vasopressors/IABP were weaned off and he did well on medical therapy.  He underwent a secondary prevention ICD placement and was eventually discharged with medical therapy.    Since discharge from the hospital he has not had any further recurrences of chest pain, although he does report continued mild symptoms of heartburn at the end of the day, for which he takes Tums with resolution of symptoms.  He followed up with his PCP and is now here to see me for establishment of care.  I am meeting him today for the 1st time.  He remains compliant with medical therapy, and has been working on cutting down on his smoking.  No further complains of palpitations, dizziness/light-headedness, near syncope or syncope.    Interval history - 11/21/2022  He comes back for follow-up after about 3 months.  In the interim, he followed up initially with [facility] and was reporting increased complaints of chest pain on 8/25/22.  He was started on amlodipine for anti-anginal effect.  But  subsequently he had worsening symptoms and eventually had another ICD shock, and presented back to hospital. He was found to have Vfib on device check and again underwent cardiac catheterization.  This revealed multivessel coronary artery disease and he was recommended CABG evaluation.  He underwent a successful CABGx3, and has recovered very well over the last few months.  No current complains of chest pain, shortness of breath, palpitations or dizziness.    Interval history - 2/13/2023  He comes back for follow-up after about 3 months.  He has been doing very well and has not had any further issues with chest pain or shortness of breath.  He reports feeling the best he has in the last 10 years and is very active at work.  He remains compliant with medical therapy.    Interval history - 8/2/2023  He comes back for follow-up after about 6 months.  In the interim he has been doing very well and has not had any issues with chest pain or shortness of breath.  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  He did have some issues with the remote monitor for his ICD, but this was being sorted out since getting a new bedside monitor at home.  His recent device check did show multiple runs of NSVT in July, but it has been better since then. No further VT/VF recently    Interval history - 2/14/2024  He returns for follow-up after 6 months.  He remains very active, working physical labor-intensive jobs.  He has not had any chest pain or shortness of breath with these and remains compliant with medical therapy.  He is switching jobs again and notes to me that his insurance plan will be changing to a point where his prescriptions will be a lot cheaper.  He unfortunately continues to smoke about a pack a day.    Interval history - 8/28/2024  He returns for follow-up after 6 months.  He continues to do well and does physically  intensive job working building [employer].  He remains compliant with medical therapy, but unfortunately continues to smoke a pack a day.      Historical Information   Past Medical History:   Diagnosis Date   • PERCY (acute kidney injury) ([facility])    • Bilateral inguinal hernia    • CAD (coronary artery disease)     s/p CABG x3 LIMA-LAD, SVG-RCA, SVG-D1 9/12/2022   • Cardiac arrest with ventricular fibrillation (HCC)    • Dizziness 02/02/2021    isolated incident of dizziness, sweating & disorientation   • Elevated LFTs    • History of left heart catheterization     and IABP, ICD PLACEMENT, DUAL CHAMBER, [employer]   • Hyperglycemia    • Hypertension    • Pneumonia    • STEMI (ST elevation myocardial infarction) ([facility])    • VT (ventricular tachycardia) ([facility])      Past Surgical History:   Procedure Laterality Date   • CARDIAC CATHETERIZATION N/A 6/29/2022    Procedure: Cardiac pci;  Surgeon: Tristian Edwards MD;  [facility]: [facility];  Service: Cardiology   • CARDIAC CATHETERIZATION N/A 6/29/2022    Procedure: Cardiac iabp;  Surgeon: Tristian Edwards MD;  [facility];  Service: Cardiology   • CARDIAC CATHETERIZATION N/A 9/9/2022    Procedure: CARDIAC CATHETERIZATION;  Surgeon: Eduard Charles DO;  [facility]: [facility];  Service: Cardiology   • CARDIAC ELECTROPHYSIOLOGY PROCEDURE N/A 7/5/2022    Procedure: Cardiac icd implant;  Surgeon: Mike Ya MD;  [facility]: [facility];  Service: Cardiology   • CORONARY ARTERY BYPASS GRAFT N/A 9/12/2022    Procedure: CORONARY ARTERY BYPASS GRAFT (CABG) x 3, SWIFT TO LAD, L LEG EVH/SVG TO RCA  AND DIAGONAL;  Surgeon: GABRIEL Steel MD;  Location: [facility] MAIN OR;  Service: Cardiac Surgery   • HARVEST VEIN Left 9/12/2022    Procedure: HARVEST VEIN ENDOSCOPIC (EVH);  Surgeon: GABRIEL Steel MD;  Location: [facility];  Service: Cardiac Surgery   • MT RPR 1ST INGUN HRNA AGE 5 YRS/> REDUCIBLE Bilateral 2/9/2021    Procedure:  INGUINAL HERNIA REPAIR with mesh;  Surgeon: Alejo Coyne MD;  Location:  MAIN OR;  Service: General   • TONSILLECTOMY       Family History   Problem Relation Age of Onset   • Diabetes Maternal Grandfather      Current Outpatient Medications on File Prior to Visit   Medication Sig Dispense Refill   • Aspirin Low Dose 81 MG chewable tablet CHEW ONE TABLET DAILY CHEW AND SWALLOW 30 tablet 5   • [DISCONTINUED] atorvastatin (LIPITOR) 80 mg tablet TAKE ONE TABLET BY MOUTH DAILY WITH DINNER 30 tablet 5   • [DISCONTINUED] lisinopril (ZESTRIL) 2.5 mg tablet TAKE ONE TABLET BY MOUTH AT BEDTIME 30 tablet 5   • [DISCONTINUED] metoprolol succinate (TOPROL-XL) 50 mg 24 hr tablet TAKE ONE TABLET BY MOUTH DAILY 30 tablet 5   • lidocaine (Lidoderm) 5 % Apply 1 patch topically over 12 hours daily Remove & Discard patch within 12 hours or as directed by MD (Patient not taking: Reported on 4/29/2024) 30 patch 0   • meclizine (ANTIVERT) 25 mg tablet Take 1 tablet (25 mg total) by mouth 3 (three) times a day as needed for dizziness (Patient not taking: Reported on 4/29/2024) 30 tablet 0   • methocarbamol (ROBAXIN) 500 mg tablet Take 1 tablet (500 mg total) by mouth 3 (three) times a day as needed for muscle spasms (Patient not taking: Reported on 4/29/2024) 15 tablet 0   • [DISCONTINUED] sacubitril-valsartan (Entresto) 24-26 MG TABS Take 1 tablet by mouth 2 (two) times a day 60 tablet 2     No current facility-administered medications on file prior to visit.     No Known Allergies  Social History     Socioeconomic History   • Marital status: /Civil Union     Spouse name: None   • Number of children: None   • Years of education: None   • Highest education level: None   Occupational History   • None   Tobacco Use   • Smoking status: Every Day     Current packs/day: 0.25     Types: Cigarettes   • Smokeless tobacco: Never   Vaping Use   • Vaping status: Never Used   Substance and Sexual Activity   • Alcohol use: Not Currently  "  • Drug use: Not Currently   • Sexual activity: Not Currently     Partners: Female   Other Topics Concern   • None   Social History Narrative   • None     Social Determinants of Health     Financial Resource Strain: Not on file   Food Insecurity: No Food Insecurity (9/11/2022)    Hunger Vital Sign    • Worried About Running Out of Food in the Last Year: Never true    • Ran Out of Food in the Last Year: Never true   Transportation Needs: No Transportation Needs (9/11/2022)    PRAPARE - Transportation    • Lack of Transportation (Medical): No    • Lack of Transportation (Non-Medical): No   Physical Activity: Not on file   Stress: Not on file   Social Connections: Not on file   Intimate Partner Violence: Not on file   Housing Stability: Low Risk  (9/11/2022)    Housing Stability Vital Sign    • Unable to Pay for Housing in the Last Year: No    • Number of Places Lived in the Last Year: 1    • Unstable Housing in the Last Year: No        Review of Systems   All other systems reviewed and are negative.        Vitals:  Vitals:    08/28/24 1450   BP: 126/86   Pulse: 90   Weight: 95.7 kg (211 lb)   Height: 6' 1\" (1.854 m)     BMI - Body mass index is 27.84 kg/m².  Wt Readings from Last 7 Encounters:   08/28/24 95.7 kg (211 lb)   04/29/24 95.3 kg (210 lb)   02/14/24 95.3 kg (210 lb)   01/11/24 96.2 kg (212 lb)   01/08/24 96.2 kg (212 lb)   12/27/23 96.2 kg (212 lb)   11/03/23 96.2 kg (212 lb)       Physical Exam  Vitals and nursing note reviewed.   Constitutional:       General: He is not in acute distress.     Appearance: Normal appearance. He is well-developed. He is not ill-appearing or diaphoretic.   HENT:      Head: Normocephalic and atraumatic.      Nose: No congestion.   Eyes:      General: No scleral icterus.     Conjunctiva/sclera: Conjunctivae normal.   Neck:      Vascular: No carotid bruit or JVD.   Cardiovascular:      Rate and Rhythm: Normal rate and regular rhythm.      Heart sounds: Normal heart sounds. No " "murmur heard.     No friction rub. No gallop.      Comments: Well-healed central sternal surgical scar noted.   Left upper chest wall cardiac device noted in situ  Pulmonary:      Effort: Pulmonary effort is normal. No respiratory distress.      Breath sounds: Normal breath sounds. No wheezing or rales.   Chest:      Chest wall: No tenderness.   Abdominal:      General: There is no distension.      Palpations: Abdomen is soft.      Tenderness: There is no abdominal tenderness.   Musculoskeletal:         General: No swelling, tenderness or deformity.      Cervical back: Neck supple. No muscular tenderness.      Right lower leg: No edema.      Left lower leg: No edema.   Skin:     General: Skin is warm.   Neurological:      General: No focal deficit present.      Mental Status: He is alert and oriented to person, place, and time. Mental status is at baseline.   Psychiatric:         Mood and Affect: Mood normal.         Behavior: Behavior normal.         Thought Content: Thought content normal.           Labs:  CBC:   Lab Results   Component Value Date    WBC 11.56 (H) 11/02/2023    RBC 4.82 11/02/2023    HGB 15.1 11/02/2023    HCT 45.7 11/02/2023    MCV 95 11/02/2023     11/02/2023    RDW 12.7 11/02/2023       CMP:   Lab Results   Component Value Date    K 3.9 11/02/2023     11/02/2023    CO2 25 11/02/2023    BUN 22 11/02/2023    CREATININE 1.01 11/02/2023    EGFR 78 11/02/2023    GLUCOSE 147 (H) 09/12/2022    CALCIUM 10.1 11/02/2023    AST 16 11/02/2023    ALT 14 11/02/2023    ALKPHOS 110 (H) 11/02/2023       Magnesium:  Lab Results   Component Value Date    MG 2.0 09/14/2022       Lipid Profile:   Lab Results   Component Value Date    HDL 37 (L) 08/12/2024    TRIG 190 (H) 08/12/2024    LDLCALC 39 08/12/2024       Thyroid Studies: No results found for: \"ERI2NJEINSTO\", \"T3FREE\", \"FREET4\", \"C5LKPCL\", \"L3TUOUA\"    A1c:  No components found for: \"HGA1C\"    INR:  Lab Results   Component Value Date    INR 0.91 " 11/02/2023    INR 0.95 09/06/2022    INR 0.91 09/06/2022   5    Imaging: Cardiac EP device report    Result Date: 7/20/2022  Narrative: MDT DUAL ICD/ ACTIVE SYSTEM IS MRI CONDITIONAL DEVICE INTERROGATED IN THE [place] OFFICE. BATTERY VOLTAGE ADEQUATE (13 YRS). AP-5%, -0.5%. ALL LEAD PARAMETERS WITHIN NORMAL LIMITS. 58  BRIEF NSVT & 15 HIGH RATE-NS EPISODES >200 BPM, MOST RECENT FOR 5 BEATS, AVG CL~210MS. PT ASYMPTOMATIC W/ EPISODES. PT WAS TAKING BOTH METOPROLOL TARTRATE & SUCCINATE IN ERROR. PT INFORMED NOT TO TAKE TARTRATE, ONLY SUCCINATE. NEW RX GIVEN. OPTIVOL INITIALIZING. NORMAL DEVICE FUNCTION. WOUND CHECK: INCISION CLEAN AND DRY WITH EDGES APPROXIMATED; WOUND CARE AND RESTRICTIONS REVIEWED WITH PATIENT. GV       Cardiac testing:   No results found for this or any previous visit.    No results found for this or any previous visit.    No results found for this or any previous visit.    No results found for this or any previous visit.      Cardiac EP device report  MDT DUAL ICD/ ACTIVE SYSTEM IS MRI CONDITIONAL  CARELINK TRANSMISSION: BATTERY VOLTAGE ADEQUATE (11.1 YRS). SINCE 12/18/2023: AP 4.5%  <0.1% (AAI-DDD 60PPM); ALL AVAILABLE LEAD PARAMETERS WITHIN NORMAL LIMITS. 8 VT-NS EPISODES WITH AVAILABLE EGM'S SHOWING NSVT, SVT - MOST RECENT EPISODE NSVT 10 BEATS @  BPM; NO THERAPY; PATIENT TAKING ASA 81, METOPROLOL SUCC; EF 35% (4/2023 ECHO); OPTI-VOL WITHIN NORMAL LIMITS. NORMAL DEVICE FUNCTION.  ES

## 2024-09-18 ENCOUNTER — REMOTE DEVICE CLINIC VISIT (OUTPATIENT)
Dept: CARDIOLOGY CLINIC | Facility: CLINIC | Age: 64
End: 2024-09-18
Payer: COMMERCIAL

## 2024-09-18 ENCOUNTER — TELEPHONE (OUTPATIENT)
Dept: CARDIOLOGY CLINIC | Facility: CLINIC | Age: 64
End: 2024-09-18

## 2024-09-18 DIAGNOSIS — Z95.810 IMPLANTABLE CARDIOVERTER-DEFIBRILLATOR (ICD) IN SITU: Primary | ICD-10-CM

## 2024-09-18 PROCEDURE — 93295 DEV INTERROG REMOTE 1/2/MLT: CPT | Performed by: INTERNAL MEDICINE

## 2024-09-18 PROCEDURE — 93296 REM INTERROG EVL PM/IDS: CPT | Performed by: INTERNAL MEDICINE

## 2024-09-18 NOTE — TELEPHONE ENCOUNTER
Pt returned call.  No c/o cardiac issues. Weight is stable at 217 lbs.  He says he does get sob/tired when he overworks himself.  No edema.

## 2024-09-18 NOTE — TELEPHONE ENCOUNTER
----- Message from Erika SHORT sent at 9/18/2024 10:42 AM EDT -----  Regarding: crossed optivol  Scheduled remote with opti-vol cross since 9/16/24; no diuretics. Recheck 10/22/24. Thank you.    CARELINK TRANSMISSION: BATTERY VOLTAGE ADEQUATE (10.8 YRS). AP 5.7%   <0.1% (AAI-DDD 60PPM); ALL AVAILABLE LEAD PARAMETERS WITHIN NORMAL LIMITS. 10 VT-NS EPISODES WITH EGM #99 SHOWING NSVT (7 BEATS @  BPM). ALL OTHER EGM'S AVAILABLE FOR PSVT - MOST RECENT EPISODE 10 BEATS @  BPM; EF 35% (4/2023 ECHO); PATIENT TAKING ASA 81, METOPROLOL SUCC; OPTI-VOL FLUID THRESHOLD CROSSED 9/16/24 & ONGOING. NO DIURETICS. TASK TO CHF RN W/RECHECK IN 1 MO. NORMAL DEVICE FUNCTION.  ES

## 2024-09-18 NOTE — TELEPHONE ENCOUNTER
Please call pt and ask if he is having any edema/ wt gain, sob etc.  If he is having cardiac issues , please send to provider.

## 2024-09-18 NOTE — PROGRESS NOTES
MDT DUAL ICD/ ACTIVE SYSTEM IS MRI CONDITIONAL   CARELINK TRANSMISSION: BATTERY VOLTAGE ADEQUATE (10.8 YRS). AP 5.7%   <0.1% (AAI-DDD 60PPM); ALL AVAILABLE LEAD PARAMETERS WITHIN NORMAL LIMITS. 10 VT-NS EPISODES WITH EGM #99 SHOWING NSVT (7 BEATS @  BPM). ALL OTHER EGM'S AVAILABLE FOR PSVT - MOST RECENT EPISODE 10 BEATS @  BPM; EF 35% (4/2023 ECHO); PATIENT TAKING ASA 81, METOPROLOL SUCC; OPTI-VOL FLUID THRESHOLD CROSSED 9/16/24 & ONGOING. NO DIURETICS. TASK TO CHF RN W/RECHECK IN 1 MO. NORMAL DEVICE FUNCTION.  ES

## 2024-10-30 ENCOUNTER — REMOTE DEVICE CLINIC VISIT (OUTPATIENT)
Dept: CARDIOLOGY CLINIC | Facility: CLINIC | Age: 64
End: 2024-10-30
Payer: COMMERCIAL

## 2024-10-30 DIAGNOSIS — Z95.810 PRESENCE OF AUTOMATIC CARDIOVERTER/DEFIBRILLATOR (AICD): Primary | ICD-10-CM

## 2024-10-30 PROCEDURE — 93297 REM INTERROG DEV EVAL ICPMS: CPT | Performed by: INTERNAL MEDICINE

## 2024-10-30 NOTE — PROGRESS NOTES
Results for orders placed or performed in visit on 10/30/24   Cardiac EP device report    Narrative    MDT DUAL ICD/ ACTIVE SYSTEM IS MRI CONDITIONAL  CARELINK TRANSMISSION OPTIVOL: OPTI-VOL RETURNED TO NORMAL ON 10/22. BATTERY VOLTAGE ADEQUATE. (10.7 YRS) AP 9%  1%. ALL AVAILABLE LEAD PARAMETERS WITHIN NORMAL LIMITS. 5 NSVT EPISODES DETECTED MOST RECENT 10 BEATS @ 380ms. NORMAL DEVICE FUNCTION.---MCCLURE

## 2024-12-04 DIAGNOSIS — I25.10 CORONARY ARTERY DISEASE INVOLVING NATIVE CORONARY ARTERY OF NATIVE HEART WITHOUT ANGINA PECTORIS: ICD-10-CM

## 2024-12-05 RX ORDER — ASPIRIN 81 MG
TABLET,CHEWABLE ORAL
Qty: 30 TABLET | Refills: 5 | Status: SHIPPED | OUTPATIENT
Start: 2024-12-05

## 2025-02-19 ENCOUNTER — TELEPHONE (OUTPATIENT)
Dept: GASTROENTEROLOGY | Facility: CLINIC | Age: 65
End: 2025-02-19

## 2025-02-19 NOTE — TELEPHONE ENCOUNTER
Called and left a message for the patient to call back to schedule 3 year repeat colonoscopy with Dr. Arias from [facility]. Sent a letter to the home address.

## 2025-04-15 NOTE — RESPIRATORY THERAPY NOTE
Caller: Blaze Means    ETT 8 o advanced 2 from 26 at lip to 28 at lip per ER Dr view on Xray, once in Cath lab re checked via [facility] and confirmed in good position

## 2025-05-02 ENCOUNTER — REMOTE DEVICE CLINIC VISIT (OUTPATIENT)
Dept: CARDIOLOGY CLINIC | Facility: CLINIC | Age: 65
End: 2025-05-02
Payer: COMMERCIAL

## 2025-05-02 DIAGNOSIS — Z95.1 S/P CABG X 3: ICD-10-CM

## 2025-05-02 DIAGNOSIS — Z95.810 AICD (AUTOMATIC CARDIOVERTER/DEFIBRILLATOR) PRESENT: Primary | ICD-10-CM

## 2025-05-02 PROCEDURE — 93295 DEV INTERROG REMOTE 1/2/MLT: CPT | Performed by: INTERNAL MEDICINE

## 2025-05-02 PROCEDURE — 93296 REM INTERROG EVL PM/IDS: CPT | Performed by: INTERNAL MEDICINE

## 2025-05-02 RX ORDER — ATORVASTATIN CALCIUM 80 MG/1
80 TABLET, FILM COATED ORAL DAILY
Qty: 90 TABLET | Refills: 1 | Status: SHIPPED | OUTPATIENT
Start: 2025-05-02

## 2025-05-03 ENCOUNTER — RESULTS FOLLOW-UP (OUTPATIENT)
Dept: NON INVASIVE DIAGNOSTICS | Facility: HOSPITAL | Age: 65
End: 2025-05-03

## 2025-05-05 NOTE — PROGRESS NOTES
Results for orders placed or performed in visit on 05/02/25   Cardiac EP device report    Narrative    MDT DUAL ICD/ ACTIVE SYSTEM IS MRI CONDITIONAL  CARELINK TRANSMISSION: BATTERY VOLTAGE ADEQUATE (10.3 YRS). AP-4%, <0.1%. ALL AVAILABLE LEAD PARAMETERS WITHIN NORMAL LIMITS. 18 DEVICE CLASSIFIED NSVT EPISODES, NSVT & SVT ON EGM'S, MOST RECENT WAS 14 BEAT NSVT, AVG CL~400MS FOLLOWED BY 3 BEATS OF NSR  & THEN  5 BEAT NSVT, AVG CL~390MS; SVT ON #122 & #120. PT ON ASA 81MG & METOPROLOL. PVC SINGLES COUNT SINCE-10/30/24- 54.8/H OPTI-VOL WITHIN NORMAL LIMITS. NORMAL DEVICE FUNCTION. GV

## 2025-06-16 DIAGNOSIS — Z95.1 S/P CABG X 3: ICD-10-CM

## 2025-06-16 DIAGNOSIS — I25.5 ISCHEMIC CARDIOMYOPATHY: ICD-10-CM

## 2025-06-16 RX ORDER — METOPROLOL SUCCINATE 50 MG/1
50 TABLET, EXTENDED RELEASE ORAL DAILY
Qty: 30 TABLET | Refills: 0 | Status: SHIPPED | OUTPATIENT
Start: 2025-06-16

## 2025-06-16 RX ORDER — LISINOPRIL 2.5 MG/1
2.5 TABLET ORAL
Qty: 30 TABLET | Refills: 0 | Status: SHIPPED | OUTPATIENT
Start: 2025-06-16

## 2025-06-26 ENCOUNTER — TELEPHONE (OUTPATIENT)
Age: 65
End: 2025-06-26

## 2025-06-26 NOTE — TELEPHONE ENCOUNTER
Pt wife called in requesting to switch all the prescriptions from current pharmacy to [facility]. Thanks

## 2025-07-01 DIAGNOSIS — I25.10 CORONARY ARTERY DISEASE INVOLVING NATIVE CORONARY ARTERY OF NATIVE HEART WITHOUT ANGINA PECTORIS: ICD-10-CM

## 2025-07-02 RX ORDER — ASPIRIN 81 MG
TABLET,CHEWABLE ORAL
Qty: 30 TABLET | Refills: 5 | Status: SHIPPED | OUTPATIENT
Start: 2025-07-02

## 2025-08-01 ENCOUNTER — REMOTE DEVICE CLINIC VISIT (OUTPATIENT)
Dept: CARDIOLOGY CLINIC | Facility: CLINIC | Age: 65
End: 2025-08-01
Payer: COMMERCIAL

## 2025-08-01 DIAGNOSIS — Z95.810 PRESENCE OF AUTOMATIC CARDIOVERTER/DEFIBRILLATOR (AICD): Primary | ICD-10-CM

## 2025-08-01 PROCEDURE — 93295 DEV INTERROG REMOTE 1/2/MLT: CPT | Performed by: STUDENT IN AN ORGANIZED HEALTH CARE EDUCATION/TRAINING PROGRAM

## 2025-08-01 PROCEDURE — 93296 REM INTERROG EVL PM/IDS: CPT | Performed by: STUDENT IN AN ORGANIZED HEALTH CARE EDUCATION/TRAINING PROGRAM

## (undated) DEVICE — CATH DIAG 5FR IMPULSE 110CM PIG

## (undated) DEVICE — INTRO SHEATH PEEL AWAY 9 FR

## (undated) DEVICE — BONE WAX WHITE: Brand: BONE WAX WHITE

## (undated) DEVICE — MICROPUNCTURE INTRODUCER SET SILHOUETTE TRANSITIONLESS PUSH-PLUS DESIGN - STIFFENED CANNULA WITH NITINOL WIRE GUIDE: Brand: MICROPUNCTURE

## (undated) DEVICE — GLOVE SRG BIOGEL ECLIPSE 8

## (undated) DEVICE — BLANKET HYPOTHERMIA ADULT GAYMAR

## (undated) DEVICE — SUT SILK 2 60 IN SA8H

## (undated) DEVICE — VASOVIEW HEMOPRO 2: Brand: VASOVIEW HEMOPRO 2

## (undated) DEVICE — GLOVE INDICATOR UNDERGLOVE SZ 7.5 GREEN

## (undated) DEVICE — EVERGRIP INSERT SET 86MM: Brand: FOGARTY EVERGRIP

## (undated) DEVICE — INTENDED FOR TISSUE SEPARATION, AND OTHER PROCEDURES THAT REQUIRE A SHARP SURGICAL BLADE TO PUNCTURE OR CUT.: Brand: BARD-PARKER ® CARBON RIB-BACK BLADES

## (undated) DEVICE — SUT VICRYL 3-0 REEL 54 IN J285G

## (undated) DEVICE — LIGACLIP MCA MULTIPLE CLIP APPLIERS, 20 SMALL CLIPS: Brand: LIGACLIP

## (undated) DEVICE — SUT PROLENE 5-0 C-1/C-1 36 IN 8321H

## (undated) DEVICE — SUT MONOCRYL PLUS 3-0 PS-2 27 IN MCP427H

## (undated) DEVICE — STRL PENROSE DRAIN 18" X 1/4": Brand: CARDINAL HEALTH

## (undated) DEVICE — SPECIMEN CONTAINER STERILE PEEL PACK

## (undated) DEVICE — DRESSING ALLEVYN LIFE HEEL 25 X 25.2CM

## (undated) DEVICE — AORTIC PUNCH 5.2 MM DISP

## (undated) DEVICE — TRAY FOLEY 16FR SURESTEP TEMP SENS URIMETER STAT LOK

## (undated) DEVICE — RADIFOCUS OPTITORQUE ANGIOGRAPHIC CATHETER: Brand: OPTITORQUE

## (undated) DEVICE — CATH STRAIGHT RED RUBBER 20 FR

## (undated) DEVICE — FILTER SMOKE EVAC VIROSAFE

## (undated) DEVICE — TR BAND RADIAL ARTERY COMPRESSION DEVICE: Brand: TR BAND

## (undated) DEVICE — GAUZE SPONGES,16 PLY: Brand: CURITY

## (undated) DEVICE — GLOVE INDICATOR PI UNDERGLOVE SZ 8 BLUE

## (undated) DEVICE — SUT VICRYL PLUS 1 CTB-1 36 IN VCPB947H

## (undated) DEVICE — DGW .035 FC J3MM 260CM TEF: Brand: EMERALD

## (undated) DEVICE — GUIDEWIRE WHOLEY HI TORQUE INTERM MOD J .035 145CM

## (undated) DEVICE — PINNACLE INTRODUCER SHEATH: Brand: PINNACLE

## (undated) DEVICE — ANTIBACTERIAL VIOLET BRAIDED (POLYGLACTIN 910), SYNTHETIC ABSORBABLE SUTURE: Brand: COATED VICRYL

## (undated) DEVICE — ADHESIVE SKIN HIGH VISCOSITY EXOFIN 1ML

## (undated) DEVICE — GLOVE SRG BIOGEL 7

## (undated) DEVICE — 32 FR RIGHT ANGLE – SOFT PVC CATHETER: Brand: PVC THORACIC CATHETERS

## (undated) DEVICE — PLEDGET CARDIO PTFE 9.5 X 4.8 SOFT LF (6EA/PK)

## (undated) DEVICE — PACK CABG PBDS

## (undated) DEVICE — POOLE SUCTION HANDLE W/TUBING: Brand: CARDINAL HEALTH

## (undated) DEVICE — LIGHT HANDLE COVER SLEEVE DISP BLUE STELLAR

## (undated) DEVICE — GLOVE SRG BIOGEL 7.5

## (undated) DEVICE — INTENDED FOR TISSUE SEPARATION, AND OTHER PROCEDURES THAT REQUIRE A SHARP SURGICAL BLADE TO PUNCTURE OR CUT.: Brand: BARD-PARKER SAFETY BLADES SIZE 15, STERILE

## (undated) DEVICE — SUT VICRYL 3-0 SH 27 IN J416H

## (undated) DEVICE — GLIDESHEATH SLENDER STAINLESS STEEL KIT: Brand: GLIDESHEATH SLENDER

## (undated) DEVICE — PLUMEPEN PRO 10FT

## (undated) DEVICE — SYRINGE 10ML LL

## (undated) DEVICE — SUT PROLENE 4-0 BB 36 IN 8581H

## (undated) DEVICE — INTRO SHEATH PEEL AWAY 7FR

## (undated) DEVICE — SILVER-COATED ANTIBACTERIAL BARRIER DRESSING: Brand: ACTICOAT SURGIC 10X12CM 5PK US

## (undated) DEVICE — ALCON OPHTHALMIC KNIFE 15 °: Brand: ALCON

## (undated) DEVICE — BALLOON IABP 8FR 50ML

## (undated) DEVICE — BETHLEHEM UNIVERSAL MINOR GEN: Brand: CARDINAL HEALTH

## (undated) DEVICE — HEMOCLIP CARTRIDGE LRG

## (undated) DEVICE — SUT ETHIBOND 2-0 SH/SH 36 IN X523H

## (undated) DEVICE — 32 FR STRAIGHT – SOFT PVC CATHETER: Brand: PVC THORACIC CATHETERS

## (undated) DEVICE — 3000CC GUARDIAN II: Brand: GUARDIAN

## (undated) DEVICE — ANTIBACTERIAL UNDYED BRAIDED (POLYGLACTIN 910), SYNTHETIC ABSORBABLE SUTURE: Brand: COATED VICRYL

## (undated) DEVICE — RECIP.STERNUM SAW BLADE 34/7.5/0.7MM: Brand: AESCULAP

## (undated) DEVICE — SYRINGE 50ML LL

## (undated) DEVICE — SUT ETHIBOND 2-0 SH-1/SH-1 30 IN X763H

## (undated) DEVICE — 3M™ TEGADERM™ CHG DRESSING 25/CARTON 4 CARTONS/CASE 1659: Brand: TEGADERM™

## (undated) DEVICE — OASIS DRAIN, SINGLE, INLINE & ATS COMPATIBLE: Brand: OASIS

## (undated) DEVICE — SUT MONOCRYL 4-0 PS-2 18 IN Y496G

## (undated) DEVICE — NEEDLE 25G X 1 1/2

## (undated) DEVICE — CATH GUIDE LAUNCHER 6FR EBU 3.5

## (undated) DEVICE — CATH GUIDE LAUNCHER 6FR JR 4.0

## (undated) DEVICE — SILVER-COATED ANTIBACTERIAL BARRIER DRESSING: Brand: ACTICOAT SURGIC 10X25CM 5PK US

## (undated) DEVICE — SUT PROLENE 7-0 BV175-8/BV175-8 24 IN EPM8747

## (undated) DEVICE — SUT VICRYL 2-0 CP-1 27 IN J266H

## (undated) DEVICE — SUT SILK 2-0 SH CR/8 18 IN C012D

## (undated) DEVICE — SUT SILK 0 CT-1 30 IN 424H

## (undated) DEVICE — ACE WRAP 6 IN UNSTERILE

## (undated) DEVICE — SURGICEL NU-KNIT 3 X 4

## (undated) DEVICE — STERNAL WIRE

## (undated) DEVICE — Device: Brand: RETRACT-O-TAPE 18G X 30.5CM BLUNT NEEDLE

## (undated) DEVICE — BLADE BEAVER MINI SZ 69

## (undated) DEVICE — SUT PDS PLUS 1 CTB 36 IN PDPB359T

## (undated) DEVICE — SUT PROLENE 7-0 BV-1/BV-1 24 IN 8304H

## (undated) DEVICE — CATH DIAG 5FR IMPULSE 100CM FR4

## (undated) DEVICE — SUCTION CATH 18 FR

## (undated) DEVICE — SUT MONOCRYL 4-0 PS-2 27 IN Y426H

## (undated) DEVICE — ELECTRODE BLADE E-Z CLEAN 4IN -0014A

## (undated) DEVICE — PENCIL ELECTROSURG E-Z CLEAN -0035H

## (undated) DEVICE — THERMOFLECT BLANKET, L, 25EA                               TS THERMOFLECT BLANKET, 48" X 84", SILVER, 5/BG, 5 BG/CS NW: Brand: THERMOFLECT

## (undated) DEVICE — CHLORAPREP HI-LITE 26ML ORANGE

## (undated) DEVICE — 40601 PROLONGED POSITIONING SYSTEM: Brand: 40601 PROLONGED POSITIONING SYSTEM

## (undated) DEVICE — TUBING INSUFFLATION SET ISO CONNECTOR